# Patient Record
Sex: MALE | Race: WHITE | Employment: OTHER | URBAN - METROPOLITAN AREA
[De-identification: names, ages, dates, MRNs, and addresses within clinical notes are randomized per-mention and may not be internally consistent; named-entity substitution may affect disease eponyms.]

---

## 2017-02-24 ENCOUNTER — HOSPITAL ENCOUNTER (OUTPATIENT)
Dept: WOUND CARE | Age: 62
Discharge: HOME | End: 2017-02-24
Attending: INTERNAL MEDICINE | Admitting: INTERNAL MEDICINE

## 2017-02-24 VITALS
TEMPERATURE: 98.3 F | HEART RATE: 91 BPM | HEIGHT: 70 IN | OXYGEN SATURATION: 93 % | SYSTOLIC BLOOD PRESSURE: 134 MMHG | WEIGHT: 293 LBS | BODY MASS INDEX: 41.95 KG/M2 | DIASTOLIC BLOOD PRESSURE: 78 MMHG | RESPIRATION RATE: 18 BRPM

## 2017-02-24 DIAGNOSIS — L89.301 PRESSURE ULCER OF UNSPECIFIED BUTTOCK, STAGE 1: ICD-10-CM

## 2017-02-24 PROCEDURE — 99213 OFFICE O/P EST LOW 20 MIN: CPT | Performed by: INTERNAL MEDICINE

## 2017-02-24 ASSESSMENT — PAIN DESCRIPTION - PAIN TYPE: TYPE: CHRONIC PAIN

## 2017-02-24 ASSESSMENT — PAIN DESCRIPTION - FREQUENCY: FREQUENCY: CONTINUOUS

## 2017-02-24 ASSESSMENT — PAIN DESCRIPTION - ORIENTATION: ORIENTATION: MID;LOWER

## 2017-02-24 ASSESSMENT — PAIN SCALES - GENERAL: PAINLEVEL_OUTOF10: 8

## 2017-02-24 ASSESSMENT — PAIN DESCRIPTION - ONSET: ONSET: ON-GOING

## 2017-02-24 ASSESSMENT — PAIN DESCRIPTION - DESCRIPTORS: DESCRIPTORS: ACHING;STABBING

## 2017-02-24 ASSESSMENT — PAIN DESCRIPTION - PROGRESSION: CLINICAL_PROGRESSION: NOT CHANGED

## 2017-02-24 ASSESSMENT — PAIN DESCRIPTION - LOCATION: LOCATION: BACK

## 2017-03-01 PROBLEM — L89.301: Status: ACTIVE | Noted: 2017-03-01

## 2017-03-17 PROBLEM — A41.9 SEPSIS (HCC): Status: ACTIVE | Noted: 2017-03-17

## 2017-05-25 ENCOUNTER — HOSPITAL ENCOUNTER (OUTPATIENT)
Dept: WOUND CARE | Age: 62
Discharge: HOME | End: 2017-05-25
Attending: SURGERY | Admitting: SURGERY

## 2017-05-25 VITALS
SYSTOLIC BLOOD PRESSURE: 136 MMHG | BODY MASS INDEX: 39.65 KG/M2 | TEMPERATURE: 98.1 F | HEIGHT: 70 IN | RESPIRATION RATE: 18 BRPM | HEART RATE: 90 BPM | DIASTOLIC BLOOD PRESSURE: 63 MMHG | WEIGHT: 277 LBS

## 2017-05-25 DIAGNOSIS — I83.019 VENOUS ULCER OF RIGHT LEG (HCC): Primary | ICD-10-CM

## 2017-05-25 DIAGNOSIS — L97.919 VENOUS ULCER OF RIGHT LEG (HCC): Primary | ICD-10-CM

## 2017-05-25 RX ORDER — DOXYCYCLINE HYCLATE 100 MG
100 TABLET ORAL 2 TIMES DAILY
Qty: 14 TABLET | Refills: 0 | Status: SHIPPED | OUTPATIENT
Start: 2017-05-25 | End: 2017-07-19 | Stop reason: ALTCHOICE

## 2017-05-25 ASSESSMENT — PAIN SCALES - GENERAL: PAINLEVEL_OUTOF10: 9

## 2017-05-25 ASSESSMENT — PAIN DESCRIPTION - ONSET: ONSET: OTHER (COMMENT)

## 2017-05-25 ASSESSMENT — PAIN DESCRIPTION - DESCRIPTORS: DESCRIPTORS: ACHING;BURNING

## 2017-05-25 ASSESSMENT — PAIN DESCRIPTION - FREQUENCY: FREQUENCY: CONTINUOUS

## 2017-05-25 ASSESSMENT — PAIN DESCRIPTION - ORIENTATION: ORIENTATION: MID;LOWER

## 2017-05-25 ASSESSMENT — PAIN DESCRIPTION - LOCATION: LOCATION: BUTTOCKS;BACK

## 2017-05-25 ASSESSMENT — PAIN DESCRIPTION - PROGRESSION: CLINICAL_PROGRESSION: NOT CHANGED

## 2017-05-25 ASSESSMENT — PAIN DESCRIPTION - PAIN TYPE: TYPE: CHRONIC PAIN

## 2017-06-01 ENCOUNTER — HOSPITAL ENCOUNTER (OUTPATIENT)
Dept: WOUND CARE | Age: 62
Discharge: HOME | End: 2017-06-01
Attending: SURGERY | Admitting: SURGERY

## 2017-06-01 VITALS
BODY MASS INDEX: 39.54 KG/M2 | SYSTOLIC BLOOD PRESSURE: 101 MMHG | DIASTOLIC BLOOD PRESSURE: 62 MMHG | WEIGHT: 276.2 LBS | TEMPERATURE: 98.5 F | HEART RATE: 76 BPM | HEIGHT: 70 IN | RESPIRATION RATE: 18 BRPM

## 2017-06-01 ASSESSMENT — PAIN DESCRIPTION - FREQUENCY: FREQUENCY: CONTINUOUS

## 2017-06-01 ASSESSMENT — PAIN DESCRIPTION - LOCATION: LOCATION: BUTTOCKS

## 2017-06-01 ASSESSMENT — PAIN SCALES - GENERAL: PAINLEVEL_OUTOF10: 7

## 2017-06-01 ASSESSMENT — PAIN DESCRIPTION - DESCRIPTORS: DESCRIPTORS: SORE

## 2017-06-01 ASSESSMENT — PAIN DESCRIPTION - PAIN TYPE: TYPE: CHRONIC PAIN

## 2017-06-01 ASSESSMENT — PAIN DESCRIPTION - ONSET: ONSET: ON-GOING

## 2017-06-01 ASSESSMENT — PAIN DESCRIPTION - ORIENTATION: ORIENTATION: MID

## 2017-06-07 ENCOUNTER — HOSPITAL ENCOUNTER (OUTPATIENT)
Dept: VASCULAR LAB | Age: 62
Discharge: HOME | End: 2017-06-07
Attending: SURGERY | Admitting: SURGERY

## 2017-06-07 DIAGNOSIS — L97.801: ICD-10-CM

## 2017-06-22 ENCOUNTER — HOSPITAL ENCOUNTER (OUTPATIENT)
Dept: WOUND CARE | Age: 62
Discharge: HOME | End: 2017-06-22
Attending: SURGERY | Admitting: SURGERY

## 2017-06-22 VITALS
DIASTOLIC BLOOD PRESSURE: 64 MMHG | HEART RATE: 64 BPM | SYSTOLIC BLOOD PRESSURE: 111 MMHG | TEMPERATURE: 98.3 F | RESPIRATION RATE: 20 BRPM

## 2017-06-22 ASSESSMENT — PAIN DESCRIPTION - DESCRIPTORS: DESCRIPTORS: ACHING

## 2017-06-22 ASSESSMENT — PAIN DESCRIPTION - FREQUENCY: FREQUENCY: CONTINUOUS

## 2017-06-22 ASSESSMENT — PAIN DESCRIPTION - ORIENTATION: ORIENTATION: MID

## 2017-06-22 ASSESSMENT — PAIN DESCRIPTION - ONSET: ONSET: ON-GOING

## 2017-06-22 ASSESSMENT — PAIN DESCRIPTION - LOCATION: LOCATION: LEG;BUTTOCKS

## 2017-06-22 ASSESSMENT — PAIN DESCRIPTION - PROGRESSION: CLINICAL_PROGRESSION: NOT CHANGED

## 2017-06-22 ASSESSMENT — PAIN DESCRIPTION - PAIN TYPE: TYPE: CHRONIC PAIN

## 2017-06-22 ASSESSMENT — PAIN SCALES - GENERAL: PAINLEVEL_OUTOF10: 7

## 2017-06-29 ENCOUNTER — HOSPITAL ENCOUNTER (OUTPATIENT)
Dept: WOUND CARE | Age: 62
Discharge: HOME | End: 2017-06-29
Attending: SURGERY | Admitting: SURGERY

## 2017-06-29 VITALS
WEIGHT: 272 LBS | BODY MASS INDEX: 38.94 KG/M2 | HEIGHT: 70 IN | DIASTOLIC BLOOD PRESSURE: 65 MMHG | TEMPERATURE: 98.7 F | SYSTOLIC BLOOD PRESSURE: 107 MMHG | HEART RATE: 76 BPM | RESPIRATION RATE: 18 BRPM

## 2017-07-12 ENCOUNTER — HOSPITAL ENCOUNTER (OUTPATIENT)
Dept: WOUND CARE | Age: 62
Discharge: HOME | End: 2017-07-12
Attending: INTERNAL MEDICINE | Admitting: INTERNAL MEDICINE

## 2017-07-12 VITALS
TEMPERATURE: 98.2 F | HEART RATE: 73 BPM | DIASTOLIC BLOOD PRESSURE: 59 MMHG | SYSTOLIC BLOOD PRESSURE: 96 MMHG | HEIGHT: 70 IN | RESPIRATION RATE: 20 BRPM

## 2017-07-12 DIAGNOSIS — L97.911 CHRONIC ULCER OF RIGHT LEG, LIMITED TO BREAKDOWN OF SKIN (HCC): ICD-10-CM

## 2017-07-12 DIAGNOSIS — L89.153 PRESSURE ULCER OF COCCYGEAL REGION, STAGE 3 (HCC): ICD-10-CM

## 2017-07-12 PROCEDURE — 97597 DBRDMT OPN WND 1ST 20 CM/<: CPT | Performed by: INTERNAL MEDICINE

## 2017-07-12 ASSESSMENT — PAIN DESCRIPTION - FREQUENCY: FREQUENCY: INTERMITTENT

## 2017-07-12 ASSESSMENT — PAIN DESCRIPTION - PAIN TYPE: TYPE: CHRONIC PAIN

## 2017-07-12 ASSESSMENT — PAIN DESCRIPTION - ORIENTATION: ORIENTATION: MID

## 2017-07-12 ASSESSMENT — PAIN DESCRIPTION - ONSET: ONSET: ON-GOING

## 2017-07-12 ASSESSMENT — PAIN SCALES - GENERAL: PAINLEVEL_OUTOF10: 8

## 2017-07-12 ASSESSMENT — PAIN DESCRIPTION - DESCRIPTORS: DESCRIPTORS: ACHING

## 2017-07-12 ASSESSMENT — PAIN DESCRIPTION - PROGRESSION: CLINICAL_PROGRESSION: NOT CHANGED

## 2017-07-12 ASSESSMENT — PAIN DESCRIPTION - LOCATION: LOCATION: COCCYX

## 2017-07-19 ENCOUNTER — HOSPITAL ENCOUNTER (OUTPATIENT)
Dept: WOUND CARE | Age: 62
Discharge: HOME | End: 2017-07-19
Attending: INTERNAL MEDICINE | Admitting: INTERNAL MEDICINE

## 2017-07-19 VITALS
HEART RATE: 80 BPM | TEMPERATURE: 97.5 F | RESPIRATION RATE: 18 BRPM | DIASTOLIC BLOOD PRESSURE: 64 MMHG | SYSTOLIC BLOOD PRESSURE: 107 MMHG

## 2017-07-19 PROCEDURE — 97597 DBRDMT OPN WND 1ST 20 CM/<: CPT | Performed by: INTERNAL MEDICINE

## 2017-07-20 PROBLEM — L89.301: Status: RESOLVED | Noted: 2017-03-01 | Resolved: 2017-07-20

## 2017-07-20 PROBLEM — L97.911 CHRONIC ULCER OF RIGHT LEG, LIMITED TO BREAKDOWN OF SKIN (HCC): Status: ACTIVE | Noted: 2017-07-20

## 2017-07-20 PROBLEM — A41.9 SEPSIS (HCC): Status: RESOLVED | Noted: 2017-03-17 | Resolved: 2017-07-20

## 2017-07-20 PROBLEM — L89.153 PRESSURE ULCER OF COCCYGEAL REGION, STAGE 3 (HCC): Status: ACTIVE | Noted: 2017-07-20

## 2017-07-26 ENCOUNTER — HOSPITAL ENCOUNTER (OUTPATIENT)
Dept: WOUND CARE | Age: 62
Discharge: HOME | End: 2017-07-26
Attending: INTERNAL MEDICINE | Admitting: INTERNAL MEDICINE

## 2017-07-26 VITALS
HEART RATE: 85 BPM | HEIGHT: 70 IN | RESPIRATION RATE: 20 BRPM | TEMPERATURE: 98.5 F | WEIGHT: 260 LBS | BODY MASS INDEX: 37.22 KG/M2 | SYSTOLIC BLOOD PRESSURE: 102 MMHG | DIASTOLIC BLOOD PRESSURE: 64 MMHG

## 2017-07-26 DIAGNOSIS — L92.9 HYPERGRANULATION: ICD-10-CM

## 2017-07-26 DIAGNOSIS — L97.911 TRAUMATIC ULCER OF RIGHT LOWER LEG, LIMITED TO BREAKDOWN OF SKIN (HCC): ICD-10-CM

## 2017-07-26 PROCEDURE — 11042 DBRDMT SUBQ TIS 1ST 20SQCM/<: CPT | Performed by: INTERNAL MEDICINE

## 2017-07-26 PROCEDURE — 17250 CHEM CAUT OF GRANLTJ TISSUE: CPT | Performed by: INTERNAL MEDICINE

## 2017-07-26 PROCEDURE — 97597 DBRDMT OPN WND 1ST 20 CM/<: CPT | Performed by: INTERNAL MEDICINE

## 2017-07-26 ASSESSMENT — PAIN DESCRIPTION - PAIN TYPE: TYPE: ACUTE PAIN

## 2017-07-26 ASSESSMENT — PAIN DESCRIPTION - LOCATION: LOCATION: COCCYX

## 2017-07-26 ASSESSMENT — PAIN DESCRIPTION - DESCRIPTORS: DESCRIPTORS: BURNING

## 2017-07-26 ASSESSMENT — PAIN DESCRIPTION - ONSET: ONSET: ON-GOING

## 2017-07-26 ASSESSMENT — PAIN SCALES - GENERAL: PAINLEVEL_OUTOF10: 9

## 2017-07-26 ASSESSMENT — PAIN DESCRIPTION - FREQUENCY: FREQUENCY: CONTINUOUS

## 2017-07-26 ASSESSMENT — PAIN DESCRIPTION - ORIENTATION: ORIENTATION: MID

## 2017-08-01 PROBLEM — L92.9 HYPERGRANULATION: Status: ACTIVE | Noted: 2017-08-01

## 2017-08-01 PROBLEM — L97.911 TRAUMATIC ULCER OF RIGHT LOWER LEG, LIMITED TO BREAKDOWN OF SKIN (HCC): Status: ACTIVE | Noted: 2017-08-01

## 2017-08-02 ENCOUNTER — HOSPITAL ENCOUNTER (OUTPATIENT)
Dept: WOUND CARE | Age: 62
Discharge: HOME | End: 2017-08-02
Attending: INTERNAL MEDICINE | Admitting: INTERNAL MEDICINE

## 2017-08-02 VITALS
BODY MASS INDEX: 37.65 KG/M2 | RESPIRATION RATE: 20 BRPM | SYSTOLIC BLOOD PRESSURE: 93 MMHG | TEMPERATURE: 98.5 F | DIASTOLIC BLOOD PRESSURE: 52 MMHG | HEART RATE: 87 BPM | HEIGHT: 70 IN | WEIGHT: 263 LBS

## 2017-08-02 PROCEDURE — 99214 OFFICE O/P EST MOD 30 MIN: CPT | Performed by: INTERNAL MEDICINE

## 2017-08-02 RX ORDER — METRONIDAZOLE 250 MG/1
TABLET ORAL
Qty: 30 TABLET | Refills: 0 | Status: SHIPPED | OUTPATIENT
Start: 2017-08-02 | End: 2017-09-13

## 2017-08-02 RX ORDER — DOXYCYCLINE 100 MG/1
100 TABLET ORAL 2 TIMES DAILY
Qty: 14 TABLET | Refills: 0 | Status: SHIPPED | OUTPATIENT
Start: 2017-08-02 | End: 2017-08-09

## 2017-08-02 ASSESSMENT — PAIN SCALES - GENERAL: PAINLEVEL_OUTOF10: 10

## 2017-08-02 ASSESSMENT — PAIN DESCRIPTION - DESCRIPTORS: DESCRIPTORS: BURNING;SHARP

## 2017-08-02 ASSESSMENT — PAIN DESCRIPTION - PAIN TYPE: TYPE: ACUTE PAIN

## 2017-08-02 ASSESSMENT — PAIN DESCRIPTION - ORIENTATION: ORIENTATION: MID

## 2017-08-02 ASSESSMENT — PAIN DESCRIPTION - LOCATION: LOCATION: BUTTOCKS

## 2017-08-02 ASSESSMENT — PAIN DESCRIPTION - PROGRESSION: CLINICAL_PROGRESSION: GRADUALLY WORSENING

## 2017-08-02 ASSESSMENT — PAIN DESCRIPTION - FREQUENCY: FREQUENCY: CONTINUOUS

## 2017-08-02 ASSESSMENT — PAIN DESCRIPTION - ONSET: ONSET: PROGRESSIVE

## 2017-08-08 PROBLEM — L97.911 TRAUMATIC ULCER OF RIGHT LOWER LEG, LIMITED TO BREAKDOWN OF SKIN (HCC): Status: RESOLVED | Noted: 2017-08-01 | Resolved: 2017-08-08

## 2017-08-08 PROBLEM — L89.45: Status: ACTIVE | Noted: 2017-07-20

## 2017-08-08 PROBLEM — L92.9 HYPERGRANULATION: Status: RESOLVED | Noted: 2017-08-01 | Resolved: 2017-08-08

## 2017-08-09 ENCOUNTER — HOSPITAL ENCOUNTER (OUTPATIENT)
Dept: WOUND CARE | Age: 62
Discharge: HOME | End: 2017-08-09
Attending: INTERNAL MEDICINE | Admitting: INTERNAL MEDICINE

## 2017-08-09 VITALS
DIASTOLIC BLOOD PRESSURE: 88 MMHG | SYSTOLIC BLOOD PRESSURE: 130 MMHG | HEART RATE: 82 BPM | RESPIRATION RATE: 20 BRPM | BODY MASS INDEX: 36.51 KG/M2 | WEIGHT: 255 LBS | TEMPERATURE: 97.9 F | HEIGHT: 70 IN

## 2017-08-09 PROCEDURE — 11042 DBRDMT SUBQ TIS 1ST 20SQCM/<: CPT | Performed by: INTERNAL MEDICINE

## 2017-08-09 ASSESSMENT — PAIN DESCRIPTION - LOCATION: LOCATION: CHEST

## 2017-08-09 ASSESSMENT — PAIN DESCRIPTION - PROGRESSION: CLINICAL_PROGRESSION: NOT CHANGED

## 2017-08-09 ASSESSMENT — PAIN SCALES - GENERAL: PAINLEVEL_OUTOF10: 9

## 2017-08-09 ASSESSMENT — PAIN DESCRIPTION - ORIENTATION: ORIENTATION: MID

## 2017-08-09 ASSESSMENT — PAIN DESCRIPTION - FREQUENCY: FREQUENCY: CONTINUOUS

## 2017-08-09 ASSESSMENT — PAIN DESCRIPTION - PAIN TYPE: TYPE: CHRONIC PAIN

## 2017-08-09 ASSESSMENT — PAIN DESCRIPTION - ONSET: ONSET: ON-GOING

## 2017-08-09 ASSESSMENT — PAIN DESCRIPTION - DESCRIPTORS: DESCRIPTORS: BURNING;SHARP

## 2017-08-16 ENCOUNTER — HOSPITAL ENCOUNTER (OUTPATIENT)
Dept: WOUND CARE | Age: 62
Discharge: HOME | End: 2017-08-16
Attending: INTERNAL MEDICINE | Admitting: INTERNAL MEDICINE

## 2017-08-16 VITALS
HEIGHT: 70 IN | SYSTOLIC BLOOD PRESSURE: 100 MMHG | RESPIRATION RATE: 20 BRPM | DIASTOLIC BLOOD PRESSURE: 68 MMHG | TEMPERATURE: 97.3 F | HEART RATE: 79 BPM

## 2017-08-16 PROCEDURE — 11045 DBRDMT SUBQ TISS EACH ADDL: CPT | Performed by: INTERNAL MEDICINE

## 2017-08-16 PROCEDURE — 11042 DBRDMT SUBQ TIS 1ST 20SQCM/<: CPT | Performed by: INTERNAL MEDICINE

## 2017-08-16 ASSESSMENT — PAIN DESCRIPTION - PROGRESSION: CLINICAL_PROGRESSION: NOT CHANGED

## 2017-08-16 ASSESSMENT — PAIN DESCRIPTION - FREQUENCY: FREQUENCY: CONTINUOUS

## 2017-08-16 ASSESSMENT — PAIN DESCRIPTION - DESCRIPTORS: DESCRIPTORS: ACHING;SORE;TENDER

## 2017-08-16 ASSESSMENT — PAIN DESCRIPTION - PAIN TYPE: TYPE: CHRONIC PAIN

## 2017-08-16 ASSESSMENT — PAIN SCALES - GENERAL: PAINLEVEL_OUTOF10: 7

## 2017-08-16 ASSESSMENT — PAIN DESCRIPTION - ORIENTATION: ORIENTATION: MID

## 2017-08-16 ASSESSMENT — PAIN DESCRIPTION - ONSET: ONSET: ON-GOING

## 2017-08-16 ASSESSMENT — PAIN DESCRIPTION - LOCATION: LOCATION: BUTTOCKS

## 2017-08-21 PROBLEM — L97.911 CHRONIC ULCER OF RIGHT LEG, LIMITED TO BREAKDOWN OF SKIN (HCC): Status: RESOLVED | Noted: 2017-07-20 | Resolved: 2017-08-21

## 2017-08-23 ENCOUNTER — HOSPITAL ENCOUNTER (OUTPATIENT)
Dept: WOUND CARE | Age: 62
Discharge: HOME | End: 2017-08-23
Attending: INTERNAL MEDICINE | Admitting: INTERNAL MEDICINE

## 2017-08-23 VITALS
WEIGHT: 257 LBS | DIASTOLIC BLOOD PRESSURE: 62 MMHG | TEMPERATURE: 97.2 F | BODY MASS INDEX: 36.79 KG/M2 | RESPIRATION RATE: 18 BRPM | HEIGHT: 70 IN | HEART RATE: 69 BPM | SYSTOLIC BLOOD PRESSURE: 101 MMHG

## 2017-08-23 PROCEDURE — 11042 DBRDMT SUBQ TIS 1ST 20SQCM/<: CPT | Performed by: INTERNAL MEDICINE

## 2017-08-23 ASSESSMENT — PAIN SCALES - GENERAL: PAINLEVEL_OUTOF10: 8

## 2017-08-23 ASSESSMENT — PAIN DESCRIPTION - PAIN TYPE: TYPE: CHRONIC PAIN

## 2017-08-23 ASSESSMENT — PAIN DESCRIPTION - FREQUENCY: FREQUENCY: CONTINUOUS

## 2017-08-23 ASSESSMENT — PAIN DESCRIPTION - PROGRESSION: CLINICAL_PROGRESSION: NOT CHANGED

## 2017-08-23 ASSESSMENT — PAIN DESCRIPTION - ONSET: ONSET: ON-GOING

## 2017-08-23 ASSESSMENT — PAIN DESCRIPTION - ORIENTATION: ORIENTATION: MID

## 2017-08-23 ASSESSMENT — PAIN DESCRIPTION - DESCRIPTORS: DESCRIPTORS: BURNING;SHOOTING

## 2017-08-23 ASSESSMENT — PAIN DESCRIPTION - LOCATION: LOCATION: BUTTOCKS

## 2017-08-29 PROBLEM — L89.44 PRESSURE ULCER OF CONTIGUOUS REGION INVOLVING BACK AND BUTTOCK, STAGE 4 (HCC): Status: ACTIVE | Noted: 2017-07-20

## 2017-08-30 ENCOUNTER — HOSPITAL ENCOUNTER (OUTPATIENT)
Dept: WOUND CARE | Age: 62
Discharge: HOME | End: 2017-08-30
Attending: INTERNAL MEDICINE | Admitting: INTERNAL MEDICINE

## 2017-08-30 VITALS
SYSTOLIC BLOOD PRESSURE: 92 MMHG | TEMPERATURE: 97.3 F | HEIGHT: 70 IN | WEIGHT: 261 LBS | RESPIRATION RATE: 14 BRPM | DIASTOLIC BLOOD PRESSURE: 54 MMHG | BODY MASS INDEX: 37.37 KG/M2 | HEART RATE: 68 BPM

## 2017-08-30 LAB
GLUCOSE BLD-MCNC: 175 MG/DL (ref 70–99)
PERFORMED ON: ABNORMAL

## 2017-08-30 PROCEDURE — 11042 DBRDMT SUBQ TIS 1ST 20SQCM/<: CPT | Performed by: INTERNAL MEDICINE

## 2017-08-30 ASSESSMENT — PAIN DESCRIPTION - FREQUENCY: FREQUENCY: CONTINUOUS

## 2017-08-30 ASSESSMENT — PAIN DESCRIPTION - PROGRESSION: CLINICAL_PROGRESSION: NOT CHANGED

## 2017-08-30 ASSESSMENT — PAIN DESCRIPTION - LOCATION: LOCATION: COCCYX

## 2017-08-30 ASSESSMENT — PAIN SCALES - GENERAL: PAINLEVEL_OUTOF10: 7

## 2017-08-30 ASSESSMENT — PAIN DESCRIPTION - DESCRIPTORS: DESCRIPTORS: BURNING

## 2017-08-30 ASSESSMENT — PAIN DESCRIPTION - PAIN TYPE: TYPE: CHRONIC PAIN

## 2017-08-30 ASSESSMENT — PAIN DESCRIPTION - ONSET: ONSET: ON-GOING

## 2017-08-30 ASSESSMENT — PAIN DESCRIPTION - ORIENTATION: ORIENTATION: MID

## 2017-09-06 ENCOUNTER — HOSPITAL ENCOUNTER (OUTPATIENT)
Dept: WOUND CARE | Age: 62
Discharge: HOME | End: 2017-09-06
Attending: INTERNAL MEDICINE | Admitting: INTERNAL MEDICINE

## 2017-09-06 VITALS
RESPIRATION RATE: 17 BRPM | DIASTOLIC BLOOD PRESSURE: 60 MMHG | WEIGHT: 256.2 LBS | SYSTOLIC BLOOD PRESSURE: 90 MMHG | TEMPERATURE: 98.7 F | HEART RATE: 81 BPM | BODY MASS INDEX: 36.68 KG/M2 | HEIGHT: 70 IN

## 2017-09-06 PROCEDURE — 11043 DBRDMT MUSC&/FSCA 1ST 20/<: CPT | Performed by: INTERNAL MEDICINE

## 2017-09-06 RX ORDER — EPLERENONE 25 MG/1
50 TABLET, FILM COATED ORAL DAILY
COMMUNITY
End: 2018-05-18 | Stop reason: ALTCHOICE

## 2017-09-06 ASSESSMENT — PAIN DESCRIPTION - DESCRIPTORS: DESCRIPTORS: BURNING

## 2017-09-06 ASSESSMENT — PAIN DESCRIPTION - ORIENTATION: ORIENTATION: MID

## 2017-09-06 ASSESSMENT — PAIN DESCRIPTION - PROGRESSION: CLINICAL_PROGRESSION: NOT CHANGED

## 2017-09-06 ASSESSMENT — PAIN DESCRIPTION - LOCATION: LOCATION: COCCYX

## 2017-09-06 ASSESSMENT — PAIN DESCRIPTION - ONSET: ONSET: ON-GOING

## 2017-09-06 ASSESSMENT — PAIN SCALES - GENERAL: PAINLEVEL_OUTOF10: 8

## 2017-09-06 ASSESSMENT — PAIN DESCRIPTION - FREQUENCY: FREQUENCY: CONTINUOUS

## 2017-09-06 ASSESSMENT — PAIN DESCRIPTION - PAIN TYPE: TYPE: CHRONIC PAIN

## 2017-09-13 ENCOUNTER — HOSPITAL ENCOUNTER (OUTPATIENT)
Dept: WOUND CARE | Age: 62
Discharge: HOME | End: 2017-09-13
Attending: INTERNAL MEDICINE | Admitting: INTERNAL MEDICINE

## 2017-09-13 VITALS
OXYGEN SATURATION: 93 % | RESPIRATION RATE: 18 BRPM | HEIGHT: 70 IN | SYSTOLIC BLOOD PRESSURE: 106 MMHG | BODY MASS INDEX: 36.08 KG/M2 | TEMPERATURE: 97.6 F | DIASTOLIC BLOOD PRESSURE: 73 MMHG | WEIGHT: 252 LBS | HEART RATE: 80 BPM

## 2017-09-13 PROCEDURE — 11043 DBRDMT MUSC&/FSCA 1ST 20/<: CPT | Performed by: INTERNAL MEDICINE

## 2017-09-13 RX ORDER — PREDNISONE 1 MG/1
4 TABLET ORAL DAILY
COMMUNITY
End: 2017-09-29

## 2017-09-13 RX ORDER — CIPROFLOXACIN 500 MG/1
500 TABLET, FILM COATED ORAL 2 TIMES DAILY
COMMUNITY
End: 2017-10-04 | Stop reason: ALTCHOICE

## 2017-09-13 RX ORDER — METRONIDAZOLE 250 MG/1
TABLET ORAL
Qty: 30 TABLET | Refills: 0 | Status: SHIPPED | OUTPATIENT
Start: 2017-09-13 | End: 2017-12-15

## 2017-09-13 RX ORDER — METOPROLOL SUCCINATE 25 MG/1
25 TABLET, EXTENDED RELEASE ORAL DAILY
COMMUNITY
End: 2018-01-31 | Stop reason: ALTCHOICE

## 2017-09-13 ASSESSMENT — PAIN DESCRIPTION - FREQUENCY: FREQUENCY: CONTINUOUS

## 2017-09-13 ASSESSMENT — PAIN DESCRIPTION - PROGRESSION: CLINICAL_PROGRESSION: NOT CHANGED

## 2017-09-13 ASSESSMENT — PAIN DESCRIPTION - PAIN TYPE: TYPE: CHRONIC PAIN

## 2017-09-13 ASSESSMENT — PAIN DESCRIPTION - ORIENTATION: ORIENTATION: MID

## 2017-09-13 ASSESSMENT — PAIN DESCRIPTION - LOCATION: LOCATION: COCCYX

## 2017-09-13 ASSESSMENT — PAIN DESCRIPTION - ONSET: ONSET: ON-GOING

## 2017-09-13 ASSESSMENT — PAIN SCALES - GENERAL: PAINLEVEL_OUTOF10: 7

## 2017-09-13 ASSESSMENT — PAIN DESCRIPTION - DESCRIPTORS: DESCRIPTORS: BURNING

## 2017-09-19 PROBLEM — L89.154 DECUBITUS ULCER OF SACRAL REGION, STAGE 4 (HCC): Status: ACTIVE | Noted: 2017-07-20

## 2017-09-20 ENCOUNTER — HOSPITAL ENCOUNTER (OUTPATIENT)
Dept: WOUND CARE | Age: 62
Discharge: HOME | End: 2017-09-20
Attending: INTERNAL MEDICINE | Admitting: INTERNAL MEDICINE

## 2017-09-20 VITALS
WEIGHT: 263 LBS | SYSTOLIC BLOOD PRESSURE: 98 MMHG | HEART RATE: 85 BPM | BODY MASS INDEX: 37.65 KG/M2 | RESPIRATION RATE: 17 BRPM | OXYGEN SATURATION: 94 % | TEMPERATURE: 98.9 F | HEIGHT: 70 IN | DIASTOLIC BLOOD PRESSURE: 64 MMHG

## 2017-09-20 PROCEDURE — 11042 DBRDMT SUBQ TIS 1ST 20SQCM/<: CPT | Performed by: INTERNAL MEDICINE

## 2017-09-20 ASSESSMENT — PAIN SCALES - GENERAL: PAINLEVEL_OUTOF10: 7

## 2017-09-20 ASSESSMENT — PAIN DESCRIPTION - PAIN TYPE: TYPE: CHRONIC PAIN

## 2017-09-20 ASSESSMENT — PAIN DESCRIPTION - DESCRIPTORS: DESCRIPTORS: BURNING

## 2017-09-20 ASSESSMENT — PAIN DESCRIPTION - ONSET: ONSET: ON-GOING

## 2017-09-20 ASSESSMENT — PAIN DESCRIPTION - FREQUENCY: FREQUENCY: CONTINUOUS

## 2017-09-20 ASSESSMENT — PAIN DESCRIPTION - ORIENTATION: ORIENTATION: MID

## 2017-09-20 ASSESSMENT — PAIN DESCRIPTION - LOCATION: LOCATION: COCCYX

## 2017-09-20 ASSESSMENT — PAIN DESCRIPTION - PROGRESSION: CLINICAL_PROGRESSION: NOT CHANGED

## 2017-09-27 ENCOUNTER — HOSPITAL ENCOUNTER (OUTPATIENT)
Dept: WOUND CARE | Age: 62
Discharge: HOME | End: 2017-09-27
Attending: INTERNAL MEDICINE | Admitting: INTERNAL MEDICINE

## 2017-09-29 ENCOUNTER — HOSPITAL ENCOUNTER (OUTPATIENT)
Dept: WOUND CARE | Age: 62
Discharge: HOME | End: 2017-09-29
Attending: INTERNAL MEDICINE | Admitting: INTERNAL MEDICINE

## 2017-09-29 VITALS
TEMPERATURE: 98.6 F | HEART RATE: 76 BPM | WEIGHT: 262 LBS | SYSTOLIC BLOOD PRESSURE: 111 MMHG | BODY MASS INDEX: 37.59 KG/M2 | RESPIRATION RATE: 16 BRPM | DIASTOLIC BLOOD PRESSURE: 72 MMHG

## 2017-09-29 PROCEDURE — 11043 DBRDMT MUSC&/FSCA 1ST 20/<: CPT | Performed by: INTERNAL MEDICINE

## 2017-09-29 PROCEDURE — 97605 NEG PRS WND THER DME<=50SQCM: CPT | Performed by: INTERNAL MEDICINE

## 2017-09-29 RX ORDER — POLYETHYLENE GLYCOL 3350 17 G/17G
17 POWDER, FOR SOLUTION ORAL DAILY PRN
COMMUNITY
End: 2018-10-24 | Stop reason: ALTCHOICE

## 2017-09-29 ASSESSMENT — PAIN DESCRIPTION - ONSET: ONSET: ON-GOING

## 2017-09-29 ASSESSMENT — PAIN DESCRIPTION - PROGRESSION: CLINICAL_PROGRESSION: NOT CHANGED

## 2017-09-29 ASSESSMENT — PAIN DESCRIPTION - LOCATION: LOCATION: COCCYX

## 2017-09-29 ASSESSMENT — PAIN DESCRIPTION - ORIENTATION: ORIENTATION: MID

## 2017-09-29 ASSESSMENT — PAIN DESCRIPTION - PAIN TYPE: TYPE: CHRONIC PAIN

## 2017-09-29 ASSESSMENT — PAIN DESCRIPTION - FREQUENCY: FREQUENCY: CONTINUOUS

## 2017-09-29 ASSESSMENT — PAIN DESCRIPTION - DESCRIPTORS: DESCRIPTORS: BURNING

## 2017-09-29 ASSESSMENT — PAIN SCALES - GENERAL: PAINLEVEL_OUTOF10: 7

## 2017-09-29 NOTE — IP AVS SNAPSHOT
pregabalin 100 MG capsule   Commonly known as:  LYRICA       torsemide 20 MG tablet   Commonly known as:  DEMADEX       zafirlukast 20 MG tablet   Commonly known as:  Dorcas Nielsen

## 2017-09-29 NOTE — IP AVS SNAPSHOT
Patient Information     Patient Name KARELY Ann Earing 1955      Important Information for Heart Failure       If your condition worsens or if you have any concerns, call your doctor or seek emergency medical services (dial 9-1-1) as needed. If you have any of the following symptoms/conditions, call your doctor. Call your primary care physician to obtain results of outstanding lab tests, cultures, x-rays, or other tests. If you have a current diagnosis or history of any of the following, please review the information carefully. HEART FAILURE PATIENTS:   DISCHARGE WEIGHT: Weight: 262 lb (118.8 kg)  Record daily weights. Notify your doctor if you have a weight gain 2 pounds in a day, or 3-5 pounds in 1 week. Notify your doctor for increased shortness of breath, or swelling in legs or feet. Follow a low sodium diet. Resume normal activity unless otherwise instructed by your doctor.

## 2017-09-29 NOTE — IP AVS SNAPSHOT
glipiZIDE 10 MG extended release tablet   Commonly known as:  GLUCOTROL XL   Take 10 mg by mouth 2 times daily                                         insulin glargine 100 UNIT/ML injection vial   Commonly known as:  LANTUS   Inject 30 Units into the skin nightly                                         metoprolol succinate 25 MG extended release tablet   Commonly known as:  TOPROL XL   Take 25 mg by mouth daily Indications: 1.5 tabs=37.5 mg                                         metroNIDAZOLE 250 MG tablet   Commonly known as:  FLAGYL   Crush one tablet and sprinkle fine powder into wound bed at each dressing change, PRN malodor (three times weekly). moexipril 15 MG tablet   Commonly known as:  UNIVASC   Take 1 tablet by mouth daily                                         nitroGLYCERIN 0.4 MG SL tablet   Commonly known as:  NITROSTAT   Place 0.4 mg under the tongue every 5 minutes as needed.                                          NONFORMULARY   1 applicator by Other route continuous Pain pump continuous infusion  Hydromorphone 20mg/ml and Bupivacaine 40mg/ml Continuous Infusion of:   Hydromorphone 5.793mg/day    Daily dose Bupivacaine 11.585mg/day                                         NOVOLIN R 100 UNIT/ML injection   Generic drug:  insulin regular   Inject into the skin See Admin Instructions U 20-30 UNITS 30 MINUTES PRIOR TO EACH MEAL OR UTD                                         omeprazole 40 MG delayed release capsule   Commonly known as:  PRILOSEC   Take 40 mg by mouth daily                                         OXYGEN   6 L by Tracheal Tube route continuous Indications: 4 to 6 L 4 to 6 L per Pt                                         polyethylene glycol packet   Commonly known as:  GLYCOLAX   Take 17 g by mouth once                                         potassium chloride 20 MEQ extended release tablet   Commonly known as:  Winston Flood Take 20 mEq by mouth daily                                         pravastatin 40 MG tablet   Commonly known as:  PRAVACHOL   Take 40 mg by mouth daily. predniSONE 1 MG tablet   Commonly known as:  DELTASONE   Take 4 mg by mouth daily Indications: taper dose                                         pregabalin 100 MG capsule   Commonly known as:  LYRICA   Take 100 mg by mouth 3 times daily                                         torsemide 20 MG tablet   Commonly known as:  DEMADEX   Take 2 tablets by mouth daily                                         zafirlukast 20 MG tablet   Commonly known as:  ACCOLATE   Take 20 mg by mouth 2 times daily                                                 Allergies as of 9/29/2017        Reactions    Aspartame And Phenylalanine Anaphylaxis    Erythromycin Anaphylaxis    Arthrotec [Diclofenac-misoprostol]     Betadine [Povidone Iodine]     Cefuroxime Axetil     Claritin [Loratadine]     Clindamycin/lincomycin     Feldene [Piroxicam]     Indocin [Indometacin Sodium]     Iodine     Pcn [Penicillins]     Pletal [Cilostazol]     Robaxin [Methocarbamol]     Tenex [Guanfacine Hcl]     Vasotec     Vioxx       Immunizations as of 9/29/2017     Name Date Dose VIS Date Route    Pneumococcal Polysaccharide ([id number]) 8/31/2016 0.5 mL 4/24/2015 Intramuscular    Pneumococcal Polysaccharide (Sqhzuucyo28) 3/10/2016 0.5 mL 4/24/2015 Intramuscular    Pneumococcal Polysaccharide (Bprykjvxq29) 4/9/2012 0.5 mL 10/6/2009 Intramuscular      Last Vitals          Most Recent Value    Temp  98.6 °F (37 °C)    Pulse  76    Resp  16    BP  111/72         After Visit Summary    This summary was created for you. Thank you for entrusting your care to us.   The following information includes details about your hospital/visit stay along with steps you should take to help with your recovery once you leave the hospital.  In redness, increased warmth, increased pain, increased drainage, odor, or fever) or have questions about your wound care, please contact the [address] [place] [address] at [phone] Monday-Thursday from 8:00 am  4:30 pm, or Friday from 8:00 am - 2:30 pm. If you need help with your wound outside these hours and cannot wait until we are again available, contact your home-care company (if applicable), your PCP, or go to the nearest emergency room. Important information for a smoker       SMOKING: QUIT SMOKING. THIS IS THE MOST IMPORTANT ACTION YOU CAN TAKE TO IMPROVE YOUR CURRENT AND FUTURE HEALTH. Call the [address] [place] [address] at [facility] ([phone])    Smoking harms nonsmokers. When nonsmokers are around people who smoke, they absorb nicotine, carbon monoxide, and other ingredients of tobacco smoke. DO NOT SMOKE AROUND CHILDREN     Children exposed to secondhand smoke are at an increased risk of:  Sudden Infant Death Syndrome (SIDS), acute respiratory infections, inflammation of the middle ear, and severe asthma. Over a longer time, it causes heart disease and lung cancer. There is no safe level of exposure to secondhand smoke. [employer] Signup     [employer] allows you to send messages to your doctor, view your test results, renew your prescriptions, schedule appointments, view visit notes, and more. How Do I Sign Up? 1. In your Internet browser, go to [email or web address]  2. Click on the Sign Up Now link in the Sign In box. You will see the New Member Sign Up page. 3. Enter your [employer] Access Code exactly as it appears below. You will not need to use this code after youve completed the sign-up process. If you do not sign up before the expiration date, you must request a new code. [employer] Access Code: [email or web address]  Expires: 11/28/2017 11:46 AM    4.  Enter your Social Security Number ([id number]) and Date of Birth Patient Signature/Responsible Adult:____________________    Clinician Signature:_____________________    Date:_____________________    Time:_____________________

## 2017-10-04 ENCOUNTER — HOSPITAL ENCOUNTER (OUTPATIENT)
Dept: WOUND CARE | Age: 62
Discharge: HOME | End: 2017-10-04
Attending: INTERNAL MEDICINE | Admitting: INTERNAL MEDICINE

## 2017-10-04 VITALS
DIASTOLIC BLOOD PRESSURE: 58 MMHG | TEMPERATURE: 98 F | SYSTOLIC BLOOD PRESSURE: 119 MMHG | RESPIRATION RATE: 18 BRPM | HEART RATE: 72 BPM

## 2017-10-04 DIAGNOSIS — L89.622 PRESSURE ULCER OF LEFT HEEL, STAGE 2 (HCC): ICD-10-CM

## 2017-10-04 PROCEDURE — 11043 DBRDMT MUSC&/FSCA 1ST 20/<: CPT | Performed by: INTERNAL MEDICINE

## 2017-10-04 PROCEDURE — 97597 DBRDMT OPN WND 1ST 20 CM/<: CPT | Performed by: INTERNAL MEDICINE

## 2017-10-04 ASSESSMENT — PAIN DESCRIPTION - LOCATION: LOCATION: COCCYX

## 2017-10-04 ASSESSMENT — PAIN DESCRIPTION - ORIENTATION: ORIENTATION: MID

## 2017-10-04 ASSESSMENT — PAIN DESCRIPTION - PROGRESSION: CLINICAL_PROGRESSION: NOT CHANGED

## 2017-10-04 ASSESSMENT — PAIN SCALES - GENERAL: PAINLEVEL_OUTOF10: 7

## 2017-10-04 ASSESSMENT — PAIN DESCRIPTION - ONSET: ONSET: ON-GOING

## 2017-10-04 ASSESSMENT — PAIN DESCRIPTION - FREQUENCY: FREQUENCY: CONTINUOUS

## 2017-10-04 ASSESSMENT — PAIN DESCRIPTION - PAIN TYPE: TYPE: CHRONIC PAIN

## 2017-10-04 ASSESSMENT — PAIN DESCRIPTION - DESCRIPTORS: DESCRIPTORS: ACHING;BURNING

## 2017-10-04 NOTE — IP AVS SNAPSHOT
After Visit Summary  (Discharge Instructions)    Medication List for Home    Based on the information you provided to us as well as any changes during this visit, the following is your updated medication list.  Compare this with your prescription bottles at home. If you have any questions or concerns, contact your primary care physician's office. Daily Medication List (This medication list can be shared with any healthcare provider who is helping you manage your medications)      ASK your doctor about these medications if you have questions        Last Dose    Next Dose Due AM NOON PM NIGHT    ADVAIR DISKUS 250-50 MCG/DOSE Aepb   Generic drug:  fluticasone-salmeterol   Inhale 1 puff into the lungs 2 times daily                                         ALBUTEROL IN   Inhale 2 each into the lungs 2 times daily                                         allopurinol 300 MG tablet   Commonly known as:  ZYLOPRIM   Take 300 mg by mouth daily                                         ARIPiprazole 15 MG tablet   Commonly known as:  ABILIFY   Take 30 mg by mouth daily. buPROPion 200 MG extended release tablet   Commonly known as:  WELLBUTRIN SR   Take 1 tablet by mouth 2 times daily                                         docusate sodium 100 MG capsule   Commonly known as:  COLACE   Take 100 mg by mouth 2 times daily                                         eplerenone 25 MG tablet   Commonly known as:  INSPRA   Take 50 mg by mouth daily                                         FOCALIN 10 MG tablet   Generic drug:  dexmethylphenidate   Take 20 mg by mouth 2 times daily  .                                          gabapentin 300 MG capsule   Commonly known as:  NEURONTIN   Take 600 mg by mouth daily                                         glipiZIDE 10 MG extended release tablet   Commonly known as:  GLUCOTROL XL   Take 10 mg by mouth 2 times daily insulin glargine 100 UNIT/ML injection vial   Commonly known as:  LANTUS   Inject 30 Units into the skin nightly                                         metoprolol succinate 25 MG extended release tablet   Commonly known as:  TOPROL XL   Take 25 mg by mouth daily Indications: 1.5 tabs=37.5 mg                                         metroNIDAZOLE 250 MG tablet   Commonly known as:  FLAGYL   Crush one tablet and sprinkle fine powder into wound bed at each dressing change, PRN malodor (three times weekly). moexipril 15 MG tablet   Commonly known as:  UNIVASC   Take 1 tablet by mouth daily                                         nitroGLYCERIN 0.4 MG SL tablet   Commonly known as:  NITROSTAT   Place 0.4 mg under the tongue every 5 minutes as needed. NONFORMULARY   1 applicator by Other route continuous Pain pump continuous infusion  Hydromorphone 20mg/ml and Bupivacaine 40mg/ml Continuous Infusion of:   Hydromorphone 5.793mg/day    Daily dose Bupivacaine 11.585mg/day                                         NOVOLIN R 100 UNIT/ML injection   Generic drug:  insulin regular   Inject into the skin See Admin Instructions U 20-30 UNITS 30 MINUTES PRIOR TO EACH MEAL OR UTD                                         omeprazole 40 MG delayed release capsule   Commonly known as:  PRILOSEC   Take 40 mg by mouth daily                                         OXYGEN   6 L by Tracheal Tube route continuous Indications: 4 to 6 L 4 to 6 L per Pt                                         polyethylene glycol packet   Commonly known as:  GLYCOLAX   Take 17 g by mouth once                                         potassium chloride 20 MEQ extended release tablet   Commonly known as:  KLOR-CON M   Take 20 mEq by mouth daily                                         pravastatin 40 MG tablet   Commonly known as:  PRAVACHOL   Take 40 mg by mouth daily. We hope you will choose us in the future for your healthcare needs. Patient Information     Patient Name KARELY Noonan 1955      Care Provided at:     Name Address Phone       [facility] [address] [phone]            Your Visit    Here you will find information about your visit, including the reason for your visit. Please take this sheet with you when you visit your doctor or other health care provider in the future. It will help determine the best possible medical care for you at that time. If you have any questions once you leave the hospital, please call the department phone number listed below. Why you were here     Your primary diagnosis was:  Not on File      Visit Information     Date & Time Provider Department Dept. Phone    10/4/2017 Cesar Herrera MD [facility] Wound Care [phone]       Follow-up Appointments    Below is a list of your follow-up and future appointments. This may not be a complete list as you may have made appointments directly with providers that we are not aware of or your providers may have made some for you. Please call your providers to confirm appointments. It is important to keep your appointments. Please bring your current insurance card, photo ID, co-pay, and all medication bottles to your appointment. If self-pay, payment is expected at the time of service. Preventive Care        Date Due    Hepatitis C screening is recommended for all adults regardless of risk factors born between [facility] at least once (lifetime) who have never been tested. 1955    Diabetic Foot Exam 1966    Eye Exam By An Eye Doctor 1966    Cholesterol Screening 1966    HIV screening is recommended for all people regardless of risk factors  aged 15-65 years at least once (lifetime) who have never been HIV tested.  1971    Tetanus Combination Vaccine (1 - Tdap) 1975    Colonoscopy 2006 Zoster Vaccine 5/7/2016    Yearly Flu Vaccine (1) 9/1/2017    Hemoglobin A1C (Test For Long-Term Glucose Control) 3/17/2018                 Care Plan Once You Return Home    This section includes instructions you will need to follow once you leave the hospital.  Your care team will discuss these with you, so you and those caring for you know how to best care for your health needs at home. This section may also include educational information about certain health topics that may be of help to you. Discharge [address] [facility] Physician Orders and Discharge 800 [facility]  [address], [address]  [place], [facility]  Telephone: [phone] Fax: [phone]        Please fax these orders to: [address] for dressing changes as ordered below and please have Home health aides to come and help pt bath. Your wound-care supplies will be provided by: [address]     NAME: Debora Levin   YOB: 1955  PRIMARY DIAGNOSIS FOR WOUND CARE CENTER:  Pressure Ulcer-Stage 4 . ANESTHETIC APPLIED IN [facility] TODAY:  Lidocaine 4% Topical .     Cellular or tissue product history, if applicable:      None     Prior testing or prescriptions, if applicable:      DANIELLE 2[birth year] Right 0.95 ; Left 1.08        Wound cleansing:   Do not scrub or use excessive force. Wash hands with soap and water before and after dressing changes. Prior to applying a clean dressing, cleanse wound with normal saline, wound cleanser, or mild soap and water. Ask your physician or nurse before getting the wound(s) wet in the shower. Wound care for home:     Coccyx Wound:  Apply Nexodyn spray to wound bed, let sit for 1 minute, then apply dressing as ordered below.        Crushed Flagyl powder as needed for malodor  No skin prep needed for NPWT dressing  -120 mmHg Continuous NPWT with green foam  HHC to change Friday & Monday Also apply Interdry Ag between scrotum & groin area (Does NOT need to wick to air) PRN    Left Lateral Heel:  Zinc to lexi-wound  Aquacel Ag to wound   Cover with dry dressing  HHC to change on Friday & Monday. Please provide with Prevlon boot today 10/4/17  Wear this at all times when at rest. Do NOT walk in this boot, it will cause you to fall. Substituted dressings applied in the [address] [place] [address] today, if applicable:      Coccyx: Triad, cover with dry dressing today in [address] [place] [address],[address] only     New orders for this week (labs, imaging, medications, etc.):     ROHO cushion use at all times when sitting. Use your pump at home for your [facility] inflation. [facility] AT [facility] Nurse to help patient re-evaluate proper inflation of ROHO cushion during weekly visits. Continue to use wedge pillows to help with repositioning. Reposition from side to side every 2 hours. Do NOT sit for more than 1-2 hours at a time. Hold NPWT today in [address] [place] [address],[address] [place] [address] 10/4/17 & will investigate if unit needs to be changed out with the company. C to apply NPWT on Friday 10/6/17. Additional instructions for specific diagnoses:     General comments for pressure ulcers: * Make sure you stay well-hydrated, and maintain good protein intake. * Reposition at least every two hours, to keep from having pressure in one spot for too long. * If you are not sure whether you have the best offloading surfaces (mattress, mattress overlay, chair cushion, heel-protector boots, etc), please ask. * Moisturize your skin regularly with Vaseline, Aquaphor, Aveeno, CeraVe, Cetaphil, Eucerin, Lubriderm, etc; but keep the skin between your toes dry. * If you smoke, your wound can not heal properly -- please talk with us when you're ready to quit. General comments for venous / lymphedema ulcers: * Elevate your legs to the level of your heart for at least 30 minutes, several times daily. * Walk as much as you can tolerate.  Avoid standing for long periods of time, but if you must stand, regularly do heel-raises and calf-pump exercises. * If you have compression wraps designed to remain in place all week, be sure to keep them from getting wet. * If you have compression garments that can be changed regularly, apply them first thing in the morning, and remove them when you go to bed. Moisturize your skin at bedtime, with Vaseline, Aquaphor, Aveeno, CeraVe, Cetaphil, Eucerin, Lubriderm, etc; but keep the skin between your toes dry. * If you smoke, your wound can not heal properly -- please talk with us when you're ready to quit. * Be sure to adhere to any recommendations from your PCP about diuretics (water pills), diet, exercise, and maintaining a healthy weight. If you have questions, please ask. * If you have a compression pump, aim for at least two hours of use daily. F/U Appointment is with Dr. Kenia Melo in 1 week, on                            At                                         .        Signed by __________________________________________ on 10/4/2017 at _________________. Your nurse  is Brown Kennedy RN. If we applied slip-resistant hospital socks today, be sure to remove them at least once a  day to inspect your toes or feet, even if you're not changing the wraps or dressings  underneath. If you see anything concerning (redness, excess moisture, etc), please call  and let us know right away. Should you experience any significant changes in your wound(s) (including redness, increased warmth, increased pain, increased drainage, odor, or fever) or have questions about your wound care, please contact the [facility] at [phone] Monday-Thursday from 8:00 am  4:30 pm, or Friday from 8:00 am - 2:30 pm. If you need help with your wound outside these hours and cannot wait until we are again available, contact your home-care company (if applicable), your PCP, or go to the nearest emergency room. 8. Enter your e-mail address. You will receive e-mail notification when new information is available in [address] 9. Click Sign Up. You can now view your medical record. Additional Information  If you have questions, please contact the physician practice where you receive care. Remember, MyChart is NOT to be used for urgent needs. For medical emergencies, dial 911. For questions regarding your MyChart account call [phone]. If you have a clinical question, please call your doctor's office. View your information online  ? Review your current list of  medications, immunization, and allergies. ? Review your future test results online . ? Review your discharge instructions provided by your caregivers at discharge    Certain functionality such as prescription refills, scheduling appointments or sending messages to your provider are not activated if your provider does not use [employer] in his/her office    For questions regarding your MyChart account call [phone]. If you have a clinical question, please call your doctor's office. The information on all pages of the After Visit Summary has been reviewed with me, the patient and/or responsible adult, by my health care provider(s). I had the opportunity to ask questions regarding this information. I understand I should dispose of my armband safely at home to protect my health information. A complete copy of the After Visit Summary has been given to me, the patient and/or responsible adult.            Patient Signature/Responsible Adult:____________________    Clinician Signature:_____________________    Date:_____________________    Time:_____________________

## 2017-10-04 NOTE — PLAN OF CARE
Problem: Wound:  Goal: Will show signs of wound healing; wound closure and no evidence of infection  Will show signs of wound healing; wound closure and no evidence of infection   Outcome: Ongoing  Coccyx wound stable, debridement per Dr. Caleb Huerta & pt. Tolerated well. Will hold NPWT today d/t pt. States he doesn't think the NPWT unit is operating properly & not holding charge. [facility] will contact [employer] to have the unit checked & possibly changed. Will increase pressure to -120mmHg & have [address] to apply on Friday. Will use Triad on coccyx today in [facility]. New pressure wound noted on left heel with intact blister. Dr. Caleb Huerta debrided heel wound & pt. Tolerated well. Will use zinc to lexi-wound, AqAg to wound bed, HHC to change on Friday & Monday. Prevlon boot provided for off-loading. Instructed to wear at all times when at rest. Pt. Advised to NOT walk or  boot, that it would cause him to fall. Reinforced importance of cont. To use ROHO & pt. States he did get the new low air loss mattress. Pt. To f/u in [facility] in 1 week as ordered. Discharge instructions reviewed with patient, all questions answered, copy given to patient. Dressings were applied to all wounds per M.D. Instructions at this visit.

## 2017-10-07 NOTE — PROGRESS NOTES
Problem List   Diagnosis Code    Chronic thrombocytopenia D69.6    Chronic respiratory failure with hypoxia on 6 L home O2 tent over trach J96.11    IDDM (insulin dependent diabetes mellitus) ([facility] [address] E11.9, Z79.4    HTN (hypertension) I10    Non morbid obesity due to excess calories E66.09    Anxiety and depression F41.9, F32.9    Tracheostomy dependent ([facility] [address] Z93.0    Chronic pain syndrome on chronic opioids G89.4    Asthma/COPD J44.9    Chronic ischemic heart disease I25.9    Bilateral lower extremity edema R60.0    Hiatal hernia with GERD K21.9, K44.9    Extrinsic asthma J45.909    Low back pain M54.5    TESS G47.33    Calcification of bronchus or trachea J39.8, J98.09    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    Diabetic peripheral neuropathy E11.42    CKD2/3 N18.3    Chronic microcytic Iron-deficiency anemia D50.9    Chronic dCHF (grade 1 LVDD) I50.32    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    Arthritis M19.90    Claustrophobia F40.240    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 Factors contributing to occurrence and/or persistence of the chronic ulcer include diabetes, chronic pressure, decreased mobility, shear force and obesity. Medical necessity of today's visit is shown by the above documentation. Sharp debridement is indicated today, based upon the exam findings in the wound(s) above. Procedure note:     Consent obtained. Time out performed per [facility] policy. Anesthetic  Anesthetic: 4% Topical Xylocaine     Using a curette, scissors and forceps, I sharply debrided the back ulcer(s) down through and including the removal of muscle/fascia.  The type(s) of tissue

## 2017-10-10 PROBLEM — L89.622 PRESSURE ULCER OF LEFT HEEL, STAGE 2 (HCC): Status: ACTIVE | Noted: 2017-10-10

## 2017-10-10 NOTE — PROGRESS NOTES
granular, still some fascial fibronecrosis, overlying fibrin and debris, no bone exposure, no pus. Pre-debridement wound measurements are in the wound documentation flowsheet. Photos also saved in electronic chart. Assessment:     Patient Active Problem List   Diagnosis Code    Chronic thrombocytopenia D69.6    Chronic respiratory failure with hypoxia on 6 L home O2 tent over trach J96.11    IDDM (insulin dependent diabetes mellitus) ([facility] [address] E11.9, Z79.4    HTN (hypertension) I10    Non morbid obesity due to excess calories E66.09    Anxiety and depression F41.9, F32.9    Tracheostomy dependent ([facility] [address] Z93.0    Chronic pain syndrome on chronic opioids G89.4    Asthma/COPD J44.9    Chronic ischemic heart disease I25.9    Bilateral lower extremity edema R60.0    Hiatal hernia with GERD K21.9, K44.9    Extrinsic asthma J45.909    Low back pain M54.5    TESS G47.33    Calcification of bronchus or trachea J39.8, J98.09    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    Diabetic peripheral neuropathy E11.42    CKD2/3 N18.3    Chronic microcytic Iron-deficiency anemia D50.9    Chronic dCHF (grade 1 LVDD) I50.32    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 New left heel pressure ulcer (blister, from a combination of pressure and friction, I think). Foot not well offloaded at times, I think more in his chair than in bed, no signs of infection or ischemia there.  Factors contributing to occurrence and/or persistence of the chronic ulcer include diabetes, chronic pressure, decreased mobility, monitored, and is noted in the wound documentation flowsheet. Discharge plan:     Treatment in the wound care center today: Wound 05/25/17 #8,  Coccyx Pressure Stage 4,  onset 5/1/17, PRESSURE-Dressing/Treatment: Silicone dressing, Triad hydro (flagyl)  Wound 10/04/17 #17 Left lateral heel, pressure, stage II (onset 9/30/17) pressure-Dressing/Treatment: Dry dressing, Protective barrier, Silver dressing. Prevalon boot for offloading. Has a group II mattress and a Roho cushion. If the heel drains significantly, going back to some compression therapy could help. Home treatment: good handwashing before and after any dressing changes. Cleanse wound with saline or soap & water before dressing change. May use Vaseline (petrolatum), Aquaphor, Aveeno, CeraVe, Cetaphil, Eucerin, Lubriderm, etc for dry skin. Dressing type for home: Other: ZnO, silver alginate and gauze to left heel; NPWT (Flagyl powder, foam, bridge to hip, drape, increase to 120 mmHg continuous), change both 3 times weekly. Written discharge instructions given to patient. Follow up in 1 week.     Electronically signed by Cesar Herrera MD on 10/10/2017 at 12:42 PM.

## 2017-10-11 ENCOUNTER — HOSPITAL ENCOUNTER (OUTPATIENT)
Dept: WOUND CARE | Age: 62
Discharge: HOME | End: 2017-10-11
Attending: INTERNAL MEDICINE | Admitting: INTERNAL MEDICINE

## 2017-10-11 VITALS
TEMPERATURE: 98 F | SYSTOLIC BLOOD PRESSURE: 108 MMHG | WEIGHT: 263 LBS | RESPIRATION RATE: 18 BRPM | DIASTOLIC BLOOD PRESSURE: 59 MMHG | BODY MASS INDEX: 37.65 KG/M2 | HEIGHT: 70 IN | HEART RATE: 70 BPM

## 2017-10-11 DIAGNOSIS — L89.322 DECUBITUS ULCER OF LEFT BUTTOCK, STAGE 2 (HCC): ICD-10-CM

## 2017-10-11 PROCEDURE — 11042 DBRDMT SUBQ TIS 1ST 20SQCM/<: CPT | Performed by: INTERNAL MEDICINE

## 2017-10-11 PROCEDURE — 97605 NEG PRS WND THER DME<=50SQCM: CPT | Performed by: INTERNAL MEDICINE

## 2017-10-11 ASSESSMENT — PAIN DESCRIPTION - FREQUENCY: FREQUENCY: CONTINUOUS

## 2017-10-11 ASSESSMENT — PAIN DESCRIPTION - ONSET: ONSET: ON-GOING

## 2017-10-11 ASSESSMENT — PAIN DESCRIPTION - LOCATION: LOCATION: COCCYX

## 2017-10-11 ASSESSMENT — PAIN DESCRIPTION - PAIN TYPE: TYPE: CHRONIC PAIN

## 2017-10-11 ASSESSMENT — PAIN DESCRIPTION - ORIENTATION: ORIENTATION: MID

## 2017-10-11 ASSESSMENT — PAIN DESCRIPTION - DESCRIPTORS: DESCRIPTORS: BURNING;SHARP;ACHING

## 2017-10-11 ASSESSMENT — PAIN DESCRIPTION - PROGRESSION: CLINICAL_PROGRESSION: NOT CHANGED

## 2017-10-11 ASSESSMENT — PAIN SCALES - GENERAL: PAINLEVEL_OUTOF10: 7

## 2017-10-17 PROBLEM — L89.322 DECUBITUS ULCER OF LEFT BUTTOCK, STAGE 2 (HCC): Status: ACTIVE | Noted: 2017-10-17

## 2017-10-17 NOTE — PROGRESS NOTES
[facility] Progress Note    Asher Oreilly     : 1955    DATE OF VISIT:  10/11/2017    Subjective:     Asher Oreilly is a 65 y.o. male who has a pressure ulcer located on the back. Significant symptoms or pertinent wound history since last visit: pain stable this week (still moderately severe), NPWT working a bit more consistently (the unit was switched out). No F/C/D. Trying to offload as best he can. Very much likes his low air loss mattress. Additional ulcer(s) noted? yes. Left heel healed, but a small left buttock stage 2 this week. His current medication list consists of ARIPiprazole, Albuterol, NONFORMULARY, OXYGEN, allopurinol, buPROPion, dexmethylphenidate, docusate sodium, eplerenone, fluticasone-salmeterol, gabapentin, glipiZIDE, insulin glargine, insulin regular, metoprolol succinate, metroNIDAZOLE, moexipril, nitroGLYCERIN, omeprazole, polyethylene glycol, potassium chloride, pravastatin, pregabalin, torsemide, and zafirlukast.    Allergies: Aspartame and phenylalanine; Erythromycin; Arthrotec [diclofenac-misoprostol]; Betadine [povidone iodine]; Cefuroxime axetil; Claritin [loratadine]; Clindamycin/lincomycin; Feldene [piroxicam]; Indocin [indometacin sodium]; Iodine; Pcn [penicillins]; Pletal [cilostazol]; Robaxin [methocarbamol]; Tenex [guanfacine hcl]; Vasotec; and Vioxx    Objective:     Vitals:    10/11/17 1043   BP: (!) 108/59   Pulse: 70   Resp: 18   Temp: 98 °F (36.7 °C)   TempSrc: Oral   Weight: 263 lb (119.3 kg)   Height: 5' 10\" (1.778 m)     AAOx3, overweight, fatigued, NAD  Left heel delicately healed, fragile skin, a bit of hyperkeratosis, moderate edema, no cellulitis  No acute arthritis or bursitis, no incontinence or drainage dermatitis, no Candidiasis, still some allodynia within primary wound  Dominique-ulcer skin: Induration, Warmth, Pink and (at the sacrum) mildly macerated.   Ulcer(s): new buttock stage 2 is mostly pink, a bit of fibrin, no signs of infection; primary stage 4 ulcer is a bit more granular, not smaller yet, still some intermixed fibronecrotic SQ tissue, fibrinous debris, presumed biofilm, a couple of areas very friable. Pre-debridement wound measurements are in the wound documentation flowsheet. Photos also saved in electronic chart.     Assessment:     Patient Active Problem List   Diagnosis Code    Chronic thrombocytopenia D69.6    Chronic respiratory failure with hypoxia on 6 L home O2 tent over trach J96.11    IDDM (insulin dependent diabetes mellitus) ([facility] [address] E11.9, Z79.4    HTN (hypertension) I10    Non morbid obesity due to excess calories E66.09    Anxiety and depression F41.8    Tracheostomy dependent ([facility] [address] Z93.0    Chronic pain syndrome on chronic opioids G89.4    Asthma/COPD J44.9    Chronic ischemic heart disease I25.9    Bilateral lower extremity edema R60.0    Hiatal hernia with GERD K21.9, K44.9    Extrinsic asthma J45.909    Low back pain M54.5    TESS G47.33    Calcification of bronchus or trachea J39.8, J98.09    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    Diabetic peripheral neuropathy E11.42    CKD2/3 N18.3    Chronic microcytic Iron-deficiency anemia D50.9    Chronic dCHF (grade 1 LVDD) I50.32    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    Arthritis M19.90    Claustrophobia F40.240    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    Peripheral venous insufficiency I87.2    Decubitus ulcer of sacral region, stage 4 (HCC) L89.154    Pressure ulcer of left heel, stage 2 F00.717    Decubitus ulcer of left buttock, stage 2 L89.322       Assessment of today's active condition(s): poor mobility from spinal stenosis and chronic pain, recent development of a sacral DTI, progressing to a stage 4 ulcer, soft tissue infection resolved, necrosis less, starting to get healthier with NPWT but still a stubborn area of fibrosis; left heel pressure / friction-related bulla and stage 2 ulcer healed; small new left buttock pressure ulcer, should heal readily, but I'm surprised it happened, with good use of his mattress and Roho; he thought it might have been a skin tear from NPWT drape, but it looks like a pressure ulcer to me. Factors contributing to occurrence and/or persistence of the chronic ulcer include diabetes, chronic pressure, decreased mobility, shear force and obesity. Medical necessity of today's visit is shown by the above documentation. Sharp debridement is indicated today, based upon the exam findings in the wound(s) above. Procedure note:     Consent obtained. Time out performed per [facility] policy. Anesthetic  Anesthetic: 4% Topical Xylocaine     Using a curette, #15 blade scalpel and forceps, I sharply debrided the back ulcer(s) down through and including the removal of subcutaneous tissue. The type(s) of tissue debrided included fibrin, biofilm and necrotic/eschar. Total Surface Area Debrided: 6 sq cm. Post  Debridement Measurements:  Wound 10/04/17 #17 Left lateral heel, pressure, stage II (onset 9/30/17) pressure-Wound Length (cm): 0 cm  Wound 05/25/17 #8,  Coccyx Pressure Stage 4,  onset 5/1/17, PRESSURE-Wound Length (cm): 3 cm  Wound 10/11/17 #18 Left buttock, Pressure Stage II Onset 10/10/17, pressure-Wound Length (cm): 0.9 cm    Wound 10/04/17 #17 Left lateral heel, pressure, stage II (onset 9/30/17) pressure-Wound Width (cm): 0 cm  Wound 05/25/17 #8,  Coccyx Pressure Stage 4,  onset 5/1/17, PRESSURE-Wound Width (cm): 2 cm  Wound 10/11/17 #18 Left buttock, Pressure Stage II Onset 10/10/17, pressure-Wound Width (cm): 0.8 cm    Wound 10/04/17 #17 Left lateral heel, pressure, stage II (onset 9/30/17) pressure-Wound Depth (cm) : 0  Wound 05/25/17 #8,  Coccyx Pressure Stage 4,  onset 5/1/17, PRESSURE-Wound Depth (cm) : 1.5  Wound 10/11/17 #18 Left buttock, Pressure Stage II Onset 10/10/17, pressure-Wound Depth (cm) : 0.1    The ulcers were then irrigated with normal saline solution.  The procedure was completed with a moderate amount of

## 2017-10-18 ENCOUNTER — HOSPITAL ENCOUNTER (OUTPATIENT)
Dept: WOUND CARE | Age: 62
Discharge: HOME | End: 2017-10-18
Attending: INTERNAL MEDICINE | Admitting: INTERNAL MEDICINE

## 2017-10-25 ENCOUNTER — HOSPITAL ENCOUNTER (OUTPATIENT)
Dept: WOUND CARE | Age: 62
Discharge: HOME | End: 2017-10-25
Attending: INTERNAL MEDICINE | Admitting: INTERNAL MEDICINE

## 2017-10-25 VITALS
HEIGHT: 70 IN | TEMPERATURE: 96.8 F | HEART RATE: 73 BPM | WEIGHT: 250 LBS | BODY MASS INDEX: 35.79 KG/M2 | RESPIRATION RATE: 17 BRPM | SYSTOLIC BLOOD PRESSURE: 123 MMHG | DIASTOLIC BLOOD PRESSURE: 95 MMHG

## 2017-10-25 PROCEDURE — 11042 DBRDMT SUBQ TIS 1ST 20SQCM/<: CPT | Performed by: INTERNAL MEDICINE

## 2017-10-25 PROCEDURE — 97605 NEG PRS WND THER DME<=50SQCM: CPT | Performed by: INTERNAL MEDICINE

## 2017-10-25 ASSESSMENT — PAIN DESCRIPTION - ORIENTATION: ORIENTATION: MID

## 2017-10-25 ASSESSMENT — PAIN DESCRIPTION - PAIN TYPE: TYPE: CHRONIC PAIN

## 2017-10-25 ASSESSMENT — PAIN DESCRIPTION - DESCRIPTORS: DESCRIPTORS: BURNING;STABBING

## 2017-10-25 ASSESSMENT — PAIN SCALES - GENERAL: PAINLEVEL_OUTOF10: 7

## 2017-10-25 ASSESSMENT — PAIN DESCRIPTION - ONSET: ONSET: ON-GOING

## 2017-10-25 ASSESSMENT — PAIN DESCRIPTION - PROGRESSION: CLINICAL_PROGRESSION: NOT CHANGED

## 2017-10-25 ASSESSMENT — PAIN DESCRIPTION - FREQUENCY: FREQUENCY: CONTINUOUS

## 2017-10-25 ASSESSMENT — PAIN DESCRIPTION - LOCATION: LOCATION: COCCYX

## 2017-11-01 ENCOUNTER — HOSPITAL ENCOUNTER (OUTPATIENT)
Dept: WOUND CARE | Age: 62
Discharge: HOME | End: 2017-11-01
Attending: INTERNAL MEDICINE | Admitting: INTERNAL MEDICINE

## 2017-11-01 VITALS
RESPIRATION RATE: 16 BRPM | WEIGHT: 256 LBS | HEIGHT: 70 IN | BODY MASS INDEX: 36.65 KG/M2 | SYSTOLIC BLOOD PRESSURE: 123 MMHG | HEART RATE: 82 BPM | TEMPERATURE: 97.2 F | DIASTOLIC BLOOD PRESSURE: 79 MMHG

## 2017-11-01 PROCEDURE — 11042 DBRDMT SUBQ TIS 1ST 20SQCM/<: CPT | Performed by: INTERNAL MEDICINE

## 2017-11-01 ASSESSMENT — PAIN SCALES - GENERAL: PAINLEVEL_OUTOF10: 8

## 2017-11-01 ASSESSMENT — PAIN DESCRIPTION - FREQUENCY: FREQUENCY: CONTINUOUS

## 2017-11-01 ASSESSMENT — PAIN DESCRIPTION - PAIN TYPE: TYPE: CHRONIC PAIN

## 2017-11-01 ASSESSMENT — PAIN DESCRIPTION - LOCATION: LOCATION: COCCYX

## 2017-11-01 ASSESSMENT — PAIN DESCRIPTION - ONSET: ONSET: ON-GOING

## 2017-11-01 ASSESSMENT — PAIN DESCRIPTION - DESCRIPTORS: DESCRIPTORS: SHARP

## 2017-11-01 ASSESSMENT — PAIN DESCRIPTION - ORIENTATION: ORIENTATION: MID

## 2017-11-01 ASSESSMENT — PAIN DESCRIPTION - PROGRESSION: CLINICAL_PROGRESSION: NOT CHANGED

## 2017-11-01 NOTE — PLAN OF CARE
Problem: Wound:  Goal: Will show signs of wound healing; wound closure and no evidence of infection  Will show signs of wound healing; wound closure and no evidence of infection   Outcome: Ongoing  Buttock wound measuring larger, no dressing in place when arrived to [facility]. Reinforced importance of off-loading & frequent repositioning to assist with wound healing, pt. Verbalizes understanding. Also advised dressing needs to be in place as per MD orders. Coccyx wound stable, debridement per Dr. Alejandre Figures & pt. Tolerated well. Will place NPWT on hold until Monday to allow time for lexi-wound skin to recover. Will change to using Triad & Ag Alginate to wound, dry dressing, & HHC to change Thur., Friday, Saturday. F/u in [facility] in 1 week as ordered. Discharge instructions reviewed with patient, all questions answered, copy given to patient. Dressings were applied to all wounds per M.D. Instructions at this visit.

## 2017-11-02 PROBLEM — L89.622 PRESSURE ULCER OF LEFT HEEL, STAGE 2 (HCC): Status: RESOLVED | Noted: 2017-10-10 | Resolved: 2017-11-02

## 2017-11-03 NOTE — PROGRESS NOTES
in the wound(s) above. Procedure note:     Consent obtained. Time out performed per [facility] policy. Anesthetic  Anesthetic: 4% Topical Xylocaine     Using a curette, I sharply debrided the back ulcer(s) down through and including the removal of subcutaneous tissue. The type(s) of tissue debrided included fibrin, biofilm and necrotic/eschar. Total Surface Area Debrided: 3 sq cm. Post  Debridement Measurements:  [REMOVED] Wound 10/04/17 #17 Left lateral heel, pressure, stage II (onset 9/30/17) pressure-Wound Length (cm): 0 cm  Wound 10/11/17 #18 Left buttock, Pressure Stage II Onset 10/10/17, pressure-Wound Length (cm): 1.5 cm  Wound 05/25/17 #8,  Coccyx Pressure Stage 4,  onset 5/1/17, PRESSURE-Wound Length (cm): 2.5 cm    [REMOVED] Wound 10/04/17 #17 Left lateral heel, pressure, stage II (onset 9/30/17) pressure-Wound Width (cm): 0 cm  Wound 10/11/17 #18 Left buttock, Pressure Stage II Onset 10/10/17, pressure-Wound Width (cm): 0.7 cm  Wound 05/25/17 #8,  Coccyx Pressure Stage 4,  onset 5/1/17, PRESSURE-Wound Width (cm): 1.5 cm    [REMOVED] Wound 10/04/17 #17 Left lateral heel, pressure, stage II (onset 9/30/17) pressure-Wound Depth (cm) : 0  Wound 10/11/17 #18 Left buttock, Pressure Stage II Onset 10/10/17, pressure-Wound Depth (cm) : 0.1  Wound 05/25/17 #8,  Coccyx Pressure Stage 4,  onset 5/1/17, PRESSURE-Wound Depth (cm) : 1.5    The ulcers were then irrigated with normal saline solution. The procedure was completed with a small amount of bleeding, and hemostasis was by pressure. The patient tolerated the procedure well, with no significant complications. The patient's level of pain during and after the procedure was monitored, and is noted in the wound documentation flowsheet.     Discharge plan:     Treatment in the wound care center today: Wound 10/11/17 #18 Left buttock, Pressure Stage II Onset 10/10/17, pressure-Dressing/Treatment: Hydrocolloid  Wound 05/25/17 #8,  Coccyx Pressure Stage 4,  onset 5/1/17, PRESSURE-Dressing/Treatment: Triad hydro, Vacuum dressing (flagyl). Home treatment: good handwashing before and after any dressing changes. Cleanse wound with saline or soap & water before dressing change. May use Vaseline (petrolatum), Aquaphor, Aveeno, CeraVe, Cetaphil, Eucerin, Lubriderm, etc for dry skin. Dressing type for home: Other: Kerra-Cool Lite (bordered hydrogel-hydrocolloid) to buttock TIW; NPWT (Triad, Flagyl powder, foam, continuous negative pressure, bridged to hip), also 3 times weekly. Written discharge instructions given to patient. Follow up in 1 week.     Electronically signed by Maribeth Dorsey MD on 11/2/2017 at 8:35 PM.

## 2017-11-08 ENCOUNTER — HOSPITAL ENCOUNTER (OUTPATIENT)
Dept: WOUND CARE | Age: 62
Discharge: HOME | End: 2017-11-08
Attending: INTERNAL MEDICINE | Admitting: INTERNAL MEDICINE

## 2017-11-08 VITALS
TEMPERATURE: 97.6 F | HEIGHT: 70 IN | HEART RATE: 77 BPM | SYSTOLIC BLOOD PRESSURE: 129 MMHG | BODY MASS INDEX: 37.05 KG/M2 | WEIGHT: 258.8 LBS | DIASTOLIC BLOOD PRESSURE: 85 MMHG | RESPIRATION RATE: 16 BRPM

## 2017-11-08 PROCEDURE — 97605 NEG PRS WND THER DME<=50SQCM: CPT | Performed by: INTERNAL MEDICINE

## 2017-11-08 PROCEDURE — 11042 DBRDMT SUBQ TIS 1ST 20SQCM/<: CPT | Performed by: INTERNAL MEDICINE

## 2017-11-08 RX ORDER — CIPROFLOXACIN 500 MG/1
500 TABLET, FILM COATED ORAL 2 TIMES DAILY
COMMUNITY
End: 2017-11-26

## 2017-11-08 ASSESSMENT — PAIN DESCRIPTION - LOCATION: LOCATION: COCCYX

## 2017-11-08 ASSESSMENT — PAIN SCALES - GENERAL: PAINLEVEL_OUTOF10: 8

## 2017-11-08 ASSESSMENT — PAIN DESCRIPTION - ORIENTATION: ORIENTATION: MID

## 2017-11-08 ASSESSMENT — PAIN DESCRIPTION - ONSET: ONSET: ON-GOING

## 2017-11-08 ASSESSMENT — PAIN DESCRIPTION - FREQUENCY: FREQUENCY: CONTINUOUS

## 2017-11-08 ASSESSMENT — PAIN DESCRIPTION - PAIN TYPE: TYPE: CHRONIC PAIN

## 2017-11-08 ASSESSMENT — PAIN DESCRIPTION - DESCRIPTORS: DESCRIPTORS: BURNING

## 2017-11-08 ASSESSMENT — PAIN DESCRIPTION - PROGRESSION: CLINICAL_PROGRESSION: NOT CHANGED

## 2017-11-08 NOTE — PLAN OF CARE
Problem: Wound:  Goal: Will show signs of wound healing; wound closure and no evidence of infection  Will show signs of wound healing; wound closure and no evidence of infection   Outcome: Ongoing  Skin overall showed improvement this week. Debridement per Dr. Eleazar Land, will return to using NPWT this week as per MD orders. Pt. Has done a better job of off-loading this week & it shows. Reinforced to cont. To reposition from side to side & not sit for more than 1-2 hours at a time. F/u in [facility] in 1 week as ordered. Discharge instructions reviewed with patient, all questions answered, copy given to patient. Dressings were applied to all wounds per M.D. Instructions at this visit.

## 2017-11-11 NOTE — PROGRESS NOTES
sacral ulcer mostly red and granular, still some SQ fibronecrosis, rather tender, no pus or bone exposure, less biofilm than previously, stagnant in size. Pre-debridement wound measurements are in the wound documentation flowsheet. Photos also saved in electronic chart. Assessment:     Patient Active Problem List   Diagnosis Code    Chronic thrombocytopenia D69.6    Chronic respiratory failure with hypoxia on 6 L home O2 tent over trach J96.11    IDDM (insulin dependent diabetes mellitus) ([facility] [address] E11.9, Z79.4    HTN (hypertension) I10    Non morbid obesity due to excess calories E66.09    Anxiety and depression F41.8    Tracheostomy dependent ([facility] [address] Z93.0    Chronic pain syndrome on chronic opioids G89.4    Asthma/COPD J44.9    Chronic ischemic heart disease I25.9    Bilateral lower extremity edema R60.0    Hiatal hernia with GERD K21.9, K44.9    Extrinsic asthma J45.909    Low back pain M54.5    TESS G47.33    Calcification of bronchus or trachea J39.8, J98.09    [facility] F20.3    Diabetic peripheral neuropathy E11.42    CKD2/3 N18.3    Chronic microcytic Iron-deficiency anemia D50.9    Chronic dCHF (grade 1 LVDD) I50.32    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 Factors contributing to occurrence and/or persistence of the chronic ulcer include diabetes, chronic pressure, decreased mobility, shear force and obesity.  Medical necessity of today's home-care bed and group 2 mattress doing well. Home treatment: good handwashing before and after any dressing changes. Cleanse wound with saline or soap & water before dressing change. May use Vaseline (petrolatum), Aquaphor, Aveeno, CeraVe, Cetaphil, Eucerin, Lubriderm, etc for dry skin. Dressing type for home: Other: pause NPWT for a week to allow skin to recover; dress as above, 3-4 times weekly. Written discharge instructions given to patient. Follow up in 1 week.     Electronically signed by Frank Alberts MD on 11/11/2017 at 7:38 AM.

## 2017-11-15 ENCOUNTER — HOSPITAL ENCOUNTER (OUTPATIENT)
Dept: WOUND CARE | Age: 62
Discharge: HOME | End: 2017-11-15
Attending: INTERNAL MEDICINE | Admitting: INTERNAL MEDICINE

## 2017-11-15 VITALS
WEIGHT: 262 LBS | HEART RATE: 81 BPM | BODY MASS INDEX: 37.51 KG/M2 | SYSTOLIC BLOOD PRESSURE: 117 MMHG | HEIGHT: 70 IN | RESPIRATION RATE: 17 BRPM | DIASTOLIC BLOOD PRESSURE: 57 MMHG | TEMPERATURE: 97.7 F

## 2017-11-15 PROCEDURE — 15271 SKIN SUB GRAFT TRNK/ARM/LEG: CPT | Performed by: INTERNAL MEDICINE

## 2017-11-15 ASSESSMENT — PAIN DESCRIPTION - DESCRIPTORS: DESCRIPTORS: BURNING

## 2017-11-15 ASSESSMENT — PAIN DESCRIPTION - LOCATION: LOCATION: COCCYX

## 2017-11-15 ASSESSMENT — PAIN DESCRIPTION - FREQUENCY: FREQUENCY: CONTINUOUS

## 2017-11-15 ASSESSMENT — PAIN DESCRIPTION - PROGRESSION: CLINICAL_PROGRESSION: NOT CHANGED

## 2017-11-15 ASSESSMENT — PAIN SCALES - GENERAL: PAINLEVEL_OUTOF10: 7

## 2017-11-15 ASSESSMENT — PAIN DESCRIPTION - ONSET: ONSET: ON-GOING

## 2017-11-15 ASSESSMENT — PAIN DESCRIPTION - PAIN TYPE: TYPE: CHRONIC PAIN

## 2017-11-15 ASSESSMENT — PAIN DESCRIPTION - ORIENTATION: ORIENTATION: MID

## 2017-11-16 NOTE — PROGRESS NOTES
[facility] Progress Note    Xena Christensen     : 1955    DATE OF VISIT:  2017    Subjective:     Xena Christensen is a 65 y.o. male who has a pressure ulcer located on the back and buttocks. Significant symptoms or pertinent wound history since last visit: still mod-severe pain, a bit of malodor, no fever, eating well, trying to offload as best he can. Additional ulcer(s) noted? no.      His current medication list consists of ARIPiprazole, Albuterol, Fexofenadine-Pseudoephedrine, NONFORMULARY, OXYGEN, allopurinol, buPROPion, ciprofloxacin, dexmethylphenidate, docusate sodium, eplerenone, fluticasone-salmeterol, gabapentin, glipiZIDE, insulin glargine, insulin regular, metoprolol succinate, metroNIDAZOLE, moexipril, nitroGLYCERIN, omeprazole, polyethylene glycol, potassium chloride, pravastatin, pregabalin, torsemide, and zafirlukast.    Allergies: Aspartame and phenylalanine; Erythromycin; Arthrotec [diclofenac-misoprostol]; Betadine [povidone iodine]; Cefuroxime axetil; Claritin [loratadine]; Clindamycin/lincomycin; Feldene [piroxicam]; Indocin [indometacin sodium]; Iodine; Pcn [penicillins]; Pletal [cilostazol]; Robaxin [methocarbamol]; Tenex [guanfacine hcl]; Vasotec; and Vioxx    Objective:     Vitals:    17 1020   BP: 129/85   Pulse: 77   Resp: 16   Temp: 97.6 °F (36.4 °C)   TempSrc: Oral   Weight: 258 lb 12.8 oz (117.4 kg)   Height: 5' 10\" (1.778 m)     AAOx3, overweight, fatigued, chronically ill appearing, NAD  No cellulitis, Candidiasis, milder contact dermatitis from NPWT drape, no real allodynia  Dominique-ulcer skin: Induration, Erythema, Warmth and Pink. Ulcer(s): stage 4 sacral slowly more granular and red, less fibrotic, a bit of SQ necrosis, fibrin and biofilm, still rather tender, no palpable bone or clearly exposed fascia any longer; stage 4 buttock a bit smaller, pink, granular, clean.  Pre-debridement wound measurements are in the wound documentation flowsheet. Photos also saved in electronic chart. Assessment:     Patient Active Problem List   Diagnosis Code    Chronic thrombocytopenia D69.6    Chronic respiratory failure with hypoxia on 6 L home O2 tent over trach J96.11    IDDM (insulin dependent diabetes mellitus) ([facility] [address] E11.9, Z79.4    HTN (hypertension) I10    Non morbid obesity due to excess calories E66.09    Anxiety and depression F41.8    [facility] Z[facility]    Chronic pain syndrome on chronic opioids G89.4    Asthma/COPD J44.9    Chronic ischemic heart disease I25.9    Bilateral lower extremity edema R60.0    Hiatal hernia with GERD K21.9, K44.9    Extrinsic asthma J45.909    Low back pain M54.5    TESS G47.33    Calcification of bronchus or trachea J39.8, J98.09    Atypical schizophrenia ([facility] F20.3    Diabetic peripheral neuropathy E11.42    CKD2/3 N18.3    Chronic microcytic Iron-deficiency anemia D50.9    Chronic dCHF (grade 1 LVDD) I50.32    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 Factors contributing to occurrence and/or persistence of the chronic ulcer include diabetes, chronic pressure, decreased mobility, shear force and obesity. Medical necessity of today's visit is shown by the above documentation. Sharp debridement is indicated today, based upon the exam findings in the wound(s) above. Procedure note:     Consent obtained. Time out performed per [facility] policy.     Anesthetic  Anesthetic: 4% Topical Xylocaine     Using a curette, #15 blade scalpel and forceps, I sharply debrided the back ulcer(s) down through and including the removal of subcutaneous tissue. The type(s) of tissue debrided included fibrin, biofilm and necrotic/eschar. Total Surface Area Debrided: 4 sq cm. Post  Debridement Measurements:  Wound 10/11/17 #18 Left buttock, Pressure Stage II Onset 10/10/17, pressure-Wound Length (cm): 0.5 cm  Wound 05/25/17 #8,  Coccyx Pressure Stage 4,  onset 5/1/17, PRESSURE-Wound Length (cm): 2.5 cm    Wound 10/11/17 #18 Left buttock, Pressure Stage II Onset 10/10/17, pressure-Wound Width (cm): 0.3 cm  Wound 05/25/17 #8,  Coccyx Pressure Stage 4,  onset 5/1/17, PRESSURE-Wound Width (cm): 1.5 cm    Wound 10/11/17 #18 Left buttock, Pressure Stage II Onset 10/10/17, pressure-Wound Depth (cm) : 0.1  Wound 05/25/17 #8,  Coccyx Pressure Stage 4,  onset 5/1/17, PRESSURE-Wound Depth (cm) : 1.6    The ulcers were then irrigated with normal saline solution. The procedure was completed with a moderate amount of bleeding, and hemostasis was by pressure and by silver nitrate stick. The patient tolerated the procedure well, with no significant complications. The patient's level of pain during and after the procedure was monitored, and is noted in the wound documentation flowsheet. Discharge plan:     Treatment in the wound care center today: Wound 10/11/17 #18 Left buttock, Pressure Stage II Onset 10/10/17, pressure-Dressing/Treatment:  (Duoderm, Allkare)  Wound 05/25/17 #8,  Coccyx Pressure Stage 4,  onset 5/1/17, PRESSURE-Dressing/Treatment:  (Allkare,Flagyl, green foam, ). If offloading continues to be good and necrosis is at a minimum, might add a cellular-tissue product to further stimulate healing. Home treatment: good handwashing before and after any dressing changes. Cleanse wound with saline or soap & water before dressing change. May use Vaseline (petrolatum), Aquaphor, Aveeno, CeraVe, Cetaphil, Eucerin, Lubriderm, etc for dry skin.      Dressing

## 2017-11-16 NOTE — PLAN OF CARE
Problem: Wound:  Goal: Will show signs of wound healing; wound closure and no evidence of infection  Will show signs of wound healing; wound closure and no evidence of infection   Outcome: Ongoing  Buttock wound stable & almost healed, no debridement, cont. With [employer] border. Coccyx wound stable, debridement per Dr. Sandy iKng & pt. Tolerated well. Primatrix applied per MD with dressing to remain in place until Friday. On Friday [facility] nurse to only remove outer dressing & then apply NPWT over Mepitel Ag as ordered. F/u in [facility] in 1 week as ordered. Reinforced importance of frequent repositioning & not sitting for more than 1-2 hours at a time, pt. Verbalizes understanding & states he has been doing this. Discharge instructions reviewed with patient, all questions answered, copy given to patient. Dressings were applied to all wounds per M.D. Instructions at this visit.

## 2017-11-22 ENCOUNTER — HOSPITAL ENCOUNTER (OUTPATIENT)
Dept: WOUND CARE | Age: 62
Discharge: HOME | End: 2017-11-22
Attending: INTERNAL MEDICINE | Admitting: INTERNAL MEDICINE

## 2017-11-22 VITALS
WEIGHT: 257 LBS | BODY MASS INDEX: 36.79 KG/M2 | HEIGHT: 70 IN | HEART RATE: 97 BPM | DIASTOLIC BLOOD PRESSURE: 79 MMHG | RESPIRATION RATE: 18 BRPM | TEMPERATURE: 98.5 F | SYSTOLIC BLOOD PRESSURE: 130 MMHG

## 2017-11-22 PROCEDURE — 97597 DBRDMT OPN WND 1ST 20 CM/<: CPT | Performed by: INTERNAL MEDICINE

## 2017-11-22 ASSESSMENT — PAIN DESCRIPTION - ONSET: ONSET: ON-GOING

## 2017-11-22 ASSESSMENT — PAIN SCALES - GENERAL: PAINLEVEL_OUTOF10: 6

## 2017-11-22 ASSESSMENT — PAIN DESCRIPTION - ORIENTATION: ORIENTATION: MID

## 2017-11-22 ASSESSMENT — PAIN DESCRIPTION - FREQUENCY: FREQUENCY: CONTINUOUS

## 2017-11-22 ASSESSMENT — PAIN DESCRIPTION - PROGRESSION: CLINICAL_PROGRESSION: NOT CHANGED

## 2017-11-22 ASSESSMENT — PAIN DESCRIPTION - PAIN TYPE: TYPE: CHRONIC PAIN

## 2017-11-22 ASSESSMENT — PAIN DESCRIPTION - LOCATION: LOCATION: COCCYX

## 2017-11-22 ASSESSMENT — PAIN DESCRIPTION - DESCRIPTORS: DESCRIPTORS: BURNING

## 2017-11-22 NOTE — PLAN OF CARE
Problem: Wound:  Goal: Will show signs of wound healing; wound closure and no evidence of infection  Will show signs of wound healing; wound closure and no evidence of infection   Outcome: Ongoing  Buttock wound healed. Wound showing improvement, debridement per Dr. Eleazar Land & pt. Tolerated well. Will cont. With current wound care regime with NPWT starting on Friday. Pt. Asked for time off NPWT for the holiday & restart on Friday. F/u in [address] [place] [address],[address] in 1 week as ordered. Reinforced to keep up with off-loading as much as possible & frequent repositioning. Pt. Cont. To use ZARIA & ROHO cushion when sitting. Discharge instructions reviewed with patient, all questions answered, copy given to patient. Dressings were applied to all wounds per M.D. Instructions at this visit.

## 2017-11-24 NOTE — PROGRESS NOTES
the wound documentation flowsheet. Photos also saved in electronic chart. 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    Chronic pain syndrome on chronic opioids G89.4    Asthma/COPD J44.9    Chronic ischemic heart disease I25.9    Bilateral lower extremity edema R60.0    Hiatal hernia with GERD K21.9, K44.9    Extrinsic asthma J45.909    Low back pain M54.5    TESS G47.33    Calcification of bronchus or trachea J39.8, J98.09    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    Diabetic peripheral neuropathy E11.42    CKD2/3 N18.3    Chronic microcytic Iron-deficiency anemia D50.9    Chronic dCHF (grade 1 LVDD) I50.32    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    Arthritis M19.90    Claustrophobia F40.240    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 Factors contributing to occurrence and/or persistence of the chronic ulcer include chronic pressure, decreased mobility, shear force and obesity. Medical necessity of today's visit is shown by the above documentation. Sharp debridement is indicated today, based upon the exam findings in the wound(s) above.

## 2017-11-26 PROBLEM — L89.322 DECUBITUS ULCER OF LEFT BUTTOCK, STAGE 2 (HCC): Status: RESOLVED | Noted: 2017-10-17 | Resolved: 2017-11-26

## 2017-11-26 NOTE — PROGRESS NOTES
shaped, overlying fibrinous debris +/- biofilm, no deep structures exposed, not as tender to palpation either. Pre-debridement wound measurements are in the wound documentation flowsheet. Photos also saved in electronic chart. 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    Chronic pain syndrome on chronic opioids G89.4    Asthma/COPD J44.9    Chronic ischemic heart disease I25.9    Bilateral lower extremity edema R60.0    Hiatal hernia with GERD K21.9, K44.9    Extrinsic asthma J45.909    Low back pain M54.5    TESS G47.33    Calcification of bronchus or trachea J39.8, J98.09    Atypical schizophrenia ([facility] F20.3    Diabetic peripheral neuropathy E11.42    CKD2/3 N18.3    Chronic microcytic Iron-deficiency anemia D50.9    Chronic dCHF (grade 1 LVDD) I50.32    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    Arthritis M19.90    Claustrophobia F40.240    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 No infection, very little inflammation, less pain, offloading well. Factors contributing to occurrence and/or persistence of the chronic ulcer include diabetes, chronic pressure, decreased mobility, shear force and obesity. Medical necessity of today's visit is shown by the above documentation. Sharp debridement is indicated today, based upon the exam findings in the wound(s) above. Procedure note:     Consent obtained. Time out performed per [facility] policy. Anesthetic  Anesthetic: 4% Topical Xylocaine     Using a curette, I sharply debrided the back ulcer(s) down through and including the removal of dermis. The type(s) of tissue debrided included fibrin, biofilm and exudate. Total Surface Area Debrided: 5 sq cm. Post  Debridement Measurements:  Wound 05/25/17 #8,  Coccyx Pressure Stage 4,  onset 5/1/17, PRESSURE-Wound Length (cm): 2.5 cm  [REMOVED] Wound 10/11/17 #18 Left buttock, Pressure Stage II Onset 10/10/17, pressure-Wound Length (cm): 0 cm    Wound 05/25/17 #8,  Coccyx Pressure Stage 4,  onset 5/1/17, PRESSURE-Wound Width (cm): 2 cm  [REMOVED] Wound 10/11/17 #18 Left buttock, Pressure Stage II Onset 10/10/17, pressure-Wound Width (cm): 0 cm    Wound 05/25/17 #8,  Coccyx Pressure Stage 4,  onset 5/1/17, PRESSURE-Wound Depth (cm) : 1.5  [REMOVED] Wound 10/11/17 #18 Left buttock, Pressure Stage II Onset 10/10/17, pressure-Wound Depth (cm) : 0    The ulcers were then irrigated with normal saline solution. The procedure was completed with a small amount of bleeding, and hemostasis was by pressure. The patient tolerated the procedure well, with no significant complications. The patient's level of pain during and after the procedure was monitored, and is noted in the wound documentation flowsheet. Discharge plan:     Treatment in the wound care center today: Wound 05/25/17 #8,  Coccyx Pressure Stage 4,  onset 5/1/17, PRESSURE-Dressing/Treatment: Other (Comment) (flagyl,aquacel ag,fluffed 4x4,mepilex border ). Might try PriMatrix again next week, if I can be more assured that it will stay in place; since his sensation is intact, I hesitate to suture or staple it. He asked if he could start sitting more, but I cautioned him that this is not the time to get relaxed about this, when he's built up some good momentum towards healing.  He can perhaps very slightly increase his time spent sitting, if his wound does not

## 2017-11-29 ENCOUNTER — HOSPITAL ENCOUNTER (OUTPATIENT)
Dept: WOUND CARE | Age: 62
Discharge: HOME | End: 2017-11-29
Attending: INTERNAL MEDICINE | Admitting: INTERNAL MEDICINE

## 2017-11-29 VITALS
HEART RATE: 70 BPM | DIASTOLIC BLOOD PRESSURE: 76 MMHG | BODY MASS INDEX: 37.22 KG/M2 | WEIGHT: 260 LBS | HEIGHT: 70 IN | TEMPERATURE: 97.8 F | SYSTOLIC BLOOD PRESSURE: 123 MMHG | RESPIRATION RATE: 18 BRPM

## 2017-11-29 PROCEDURE — 15271 SKIN SUB GRAFT TRNK/ARM/LEG: CPT | Performed by: INTERNAL MEDICINE

## 2017-11-29 ASSESSMENT — PAIN DESCRIPTION - ORIENTATION: ORIENTATION: MID;RIGHT

## 2017-11-29 ASSESSMENT — PAIN DESCRIPTION - FREQUENCY: FREQUENCY: CONTINUOUS

## 2017-11-29 ASSESSMENT — PAIN SCALES - GENERAL: PAINLEVEL_OUTOF10: 9

## 2017-11-29 ASSESSMENT — PAIN DESCRIPTION - LOCATION: LOCATION: COCCYX;KNEE

## 2017-11-29 ASSESSMENT — PAIN DESCRIPTION - DESCRIPTORS: DESCRIPTORS: BURNING

## 2017-11-29 ASSESSMENT — PAIN DESCRIPTION - PROGRESSION: CLINICAL_PROGRESSION: GRADUALLY WORSENING

## 2017-11-29 ASSESSMENT — PAIN DESCRIPTION - PAIN TYPE: TYPE: CHRONIC PAIN

## 2017-11-29 ASSESSMENT — PAIN DESCRIPTION - ONSET: ONSET: ON-GOING

## 2017-11-30 NOTE — PLAN OF CARE
Problem: Wound:  Goal: Will show signs of wound healing; wound closure and no evidence of infection  Will show signs of wound healing; wound closure and no evidence of infection   Outcome: Ongoing  Wound stable, debridement per Dr. Jewels Barbosa applied per Dr. Caleb Huerta, will cont. With NPWT. Reinforced importance to cont. With good off-loading & frequent repositioning. F/u in [address] [place] [address],[address] in 1 week as ordered. Discharge instructions reviewed with patient, all questions answered, copy given to patient. Dressings were applied to all wounds per M.D. Instructions at this visit.

## 2017-12-05 NOTE — PROGRESS NOTES
fibrinous necrosis and mild biofilm, minimal residual fibrosis, mild pain, no deep structures exposed. Pre-debridement wound measurements are in the wound documentation flowsheet. Photos also saved in electronic chart. Assessment:     Patient Active Problem List   Diagnosis Code    Chronic thrombocytopenia D69.6    Chronic respiratory failure with hypoxia on 6 L home O2 tent over trach J96.11    IDDM (insulin dependent diabetes mellitus) ([facility] [address] E11.9, Z79.4    HTN (hypertension) I10    Non morbid obesity due to excess calories E66.09    Anxiety and depression F41.8    Tracheostomy dependent ([facility] [address] Z93.0    Chronic pain syndrome on chronic opioids G89.4    Asthma/COPD J44.9    Chronic ischemic heart disease I25.9    Bilateral lower extremity edema R60.0    Hiatal hernia with GERD K21.9, K44.9    Extrinsic asthma J45.909    Low back pain M54.5    TESS G47.33    Calcification of bronchus or trachea J39.8, J98.09    [facility] F20.3    Diabetic peripheral neuropathy E11.42    CKD2/3 N18.3    Chronic microcytic Iron-deficiency anemia D50.9    Chronic dCHF (grade 1 LVDD) I50.32    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    Arthritis M19.90    Claustrophobia F40.240    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 Factors contributing to occurrence and/or persistence of the chronic ulcer include diabetes, chronic pressure, decreased mobility, shear force and obesity.  Medical necessity of today's visit is shown by the above documentation. Sharp debridement is indicated today, based upon the exam findings in the wound(s) above. Procedure note:     Consent obtained. Time out performed per [facility] policy. Anesthetic  Anesthetic: 4% Lidocaine Liquid Topical     Using a curette, I sharply debrided the back ulcer(s) down through and including the removal of dermis. The type(s) of tissue debrided included fibrin and necrotic/eschar. Total Surface Area Debrided: 4 sq cm. Post  Debridement Measurements:  Wound 05/25/17 #8,  Coccyx Pressure Stage 4,  onset 5/1/17, PRESSURE-Wound Length (cm): 3 cm    Wound 05/25/17 #8,  Coccyx Pressure Stage 4,  onset 5/1/17, PRESSURE-Wound Width (cm): 1.5 cm    Wound 05/25/17 #8,  Coccyx Pressure Stage 4,  onset 5/1/17, PRESSURE-Wound Depth (cm) : 1.5    The ulcers were then irrigated with normal saline solution. The procedure was completed with a small amount of bleeding, and hemostasis was by pressure. The patient tolerated the procedure well, with no significant complications. The patient's level of pain during and after the procedure was monitored, and is noted in the wound documentation flowsheet.  _____________    Because this patient's pressure ulcer has failed to respond to standard wound-care measures (including treatment of infection, debridement of necrosis, provision of a moist wound environment, good compliance with offloading and medical treatment of diabetes) for more than 4 weeks, I recommended PriMatrix as adjunctive therapy to help speed healing of this ulcer. There is no evidence of untreated infection today. Mr. Sinan Beckham does not currently smoke.      After cleansing the sacral ulcer with saline and applying Skin-Prep to the elxi-wound skin, a nine sqcm piece of PriMatrix was cut to fit the ulcer (several small pieces, to stack on top of one another), hydrated with saline for 60 seconds, placed into the ulcer bed, affixed to the wound bed and/or

## 2017-12-06 ENCOUNTER — HOSPITAL ENCOUNTER (OUTPATIENT)
Dept: WOUND CARE | Age: 62
Discharge: HOME | End: 2017-12-06
Attending: INTERNAL MEDICINE | Admitting: INTERNAL MEDICINE

## 2017-12-06 VITALS
DIASTOLIC BLOOD PRESSURE: 79 MMHG | TEMPERATURE: 97.1 F | WEIGHT: 258.8 LBS | SYSTOLIC BLOOD PRESSURE: 131 MMHG | HEART RATE: 71 BPM | RESPIRATION RATE: 18 BRPM | BODY MASS INDEX: 37.05 KG/M2 | HEIGHT: 70 IN

## 2017-12-06 PROCEDURE — 97597 DBRDMT OPN WND 1ST 20 CM/<: CPT | Performed by: INTERNAL MEDICINE

## 2017-12-06 ASSESSMENT — PAIN DESCRIPTION - ORIENTATION: ORIENTATION: MID

## 2017-12-06 ASSESSMENT — PAIN DESCRIPTION - FREQUENCY: FREQUENCY: CONTINUOUS

## 2017-12-06 ASSESSMENT — PAIN DESCRIPTION - LOCATION: LOCATION: COCCYX

## 2017-12-06 ASSESSMENT — PAIN SCALES - GENERAL: PAINLEVEL_OUTOF10: 8

## 2017-12-06 ASSESSMENT — PAIN DESCRIPTION - PROGRESSION: CLINICAL_PROGRESSION: NOT CHANGED

## 2017-12-06 ASSESSMENT — PAIN DESCRIPTION - PAIN TYPE: TYPE: CHRONIC PAIN

## 2017-12-06 ASSESSMENT — PAIN DESCRIPTION - ONSET: ONSET: ON-GOING

## 2017-12-06 ASSESSMENT — PAIN DESCRIPTION - DESCRIPTORS: DESCRIPTORS: BURNING

## 2017-12-13 ENCOUNTER — HOSPITAL ENCOUNTER (OUTPATIENT)
Dept: WOUND CARE | Age: 62
Discharge: HOME | End: 2017-12-13
Attending: INTERNAL MEDICINE | Admitting: INTERNAL MEDICINE

## 2017-12-13 VITALS
DIASTOLIC BLOOD PRESSURE: 81 MMHG | HEART RATE: 73 BPM | RESPIRATION RATE: 17 BRPM | SYSTOLIC BLOOD PRESSURE: 139 MMHG | TEMPERATURE: 97.6 F | WEIGHT: 265 LBS | HEIGHT: 70 IN | BODY MASS INDEX: 37.94 KG/M2

## 2017-12-13 PROCEDURE — 11042 DBRDMT SUBQ TIS 1ST 20SQCM/<: CPT | Performed by: INTERNAL MEDICINE

## 2017-12-13 ASSESSMENT — PAIN DESCRIPTION - DESCRIPTORS: DESCRIPTORS: BURNING

## 2017-12-13 ASSESSMENT — PAIN DESCRIPTION - ORIENTATION: ORIENTATION: MID

## 2017-12-13 ASSESSMENT — PAIN DESCRIPTION - PAIN TYPE: TYPE: CHRONIC PAIN

## 2017-12-13 ASSESSMENT — PAIN DESCRIPTION - FREQUENCY: FREQUENCY: CONTINUOUS

## 2017-12-13 ASSESSMENT — PAIN DESCRIPTION - ONSET: ONSET: ON-GOING

## 2017-12-13 ASSESSMENT — PAIN DESCRIPTION - PROGRESSION: CLINICAL_PROGRESSION: NOT CHANGED

## 2017-12-13 ASSESSMENT — PAIN DESCRIPTION - LOCATION: LOCATION: COCCYX

## 2017-12-13 ASSESSMENT — PAIN SCALES - GENERAL: PAINLEVEL_OUTOF10: 8

## 2017-12-15 RX ORDER — METRONIDAZOLE 250 MG/1
TABLET ORAL
Qty: 30 TABLET | Refills: 1 | Status: SHIPPED | OUTPATIENT
Start: 2017-12-15 | End: 2018-03-04

## 2017-12-15 NOTE — PROGRESS NOTES
[facility] Progress Note    Phuong Latham     : 1955    DATE OF VISIT:  2017    Subjective:     Phuong Latham is a 65 y.o. male who has a pressure ulcer located on the back. Significant symptoms or pertinent wound history since last visit: having some troubles with the NPWT (skin irritation, short battery life, inconvenience relative to showering, and also some interference with attempts to do more PT). Trying not to sit too long; eating well; wound pain stable. No fevers. Additional ulcer(s) noted? no.      His current medication list consists of ARIPiprazole, Albuterol, Fexofenadine-Pseudoephedrine, NONFORMULARY, OXYGEN, allopurinol, buPROPion, dexmethylphenidate, docusate sodium, eplerenone, fluticasone-salmeterol, gabapentin, glipiZIDE, insulin glargine, insulin regular, metoprolol succinate, metroNIDAZOLE, moexipril, nitroGLYCERIN, omeprazole, polyethylene glycol, potassium chloride, pravastatin, pregabalin, torsemide, and zafirlukast.    Allergies: Aspartame and phenylalanine; Erythromycin; Arthrotec [diclofenac-misoprostol]; Betadine [povidone iodine]; Cefuroxime axetil; Claritin [loratadine]; Clindamycin/lincomycin; Feldene [piroxicam]; Indocin [indometacin sodium]; Iodine; Pcn [penicillins]; Pletal [cilostazol]; Robaxin [methocarbamol]; Tenex [guanfacine hcl]; Vasotec; and Vioxx    Objective:     Vitals:    17 0959   BP: 131/79   Pulse: 71   Resp: 18   Temp: 97.1 °F (36.2 °C)   TempSrc: Oral   Weight: 258 lb 12.8 oz (117.4 kg)   Height: 5' 10\" (1.778 m)     AAOx3, overweight, fatigued, NAD  No cellulitis, Candidiasis or incontinence dermatitis; some contact dermatitis from NPWT drape however  Less allodynia, no acute arthritis or bursitis  Dominique-ulcer skin: Induration, Warmth, Pink and focally macerated.   Ulcer(s): mostly red and granular, less depth with NPWT Rx, overlying fibrinous debris, one corner of the wound with some residual white fibrotic material. debrided the back ulcer(s) down through and including the removal of dermis. The type(s) of tissue debrided included fibrin and necrotic/eschar. Total Surface Area Debrided: 4 sq cm. Post  Debridement Measurements:  Wound 05/25/17 #8,  Coccyx Pressure Stage 4,  onset 5/1/17, PRESSURE-Wound Length (cm): 2.2 cm    Wound 05/25/17 #8,  Coccyx Pressure Stage 4,  onset 5/1/17, PRESSURE-Wound Width (cm): 1.9 cm    Wound 05/25/17 #8,  Coccyx Pressure Stage 4,  onset 5/1/17, PRESSURE-Wound Depth (cm) : 1.3    The ulcers were then irrigated with normal saline solution. The procedure was completed with a small amount of bleeding, and hemostasis was by pressure. The patient tolerated the procedure well, with no significant complications. The patient's level of pain during and after the procedure was monitored, and is noted in the wound documentation flowsheet. Discharge plan:     Treatment in the wound care center today: Wound 05/25/17 #8,  Coccyx Pressure Stage 4,  onset 5/1/17, PRESSURE-Dressing/Treatment:  (flagyl,triad,mepilex). Will pause NPWT for a week, and both assess the ongoing treatment options -- either continue NPWT (probably with a change from Triad to a collagen), or simple TIW dressing changes with a collagen, or perhaps resuming PriMatrix, with a weekly dressing, since home-care has not been able to prevent losing that material with TIW dressing changes. Keep focused on offloading, protein intake, glucose control, hydration. Home treatment: good handwashing before and after any dressing changes. Cleanse wound with saline or soap & water before dressing change. May use Vaseline (petrolatum), Aquaphor, Aveeno, CeraVe, Cetaphil, Eucerin, Lubriderm, etc for dry skin. Dressing type for home: Other: as above for this week, 3 times weekly. Written discharge instructions given to patient. Follow up in 1 week.     Electronically signed by Christy Villafana MD on 12/14/2017 at 9:03 PM.

## 2017-12-20 ENCOUNTER — HOSPITAL ENCOUNTER (OUTPATIENT)
Dept: WOUND CARE | Age: 62
Discharge: HOME | End: 2017-12-20
Attending: INTERNAL MEDICINE | Admitting: INTERNAL MEDICINE

## 2017-12-20 VITALS
DIASTOLIC BLOOD PRESSURE: 77 MMHG | TEMPERATURE: 97.4 F | HEIGHT: 70 IN | RESPIRATION RATE: 18 BRPM | SYSTOLIC BLOOD PRESSURE: 126 MMHG | BODY MASS INDEX: 37.44 KG/M2 | HEART RATE: 74 BPM | WEIGHT: 261.5 LBS

## 2017-12-20 PROCEDURE — 97597 DBRDMT OPN WND 1ST 20 CM/<: CPT | Performed by: INTERNAL MEDICINE

## 2017-12-20 ASSESSMENT — PAIN SCALES - GENERAL: PAINLEVEL_OUTOF10: 7

## 2017-12-20 ASSESSMENT — PAIN DESCRIPTION - FREQUENCY: FREQUENCY: CONTINUOUS

## 2017-12-20 ASSESSMENT — PAIN DESCRIPTION - PROGRESSION: CLINICAL_PROGRESSION: NOT CHANGED

## 2017-12-20 ASSESSMENT — PAIN DESCRIPTION - LOCATION: LOCATION: COCCYX

## 2017-12-20 ASSESSMENT — PAIN DESCRIPTION - ONSET: ONSET: ON-GOING

## 2017-12-20 ASSESSMENT — PAIN DESCRIPTION - DESCRIPTORS: DESCRIPTORS: BURNING

## 2017-12-20 ASSESSMENT — PAIN DESCRIPTION - PAIN TYPE: TYPE: CHRONIC PAIN

## 2017-12-20 ASSESSMENT — PAIN DESCRIPTION - ORIENTATION: ORIENTATION: MID

## 2017-12-21 NOTE — PROGRESS NOTES
[facility] Progress Note    Daniel Mendoza     : 1955    DATE OF VISIT:  2017    Subjective:     Daniel Mendoza is a 65 y.o. male who has a pressure ulcer located on the back. Significant symptoms or pertinent wound history since last visit: wound and general lower back pain about the same. No F/C/D, skin tender and irritated from NPWT drape, and he's had some other issues with it as well (low battery, air leaks, interference with PT, interference with showering (which tends to flare his seborrheic dermatitis). Additional ulcer(s) noted? no.  Buttock remains healed. His current medication list consists of ARIPiprazole, Albuterol, Fexofenadine-Pseudoephedrine, NONFORMULARY, OXYGEN, allopurinol, buPROPion, dexmethylphenidate, docusate sodium, eplerenone, fluticasone-salmeterol, gabapentin, glipiZIDE, insulin glargine, insulin regular, metoprolol succinate, metroNIDAZOLE, moexipril, nitroGLYCERIN, omeprazole, polyethylene glycol, potassium chloride, pravastatin, pregabalin, torsemide, and zafirlukast.    Allergies: Aspartame and phenylalanine; Erythromycin; Arthrotec [diclofenac-misoprostol]; Betadine [povidone iodine]; Cefuroxime axetil; Claritin [loratadine]; Clindamycin/lincomycin; Feldene [piroxicam]; Indocin [indometacin sodium]; Iodine; Pcn [penicillins]; Pletal [cilostazol]; Robaxin [methocarbamol]; Tenex [guanfacine hcl]; Vasotec; and Vioxx    Objective:     Vitals:    17 1025 17 1027   BP: 139/81    Pulse: 73    Resp: 17    Temp: 97.6 °F (36.4 °C)    TempSrc: Oral    Weight:  265 lb (120.2 kg)   Height: 5' 10\" (1.778 m)      AAOx3, overweight, fatigued, NAD  No cellulitis, angitis, fluctuance, acute arthritis or bursitis  No incontinence dermatitis or Candidiasis; improving contact dermatitis from NPWT drape  Dominique-ulcer skin: Induration, Warmth and Pink.   Ulcer(s): wound IS a bit deeper without NPWT, but base is still mostly red and granular, one small contributing to occurrence and/or persistence of the chronic ulcer include diabetes, chronic pressure, decreased mobility, shear force and obesity. Medical necessity of today's visit is shown by the above documentation. Sharp debridement is indicated today, based upon the exam findings in the wound(s) above. Procedure note:     Consent obtained. Time out performed per Health system policy. Anesthetic  Anesthetic: Other (comment) (4% lidocaine)     Using a curette, I sharply debrided the back ulcer(s) down through and including the removal of subcutaneous tissue. The type(s) of tissue debrided included fibrin, biofilm and necrotic/eschar. Total Surface Area Debrided: 4 sq cm. Post  Debridement Measurements:  Wound 05/25/17 #8,  Coccyx Pressure Stage 4,  onset 5/1/17, PRESSURE-Wound Length (cm): 2.4 cm    Wound 05/25/17 #8,  Coccyx Pressure Stage 4,  onset 5/1/17, PRESSURE-Wound Width (cm): 2 cm    Wound 05/25/17 #8,  Coccyx Pressure Stage 4,  onset 5/1/17, PRESSURE-Wound Depth (cm) : 1.3    The ulcers were then irrigated with normal saline solution. The procedure was completed with a small amount of bleeding, and hemostasis was by pressure. The patient tolerated the procedure well, with no significant complications. The patient's level of pain during and after the procedure was monitored, and is noted in the wound documentation flowsheet. Discharge plan:     Treatment in the wound care center today: Wound 05/25/17 #8,  Coccyx Pressure Stage 4,  onset 5/1/17, PRESSURE-Dressing/Treatment: Dry dressing, Moisture barrier, Silicone dressing, Silver dressing. Home treatment: good handwashing before and after any dressing changes. Cleanse wound with saline or soap & water before dressing change. May use Vaseline (petrolatum), Aquaphor, Aveeno, CeraVe, Cetaphil, Eucerin, Lubriderm, etc for dry skin.      Dressing type for home: Other: periwound ZnO, Nexodyn spray, Flagyl powder PRN malodor, silver collagen, Mepilex

## 2017-12-27 ENCOUNTER — HOSPITAL ENCOUNTER (OUTPATIENT)
Dept: WOUND CARE | Age: 62
Discharge: HOME | End: 2017-12-27
Attending: SURGERY | Admitting: SURGERY

## 2017-12-27 VITALS
BODY MASS INDEX: 37.37 KG/M2 | WEIGHT: 261 LBS | TEMPERATURE: 96.7 F | DIASTOLIC BLOOD PRESSURE: 70 MMHG | HEART RATE: 84 BPM | HEIGHT: 70 IN | SYSTOLIC BLOOD PRESSURE: 113 MMHG | RESPIRATION RATE: 18 BRPM

## 2017-12-27 ASSESSMENT — PAIN SCALES - GENERAL: PAINLEVEL_OUTOF10: 8

## 2017-12-27 ASSESSMENT — PAIN DESCRIPTION - PAIN TYPE: TYPE: CHRONIC PAIN

## 2017-12-27 ASSESSMENT — PAIN DESCRIPTION - DESCRIPTORS: DESCRIPTORS: BURNING

## 2017-12-27 ASSESSMENT — PAIN DESCRIPTION - ORIENTATION: ORIENTATION: MID

## 2017-12-27 ASSESSMENT — PAIN DESCRIPTION - ONSET: ONSET: ON-GOING

## 2017-12-27 ASSESSMENT — PAIN DESCRIPTION - LOCATION: LOCATION: COCCYX

## 2017-12-27 ASSESSMENT — PAIN DESCRIPTION - PROGRESSION: CLINICAL_PROGRESSION: NOT CHANGED

## 2017-12-27 ASSESSMENT — PAIN DESCRIPTION - FREQUENCY: FREQUENCY: CONTINUOUS

## 2018-01-04 NOTE — PROGRESS NOTES
[facility] [place]    Mona Blue     : 1955    DATE OF VISIT:  2017    Subjective:     Mona Blue is a 65 y.o. male who has a pressure ulcer located on the back. Significant symptoms or pertinent wound history since last visit: pain stable, not too much drainage, no fever. Trying to stay well offloaded, and eat well. Additional ulcer(s) noted? no.      His current medication list consists of ARIPiprazole, Albuterol, Fexofenadine-Pseudoephedrine, NONFORMULARY, OXYGEN, allopurinol, buPROPion, dexmethylphenidate, docusate sodium, eplerenone, fluticasone-salmeterol, gabapentin, glipiZIDE, insulin glargine, insulin regular, metoprolol succinate, metroNIDAZOLE, moexipril, nitroGLYCERIN, omeprazole, polyethylene glycol, potassium chloride, pravastatin, pregabalin, torsemide, and zafirlukast.    Allergies: Aspartame and phenylalanine; Erythromycin; Arthrotec [diclofenac-misoprostol]; Betadine [povidone iodine]; Cefuroxime axetil; Claritin [loratadine]; Clindamycin/lincomycin; Feldene [piroxicam]; Indocin [indometacin sodium]; Iodine; Pcn [penicillins]; Pletal [cilostazol]; Robaxin [methocarbamol]; Tenex [guanfacine hcl]; Vasotec; and Vioxx    Objective:     Vitals:    17 1005   BP: 126/77   Pulse: 74   Resp: 18   Temp: 97.4 °F (36.3 °C)   TempSrc: Oral   Weight: 261 lb 8 oz (118.6 kg)   Height: 5' 10\" (1.778 m)     AAOx3, overweight, fatigued, NAD  No cellulitis, Candidiasis, dressing dermatitis, acute arthritis or bursitis  Dominique-ulcer skin: Induration, Warmth and Pink. Ulcer(s): still mostly red and granular, one edge a bit fibrotic, overlying fibrinous debris, definite increase in depth since stopping NPWT (not exposure of deeper tissues, just decreased volume of granulation tissue). Pre-debridement wound measurements are in the wound documentation flowsheet. Photos also saved in electronic chart.     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    Chronic pain syndrome on chronic opioids G89.4    Asthma/COPD J44.9    Chronic ischemic heart disease I25.9    Bilateral lower extremity edema R60.0    Hiatal hernia with GERD K21.9, K44.9    Extrinsic asthma J45.909    Low back pain M54.5    TESS G47.33    Calcification of bronchus or trachea J39.8, J98.09    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    Diabetic peripheral neuropathy E11.42    CKD2/3 N18.3    Chronic microcytic Iron-deficiency anemia D50.9    Chronic dCHF (grade 1 LVDD) I50.32    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    Arthritis M19.90    Claustrophobia F40.240    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 Factors contributing to occurrence and/or persistence of the chronic ulcer include diabetes, chronic pressure, decreased mobility, shear force and obesity. Medical necessity of today's visit is shown by the above documentation. Sharp debridement is indicated today, based upon the exam findings in the wound(s) above. Procedure note:     Consent obtained. Time out performed per [facility] policy. Anesthetic  Anesthetic: Other (comment) (4% lidocaine)     Using a curette, I sharply debrided the back ulcer(s) down through and including the removal of dermis.  The type(s) of tissue debrided included fibrin and necrotic/eschar. Total Surface Area Debrided: 4 sq cm. Post  Debridement Measurements:  Wound 05/25/17 #8,  Coccyx Pressure Stage 4,  onset 5/1/17, PRESSURE-Wound Length (cm): 2.4 cm    Wound 05/25/17 #8,  Coccyx Pressure Stage 4,  onset 5/1/17, PRESSURE-Wound Width (cm): 2 cm    Wound 05/25/17 #8,  Coccyx Pressure Stage 4,  onset 5/1/17, PRESSURE-Wound Depth (cm) : 1.3    The ulcers were then irrigated with normal saline solution. The procedure was completed with a small amount of bleeding, and hemostasis was by pressure. The patient tolerated the procedure well, with no significant complications. The patient's level of pain during and after the procedure was monitored, and is noted in the wound documentation flowsheet. Discharge plan:     Treatment in the wound care center today: Wound 05/25/17 #8,  Coccyx Pressure Stage 4,  onset 5/1/17, PRESSURE-Dressing/Treatment:  (Zinc oxide,Flagyl,AquacelAg,2x2,Mepilex border,NPWT drape). NPWT drape to assist with better adherence of other dressing layers -- somehow that's not irritating his skin much. Plans to resume PriMatrix Rx after the holidays (when he can easier manage a weekly dressing). We need to get a repeat Hgb A1c as well -- it's been 9 months. Home treatment: good handwashing before and after any dressing changes. Cleanse wound with saline or soap & water before dressing change. May use Vaseline (petrolatum), Aquaphor, Aveeno, CeraVe, Cetaphil, Eucerin, Lubriderm, etc for dry skin. Dressing type for home: Other: as above, 3 times weekly. Written discharge instructions given to patient. Follow up in 1 week.     Electronically signed by Latanya Elam MD on 1/3/2018 at 9:56 PM.

## 2018-01-10 ENCOUNTER — HOSPITAL ENCOUNTER (OUTPATIENT)
Dept: WOUND CARE | Age: 63
Discharge: HOME | End: 2018-01-10
Attending: INTERNAL MEDICINE | Admitting: INTERNAL MEDICINE

## 2018-01-10 VITALS
BODY MASS INDEX: 37.51 KG/M2 | HEIGHT: 70 IN | HEART RATE: 80 BPM | WEIGHT: 262 LBS | SYSTOLIC BLOOD PRESSURE: 106 MMHG | TEMPERATURE: 99.5 F | DIASTOLIC BLOOD PRESSURE: 69 MMHG | RESPIRATION RATE: 18 BRPM

## 2018-01-10 PROCEDURE — 15271 SKIN SUB GRAFT TRNK/ARM/LEG: CPT | Performed by: INTERNAL MEDICINE

## 2018-01-10 ASSESSMENT — PAIN DESCRIPTION - FREQUENCY: FREQUENCY: CONTINUOUS

## 2018-01-10 ASSESSMENT — PAIN DESCRIPTION - ORIENTATION: ORIENTATION: LOWER

## 2018-01-10 ASSESSMENT — PAIN DESCRIPTION - ONSET: ONSET: ON-GOING

## 2018-01-10 ASSESSMENT — PAIN DESCRIPTION - DESCRIPTORS: DESCRIPTORS: BURNING

## 2018-01-10 ASSESSMENT — PAIN SCALES - GENERAL: PAINLEVEL_OUTOF10: 8

## 2018-01-10 ASSESSMENT — PAIN DESCRIPTION - PAIN TYPE: TYPE: CHRONIC PAIN

## 2018-01-10 ASSESSMENT — PAIN DESCRIPTION - PROGRESSION: CLINICAL_PROGRESSION: NOT CHANGED

## 2018-01-10 ASSESSMENT — PAIN DESCRIPTION - LOCATION: LOCATION: COCCYX

## 2018-01-15 NOTE — PROGRESS NOTES
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    Chronic pain syndrome on chronic opioids G89.4    Asthma/COPD J44.9    Chronic ischemic heart disease I25.9    Bilateral lower extremity edema R60.0    Hiatal hernia with GERD K21.9, K44.9    Extrinsic asthma J45.909    Low back pain M54.5    TESS G47.33    Calcification of bronchus or trachea J39.8, J98.09    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    Diabetic peripheral neuropathy E11.42    CKD2/3 N18.3    Chronic microcytic Iron-deficiency anemia D50.9    Chronic dCHF (grade 1 LVDD) I50.32    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 Did not have as much progress with (or tolerance of) NPWT as I had hoped. Is willing to try to keep a dressing on for a week, to give PriMatrix a better chance of surviving and benefiting him more than it did the first time - that means not showering, which causes his seborrhea to flare, but he's willing. Factors contributing to occurrence and/or persistence of the chronic ulcer include diabetes, chronic pressure, decreased mobility, shear force and obesity. Medical necessity of today's visit is shown by the above documentation.  Sharp debridement is indicated today, based upon the exam findings in

## 2018-01-17 ENCOUNTER — HOSPITAL ENCOUNTER (OUTPATIENT)
Dept: WOUND CARE | Age: 63
Discharge: HOME | End: 2018-01-17
Attending: INTERNAL MEDICINE | Admitting: INTERNAL MEDICINE

## 2018-01-17 VITALS
HEART RATE: 90 BPM | BODY MASS INDEX: 39.08 KG/M2 | RESPIRATION RATE: 20 BRPM | HEIGHT: 70 IN | WEIGHT: 273 LBS | SYSTOLIC BLOOD PRESSURE: 132 MMHG | TEMPERATURE: 97.1 F | DIASTOLIC BLOOD PRESSURE: 86 MMHG

## 2018-01-17 PROCEDURE — 99213 OFFICE O/P EST LOW 20 MIN: CPT | Performed by: INTERNAL MEDICINE

## 2018-01-17 ASSESSMENT — PAIN SCALES - GENERAL: PAINLEVEL_OUTOF10: 0

## 2018-01-17 NOTE — PLAN OF CARE
Problem: Wound:  Goal: Will show signs of wound healing; wound closure and no evidence of infection  Will show signs of wound healing; wound closure and no evidence of infection   Outcome: Ongoing  Wound stable, the dressing applied last week didn't stay in place for the week as intended, no debridement. Will return to using [address] [place] [address] to change dressing on Friday & Monday. Reinforced importance of not sitting for prolonged periods of time & frequent repositioning from side to side. Pt. States using ROHO when sitting. F/u in [address] [place] [address],[address] in 1 week as ordered. Discharge instructions reviewed with patient, all questions answered, copy given to patient. Dressings were applied to all wounds per M.D. Instructions at this visit.

## 2018-01-25 NOTE — PROGRESS NOTES
88 [facility] Progress Note    Martina Dwyer     : 1955    DATE OF VISIT:  2018    Subjective:     Martina Dwyer is a 65 y.o. male who has a pressure ulcer located on the back. Current complaint of pain in this ulcer? yes. Quality of pain: aching and sharp  Timing: intermittent, stable  Severity: moderate  Associated Signs/Symptoms:  drainage and odor  Other significant symptoms or pertinent ulcer history: Mr. Chip Sparks had a hard time keeping his secondary dressing completely in place this week so some drainage leaked out of the inferior edge of the dressing, and I'm not sure that 100% of the PriMatrix material was retained. Has been sitting more again, trying to get some work done at his computer. No F/C/D, no pruritus. Additional ulcer(s) noted? no.      Mr. Chip Sparks has a past medical history of Abscess or cellulitis of leg; Acute renal failure ([address] Angina pectoris ([address] Cellulitis of buttock; Cellulitis of sacral region; CHF (congestive heart failure) ([address] Claustrophobia; Colitis; Decubitus ulcer; Decubitus ulcer of left buttock, stage 2; Diverticulitis; DVT (deep venous thrombosis) ([address] Gangrene ([address] Gout; HCAP (healthcare-associated pneumonia); Panic attack; Pressure ulcer of coccygeal region, stage 2; Pressure ulcer of left heel, stage 2; Tracheostomy infection ([address] and Unspecified cerebral artery occlusion with cerebral infarction. He has a past surgical history that includes tracheostomy; Tonsillectomy and adenoidectomy; Uvulopalatopharygoplasty; back surgery; knee surgery; Upper gastrointestinal endoscopy (16); lumbar laminectomy; lumbar fusion; Vena Cava Filter Placement (); Total knee arthroplasty; rhinoplasty; other surgical history (Right, 2017); and Endoscopy, colon, diagnostic. His family history includes High Blood Pressure in his father and mother. Mr. Chip Sparks reports that he has been smoking Cigars.   He has never used smokeless tobacco. He reports that he does not drink alcohol or use drugs. His current medication list consists of ARIPiprazole, Albuterol, Fexofenadine-Pseudoephedrine, NONFORMULARY, OXYGEN, allopurinol, buPROPion, dexmethylphenidate, docusate sodium, eplerenone, fluticasone-salmeterol, gabapentin, glipiZIDE, insulin glargine, insulin regular, metoprolol succinate, metroNIDAZOLE, moexipril, nitroGLYCERIN, omeprazole, polyethylene glycol, potassium chloride, pravastatin, pregabalin, torsemide, and zafirlukast.    Allergies: Aspartame and phenylalanine; Erythromycin; Arthrotec [diclofenac-misoprostol]; Betadine [povidone iodine]; Cefuroxime axetil; Claritin [loratadine]; Clindamycin/lincomycin; Feldene [piroxicam]; Indocin [indometacin sodium]; Iodine; Pcn [penicillins]; Pletal [cilostazol]; Robaxin [methocarbamol]; Tenex [guanfacine hcl]; Vasotec; and Vioxx    Pertinent items from the review of systems are discussed in the HPI; the remainder of the ROS was reviewed and is negative. Objective:     Vitals:    01/17/18 1004   BP: 132/86   Pulse: 90   Resp: 20   Temp: 97.1 °F (36.2 °C)   TempSrc: Oral   Weight: 273 lb (123.8 kg)   Height: 5' 10\" (1.778 m)       Constitutional:  well-developed, well-nourished, overweight, chronically ill appearing  Psychiatric:  oriented to person, place and time; mood and affect appropriate for the situation   Musculoskeletal:  no acute arthritis or bursitis  No cellulitis, candidiasis, mild contact dermatitis from drainage  Dominique-ulcer skin:  Induration, Warmth, Pink and mildly red and macerated in some spots. Ulcer(s):  about the same size, red and granular, an overlying translucent layer of material that I believe is fibrin + incorporating PriMatrix; does not look like biofilm; mod-heavy discolored serous exudate with some malodor. Today's ulcer measurements are in the electronic flowsheet. Photos also saved in electronic chart.     Lab Results   Component Value Date    LABALBU 3.4 03/21/2017     Lab Results   Component Value Date    CREATININE 1.1 03/21/2017     Lab Results   Component Value Date    HGB 9.1 (L) 03/21/2017     Lab Results   Component Value Date    LABA1C 8.8 03/17/2017     Assessment:     Patient Active Problem List   Diagnosis Code    Chronic thrombocytopenia D69.6    Chronic respiratory failure with hypoxia on 6 L home O2 tent over trach J96.11    ID[facility] diabetes mellitus) [facility] E11.9, Z79.4    HTN (hypertension) I10    Non morbid obesity due to excess calories E66.09    Anxiety and depression F41.8    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    Chronic pain syndrome on chronic opioids G89.4    Asthma/COPD J44.9    Chronic ischemic heart disease I25.9    Bilateral lower extremity edema R60.0    Hiatal hernia with GERD K21.9, K44.9    Extrinsic asthma J45.909    Low back pain M54.5    TESS G47.33    Calcification of bronchus or trachea J39.8, J98.09    Atypical schizophrenia ([facility] F20.3    Diabetic peripheral neuropathy E11.42    CKD2/3 N18.3    Chronic microcytic Iron-deficiency anemia D50.9    Chronic dCHF (grade 1 LVDD) I50.32    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    Arthritis M19.90    Claustrophobia F40.240    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 No signs of infection. Factors contributing to occurrence and/or persistence of the chronic ulcer include diabetes, poor glucose control, chronic pressure, decreased mobility, shear force, obesity and non-adherence.  Medical

## 2018-01-31 ENCOUNTER — HOSPITAL ENCOUNTER (OUTPATIENT)
Dept: WOUND CARE | Age: 63
Discharge: HOME | End: 2018-01-31
Attending: INTERNAL MEDICINE | Admitting: INTERNAL MEDICINE

## 2018-01-31 VITALS
SYSTOLIC BLOOD PRESSURE: 144 MMHG | HEIGHT: 70 IN | BODY MASS INDEX: 38.37 KG/M2 | WEIGHT: 268 LBS | DIASTOLIC BLOOD PRESSURE: 83 MMHG | HEART RATE: 81 BPM | RESPIRATION RATE: 17 BRPM | TEMPERATURE: 97.9 F

## 2018-01-31 PROCEDURE — 15271 SKIN SUB GRAFT TRNK/ARM/LEG: CPT | Performed by: INTERNAL MEDICINE

## 2018-01-31 RX ORDER — ATENOLOL 25 MG/1
37.5 TABLET ORAL DAILY
COMMUNITY
End: 2018-06-20 | Stop reason: ALTCHOICE

## 2018-01-31 ASSESSMENT — PAIN SCALES - GENERAL: PAINLEVEL_OUTOF10: 0

## 2018-02-07 ENCOUNTER — HOSPITAL ENCOUNTER (OUTPATIENT)
Dept: WOUND CARE | Age: 63
Discharge: HOME | End: 2018-02-07
Attending: INTERNAL MEDICINE | Admitting: INTERNAL MEDICINE

## 2018-02-14 ENCOUNTER — HOSPITAL ENCOUNTER (OUTPATIENT)
Dept: WOUND CARE | Age: 63
Discharge: HOME | End: 2018-02-14
Attending: INTERNAL MEDICINE | Admitting: INTERNAL MEDICINE

## 2018-02-14 VITALS
RESPIRATION RATE: 18 BRPM | SYSTOLIC BLOOD PRESSURE: 148 MMHG | BODY MASS INDEX: 38.65 KG/M2 | HEIGHT: 70 IN | OXYGEN SATURATION: 98 % | HEART RATE: 76 BPM | TEMPERATURE: 98.6 F | DIASTOLIC BLOOD PRESSURE: 86 MMHG | WEIGHT: 270 LBS

## 2018-02-14 PROCEDURE — 97597 DBRDMT OPN WND 1ST 20 CM/<: CPT | Performed by: INTERNAL MEDICINE

## 2018-02-14 ASSESSMENT — PAIN SCALES - GENERAL: PAINLEVEL_OUTOF10: 7

## 2018-02-14 ASSESSMENT — PAIN DESCRIPTION - ONSET: ONSET: ON-GOING

## 2018-02-14 ASSESSMENT — PAIN DESCRIPTION - DESCRIPTORS: DESCRIPTORS: SHARP

## 2018-02-14 ASSESSMENT — PAIN DESCRIPTION - ORIENTATION: ORIENTATION: LOWER

## 2018-02-14 ASSESSMENT — PAIN DESCRIPTION - PROGRESSION: CLINICAL_PROGRESSION: NOT CHANGED

## 2018-02-14 ASSESSMENT — PAIN DESCRIPTION - LOCATION: LOCATION: COCCYX

## 2018-02-14 ASSESSMENT — PAIN DESCRIPTION - PAIN TYPE: TYPE: CHRONIC PAIN

## 2018-02-14 ASSESSMENT — PAIN DESCRIPTION - FREQUENCY: FREQUENCY: CONTINUOUS

## 2018-02-20 NOTE — PROGRESS NOTES
88 [facility] Progress Note    Vy Gibbons     : 1955    DATE OF VISIT:  2018    Subjective:     Vy Gibbons is a 65 y.o. male who has a pressure ulcer located on the back. Significant symptoms or pertinent wound history since last visit: pain stable, drainage stable, mild pruritus, no fever or chills. States that he's sitting for perhaps 2 hours per day in total. Eating well. Was not able to make it in here last week because of the weather, so secondary dressing over PriMatrix had not been removed until today, when it had already loosened and was leaking. Additional ulcer(s) noted? no.      His current medication list consists of ARIPiprazole, Albuterol, Fexofenadine-Pseudoephedrine, NONFORMULARY, OXYGEN, allopurinol, atenolol, buPROPion, dexmethylphenidate, docusate sodium, eplerenone, fluticasone-salmeterol, gabapentin, glipiZIDE, insulin glargine, insulin regular, metroNIDAZOLE, moexipril, nitroGLYCERIN, omeprazole, polyethylene glycol, potassium chloride, pravastatin, pregabalin, torsemide, and zafirlukast.    Allergies: Aspartame and phenylalanine; Erythromycin; Arthrotec [diclofenac-misoprostol]; Betadine [povidone iodine]; Cefuroxime axetil; Claritin [loratadine]; Clindamycin/lincomycin; Feldene [piroxicam]; Indocin [indometacin sodium]; Iodine; Pcn [penicillins]; Pletal [cilostazol]; Robaxin [methocarbamol]; Tenex [guanfacine hcl]; Vasotec; and Vioxx    Objective:     Vitals:    18 1007 18 1015   BP: (!) 148/86    Pulse: 76    Resp: 18    Temp: 98.6 °F (37 °C)    TempSrc: Oral    SpO2:  98%   Weight: 270 lb (122.5 kg)    Height: 5' 10\" (1.778 m)      AAOx3, overweight, chronically ill appearing, NAD  No cellulitis, fluctuance, Candidiasis; mild contact dermatitis from dressing  Dominique-ulcer skin: Induration, Warmth and Pink.   Ulcer(s): a bit of new epidermal tissue, most of wound bed red and granular, overlying fibrin, stable depth, no deep tissues

## 2018-02-21 ENCOUNTER — HOSPITAL ENCOUNTER (OUTPATIENT)
Dept: WOUND CARE | Age: 63
Discharge: HOME | End: 2018-02-21
Attending: INTERNAL MEDICINE | Admitting: INTERNAL MEDICINE

## 2018-02-21 VITALS
DIASTOLIC BLOOD PRESSURE: 70 MMHG | BODY MASS INDEX: 39.8 KG/M2 | SYSTOLIC BLOOD PRESSURE: 132 MMHG | WEIGHT: 278 LBS | TEMPERATURE: 98.3 F | RESPIRATION RATE: 17 BRPM | HEIGHT: 70 IN | HEART RATE: 78 BPM

## 2018-02-21 DIAGNOSIS — L89.892 PRESSURE ULCER OF TOE OF RIGHT FOOT, STAGE 2 (HCC): ICD-10-CM

## 2018-02-21 PROCEDURE — 97597 DBRDMT OPN WND 1ST 20 CM/<: CPT | Performed by: INTERNAL MEDICINE

## 2018-02-21 ASSESSMENT — PAIN SCALES - GENERAL: PAINLEVEL_OUTOF10: 5

## 2018-02-21 ASSESSMENT — PAIN DESCRIPTION - ORIENTATION: ORIENTATION: LOWER

## 2018-02-21 ASSESSMENT — PAIN DESCRIPTION - ONSET: ONSET: ON-GOING

## 2018-02-21 ASSESSMENT — PAIN DESCRIPTION - PROGRESSION: CLINICAL_PROGRESSION: NOT CHANGED

## 2018-02-21 ASSESSMENT — PAIN DESCRIPTION - LOCATION: LOCATION: COCCYX

## 2018-02-21 ASSESSMENT — PAIN DESCRIPTION - PAIN TYPE: TYPE: CHRONIC PAIN

## 2018-02-21 ASSESSMENT — PAIN DESCRIPTION - DESCRIPTORS: DESCRIPTORS: BURNING

## 2018-02-21 ASSESSMENT — PAIN DESCRIPTION - FREQUENCY: FREQUENCY: INTERMITTENT

## 2018-02-21 NOTE — PLAN OF CARE
Problem: Wound:  Goal: Will show signs of wound healing; wound closure and no evidence of infection  Will show signs of wound healing; wound closure and no evidence of infection   Outcome: Ongoing  Blood blister noted to right great toe last week & left alone d/t being stable. Pt. States he has had some discmfort this week in the toe, debridement per Dr Parrish Hernandez. Ag Alginate & hydrocolloid applied per MD. Coccyx wound stable, debridement per Dr. Parrish Hernandez & pt. Tolerated well. Will cont. With current wound care regime. F/u in [facility] in 1 week as ordered. Again reinforced importance of off-loading & frequent repositioning. Pt. States he has been off of his wound more this week & uses ROHO when sitting. Pt. Does state that he is unable to use in w/c d/t his feet don't touch the ground & this is how he gets around. Dr. Parrish Hernandez will see if pt. Qualifies for a new w/c. Discharge instructions reviewed with patient, all questions answered, copy given to patient. Dressings were applied to all wounds per M.D. Instructions at this visit.

## 2018-02-27 PROBLEM — L89.892 PRESSURE ULCER OF TOE OF RIGHT FOOT, STAGE 2 (HCC): Status: ACTIVE | Noted: 2018-02-27

## 2018-02-28 ENCOUNTER — HOSPITAL ENCOUNTER (OUTPATIENT)
Dept: WOUND CARE | Age: 63
Discharge: HOME | End: 2018-02-28
Attending: INTERNAL MEDICINE | Admitting: INTERNAL MEDICINE

## 2018-02-28 VITALS
TEMPERATURE: 97.5 F | HEIGHT: 70 IN | BODY MASS INDEX: 39.51 KG/M2 | WEIGHT: 276 LBS | HEART RATE: 75 BPM | DIASTOLIC BLOOD PRESSURE: 72 MMHG | RESPIRATION RATE: 17 BRPM | SYSTOLIC BLOOD PRESSURE: 122 MMHG

## 2018-02-28 PROCEDURE — 97597 DBRDMT OPN WND 1ST 20 CM/<: CPT | Performed by: INTERNAL MEDICINE

## 2018-02-28 RX ORDER — M-VIT,TX,IRON,MINS/CALC/FOLIC 27MG-0.4MG
1 TABLET ORAL DAILY
COMMUNITY

## 2018-02-28 RX ORDER — ZINC GLUCONATE 50 MG
100 TABLET ORAL 2 TIMES DAILY
COMMUNITY

## 2018-02-28 ASSESSMENT — PAIN DESCRIPTION - DESCRIPTORS: DESCRIPTORS: BURNING

## 2018-02-28 ASSESSMENT — PAIN DESCRIPTION - ORIENTATION: ORIENTATION: LOWER

## 2018-02-28 ASSESSMENT — PAIN SCALES - GENERAL: PAINLEVEL_OUTOF10: 7

## 2018-02-28 ASSESSMENT — PAIN DESCRIPTION - LOCATION: LOCATION: COCCYX

## 2018-02-28 ASSESSMENT — PAIN DESCRIPTION - PROGRESSION: CLINICAL_PROGRESSION: NOT CHANGED

## 2018-02-28 ASSESSMENT — PAIN DESCRIPTION - ONSET: ONSET: ON-GOING

## 2018-02-28 ASSESSMENT — PAIN DESCRIPTION - FREQUENCY: FREQUENCY: INTERMITTENT

## 2018-02-28 ASSESSMENT — PAIN DESCRIPTION - PAIN TYPE: TYPE: CHRONIC PAIN

## 2018-02-28 NOTE — PROGRESS NOTES
tolerated the procedure well, with no significant complications. The patient's level of pain during and after the procedure was monitored, and is noted in the wound documentation flowsheet. Discharge plan:     Treatment in the wound care center today: Wound 02/21/18 #19 Right great toe, Pressure, stage 2 (onset 2/8/18) trauma-Dressing/Treatment: Hydrocolloid, Silver dressing (skin prep, ZnO, AqAg, hydrocolloid, Keaton applied per MD)  Wound 05/25/17 #8,  Coccyx Pressure Stage 4,  onset 5/1/17, PRESSURE-Dressing/Treatment:  (Nexodyn, Zinc oxide,Flagyl,Celia,Mepilex border). Keep focused on pressure relief, minimal time seated, glucose control, protein intake. He asked whether a new wheelchair might be an option for him, and I'll have a local DME provider check into his coverage and the date of purchase of his current chair (he thinks maybe 4-5 years). Home treatment: good handwashing before and after any dressing changes. Cleanse wound with saline or soap & water before dressing change. May use Vaseline (petrolatum), Aquaphor, Aveeno, CeraVe, Cetaphil, Eucerin, Lubriderm, etc for dry skin. Dressing type for home: Other: as above, 3 times weekly. Written discharge instructions given to patient. Follow up in 1 week.     Electronically signed by Niranjan Antonio MD on 2/27/2018 at 10:06 PM.

## 2018-03-01 NOTE — PLAN OF CARE
Problem: Wound:  Goal: Will show signs of wound healing; wound closure and no evidence of infection  Will show signs of wound healing; wound closure and no evidence of infection   Outcome: Ongoing  Right great toe wound stable, debridement per Dr Altagracia Patel, will cont. with current wound care regime. Coccyx wound stable, debridement per Dr Altagracia Patel, will cont. With current wound care regime. Pt. Did state that he had more discomfort in his wound this. Dr Altagracia Patel obtained culture, no visible signs of infection noted. Will hold on antibiotics until culture results obtained. Reinforced importance of off-loading & frequent repositioning from side to side. Pt. States that he is sitting for 30 minutes per day. F/u in [address] [place] [address],[address] in 1 week as ordered. Discharge instructions reviewed with patient, all questions answered, copy given to patient. Dressings were applied to all wounds per M.D. Instructions at this visit.

## 2018-03-02 LAB
GRAM STAIN RESULT: NORMAL
WOUND/ABSCESS: NORMAL

## 2018-03-04 PROBLEM — R53.81 DEBILITY: Status: ACTIVE | Noted: 2018-03-04

## 2018-03-09 PROBLEM — M17.11 ARTHRITIS OF RIGHT KNEE: Status: ACTIVE | Noted: 2018-03-09

## 2018-03-23 PROBLEM — R53.1 GENERAL WEAKNESS: Status: ACTIVE | Noted: 2018-03-23

## 2018-05-18 ENCOUNTER — HOSPITAL ENCOUNTER (OUTPATIENT)
Dept: WOUND CARE | Age: 63
Discharge: HOME | End: 2018-05-18
Attending: INTERNAL MEDICINE | Admitting: INTERNAL MEDICINE

## 2018-05-18 VITALS
DIASTOLIC BLOOD PRESSURE: 77 MMHG | OXYGEN SATURATION: 97 % | BODY MASS INDEX: 39.67 KG/M2 | HEIGHT: 70 IN | RESPIRATION RATE: 20 BRPM | TEMPERATURE: 97.3 F | HEART RATE: 81 BPM | SYSTOLIC BLOOD PRESSURE: 129 MMHG | WEIGHT: 277.13 LBS

## 2018-05-18 PROCEDURE — 97597 DBRDMT OPN WND 1ST 20 CM/<: CPT | Performed by: INTERNAL MEDICINE

## 2018-05-18 RX ORDER — LIDOCAINE 40 MG/G
CREAM TOPICAL ONCE
Status: DISCONTINUED | OUTPATIENT
Start: 2018-05-18 | End: 2018-05-19 | Stop reason: HOSPADM

## 2018-05-18 ASSESSMENT — PAIN DESCRIPTION - ONSET: ONSET: ON-GOING

## 2018-05-18 ASSESSMENT — PAIN DESCRIPTION - PAIN TYPE: TYPE: CHRONIC PAIN

## 2018-05-18 ASSESSMENT — PAIN SCALES - GENERAL: PAINLEVEL_OUTOF10: 8

## 2018-05-18 ASSESSMENT — PAIN DESCRIPTION - FREQUENCY: FREQUENCY: CONTINUOUS

## 2018-05-18 ASSESSMENT — PAIN DESCRIPTION - DESCRIPTORS: DESCRIPTORS: SHARP;STABBING

## 2018-05-18 ASSESSMENT — PAIN DESCRIPTION - PROGRESSION: CLINICAL_PROGRESSION: NOT CHANGED

## 2018-05-18 ASSESSMENT — PAIN DESCRIPTION - ORIENTATION: ORIENTATION: LOWER

## 2018-05-18 ASSESSMENT — PAIN DESCRIPTION - LOCATION: LOCATION: COCCYX

## 2018-05-30 ENCOUNTER — HOSPITAL ENCOUNTER (OUTPATIENT)
Dept: WOUND CARE | Age: 63
Discharge: HOME | End: 2018-05-30
Attending: INTERNAL MEDICINE | Admitting: INTERNAL MEDICINE

## 2018-05-30 VITALS
WEIGHT: 275 LBS | DIASTOLIC BLOOD PRESSURE: 67 MMHG | TEMPERATURE: 97.6 F | RESPIRATION RATE: 16 BRPM | BODY MASS INDEX: 39.37 KG/M2 | HEIGHT: 70 IN | HEART RATE: 80 BPM | SYSTOLIC BLOOD PRESSURE: 113 MMHG

## 2018-05-30 DIAGNOSIS — L97.511 NEUROPATHIC ULCER OF RIGHT FOOT, LIMITED TO BREAKDOWN OF SKIN (HCC): ICD-10-CM

## 2018-05-30 PROCEDURE — 97597 DBRDMT OPN WND 1ST 20 CM/<: CPT | Performed by: INTERNAL MEDICINE

## 2018-05-30 RX ORDER — LIDOCAINE 40 MG/G
CREAM TOPICAL ONCE
Status: DISCONTINUED | OUTPATIENT
Start: 2018-05-30 | End: 2018-05-31 | Stop reason: HOSPADM

## 2018-05-30 RX ORDER — FERROUS SULFATE 325(65) MG
325 TABLET ORAL
COMMUNITY

## 2018-05-30 ASSESSMENT — PAIN DESCRIPTION - ONSET: ONSET: ON-GOING

## 2018-05-30 ASSESSMENT — PAIN DESCRIPTION - ORIENTATION: ORIENTATION: LOWER

## 2018-05-30 ASSESSMENT — PAIN DESCRIPTION - FREQUENCY: FREQUENCY: CONTINUOUS

## 2018-05-30 ASSESSMENT — PAIN SCALES - GENERAL: PAINLEVEL_OUTOF10: 8

## 2018-05-30 ASSESSMENT — PAIN DESCRIPTION - PAIN TYPE: TYPE: CHRONIC PAIN

## 2018-05-30 ASSESSMENT — PAIN DESCRIPTION - PROGRESSION: CLINICAL_PROGRESSION: NOT CHANGED

## 2018-05-30 ASSESSMENT — PAIN DESCRIPTION - LOCATION: LOCATION: COCCYX

## 2018-05-30 ASSESSMENT — PAIN DESCRIPTION - DESCRIPTORS: DESCRIPTORS: SHARP;STABBING

## 2018-06-01 PROBLEM — L89.892 PRESSURE ULCER OF TOE OF RIGHT FOOT, STAGE 2 (HCC): Status: RESOLVED | Noted: 2018-02-27 | Resolved: 2018-06-01

## 2018-06-07 PROBLEM — L97.511 NEUROPATHIC ULCER OF RIGHT FOOT, LIMITED TO BREAKDOWN OF SKIN (HCC): Status: ACTIVE | Noted: 2018-06-07

## 2018-06-08 ENCOUNTER — HOSPITAL ENCOUNTER (OUTPATIENT)
Dept: WOUND CARE | Age: 63
Discharge: HOME | End: 2018-06-08
Attending: INTERNAL MEDICINE | Admitting: INTERNAL MEDICINE

## 2018-06-08 VITALS
TEMPERATURE: 98.2 F | RESPIRATION RATE: 17 BRPM | DIASTOLIC BLOOD PRESSURE: 78 MMHG | HEART RATE: 85 BPM | HEIGHT: 70 IN | SYSTOLIC BLOOD PRESSURE: 127 MMHG

## 2018-06-08 PROCEDURE — 97597 DBRDMT OPN WND 1ST 20 CM/<: CPT | Performed by: INTERNAL MEDICINE

## 2018-06-08 RX ORDER — LIDOCAINE 40 MG/G
CREAM TOPICAL PRN
Status: DISCONTINUED | OUTPATIENT
Start: 2018-06-08 | End: 2018-06-09 | Stop reason: HOSPADM

## 2018-06-08 ASSESSMENT — PAIN SCALES - GENERAL: PAINLEVEL_OUTOF10: 8

## 2018-06-08 ASSESSMENT — PAIN DESCRIPTION - ORIENTATION: ORIENTATION: MID

## 2018-06-08 ASSESSMENT — PAIN DESCRIPTION - ONSET: ONSET: ON-GOING

## 2018-06-08 ASSESSMENT — PAIN DESCRIPTION - PROGRESSION: CLINICAL_PROGRESSION: NOT CHANGED

## 2018-06-08 ASSESSMENT — PAIN DESCRIPTION - LOCATION: LOCATION: COCCYX

## 2018-06-08 ASSESSMENT — PAIN DESCRIPTION - PAIN TYPE: TYPE: CHRONIC PAIN

## 2018-06-08 ASSESSMENT — PAIN DESCRIPTION - FREQUENCY: FREQUENCY: CONTINUOUS

## 2018-06-08 ASSESSMENT — PAIN DESCRIPTION - DESCRIPTORS: DESCRIPTORS: SHARP;SHOOTING

## 2018-06-13 ENCOUNTER — HOSPITAL ENCOUNTER (OUTPATIENT)
Dept: WOUND CARE | Age: 63
Discharge: HOME | End: 2018-06-13
Attending: INTERNAL MEDICINE | Admitting: INTERNAL MEDICINE

## 2018-06-13 VITALS
BODY MASS INDEX: 39.51 KG/M2 | WEIGHT: 276 LBS | TEMPERATURE: 99 F | HEIGHT: 70 IN | DIASTOLIC BLOOD PRESSURE: 77 MMHG | SYSTOLIC BLOOD PRESSURE: 128 MMHG | HEART RATE: 78 BPM | RESPIRATION RATE: 16 BRPM

## 2018-06-13 PROCEDURE — 97597 DBRDMT OPN WND 1ST 20 CM/<: CPT | Performed by: INTERNAL MEDICINE

## 2018-06-13 RX ORDER — LIDOCAINE 40 MG/G
CREAM TOPICAL PRN
Status: DISCONTINUED | OUTPATIENT
Start: 2018-06-13 | End: 2018-06-14 | Stop reason: HOSPADM

## 2018-06-13 ASSESSMENT — PAIN DESCRIPTION - DESCRIPTORS: DESCRIPTORS: SHARP;SHOOTING;BURNING

## 2018-06-13 ASSESSMENT — PAIN DESCRIPTION - ORIENTATION: ORIENTATION: MID

## 2018-06-13 ASSESSMENT — PAIN DESCRIPTION - FREQUENCY: FREQUENCY: CONTINUOUS

## 2018-06-13 ASSESSMENT — PAIN DESCRIPTION - ONSET: ONSET: ON-GOING

## 2018-06-13 ASSESSMENT — PAIN SCALES - GENERAL: PAINLEVEL_OUTOF10: 8

## 2018-06-13 ASSESSMENT — PAIN DESCRIPTION - PROGRESSION: CLINICAL_PROGRESSION: NOT CHANGED

## 2018-06-13 ASSESSMENT — PAIN DESCRIPTION - LOCATION: LOCATION: COCCYX

## 2018-06-13 ASSESSMENT — PAIN DESCRIPTION - PAIN TYPE: TYPE: CHRONIC PAIN

## 2018-06-20 ENCOUNTER — HOSPITAL ENCOUNTER (OUTPATIENT)
Dept: WOUND CARE | Age: 63
Discharge: HOME | End: 2018-06-20
Attending: INTERNAL MEDICINE | Admitting: INTERNAL MEDICINE

## 2018-06-20 VITALS
OXYGEN SATURATION: 93 % | TEMPERATURE: 97.3 F | HEIGHT: 70 IN | RESPIRATION RATE: 18 BRPM | HEART RATE: 81 BPM | BODY MASS INDEX: 40.89 KG/M2 | SYSTOLIC BLOOD PRESSURE: 123 MMHG | WEIGHT: 285.6 LBS | DIASTOLIC BLOOD PRESSURE: 67 MMHG

## 2018-06-20 PROCEDURE — 99213 OFFICE O/P EST LOW 20 MIN: CPT | Performed by: INTERNAL MEDICINE

## 2018-06-20 RX ORDER — INSULIN GLARGINE 100 [IU]/ML
30 INJECTION, SOLUTION SUBCUTANEOUS NIGHTLY
Status: ON HOLD | COMMUNITY
End: 2019-03-19 | Stop reason: HOSPADM

## 2018-06-20 RX ORDER — ARIPIPRAZOLE 30 MG/1
30 TABLET ORAL DAILY
COMMUNITY

## 2018-06-20 RX ORDER — LIDOCAINE 40 MG/G
CREAM TOPICAL ONCE
Status: DISCONTINUED | OUTPATIENT
Start: 2018-06-20 | End: 2018-06-21 | Stop reason: HOSPADM

## 2018-06-20 ASSESSMENT — PAIN DESCRIPTION - FREQUENCY: FREQUENCY: CONTINUOUS

## 2018-06-20 ASSESSMENT — PAIN DESCRIPTION - ONSET
ONSET: ON-GOING
ONSET_2: ON-GOING

## 2018-06-20 ASSESSMENT — PAIN DESCRIPTION - DURATION: DURATION_2: CONTINUOUS

## 2018-06-20 ASSESSMENT — PAIN DESCRIPTION - PROGRESSION
CLINICAL_PROGRESSION: NOT CHANGED
CLINICAL_PROGRESSION_2: NOT CHANGED

## 2018-06-20 ASSESSMENT — PAIN DESCRIPTION - ORIENTATION
ORIENTATION: MID
ORIENTATION_2: RIGHT

## 2018-06-20 ASSESSMENT — PAIN DESCRIPTION - LOCATION
LOCATION: COCCYX
LOCATION_2: KNEE

## 2018-06-20 ASSESSMENT — PAIN DESCRIPTION - PAIN TYPE
TYPE_2: CHRONIC PAIN
TYPE: CHRONIC PAIN

## 2018-06-20 ASSESSMENT — PAIN SCALES - GENERAL: PAINLEVEL_OUTOF10: 8

## 2018-06-20 ASSESSMENT — PAIN DESCRIPTION - DESCRIPTORS
DESCRIPTORS_2: ACHING
DESCRIPTORS: BURNING

## 2018-06-20 ASSESSMENT — PAIN DESCRIPTION - INTENSITY: RATING_2: 3

## 2018-06-27 ENCOUNTER — OFFICE VISIT (OUTPATIENT)
Dept: SURGERY | Age: 63
End: 2018-06-27

## 2018-06-27 VITALS
WEIGHT: 280 LBS | BODY MASS INDEX: 40.09 KG/M2 | HEART RATE: 98 BPM | HEIGHT: 70 IN | DIASTOLIC BLOOD PRESSURE: 70 MMHG | SYSTOLIC BLOOD PRESSURE: 112 MMHG | TEMPERATURE: 98.7 F | RESPIRATION RATE: 16 BRPM | OXYGEN SATURATION: 95 %

## 2018-06-27 DIAGNOSIS — E11.42 TYPE 2 DIABETES MELLITUS WITH DIABETIC POLYNEUROPATHY, WITH LONG-TERM CURRENT USE OF INSULIN (HCC): Primary | ICD-10-CM

## 2018-06-27 DIAGNOSIS — Z79.4 TYPE 2 DIABETES MELLITUS WITH DIABETIC POLYNEUROPATHY, WITH LONG-TERM CURRENT USE OF INSULIN (HCC): Primary | ICD-10-CM

## 2018-06-27 DIAGNOSIS — L89.154 DECUBITUS ULCER OF SACRAL REGION, STAGE 4 (HCC): ICD-10-CM

## 2018-06-27 PROBLEM — L97.511 NEUROPATHIC ULCER OF RIGHT FOOT, LIMITED TO BREAKDOWN OF SKIN (HCC): Status: RESOLVED | Noted: 2018-06-07 | Resolved: 2018-06-27

## 2018-06-27 PROCEDURE — 99214 OFFICE O/P EST MOD 30 MIN: CPT | Performed by: SURGERY

## 2018-06-27 PROCEDURE — G8598 ASA/ANTIPLAT THER USED: HCPCS | Performed by: SURGERY

## 2018-06-27 PROCEDURE — 3045F PR MOST RECENT HEMOGLOBIN A1C LEVEL 7.0-9.0%: CPT | Performed by: SURGERY

## 2018-06-27 PROCEDURE — 1036F TOBACCO NON-USER: CPT | Performed by: SURGERY

## 2018-06-27 PROCEDURE — G8419 CALC BMI OUT NRM PARAM NOF/U: HCPCS | Performed by: SURGERY

## 2018-06-27 PROCEDURE — 2022F DILAT RTA XM EVC RTNOPTHY: CPT | Performed by: SURGERY

## 2018-06-27 PROCEDURE — G8427 DOCREV CUR MEDS BY ELIG CLIN: HCPCS | Performed by: SURGERY

## 2018-06-27 PROCEDURE — 3017F COLORECTAL CA SCREEN DOC REV: CPT | Performed by: SURGERY

## 2018-06-27 ASSESSMENT — ENCOUNTER SYMPTOMS
BACK PAIN: 1
GASTROINTESTINAL NEGATIVE: 1
EYES NEGATIVE: 1
SHORTNESS OF BREATH: 1

## 2018-07-03 ENCOUNTER — TELEPHONE (OUTPATIENT)
Dept: SURGERY | Age: 63
End: 2018-07-03

## 2018-07-11 ENCOUNTER — HOSPITAL ENCOUNTER (OUTPATIENT)
Dept: WOUND CARE | Age: 63
Discharge: HOME | End: 2018-07-11
Attending: INTERNAL MEDICINE | Admitting: INTERNAL MEDICINE

## 2018-07-11 VITALS
BODY MASS INDEX: 39.41 KG/M2 | WEIGHT: 275.3 LBS | HEIGHT: 70 IN | SYSTOLIC BLOOD PRESSURE: 119 MMHG | RESPIRATION RATE: 17 BRPM | HEART RATE: 72 BPM | DIASTOLIC BLOOD PRESSURE: 77 MMHG | TEMPERATURE: 98 F

## 2018-07-11 PROCEDURE — 97597 DBRDMT OPN WND 1ST 20 CM/<: CPT | Performed by: INTERNAL MEDICINE

## 2018-07-11 RX ORDER — LIDOCAINE 40 MG/G
CREAM TOPICAL PRN
Status: DISCONTINUED | OUTPATIENT
Start: 2018-07-11 | End: 2018-07-12 | Stop reason: HOSPADM

## 2018-07-11 ASSESSMENT — PAIN DESCRIPTION - ORIENTATION: ORIENTATION: MID

## 2018-07-11 ASSESSMENT — PAIN DESCRIPTION - ONSET: ONSET: ON-GOING

## 2018-07-11 ASSESSMENT — PAIN DESCRIPTION - LOCATION: LOCATION: COCCYX

## 2018-07-11 ASSESSMENT — PAIN DESCRIPTION - PROGRESSION: CLINICAL_PROGRESSION: NOT CHANGED

## 2018-07-11 ASSESSMENT — PAIN SCALES - GENERAL: PAINLEVEL_OUTOF10: 9

## 2018-07-11 ASSESSMENT — PAIN DESCRIPTION - PAIN TYPE: TYPE: CHRONIC PAIN

## 2018-07-11 ASSESSMENT — PAIN DESCRIPTION - DESCRIPTORS: DESCRIPTORS: BURNING

## 2018-07-11 ASSESSMENT — PAIN DESCRIPTION - FREQUENCY: FREQUENCY: CONTINUOUS

## 2018-07-18 ENCOUNTER — HOSPITAL ENCOUNTER (OUTPATIENT)
Dept: WOUND CARE | Age: 63
Discharge: HOME | End: 2018-07-18
Payer: MEDICARE

## 2018-07-18 VITALS
SYSTOLIC BLOOD PRESSURE: 124 MMHG | BODY MASS INDEX: 39.55 KG/M2 | HEIGHT: 70 IN | TEMPERATURE: 97.6 F | RESPIRATION RATE: 16 BRPM | WEIGHT: 276.25 LBS | DIASTOLIC BLOOD PRESSURE: 82 MMHG | HEART RATE: 79 BPM

## 2018-07-18 PROCEDURE — 97597 DBRDMT OPN WND 1ST 20 CM/<: CPT

## 2018-07-18 PROCEDURE — 97597 DBRDMT OPN WND 1ST 20 CM/<: CPT | Performed by: INTERNAL MEDICINE

## 2018-07-18 RX ORDER — LIDOCAINE 40 MG/G
CREAM TOPICAL ONCE
Status: DISCONTINUED | OUTPATIENT
Start: 2018-07-18 | End: 2018-07-19 | Stop reason: HOSPADM

## 2018-07-18 ASSESSMENT — PAIN DESCRIPTION - LOCATION: LOCATION: BUTTOCKS

## 2018-07-18 ASSESSMENT — PAIN DESCRIPTION - PAIN TYPE: TYPE: CHRONIC PAIN

## 2018-07-18 ASSESSMENT — PAIN DESCRIPTION - ORIENTATION: ORIENTATION: MID

## 2018-07-18 ASSESSMENT — PAIN DESCRIPTION - ONSET: ONSET: ON-GOING

## 2018-07-18 ASSESSMENT — PAIN DESCRIPTION - DESCRIPTORS: DESCRIPTORS: BURNING

## 2018-07-18 ASSESSMENT — PAIN DESCRIPTION - PROGRESSION: CLINICAL_PROGRESSION: NOT CHANGED

## 2018-07-18 ASSESSMENT — PAIN DESCRIPTION - FREQUENCY: FREQUENCY: CONTINUOUS

## 2018-07-18 ASSESSMENT — PAIN SCALES - GENERAL: PAINLEVEL_OUTOF10: 7

## 2018-07-19 NOTE — PROGRESS NOTES
88 [facility] Progress Note    Rani Mckeon     : 1955    DATE OF VISIT:  2018    Subjective:     Rani Mckeon is a 59 y.o. male who has a diabetic and neuropathic ulcer located on the right foot. Significant symptoms or pertinent wound history since last visit: got his new right knee brace last week, starting to take some tentative steps in it. Also went to , had impressions taken, should have shoes and inserts in a couple of weeks. No fever, stable pain, eating well. Up in chair more than usual this past week, taking care of personal affairs. Glucoses improving since discharge from rehab. Additional ulcer(s) noted? no.      His current medication list consists of ARIPiprazole, Albuterol, Atenolol, BuPROPion HCl, Dexmethylphenidate HCl, Fexofenadine HCl, NONFORMULARY, OXYGEN, Torsemide, allopurinol, docusate sodium, ferrous sulfate, fluticasone-salmeterol, gabapentin, glipiZIDE, insulin NPH, insulin glargine, lidocaine, moexipril, nitroGLYCERIN, omeprazole, polyethylene glycol, potassium chloride, pravastatin, pregabalin, therapeutic multivitamin-minerals, zafirlukast, and zinc gluconate. Allergies: Aspartame and phenylalanine; Erythromycin; Arthrotec [diclofenac-misoprostol]; Betadine [povidone iodine]; Cefuroxime axetil; Claritin [loratadine]; Clindamycin/lincomycin; Feldene [piroxicam]; Indocin [indometacin sodium]; Iodine; Pcn [penicillins]; Pletal [cilostazol]; Robaxin [methocarbamol]; Tenex [guanfacine hcl];  Vasotec; and Vioxx    Objective:     Vitals:    18 0954   BP: 124/82   Pulse: 79   Resp: 16   Temp: 97.6 °F (36.4 °C)   TempSrc: Oral   Weight: 276 lb 4 oz (125.3 kg)   Height: 5' 10\" (1.778 m)     AAOx3, overweight, fatigued, NAD  Intact distal pulses, foot warm, good cap refill  Mild-mod LE edema, no cellulitis or angitis  No Candidiasis, some pressure erythema at medial buttocks and presacral area  Dominique-ulcer skin: less callus at foot; pink and

## 2018-07-25 ENCOUNTER — HOSPITAL ENCOUNTER (OUTPATIENT)
Dept: WOUND CARE | Age: 63
Discharge: HOME | End: 2018-07-25
Payer: MEDICARE

## 2018-07-25 VITALS
DIASTOLIC BLOOD PRESSURE: 55 MMHG | RESPIRATION RATE: 18 BRPM | SYSTOLIC BLOOD PRESSURE: 93 MMHG | HEART RATE: 72 BPM | HEIGHT: 70 IN | TEMPERATURE: 97 F

## 2018-07-25 PROCEDURE — 97597 DBRDMT OPN WND 1ST 20 CM/<: CPT | Performed by: INTERNAL MEDICINE

## 2018-07-25 PROCEDURE — 97597 DBRDMT OPN WND 1ST 20 CM/<: CPT

## 2018-07-25 RX ORDER — METRONIDAZOLE 250 MG/1
TABLET ORAL
Qty: 30 TABLET | Refills: 0 | Status: SHIPPED | OUTPATIENT
Start: 2018-07-25 | End: 2018-09-28 | Stop reason: ALTCHOICE

## 2018-07-25 RX ORDER — LIDOCAINE 40 MG/G
CREAM TOPICAL PRN
Status: DISCONTINUED | OUTPATIENT
Start: 2018-07-25 | End: 2018-07-26 | Stop reason: HOSPADM

## 2018-07-25 RX ORDER — LEVOFLOXACIN 250 MG/1
250 TABLET ORAL DAILY
COMMUNITY
End: 2018-08-30

## 2018-07-25 ASSESSMENT — PAIN DESCRIPTION - PROGRESSION: CLINICAL_PROGRESSION: NOT CHANGED

## 2018-07-25 ASSESSMENT — PAIN DESCRIPTION - PAIN TYPE: TYPE: CHRONIC PAIN

## 2018-07-25 ASSESSMENT — PAIN DESCRIPTION - ORIENTATION: ORIENTATION: MID

## 2018-07-25 ASSESSMENT — PAIN SCALES - GENERAL: PAINLEVEL_OUTOF10: 7

## 2018-07-25 ASSESSMENT — PAIN DESCRIPTION - ONSET: ONSET: ON-GOING

## 2018-07-25 ASSESSMENT — PAIN DESCRIPTION - FREQUENCY: FREQUENCY: CONTINUOUS

## 2018-07-25 ASSESSMENT — PAIN DESCRIPTION - LOCATION: LOCATION: BUTTOCKS

## 2018-07-25 ASSESSMENT — PAIN DESCRIPTION - DESCRIPTORS: DESCRIPTORS: BURNING

## 2018-07-26 NOTE — PROGRESS NOTES
and after the procedure was monitored, and is noted in the wound documentation flowsheet. Discharge plan:     Treatment in the wound care center today: Wound 05/30/18 #21  Right great toe, DFU, Ferguson 1 (onset 5/25/18) Friction/shear-Dressing/Treatment: Other (Comment) (calmoseptine, medihoney,2x2, tape)  Wound 05/18/18 #20, Coccyx, Pressure Ulcer, Stage 4, onset 2015, Pressure-Dressing/Treatment: Other (Comment) (calmoseptine,medihoney, mepilex,drape). Called in Flagyl tabs to crush into powder, to use in sacral ulcer PRN malodor. Important that he work more on offloading; the glucose control will come back quickly, I think. Home treatment: good handwashing before and after any dressing changes. Cleanse wound with saline or soap & water before dressing change. May use Vaseline (petrolatum), Aquaphor, Aveeno, CeraVe, Cetaphil, Eucerin, Lubriderm, etc for dry skin. Dressing type for home: Other: as above, 3 times weekly. Written discharge instructions given to patient. Follow up in 1 week.     Electronically signed by Brittney Sotelo MD on 7/25/2018 at 9:00 PM.

## 2018-08-01 ENCOUNTER — APPOINTMENT (OUTPATIENT)
Dept: WOUND CARE | Age: 63
End: 2018-08-01
Payer: MEDICARE

## 2018-08-08 ENCOUNTER — HOSPITAL ENCOUNTER (OUTPATIENT)
Dept: WOUND CARE | Age: 63
Discharge: HOME | End: 2018-08-08
Payer: MEDICARE

## 2018-08-13 ENCOUNTER — OFFICE VISIT (OUTPATIENT)
Dept: SURGERY | Age: 63
End: 2018-08-13

## 2018-08-13 VITALS
OXYGEN SATURATION: 95 % | SYSTOLIC BLOOD PRESSURE: 129 MMHG | TEMPERATURE: 98.2 F | DIASTOLIC BLOOD PRESSURE: 76 MMHG | RESPIRATION RATE: 18 BRPM | HEART RATE: 95 BPM

## 2018-08-13 DIAGNOSIS — E11.42 TYPE 2 DIABETES MELLITUS WITH DIABETIC POLYNEUROPATHY, WITH LONG-TERM CURRENT USE OF INSULIN (HCC): ICD-10-CM

## 2018-08-13 DIAGNOSIS — L89.154 DECUBITUS ULCER OF SACRAL REGION, STAGE 4 (HCC): Primary | ICD-10-CM

## 2018-08-13 DIAGNOSIS — Z79.4 TYPE 2 DIABETES MELLITUS WITH DIABETIC POLYNEUROPATHY, WITH LONG-TERM CURRENT USE OF INSULIN (HCC): ICD-10-CM

## 2018-08-13 PROCEDURE — 1036F TOBACCO NON-USER: CPT | Performed by: SURGERY

## 2018-08-13 PROCEDURE — G8598 ASA/ANTIPLAT THER USED: HCPCS | Performed by: SURGERY

## 2018-08-13 PROCEDURE — G8417 CALC BMI ABV UP PARAM F/U: HCPCS | Performed by: SURGERY

## 2018-08-13 PROCEDURE — 2022F DILAT RTA XM EVC RTNOPTHY: CPT | Performed by: SURGERY

## 2018-08-13 PROCEDURE — 3045F PR MOST RECENT HEMOGLOBIN A1C LEVEL 7.0-9.0%: CPT | Performed by: SURGERY

## 2018-08-13 PROCEDURE — 99213 OFFICE O/P EST LOW 20 MIN: CPT | Performed by: SURGERY

## 2018-08-13 PROCEDURE — G8428 CUR MEDS NOT DOCUMENT: HCPCS | Performed by: SURGERY

## 2018-08-13 PROCEDURE — 3017F COLORECTAL CA SCREEN DOC REV: CPT | Performed by: SURGERY

## 2018-08-13 NOTE — PROGRESS NOTES
a satisfactory manner according to the patient.  Specifically, the risks including, but not limited to: bleeding possibly requiring transfusion or reoperation, infection, seroma, nonhealing, recurrence necessitating reoperation, poor cosmetic outcome, scarring,  partial or total flap loss, VTE (DVT/PE), and death were discussed.   Yovany Lo MD  [address] [place] [address] [facility]  [phone]  08/13/18

## 2018-08-15 ENCOUNTER — HOSPITAL ENCOUNTER (OUTPATIENT)
Dept: WOUND CARE | Age: 63
Discharge: HOME | End: 2018-08-15
Payer: MEDICARE

## 2018-08-15 VITALS
RESPIRATION RATE: 18 BRPM | HEART RATE: 88 BPM | WEIGHT: 283 LBS | DIASTOLIC BLOOD PRESSURE: 82 MMHG | BODY MASS INDEX: 40.52 KG/M2 | SYSTOLIC BLOOD PRESSURE: 134 MMHG | TEMPERATURE: 97.2 F | HEIGHT: 70 IN

## 2018-08-15 PROCEDURE — 97597 DBRDMT OPN WND 1ST 20 CM/<: CPT

## 2018-08-15 PROCEDURE — 97597 DBRDMT OPN WND 1ST 20 CM/<: CPT | Performed by: INTERNAL MEDICINE

## 2018-08-15 RX ORDER — LIDOCAINE 40 MG/G
CREAM TOPICAL PRN
Status: DISCONTINUED | OUTPATIENT
Start: 2018-08-15 | End: 2018-08-16 | Stop reason: HOSPADM

## 2018-08-21 NOTE — PROGRESS NOTES
[facility] Progress Note    Jose Rausch     : 1955    DATE OF VISIT:  8/15/2018    Subjective:     Jose Rausch is a 59 y.o. male who has a diabetic and neuropathic ulcer located on the right foot. Significant symptoms or pertinent wound history since last visit: stable pain in foot and sacral area. Finding that he has to postpone consideration of plastic surgery right now, as he's been forced to look for a new place to live, with very little notice. Hgb A1c still elevated this month, unfortunately (8%). Additional ulcer(s) noted? yes. Sacral stage 4 pressure ulcer. His current medication list consists of ARIPiprazole, Albuterol, Atenolol, BuPROPion HCl, Dexmethylphenidate HCl, Fexofenadine HCl, OXYGEN, Torsemide, allopurinol, docusate sodium, ferrous sulfate, fluticasone-salmeterol, gabapentin, glipiZIDE, insulin NPH, insulin glargine, levofloxacin, lidocaine, metroNIDAZOLE, moexipril, nitroGLYCERIN, omeprazole, polyethylene glycol, potassium chloride, pravastatin, pregabalin, sodium chloride 0.9 % SOLN 40 mL with SUFentanil 50 MCG/ML SOLN 5 mcg/mL, therapeutic multivitamin-minerals, zafirlukast, and zinc gluconate. Allergies: Aspartame and phenylalanine; Erythromycin; Arthrotec [diclofenac-misoprostol]; Betadine [povidone iodine]; Cefuroxime axetil; Claritin [loratadine]; Clindamycin/lincomycin; Feldene [piroxicam]; Indocin [indometacin sodium]; Iodine; Pcn [penicillins]; Pletal [cilostazol]; Robaxin [methocarbamol]; Tenex [guanfacine hcl];  Vasotec; and Vioxx    Objective:     Vitals:    08/15/18 0944   BP: 134/82   Pulse: 88   Resp: 18   Temp: 97.2 °F (36.2 °C)   TempSrc: Oral   Weight: 283 lb (128.4 kg)   Height: 5' 10\" (1.778 m)     AAOx3, overweight, fatigued, NAD  Intact distal pulses, foot warm, good cap refill, mild LE edema  No cellulitis, angitis, Candidiasis, contact dermatitis  Dominique-ulcer skin: mildly macerated callus at hallux; pink-red and indurated at sacrum. Ulcer(s): sacrum usual red granulation, a bit inflamed, fibrin, serous exudate; hallux ulcer small, granular, fibrin, mild skin-edge necrosis and steep edges. Pre-debridement wound measurements are in the wound documentation flowsheet. Photos also saved in electronic chart.     Assessment:     Patient Active Problem List   Diagnosis Code    Chronic thrombocytopenia D69.6    Chronic respiratory failure with hypoxia on 6 L home O2 tent over trach J96.11    Type 2 diabetes mellitus with diabetic polyneuropathy, with long-term current use of insulin ([facility]) E11.42, Z79.4    HTN (hypertension) I10    Non morbid obesity due to excess calories E66.09    Anxiety and depression F41.9, F32.9    Tracheostomy dependent ([facility]) Z93.0    Chronic pain syndrome on chronic opioids G89.4    Asthma/COPD J44.9    Chronic ischemic heart disease I25.9    Bilateral lower extremity edema R60.0    Hiatal hernia with GERD K21.9, K44.9    Extrinsic asthma J45.909    Low back pain M54.5    TESS G47.33    Calcification of bronchus or trachea J39.8, J98.09    Atypical schizophrenia ([facility]) F20.3    Diabetic peripheral neuropathy E11.42    CKD2/3 N18.3    Chronic microcytic Iron-deficiency anemia D50.9    Chronic dCHF (grade 1 LVDD) I50.32    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    Arthritis M19.90    Claustrophobia F40.240    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    Peripheral venous insufficiency I87.2    Decubitus ulcer of sacral region, stage 4 ([facility]) L89.154    Debility R53.81    Arthritis of right knee M17.11    General weakness R53.1    Neuropathic ulcer of toe of right foot, limited to breakdown of skin ([facility]) L97.511       Assessment of today's active condition(s): uncontrolled DM2, neuropathy, recurrent Ferguson 1 neuropathic ulcer of right hallux, now in new inserts and shoes; also chronic nonhealing stage 4 sacral ulcer, I think ultimately benefiting most from plastic surgical closure, but he needs to work more on offloading

## 2018-08-29 ENCOUNTER — HOSPITAL ENCOUNTER (OUTPATIENT)
Dept: WOUND CARE | Age: 63
Discharge: HOME | End: 2018-08-29
Payer: MEDICARE

## 2018-08-29 VITALS
SYSTOLIC BLOOD PRESSURE: 134 MMHG | DIASTOLIC BLOOD PRESSURE: 74 MMHG | OXYGEN SATURATION: 93 % | TEMPERATURE: 98.9 F | HEIGHT: 70 IN | BODY MASS INDEX: 39.71 KG/M2 | HEART RATE: 121 BPM | WEIGHT: 277.4 LBS

## 2018-08-29 PROCEDURE — 6370000000 HC RX 637 (ALT 250 FOR IP): Performed by: INTERNAL MEDICINE

## 2018-08-29 PROCEDURE — 97597 DBRDMT OPN WND 1ST 20 CM/<: CPT | Performed by: INTERNAL MEDICINE

## 2018-08-29 PROCEDURE — 97597 DBRDMT OPN WND 1ST 20 CM/<: CPT

## 2018-08-29 RX ORDER — LIDOCAINE 40 MG/G
CREAM TOPICAL ONCE
Status: DISCONTINUED | OUTPATIENT
Start: 2018-08-29 | End: 2018-08-30 | Stop reason: HOSPADM

## 2018-08-29 RX ORDER — ATENOLOL 25 MG/1
37.5 TABLET ORAL ONCE
Status: COMPLETED | OUTPATIENT
Start: 2018-08-29 | End: 2018-08-29

## 2018-08-29 RX ADMIN — ATENOLOL 37.5 MG: 25 TABLET ORAL at 11:07

## 2018-08-29 ASSESSMENT — PAIN DESCRIPTION - FREQUENCY: FREQUENCY: CONTINUOUS

## 2018-08-29 ASSESSMENT — PAIN DESCRIPTION - PAIN TYPE: TYPE: CHRONIC PAIN

## 2018-08-29 ASSESSMENT — PAIN DESCRIPTION - PROGRESSION: CLINICAL_PROGRESSION: NOT CHANGED

## 2018-08-29 ASSESSMENT — PAIN DESCRIPTION - ONSET: ONSET: ON-GOING

## 2018-08-29 ASSESSMENT — PAIN SCALES - GENERAL: PAINLEVEL_OUTOF10: 7

## 2018-08-29 ASSESSMENT — PAIN DESCRIPTION - LOCATION: LOCATION: BUTTOCKS

## 2018-08-29 ASSESSMENT — PAIN DESCRIPTION - DESCRIPTORS: DESCRIPTORS: BURNING

## 2018-08-30 NOTE — PROGRESS NOTES
DFU, Ferguson 1 (onset 5/25/18) Friction/shear-Wound Depth (cm) : 0.2  Wound 05/18/18 #20, Coccyx, Pressure Ulcer, Stage 4, onset 2015, Pressure-Wound Depth (cm) : 1.5    The ulcers were then irrigated with normal saline solution. The procedure was completed with a small amount of bleeding, and hemostasis was by pressure. The patient tolerated the procedure well, with no significant complications. The patient's level of pain during and after the procedure was monitored, and is noted in the wound documentation flowsheet. Discharge plan:     Treatment in the wound care center today: Wound 05/30/18 #21  Right great toe, DFU, Ferguson 1 (onset 5/25/18) Friction/shear-Dressing/Treatment:  (calmoseptine medihoney dry dressing)  Wound 05/18/18 #20, Coccyx, Pressure Ulcer, Stage 4, onset 2015, Pressure-Dressing/Treatment:  (calmoseptine medihoney mepilex border). I recommended that he go to the ER today, before going home, so that he could get an EKG, some routine labs, monitoring for a short time, make a diagnosis. He refused, wanting to get home so that he can finish packing for his move. I was able to get him a dose of atenolol, 37.5 mg, from the pharmacy for him to take while he's here, so at least he'll have that back in his system a little quicker. Encouraged him to call his PCP or 911 at any point, if he feels more short of breath, lightheaded, has chest pain, etc.     Home treatment: good handwashing before and after any dressing changes. Cleanse wound with saline or soap & water before dressing change. May use Vaseline (petrolatum), Aquaphor, Aveeno, CeraVe, Cetaphil, Eucerin, Lubriderm, etc for dry skin. Dressing type for home: Other: as above, 3 times weekly. Topical Flagyl powder to sacrum PRN malodor. Written discharge instructions given to patient. Follow up in 2 weeks.  I would have him come in more frequently, but it's not really warranted until he can make some more progress on his own, with glucose

## 2018-09-12 ENCOUNTER — HOSPITAL ENCOUNTER (OUTPATIENT)
Dept: WOUND CARE | Age: 63
Discharge: HOME | End: 2018-09-12
Payer: MEDICARE

## 2018-09-19 ENCOUNTER — HOSPITAL ENCOUNTER (OUTPATIENT)
Dept: WOUND CARE | Age: 63
Discharge: HOME | End: 2018-09-19
Payer: MEDICARE

## 2018-09-19 VITALS
BODY MASS INDEX: 39.6 KG/M2 | DIASTOLIC BLOOD PRESSURE: 69 MMHG | HEART RATE: 81 BPM | WEIGHT: 276.6 LBS | TEMPERATURE: 97.2 F | SYSTOLIC BLOOD PRESSURE: 123 MMHG | HEIGHT: 70 IN

## 2018-09-19 PROCEDURE — 97597 DBRDMT OPN WND 1ST 20 CM/<: CPT

## 2018-09-19 PROCEDURE — 97597 DBRDMT OPN WND 1ST 20 CM/<: CPT | Performed by: INTERNAL MEDICINE

## 2018-09-19 RX ORDER — LIDOCAINE 40 MG/G
CREAM TOPICAL ONCE
Status: DISCONTINUED | OUTPATIENT
Start: 2018-09-19 | End: 2018-09-20 | Stop reason: HOSPADM

## 2018-09-19 RX ORDER — NITROFURANTOIN 25; 75 MG/1; MG/1
100 CAPSULE ORAL 2 TIMES DAILY
COMMUNITY
End: 2018-09-28 | Stop reason: ALTCHOICE

## 2018-09-19 ASSESSMENT — PAIN DESCRIPTION - ORIENTATION: ORIENTATION: MID

## 2018-09-19 ASSESSMENT — PAIN DESCRIPTION - FREQUENCY: FREQUENCY: CONTINUOUS

## 2018-09-19 ASSESSMENT — PAIN DESCRIPTION - PROGRESSION: CLINICAL_PROGRESSION: NOT CHANGED

## 2018-09-19 ASSESSMENT — PAIN DESCRIPTION - ONSET: ONSET: ON-GOING

## 2018-09-19 ASSESSMENT — PAIN DESCRIPTION - LOCATION: LOCATION: COCCYX

## 2018-09-19 ASSESSMENT — PAIN DESCRIPTION - DESCRIPTORS: DESCRIPTORS: ACHING;STABBING

## 2018-09-19 ASSESSMENT — PAIN DESCRIPTION - PAIN TYPE: TYPE: CHRONIC PAIN

## 2018-09-19 NOTE — PLAN OF CARE
Problem: Wound:  Goal: Will show signs of wound healing; wound closure and no evidence of infection  Will show signs of wound healing; wound closure and no evidence of infection   Outcome: Ongoing  Toe wound stable, although not much change, debridement per MD & pt. Tolerated well. Will change to using football dressing for off-loading. Reinforced importance for off-loading to assist with wound healing. Coccyx wound stable, no debridement. Will cont. With current wound care regime. Reinforced importance of off-loading, using ROHO, ZARIA, & frequent repositioning to assist with wound healing. Pt. Noted to not have ROHO cushion in chair today, during visit. Pt. States his ROHO doesn't fit his current w/c, & that he uses the [employer] when in his recliner at home. Will look into getting new ROHO cushion for his current w/c, Pt. Agreeable to potential purchase out of pocket if needed & insurance won't cover. Patient states he is unsure if he wants to go through with plastic surgery. F/u in [address] [place] [address] in 1 week as ordered. Discharge instructions reviewed with patient, all questions answered, copy given to patient. Dressings were applied to all wounds per M.D. Instructions at this visit.

## 2018-09-25 NOTE — PROGRESS NOTES
Decubitus ulcer of sacral region, stage 4 ([facility]) L89.154    Debility R53.81    Arthritis of right knee M17.11    General weakness R53.1    Neuropathic ulcer of toe of right foot, limited to breakdown of skin ([facility] [address] L97.511       Assessment of today's active condition(s): uncontrolled DM, neuropathy, recurrent right hallux DFU, not responding to offloading with his shoe and insert, no infection or ischemia; also spinal stenosis, poor mobility, nonhealing stage 4 sacral ulcer, I think eventually in need of flap closure, but he's not ready for that yet (in terms of DM control, offloading, and needing to be able to commit to stay in a facility for 6-8 weeks or so after surgery). Factors contributing to occurrence and/or persistence of the chronic ulcer include diabetes, poor glucose control, chronic pressure, decreased mobility, shear force and obesity. Medical necessity of today's visit is shown by the above documentation. Sharp debridement is indicated today, based upon the exam findings in the wound(s) above. Procedure note:     Consent obtained. Time out performed per [facility] policy. Anesthetic  Anesthetic: 4% Lidocaine Liquid Topical     Using a #15 blade scalpel, I sharply debrided the right foot ulcer(s) down through and including the removal of dermis. The type(s) of tissue debrided included fibrin, necrotic/eschar and callus. Total Surface Area Debrided: 1 sq cm.     Post  Debridement Measurements from today:  Wound 05/30/18 #21  Right great toe, DFU, Ferguson 1 (onset 5/25/18) Friction/shear-Wound Length (cm): 0.5 cm  Wound 05/18/18 #20, Coccyx, Pressure Ulcer, Stage 4, onset 2015, Pressure-Wound Length (cm): 1 cm    Wound 05/30/18 #21  Right great toe, DFU, Ferguson 1 (onset 5/25/18) Friction/shear-Wound Width (cm): 0.5 cm  Wound 05/18/18 #20, Coccyx, Pressure Ulcer, Stage 4, onset 2015, Pressure-Wound Width (cm): 1.1 cm    Wound 05/30/18 #21  Right great toe, DFU, Ferguson 1 (onset 5/25/18) Friction/shear-Wound Depth (cm) : 0.2  Wound 05/18/18 #20, Coccyx, Pressure Ulcer, Stage 4, onset 2015, Pressure-Wound Depth (cm) : 1.2    The ulcers were then irrigated with normal saline solution. The procedure was completed with a small amount of bleeding, and hemostasis was by pressure. The patient tolerated the procedure well, with no significant complications. The patient's level of pain during and after the procedure was monitored, and is noted in the wound documentation flowsheet. Discharge plan:     Treatment in the wound care center today: Wound 05/30/18 #21  Right great toe, DFU, Ferguson 1 (onset 5/25/18) Friction/shear-Dressing/Treatment:  (Multidex powder,Safgel,foam,football dressing)  Wound 05/18/18 #20, Coccyx, Pressure Ulcer, Stage 4, onset 2015, Pressure-Dressing/Treatment:  (Calmoseptine,Multidex pdr.Safgel,mepilex border). Keep primary and football dressing in place on the right foot for the week. Asked him to check his glucoses 3-4 times daily, at different times, for a week or so. He's potentially interested in purchasing a ROHO cushion out-of-pocket for this smaller wheelchair, but doesn't want to get anything too high, because then it becomes hard for him to use his feet to help guide the chair along; he'll check on the height of this chair versus the one at home with the [facility] already, and then we can decide on a standard or low-profile cushion, and I can check on prices. Home treatment: good handwashing before and after any dressing changes. Cleanse wound with saline or soap & water before dressing change. May use Vaseline (petrolatum), Aquaphor, Aveeno, CeraVe, Cetaphil, Eucerin, Lubriderm, etc for dry skin. Dressing type for home: Other: as above for the sacrum, 3 times weekly. Written discharge instructions given to patient. Follow up in 1 week.     Electronically signed by Dano Schultz MD on 9/25/2018 at 2:20 PM.

## 2018-09-26 ENCOUNTER — HOSPITAL ENCOUNTER (OUTPATIENT)
Dept: WOUND CARE | Age: 63
Discharge: HOME | End: 2018-09-26
Payer: MEDICARE

## 2018-09-26 VITALS
RESPIRATION RATE: 18 BRPM | SYSTOLIC BLOOD PRESSURE: 123 MMHG | TEMPERATURE: 97.1 F | HEART RATE: 76 BPM | DIASTOLIC BLOOD PRESSURE: 74 MMHG

## 2018-09-26 PROCEDURE — 97597 DBRDMT OPN WND 1ST 20 CM/<: CPT | Performed by: INTERNAL MEDICINE

## 2018-09-26 PROCEDURE — 97597 DBRDMT OPN WND 1ST 20 CM/<: CPT

## 2018-09-26 RX ORDER — LIDOCAINE 40 MG/G
CREAM TOPICAL ONCE
Status: DISCONTINUED | OUTPATIENT
Start: 2018-09-26 | End: 2018-09-27 | Stop reason: HOSPADM

## 2018-09-26 ASSESSMENT — PAIN DESCRIPTION - PROGRESSION: CLINICAL_PROGRESSION: NOT CHANGED

## 2018-09-26 ASSESSMENT — PAIN SCALES - GENERAL: PAINLEVEL_OUTOF10: 7

## 2018-09-26 ASSESSMENT — PAIN DESCRIPTION - DESCRIPTORS: DESCRIPTORS: ACHING;BURNING

## 2018-09-26 ASSESSMENT — PAIN DESCRIPTION - FREQUENCY: FREQUENCY: CONTINUOUS

## 2018-09-26 ASSESSMENT — PAIN DESCRIPTION - LOCATION: LOCATION: COCCYX

## 2018-09-26 ASSESSMENT — PAIN DESCRIPTION - PAIN TYPE: TYPE: CHRONIC PAIN

## 2018-09-26 ASSESSMENT — PAIN DESCRIPTION - ORIENTATION: ORIENTATION: MID

## 2018-09-26 ASSESSMENT — PAIN DESCRIPTION - ONSET: ONSET: ON-GOING

## 2018-09-28 ENCOUNTER — APPOINTMENT (OUTPATIENT)
Dept: CT IMAGING | Age: 63
DRG: 202 | End: 2018-09-28
Payer: MEDICARE

## 2018-09-28 ENCOUNTER — APPOINTMENT (OUTPATIENT)
Dept: GENERAL RADIOLOGY | Age: 63
DRG: 202 | End: 2018-09-28
Payer: MEDICARE

## 2018-09-28 ENCOUNTER — HOSPITAL ENCOUNTER (INPATIENT)
Age: 63
LOS: 6 days | Discharge: HOME HEALTH | DRG: 202 | End: 2018-10-04
Attending: EMERGENCY MEDICINE
Payer: MEDICARE

## 2018-09-28 DIAGNOSIS — J95.02 TRACHEOSTOMY INFECTION (HCC): ICD-10-CM

## 2018-09-28 DIAGNOSIS — R06.89 DYSPNEA AND RESPIRATORY ABNORMALITIES: Primary | ICD-10-CM

## 2018-09-28 DIAGNOSIS — T17.908A ASPIRATION INTO AIRWAY, INITIAL ENCOUNTER: ICD-10-CM

## 2018-09-28 DIAGNOSIS — E87.20 LACTIC ACIDOSIS: ICD-10-CM

## 2018-09-28 DIAGNOSIS — N17.9 AKI (ACUTE KIDNEY INJURY) (HCC): ICD-10-CM

## 2018-09-28 DIAGNOSIS — R06.00 DYSPNEA AND RESPIRATORY ABNORMALITIES: Primary | ICD-10-CM

## 2018-09-28 LAB
A/G RATIO: 1.4 (ref 1.1–2.2)
ALBUMIN SERPL-MCNC: 3.7 G/DL (ref 3.4–5)
ALP BLD-CCNC: 119 U/L (ref 40–129)
ALT SERPL-CCNC: 15 U/L (ref 10–40)
ANION GAP SERPL CALCULATED.3IONS-SCNC: 12 MMOL/L (ref 3–16)
AST SERPL-CCNC: 12 U/L (ref 15–37)
BILIRUB SERPL-MCNC: 0.4 MG/DL (ref 0–1)
BUN BLDV-MCNC: 30 MG/DL (ref 7–20)
CALCIUM SERPL-MCNC: 9.2 MG/DL (ref 8.3–10.6)
CHLORIDE BLD-SCNC: 97 MMOL/L (ref 99–110)
CO2: 30 MMOL/L (ref 21–32)
CREAT SERPL-MCNC: 2.1 MG/DL (ref 0.8–1.3)
GFR AFRICAN AMERICAN: 39
GFR NON-AFRICAN AMERICAN: 32
GLOBULIN: 2.7 G/DL
GLUCOSE BLD-MCNC: 158 MG/DL (ref 70–99)
GLUCOSE BLD-MCNC: 214 MG/DL (ref 70–99)
GLUCOSE BLD-MCNC: 282 MG/DL (ref 70–99)
GLUCOSE BLD-MCNC: 346 MG/DL (ref 70–99)
HCT VFR BLD CALC: 37.4 % (ref 40.5–52.5)
HEMOGLOBIN: 12.4 G/DL (ref 13.5–17.5)
LACTIC ACID, SEPSIS: 1.7 MMOL/L (ref 0.4–1.9)
LACTIC ACID: 2.2 MMOL/L (ref 0.4–2)
MCH RBC QN AUTO: 27.4 PG (ref 26–34)
MCHC RBC AUTO-ENTMCNC: 33 G/DL (ref 31–36)
MCV RBC AUTO: 83 FL (ref 80–100)
PDW BLD-RTO: 15.8 % (ref 12.4–15.4)
PERFORMED ON: ABNORMAL
PLATELET # BLD: 125 K/UL (ref 135–450)
PMV BLD AUTO: 8.8 FL (ref 5–10.5)
POTASSIUM SERPL-SCNC: 4.9 MMOL/L (ref 3.5–5.1)
RBC # BLD: 4.51 M/UL (ref 4.2–5.9)
SODIUM BLD-SCNC: 139 MMOL/L (ref 136–145)
TOTAL PROTEIN: 6.4 G/DL (ref 6.4–8.2)
WBC # BLD: 8.1 K/UL (ref 4–11)

## 2018-09-28 PROCEDURE — 94761 N-INVAS EAR/PLS OXIMETRY MLT: CPT

## 2018-09-28 PROCEDURE — 6370000000 HC RX 637 (ALT 250 FOR IP): Performed by: INTERNAL MEDICINE

## 2018-09-28 PROCEDURE — 85027 COMPLETE CBC AUTOMATED: CPT

## 2018-09-28 PROCEDURE — 1200000000 HC SEMI PRIVATE

## 2018-09-28 PROCEDURE — 87070 CULTURE OTHR SPECIMN AEROBIC: CPT

## 2018-09-28 PROCEDURE — 36415 COLL VENOUS BLD VENIPUNCTURE: CPT

## 2018-09-28 PROCEDURE — 83605 ASSAY OF LACTIC ACID: CPT

## 2018-09-28 PROCEDURE — 87205 SMEAR GRAM STAIN: CPT

## 2018-09-28 PROCEDURE — 6370000000 HC RX 637 (ALT 250 FOR IP): Performed by: EMERGENCY MEDICINE

## 2018-09-28 PROCEDURE — 94664 DEMO&/EVAL PT USE INHALER: CPT

## 2018-09-28 PROCEDURE — 71250 CT THORAX DX C-: CPT

## 2018-09-28 PROCEDURE — 2700000000 HC OXYGEN THERAPY PER DAY

## 2018-09-28 PROCEDURE — 2580000003 HC RX 258: Performed by: EMERGENCY MEDICINE

## 2018-09-28 PROCEDURE — 6360000002 HC RX W HCPCS: Performed by: INTERNAL MEDICINE

## 2018-09-28 PROCEDURE — 94640 AIRWAY INHALATION TREATMENT: CPT

## 2018-09-28 PROCEDURE — 70490 CT SOFT TISSUE NECK W/O DYE: CPT

## 2018-09-28 PROCEDURE — 93010 ELECTROCARDIOGRAM REPORT: CPT | Performed by: INTERNAL MEDICINE

## 2018-09-28 PROCEDURE — 87040 BLOOD CULTURE FOR BACTERIA: CPT

## 2018-09-28 PROCEDURE — 6360000002 HC RX W HCPCS: Performed by: EMERGENCY MEDICINE

## 2018-09-28 PROCEDURE — 99285 EMERGENCY DEPT VISIT HI MDM: CPT

## 2018-09-28 PROCEDURE — 93005 ELECTROCARDIOGRAM TRACING: CPT | Performed by: EMERGENCY MEDICINE

## 2018-09-28 PROCEDURE — 99223 1ST HOSP IP/OBS HIGH 75: CPT | Performed by: INTERNAL MEDICINE

## 2018-09-28 PROCEDURE — 71045 X-RAY EXAM CHEST 1 VIEW: CPT

## 2018-09-28 PROCEDURE — 2580000003 HC RX 258: Performed by: INTERNAL MEDICINE

## 2018-09-28 PROCEDURE — 80053 COMPREHEN METABOLIC PANEL: CPT

## 2018-09-28 RX ORDER — BUPROPION HYDROCHLORIDE 150 MG/1
400 TABLET ORAL DAILY
Status: DISCONTINUED | OUTPATIENT
Start: 2018-09-28 | End: 2018-09-28

## 2018-09-28 RX ORDER — SODIUM CHLORIDE 0.9 % (FLUSH) 0.9 %
10 SYRINGE (ML) INJECTION EVERY 12 HOURS SCHEDULED
Status: DISCONTINUED | OUTPATIENT
Start: 2018-09-28 | End: 2018-10-04 | Stop reason: HOSPADM

## 2018-09-28 RX ORDER — DOCUSATE SODIUM 100 MG/1
100 CAPSULE, LIQUID FILLED ORAL 2 TIMES DAILY
Status: DISCONTINUED | OUTPATIENT
Start: 2018-09-28 | End: 2018-10-04 | Stop reason: HOSPADM

## 2018-09-28 RX ORDER — DEXTROSE MONOHYDRATE 25 G/50ML
12.5 INJECTION, SOLUTION INTRAVENOUS PRN
Status: DISCONTINUED | OUTPATIENT
Start: 2018-09-28 | End: 2018-10-04 | Stop reason: HOSPADM

## 2018-09-28 RX ORDER — PANTOPRAZOLE SODIUM 40 MG/1
40 TABLET, DELAYED RELEASE ORAL
Status: DISCONTINUED | OUTPATIENT
Start: 2018-09-29 | End: 2018-10-04 | Stop reason: HOSPADM

## 2018-09-28 RX ORDER — IPRATROPIUM BROMIDE AND ALBUTEROL SULFATE 2.5; .5 MG/3ML; MG/3ML
1 SOLUTION RESPIRATORY (INHALATION) ONCE
Status: COMPLETED | OUTPATIENT
Start: 2018-09-28 | End: 2018-09-28

## 2018-09-28 RX ORDER — INSULIN GLARGINE 100 [IU]/ML
20 INJECTION, SOLUTION SUBCUTANEOUS NIGHTLY
Status: DISCONTINUED | OUTPATIENT
Start: 2018-09-28 | End: 2018-09-30

## 2018-09-28 RX ORDER — PRAVASTATIN SODIUM 40 MG
40 TABLET ORAL DAILY
Status: DISCONTINUED | OUTPATIENT
Start: 2018-09-28 | End: 2018-10-04 | Stop reason: HOSPADM

## 2018-09-28 RX ORDER — ARIPIPRAZOLE 10 MG/1
10 TABLET ORAL DAILY
Status: DISCONTINUED | OUTPATIENT
Start: 2018-09-28 | End: 2018-09-30

## 2018-09-28 RX ORDER — DEXTROSE MONOHYDRATE 50 MG/ML
100 INJECTION, SOLUTION INTRAVENOUS PRN
Status: DISCONTINUED | OUTPATIENT
Start: 2018-09-28 | End: 2018-10-04 | Stop reason: HOSPADM

## 2018-09-28 RX ORDER — BUPROPION HYDROCHLORIDE 100 MG/1
100 TABLET ORAL 2 TIMES DAILY
Status: DISCONTINUED | OUTPATIENT
Start: 2018-09-28 | End: 2018-09-30

## 2018-09-28 RX ORDER — FERROUS SULFATE 325(65) MG
325 TABLET ORAL
Status: DISCONTINUED | OUTPATIENT
Start: 2018-09-29 | End: 2018-10-04 | Stop reason: HOSPADM

## 2018-09-28 RX ORDER — ZAFIRLUKAST 20 MG/1
20 TABLET, FILM COATED ORAL 2 TIMES DAILY
Status: DISCONTINUED | OUTPATIENT
Start: 2018-09-28 | End: 2018-10-04 | Stop reason: HOSPADM

## 2018-09-28 RX ORDER — SODIUM CHLORIDE 0.9 % (FLUSH) 0.9 %
10 SYRINGE (ML) INJECTION PRN
Status: DISCONTINUED | OUTPATIENT
Start: 2018-09-28 | End: 2018-10-04 | Stop reason: HOSPADM

## 2018-09-28 RX ORDER — ALLOPURINOL 100 MG/1
100 TABLET ORAL DAILY
Status: DISCONTINUED | OUTPATIENT
Start: 2018-09-28 | End: 2018-09-30

## 2018-09-28 RX ORDER — NICOTINE POLACRILEX 4 MG
15 LOZENGE BUCCAL PRN
Status: DISCONTINUED | OUTPATIENT
Start: 2018-09-28 | End: 2018-09-30 | Stop reason: SDUPTHER

## 2018-09-28 RX ORDER — SODIUM CHLORIDE 9 MG/ML
INJECTION, SOLUTION INTRAVENOUS CONTINUOUS
Status: DISCONTINUED | OUTPATIENT
Start: 2018-09-28 | End: 2018-09-30

## 2018-09-28 RX ORDER — CIPROFLOXACIN 250 MG/1
500 TABLET, FILM COATED ORAL 2 TIMES DAILY
Status: ON HOLD | COMMUNITY
End: 2018-10-04 | Stop reason: HOSPADM

## 2018-09-28 RX ORDER — SULFAMETHOXAZOLE AND TRIMETHOPRIM 400; 80 MG/1; MG/1
1 TABLET ORAL 2 TIMES DAILY
COMMUNITY
End: 2018-10-16

## 2018-09-28 RX ORDER — LINEZOLID 2 MG/ML
600 INJECTION, SOLUTION INTRAVENOUS EVERY 12 HOURS
Status: DISCONTINUED | OUTPATIENT
Start: 2018-09-28 | End: 2018-09-28

## 2018-09-28 RX ORDER — ONDANSETRON 2 MG/ML
4 INJECTION INTRAMUSCULAR; INTRAVENOUS EVERY 6 HOURS PRN
Status: DISCONTINUED | OUTPATIENT
Start: 2018-09-28 | End: 2018-10-04 | Stop reason: HOSPADM

## 2018-09-28 RX ADMIN — INSULIN LISPRO 2 UNITS: 100 INJECTION, SOLUTION INTRAVENOUS; SUBCUTANEOUS at 17:57

## 2018-09-28 RX ADMIN — ENOXAPARIN SODIUM 30 MG: 30 INJECTION SUBCUTANEOUS at 17:52

## 2018-09-28 RX ADMIN — INSULIN LISPRO 1 UNITS: 100 INJECTION, SOLUTION INTRAVENOUS; SUBCUTANEOUS at 22:11

## 2018-09-28 RX ADMIN — VANCOMYCIN HYDROCHLORIDE 1000 MG: 1 INJECTION, POWDER, LYOPHILIZED, FOR SOLUTION INTRAVENOUS at 14:32

## 2018-09-28 RX ADMIN — IPRATROPIUM BROMIDE AND ALBUTEROL SULFATE 1 AMPULE: .5; 3 SOLUTION RESPIRATORY (INHALATION) at 13:01

## 2018-09-28 RX ADMIN — SODIUM CHLORIDE: 9 INJECTION, SOLUTION INTRAVENOUS at 17:52

## 2018-09-28 RX ADMIN — INSULIN GLARGINE 20 UNITS: 100 INJECTION, SOLUTION SUBCUTANEOUS at 22:11

## 2018-09-28 RX ADMIN — SODIUM CHLORIDE, PRESERVATIVE FREE 10 ML: 5 INJECTION INTRAVENOUS at 17:52

## 2018-09-28 RX ADMIN — DOCUSATE SODIUM 100 MG: 100 CAPSULE, LIQUID FILLED ORAL at 22:43

## 2018-09-28 RX ADMIN — PRAVASTATIN SODIUM 40 MG: 40 TABLET ORAL at 17:52

## 2018-09-28 RX ADMIN — Medication 2 PUFF: at 20:05

## 2018-09-28 ASSESSMENT — ENCOUNTER SYMPTOMS
EYE PAIN: 0
SORE THROAT: 0
ABDOMINAL PAIN: 0
RHINORRHEA: 0
PHOTOPHOBIA: 0
BACK PAIN: 0
SPUTUM PRODUCTION: 1
EYE REDNESS: 0
BLURRED VISION: 0
WHEEZING: 0
BLOOD IN STOOL: 0
CONSTIPATION: 0
VOICE CHANGE: 0
ORTHOPNEA: 0
STRIDOR: 0
ANAL BLEEDING: 0
DOUBLE VISION: 0
EYE ITCHING: 0
FACIAL SWELLING: 0
DIARRHEA: 0
NAUSEA: 0
TROUBLE SWALLOWING: 0
SINUS PRESSURE: 0
SHORTNESS OF BREATH: 1
COUGH: 1
VOMITING: 0
EYE DISCHARGE: 0
HEARTBURN: 0
ABDOMINAL DISTENTION: 0
CHEST TIGHTNESS: 0

## 2018-09-28 ASSESSMENT — PAIN DESCRIPTION - LOCATION
LOCATION: EAR;THROAT
LOCATION: COCCYX;THROAT
LOCATION: OTHER (COMMENT)

## 2018-09-28 ASSESSMENT — PAIN SCALES - GENERAL
PAINLEVEL_OUTOF10: 8

## 2018-09-28 ASSESSMENT — PAIN SCALES - WONG BAKER
WONGBAKER_NUMERICALRESPONSE: 8
WONGBAKER_NUMERICALRESPONSE: 8

## 2018-09-28 ASSESSMENT — PAIN DESCRIPTION - PAIN TYPE
TYPE: ACUTE PAIN
TYPE: ACUTE PAIN

## 2018-09-28 ASSESSMENT — PAIN DESCRIPTION - FREQUENCY: FREQUENCY: CONTINUOUS

## 2018-09-28 ASSESSMENT — PAIN DESCRIPTION - DESCRIPTORS: DESCRIPTORS: SHARP

## 2018-09-28 NOTE — CONSULTS
Laterality Date    BACK SURGERY      ENDOSCOPY, COLON, DIAGNOSTIC      KNEE SURGERY      X 4    LUMBAR FUSION      LUMBAR LAMINECTOMY      OTHER SURGICAL HISTORY Right 05/2017    Pain Pump insertion    RHINOPLASTY      x2    TONSILLECTOMY AND ADENOIDECTOMY      TOTAL KNEE ARTHROPLASTY      TRACHEOSTOMY      over 30 trach operations due to MRSA infections in the trach    UPPER GASTROINTESTINAL ENDOSCOPY  1/8/16    removal of foreign body    UVULOPALATOPHARYGOPLASTY      VENA CAVA FILTER PLACEMENT  2002       family history includes High Blood Pressure in his father and mother.     Social History   Substance Use Topics    Smoking status: Former Smoker     Types: Cigars    Smokeless tobacco: Never Used    Alcohol use No        Scheduled Meds:   mometasone-formoterol  2 puff Inhalation BID    allopurinol  100 mg Oral Daily    ARIPiprazole  10 mg Oral Daily    docusate sodium  100 mg Oral BID    [START ON 9/29/2018] ferrous sulfate  325 mg Oral Daily with breakfast    insulin glargine  20 Units Subcutaneous Nightly    [START ON 9/29/2018] pantoprazole  40 mg Oral QAM AC    pravastatin  40 mg Oral Daily    zafirlukast  20 mg Oral BID    sodium chloride flush  10 mL Intravenous 2 times per day    enoxaparin  30 mg Subcutaneous Daily    insulin lispro  0-6 Units Subcutaneous TID WC    insulin lispro  0-3 Units Subcutaneous Nightly    buPROPion  100 mg Oral BID       Continuous Infusions:   sodium chloride 75 mL/hr at 09/28/18 1752    dextrose         PRN Meds:  sodium chloride flush, magnesium hydroxide, ondansetron, glucose, dextrose, glucagon (rDNA), dextrose  Inpatient consult to Pulmonology  Consult performed by: Parth Coe  Consult ordered by: Cleone Cabot:  Patient is allergic to aspartame and phenylalanine; cefuroxime axetil; erythromycin; feldene [piroxicam]; iodine; pcn [penicillins]; pletal [cilostazol]; robaxin [methocarbamol]; arthrotec [diclofenac-misoprostol]; betadine [povidone iodine]; claritin [loratadine]; clindamycin/lincomycin; indocin [indometacin sodium]; tenex [guanfacine hcl]; vasotec; and vioxx. REVIEW OF SYSTEMS:  Review of Systems   Constitutional: Negative for chills, fever and weight loss. HENT: Negative for hearing loss and nosebleeds. Eyes: Negative for blurred vision, double vision and photophobia. Respiratory: Positive for cough, sputum production and shortness of breath. Cardiovascular: Negative for chest pain, palpitations and orthopnea. Gastrointestinal: Negative for abdominal pain, heartburn, nausea and vomiting. Genitourinary: Negative for dysuria, frequency and urgency. Musculoskeletal: Negative for back pain, myalgias and neck pain. Neurological: Negative for dizziness, focal weakness, loss of consciousness and headaches. Endo/Heme/Allergies: Negative for environmental allergies and polydipsia. Does not bruise/bleed easily. Objective:   PHYSICAL EXAM:  BP (!) 117/58   Pulse 78   Temp 98.3 °F (36.8 °C) (Oral)   Resp 18   Ht 5' 10\" (1.778 m)   Wt 282 lb 13.6 oz (128.3 kg)   SpO2 98%   BMI 40.58 kg/m²    Physical Exam   Constitutional: He appears well-developed and well-nourished. No distress. HENT:   Head: Normocephalic and atraumatic. Mouth/Throat: Oropharynx is clear and moist. No oropharyngeal exudate. Eyes: Pupils are equal, round, and reactive to light. EOM are normal.   Neck: Neck supple. No JVD present. Tracheal stoma with erythema but no drainage   Cardiovascular: Normal heart sounds. Exam reveals no gallop and no friction rub. No murmur heard. Pulmonary/Chest: Effort normal. He has no wheezes. He has no rales. Equal chest rise and expansion bilaterally   Abdominal: Soft. Bowel sounds are normal. He exhibits no distension. There is no tenderness. Musculoskeletal: Normal range of motion. He exhibits no edema. Lymphadenopathy:     He has no cervical adenopathy. Neurological: He is alert. No cranial nerve deficit. CN 2-12 grossly intact   Skin: Skin is warm and dry. No rash noted. He is not diaphoretic. Data Reviewed:   LABS:  CBC:   Recent Labs      09/28/18   1255   WBC  8.1   HGB  12.4*   HCT  37.4*   MCV  83.0   PLT  125*     BMP:   Recent Labs      09/28/18   1255   NA  139   K  4.9   CL  97*   CO2  30   BUN  30*   CREATININE  2.1*     LIVER PROFILE:   Recent Labs      09/28/18   1255   AST  12*   ALT  15   BILITOT  0.4   ALKPHOS  119     PT/INR: No results for input(s): PROTIME, INR in the last 72 hours. APTT: No results for input(s): APTT in the last 72 hours. UA:No results for input(s): NITRITE, COLORU, PHUR, LABCAST, WBCUA, RBCUA, MUCUS, TRICHOMONAS, YEAST, BACTERIA, CLARITYU, SPECGRAV, LEUKOCYTESUR, UROBILINOGEN, BILIRUBINUR, BLOODU, GLUCOSEU, AMORPHOUS in the last 72 hours. Invalid input(s): KETONESU  No results for input(s): PHART, TZH7YKV, PO2ART in the last 72 hours. Vent Information  Equipment ID: . .. FiO2 : 40 %     CXR personally reviewed, no acute process          Assessment:     1. Acute on chronic resp failure, hypoxic  2. Acute tracheitis   -suspected MRSA based on prior history  3. Chronic tracheal stenosis with a T tube  4. Underlying COPD without acute exac  5. TRICIA     Plan:      -Escalate regimen with Meropenem given his frequent healthcare exposures, continue vancomycin. Monitor renal function and change the latter to linezolid if worse  -Will obtain CT neck and chest to evaluate tracheal site.  Continue conservative treatment with antibiotics if no acute findings  -Bronchoscopy tomorrow to evaluate airway, obtain directed specimen for culture, and assist with secretions.   -Titrate FIO2 via trach collar as tolerated    Will follow, thank you for this consultation    Marian Jackman MD

## 2018-09-28 NOTE — ED NOTES
Bedside report given Ruben Webster RN from C-5. [facility] confirmed tele box 6.        Devorah Machuca RN  09/28/18 2652

## 2018-09-28 NOTE — ED PROVIDER NOTES
by mouth 2 times daily    Yes Historical Provider, MD   allopurinol (ZYLOPRIM) 300 MG tablet Take 300 mg by mouth daily   Yes Historical Provider, MD   nitroGLYCERIN (NITROSTAT) 0.4 MG SL tablet Place 0.4 mg under the tongue every 5 minutes as needed. Yes Historical Provider, MD   OXYGEN 6 L by Tracheal Tube route continuous Indications: 4 to 6 L 4 to 6 L per Pt     Yes Historical Provider, MD   pregabalin (LYRICA) 100 MG capsule Take 100 mg by mouth 2 times daily    Yes Historical Provider, MD   pravastatin (PRAVACHOL) 40 MG tablet Take 40 mg by mouth daily.      Yes Historical Provider, MD       Allergies as of 09/28/2018 - Review Complete 09/28/2018   Allergen Reaction Noted    Aspartame and phenylalanine Anaphylaxis 08/29/2016    Cefuroxime axetil Anaphylaxis 09/06/2006    Erythromycin Anaphylaxis and Rash 09/06/2006    Feldene [piroxicam] Anaphylaxis 09/06/2006    Iodine Anaphylaxis 09/06/2006    Pcn [penicillins] Anaphylaxis 04/08/2012    Pletal [cilostazol] Anaphylaxis 09/06/2006    Robaxin [methocarbamol] Anaphylaxis and Shortness Of Breath 03/14/2007    Arthrotec [diclofenac-misoprostol]  04/08/2012    Betadine [povidone iodine]  04/08/2012    Claritin [loratadine]  04/08/2012    Clindamycin/lincomycin  04/08/2012    Indocin [indometacin sodium]  04/08/2012    Tenex [guanfacine hcl]  04/08/2012    Vasotec  04/08/2012    Vioxx  04/08/2012       Past Medical History:   Diagnosis Date    Abscess or cellulitis of leg 4/8/2012    Acute renal failure (HCC) 4/10/2012    Angina pectoris (HCC)     Cellulitis of buttock     Cellulitis of sacral region     CHF (congestive heart failure) (HCC)     Claustrophobia     Colitis     Decubitus ulcer     Decubitus ulcer of left buttock, stage 2 10/17/2017    Diverticulitis     [facility]deep venous thrombosis) ([facility]     Gangrene (HCC)     on lower back to upper thigh    Gout     HCAP (healthcare-associated pneumonia)     Low iron     Panic Interpretation. The Ekg interpreted by me in the absence of a cardiologist shows. normal sinus rhythm with a rate of 85 with first degree AV block and incomplete RBBB present  Axis is   Normal  QTc is  within an acceptable range  Intervals and Durations are unremarkable. No specific ST-T wave changes appreciated. No evidence of acute ischemia.    No significant change from prior EKG dated 7 march 2018         Laney Chase MD  09/28/18 0504

## 2018-09-28 NOTE — H&P
Allergies:  Aspartame and phenylalanine; Cefuroxime axetil; Erythromycin; Feldene [piroxicam]; Iodine; Pcn [penicillins]; Pletal [cilostazol]; Robaxin [methocarbamol]; Arthrotec [diclofenac-misoprostol]; Betadine [povidone iodine]; Claritin [loratadine]; Clindamycin/lincomycin; Indocin [indometacin sodium]; Tenex [guanfacine hcl]; Vasotec; and Vioxx    Social History:      The patient currently lives at home       TOBACCO:   reports that he has quit smoking. His smoking use included Cigars. He has never used smokeless tobacco.  ETOH:   reports that he does not drink alcohol. Family History:      Reviewed in detail and d/w patient. Family History   Problem Relation Age of Onset    High Blood Pressure Mother     High Blood Pressure Father        REVIEW OF SYSTEMS:   Pertinent positives as noted in the HPI. All other systems reviewed and negative. PHYSICAL EXAM PERFORMED BY ME:    BP (!) 123/107   Pulse 88   Temp 98.8 °F (37.1 °C) (Oral)   Resp 17   Wt 276 lb (125.2 kg)   SpO2 97%   BMI 39.60 kg/m²     General appearance: comfortable, distress- none   HEENT: Atraumatic, Pupils equal, round, and reactive to light. Extra ocular muscles intact. Neck: Supple, with full range of motion. No jugular venous distention. Respiratory:  Clear to  auscultation , accessory muscle use no, Rales/Ronchi no  Cardiovascular: Regular rate and rhythm with normal S1/S2 ,  Abdomen: Soft, non-tender, non-distended with normal bowel sounds. Musculoskelatal: No clubbing, no  edema bilaterally. Dorsalis pedal pulses +   And capillary refills time is  <  than 3 secs.   Skin: Skin color, texture, turgor normal.     Neurologic:   Neurovascularly intact grossly non-focal.      CXR:  I have reviewed the CXR with the following interpretation: clear   EKG:  I have reviewed the EKG with the following interpretation: nsr     Labs:     Recent Labs      09/28/18   1255   WBC  8.1   HGB  12.4*   HCT  37.4*   PLT  125* Recent Labs      09/28/18   1255   NA  139   K  4.9   CL  97*   CO2  30   BUN  30*   CREATININE  2.1*   CALCIUM  9.2     Recent Labs      09/28/18   1255   AST  12*   ALT  15   BILITOT  0.4   ALKPHOS  119     No results for input(s): INR in the last 72 hours. No results for input(s): Meghan Spicer in the last 72 hours. No flowsheet data found. Urinalysis:      Lab Results   Component Value Date    NITRU Negative 03/23/2018    WBCUA 0-2 08/29/2016    BACTERIA 1+ 03/05/2016    RBCUA None seen 08/29/2016    BLOODU Negative 03/23/2018    SPECGRAV 1.020 03/23/2018    GLUCOSEU Negative 03/23/2018    GLUCOSEU 500 04/08/2012              Diagnostic Assesment and Plan :   1. Chr hypo resp failure Secondary to copd, s/p trach  continue home oxygen at 6 L/m. 2.  Complicated tracheal wound infection, requiring multiple surgeries in past, recent MRSA infection, failed outpatient antibiotics with Bactrim, he mentions he had positive mrsa culture at [facility] which  I could not identify from   care every where, we will try Zyvox  Here, He failed to p.o. Bactrim Levaquin and Flagyl. We will get pulmonary evaluation to look at his trach site  3. Acute renal failure on chronic kidney disease stage III secondary to multiple antibiotics including Bactrim, discontinue all p.o. home antibiotics, gentle IV fluids, monitor BMP and creatinine. 4.  Diabetes mellitus type 2 on insulin appears uncontrolled, continue Lantus and a sliding scale for now monitor blood sugars. 5.  Obesity advised weight loss. 6.  Chronic COPD, stable continue home oxygen. 7.  Chronic stable diastolic CHF, ejection fraction 55-60%, in 2015, continue gentle IV fluids, watch for signs of CHF    Body mass index is 39.6 kg/m². DVT Prophylaxis: lvx low dose   Diet:    Code Status: Prior    PT/OT Eval Status: na    : na     Dispo - will monitor in floor   Needs to stay in hospital for iv abx .    I have discussed the above stated

## 2018-09-28 NOTE — CONSULTS
Pharmacy to Dose Vancomycin    Dx: Infection of Trachea/ Possible aspiration  Goal trough = 15-20 mcg/mL  Pt wt 128.3 kg, will use adjusted dosing wt = 95.1kg  Estimated Creatinine Clearance: 49 mL/min (A) (based on SCr of 2.1 mg/dL (H)). Vancomycin 1000mg IVPB x 1 in ED at 1430 today. Start Vancomycin 1000mg IVPB q12h tomorrow at. 0230  Vancomycin trough prior to 3rd dose at 1330  Trough ordered before 3rd dose because of todays SrCR = 2.1  Peggy Rice, [facility] 9/28/2018 6:26 PM    9/30  Vanc trough = 11.9 mcg/mL at 0131. Will increase vancomycin to 1250 mg q12. Recheck vanc trough prior to the 3rd dose (10/1 at 0400). Rizwana Etienne, PharmD  9/30/2018 3:55 AM    10/1  Vanc trough = 14.7 mcg/mL at 0345. Continue current dose and frequency of vancomycin. Will monitor and adjust dose accordingly.   Rizwana Etienne, PharmD  10/1/2018 5:54 AM

## 2018-09-28 NOTE — PROGRESS NOTES
09/28/18 1301   Oxygen Therapy/Pulse Ox   O2 Therapy Oxygen humidified   $Oxygen $Daily Charge   O2 Device Trach mask   O2 Flow Rate (L/min) 10 L/min   FiO2  40 %   Equipment ID . Oracio Go Resp 16   SpO2 97 %   $Pulse Oximeter $Spot check (multiple)   Treatment   Treatment Type HHN   $Treatment Type $Medication Delivery   Medications Albuterol/Ipratropium   Is patient on O2?  Y   Pre-Tx Pulse 83   Pre-Tx Resps 20   Breath Sounds Pre-Tx Left Diminished   Breath Sounds Pre-Tx Right Diminished   Breath Sounds Post-Tx Left Diminished   Breath Sounds Post-Tx Right Diminished   Post-Tx Pulse 83   Post-Tx Resps 20   Delivery Source Oxygen   Position Semi-Schilling's   Tx Tolerance Well

## 2018-09-29 PROBLEM — J04.10 TRACHEITIS: Status: ACTIVE | Noted: 2018-09-29

## 2018-09-29 LAB
ANION GAP SERPL CALCULATED.3IONS-SCNC: 8 MMOL/L (ref 3–16)
APPEARANCE BAL (LAVAGE): ABNORMAL
BASOPHILS ABSOLUTE: 0 K/UL (ref 0–0.2)
BASOPHILS RELATIVE PERCENT: 0.8 %
BUN BLDV-MCNC: 29 MG/DL (ref 7–20)
CALCIUM SERPL-MCNC: 8.7 MG/DL (ref 8.3–10.6)
CHLORIDE BLD-SCNC: 102 MMOL/L (ref 99–110)
CLOT EVALUATION BAL: ABNORMAL
CO2: 31 MMOL/L (ref 21–32)
COLOR LAVAGE: COLORLESS
CREAT SERPL-MCNC: 1.7 MG/DL (ref 0.8–1.3)
EOSINOPHILS ABSOLUTE: 0.1 K/UL (ref 0–0.6)
EOSINOPHILS RELATIVE PERCENT: 2.6 %
EPITHELIAL CELLS FLUID: 44 %
GFR AFRICAN AMERICAN: 50
GFR NON-AFRICAN AMERICAN: 41
GLUCOSE BLD-MCNC: 137 MG/DL (ref 70–99)
GLUCOSE BLD-MCNC: 143 MG/DL (ref 70–99)
GLUCOSE BLD-MCNC: 216 MG/DL (ref 70–99)
GLUCOSE BLD-MCNC: 231 MG/DL (ref 70–99)
GLUCOSE BLD-MCNC: 242 MG/DL (ref 70–99)
HCT VFR BLD CALC: 33.2 % (ref 40.5–52.5)
HEMOGLOBIN: 10.9 G/DL (ref 13.5–17.5)
LYMPHOCYTES ABSOLUTE: 0.9 K/UL (ref 1–5.1)
LYMPHOCYTES RELATIVE PERCENT: 16.3 %
LYMPHOCYTES, BAL: 8 % (ref 5–10)
MCH RBC QN AUTO: 27.1 PG (ref 26–34)
MCHC RBC AUTO-ENTMCNC: 32.7 G/DL (ref 31–36)
MCV RBC AUTO: 82.8 FL (ref 80–100)
MONOCYTES ABSOLUTE: 0.5 K/UL (ref 0–1.3)
MONOCYTES RELATIVE PERCENT: 9.3 %
MONOCYTES, BAL: 1 %
NEUTROPHILS ABSOLUTE: 4 K/UL (ref 1.7–7.7)
NEUTROPHILS RELATIVE PERCENT: 71 %
NUMBER OF CELLS COUNTED BAL (LAVAGE): 200
PDW BLD-RTO: 15.5 % (ref 12.4–15.4)
PERFORMED ON: ABNORMAL
PLATELET # BLD: 93 K/UL (ref 135–450)
PLATELET SLIDE REVIEW: ABNORMAL
PMV BLD AUTO: 8.5 FL (ref 5–10.5)
POTASSIUM REFLEX MAGNESIUM: 3.9 MMOL/L (ref 3.5–5.1)
RBC # BLD: 4.01 M/UL (ref 4.2–5.9)
RBC, BAL: 15 /CUMM
SEGMENTED NEUTROPHILS, BAL: 47 % (ref 5–10)
SLIDE REVIEW: ABNORMAL
SODIUM BLD-SCNC: 141 MMOL/L (ref 136–145)
WBC # BLD: 5.7 K/UL (ref 4–11)
WBC/EPI CELLS BAL: 20 /CUMM

## 2018-09-29 PROCEDURE — 7100000011 HC PHASE II RECOVERY - ADDTL 15 MIN: Performed by: INTERNAL MEDICINE

## 2018-09-29 PROCEDURE — 85025 COMPLETE CBC W/AUTO DIFF WBC: CPT

## 2018-09-29 PROCEDURE — 6360000002 HC RX W HCPCS: Performed by: INTERNAL MEDICINE

## 2018-09-29 PROCEDURE — 87116 MYCOBACTERIA CULTURE: CPT

## 2018-09-29 PROCEDURE — 6370000000 HC RX 637 (ALT 250 FOR IP): Performed by: INTERNAL MEDICINE

## 2018-09-29 PROCEDURE — 2580000003 HC RX 258: Performed by: INTERNAL MEDICINE

## 2018-09-29 PROCEDURE — 87077 CULTURE AEROBIC IDENTIFY: CPT

## 2018-09-29 PROCEDURE — 87102 FUNGUS ISOLATION CULTURE: CPT

## 2018-09-29 PROCEDURE — 87206 SMEAR FLUORESCENT/ACID STAI: CPT

## 2018-09-29 PROCEDURE — 31624 DX BRONCHOSCOPE/LAVAGE: CPT | Performed by: INTERNAL MEDICINE

## 2018-09-29 PROCEDURE — 3609010800 HC BRONCHOSCOPY ALVEOLAR LAVAGE: Performed by: INTERNAL MEDICINE

## 2018-09-29 PROCEDURE — 7100000010 HC PHASE II RECOVERY - FIRST 15 MIN: Performed by: INTERNAL MEDICINE

## 2018-09-29 PROCEDURE — 99152 MOD SED SAME PHYS/QHP 5/>YRS: CPT | Performed by: INTERNAL MEDICINE

## 2018-09-29 PROCEDURE — 94640 AIRWAY INHALATION TREATMENT: CPT

## 2018-09-29 PROCEDURE — 87205 SMEAR GRAM STAIN: CPT

## 2018-09-29 PROCEDURE — 87186 SC STD MICRODIL/AGAR DIL: CPT

## 2018-09-29 PROCEDURE — 1200000000 HC SEMI PRIVATE

## 2018-09-29 PROCEDURE — 87070 CULTURE OTHR SPECIMN AEROBIC: CPT

## 2018-09-29 PROCEDURE — 89051 BODY FLUID CELL COUNT: CPT

## 2018-09-29 PROCEDURE — 2700000000 HC OXYGEN THERAPY PER DAY

## 2018-09-29 PROCEDURE — 31645 BRNCHSC W/THER ASPIR 1ST: CPT | Performed by: INTERNAL MEDICINE

## 2018-09-29 PROCEDURE — 99233 SBSQ HOSP IP/OBS HIGH 50: CPT | Performed by: INTERNAL MEDICINE

## 2018-09-29 PROCEDURE — 2500000003 HC RX 250 WO HCPCS: Performed by: INTERNAL MEDICINE

## 2018-09-29 PROCEDURE — 2709999900 HC NON-CHARGEABLE SUPPLY: Performed by: INTERNAL MEDICINE

## 2018-09-29 PROCEDURE — 87015 SPECIMEN INFECT AGNT CONCNTJ: CPT

## 2018-09-29 PROCEDURE — 36415 COLL VENOUS BLD VENIPUNCTURE: CPT

## 2018-09-29 PROCEDURE — 0B9D8ZX DRAINAGE OF RIGHT MIDDLE LUNG LOBE, VIA NATURAL OR ARTIFICIAL OPENING ENDOSCOPIC, DIAGNOSTIC: ICD-10-PCS | Performed by: INTERNAL MEDICINE

## 2018-09-29 PROCEDURE — 80048 BASIC METABOLIC PNL TOTAL CA: CPT

## 2018-09-29 PROCEDURE — 94761 N-INVAS EAR/PLS OXIMETRY MLT: CPT

## 2018-09-29 RX ORDER — FENTANYL CITRATE 50 UG/ML
INJECTION, SOLUTION INTRAMUSCULAR; INTRAVENOUS PRN
Status: DISCONTINUED | OUTPATIENT
Start: 2018-09-29 | End: 2018-09-29 | Stop reason: HOSPADM

## 2018-09-29 RX ORDER — LIDOCAINE HYDROCHLORIDE 20 MG/ML
INJECTION, SOLUTION EPIDURAL; INFILTRATION; INTRACAUDAL; PERINEURAL PRN
Status: DISCONTINUED | OUTPATIENT
Start: 2018-09-29 | End: 2018-09-29 | Stop reason: HOSPADM

## 2018-09-29 RX ORDER — ACETAMINOPHEN 500 MG
500 TABLET ORAL EVERY 6 HOURS PRN
COMMUNITY
End: 2018-10-24 | Stop reason: ALTCHOICE

## 2018-09-29 RX ORDER — HEPARIN SODIUM 5000 [USP'U]/ML
5000 INJECTION, SOLUTION INTRAVENOUS; SUBCUTANEOUS EVERY 8 HOURS SCHEDULED
Status: DISCONTINUED | OUTPATIENT
Start: 2018-09-30 | End: 2018-10-04 | Stop reason: HOSPADM

## 2018-09-29 RX ORDER — METRONIDAZOLE 250 MG/1
250 TABLET ORAL
COMMUNITY
End: 2018-10-16 | Stop reason: CLARIF

## 2018-09-29 RX ORDER — HEPARIN SODIUM 5000 [USP'U]/ML
5000 INJECTION, SOLUTION INTRAVENOUS; SUBCUTANEOUS EVERY 8 HOURS SCHEDULED
Status: DISCONTINUED | OUTPATIENT
Start: 2018-09-29 | End: 2018-09-29

## 2018-09-29 RX ORDER — ZAFIRLUKAST 20 MG/1
20 TABLET, FILM COATED ORAL 2 TIMES DAILY
COMMUNITY

## 2018-09-29 RX ORDER — ONDANSETRON HYDROCHLORIDE 8 MG/1
8 TABLET, FILM COATED ORAL EVERY 8 HOURS PRN
COMMUNITY
End: 2019-05-28

## 2018-09-29 RX ORDER — SILDENAFIL 100 MG/1
100 TABLET, FILM COATED ORAL PRN
Status: ON HOLD | COMMUNITY
End: 2019-03-16

## 2018-09-29 RX ORDER — MIDAZOLAM HYDROCHLORIDE 5 MG/ML
INJECTION INTRAMUSCULAR; INTRAVENOUS PRN
Status: DISCONTINUED | OUTPATIENT
Start: 2018-09-29 | End: 2018-09-29 | Stop reason: HOSPADM

## 2018-09-29 RX ORDER — NITROFURANTOIN MACROCRYSTALS 100 MG/1
100 CAPSULE ORAL 2 TIMES DAILY
Status: ON HOLD | COMMUNITY
End: 2018-10-04 | Stop reason: HOSPADM

## 2018-09-29 RX ORDER — 0.9 % SODIUM CHLORIDE 0.9 %
INTRAVENOUS SOLUTION INTRAVENOUS CONTINUOUS PRN
Status: COMPLETED | OUTPATIENT
Start: 2018-09-29 | End: 2018-09-29

## 2018-09-29 RX ORDER — ALLOPURINOL 300 MG/1
300 TABLET ORAL DAILY
Status: ON HOLD | COMMUNITY
End: 2019-03-16

## 2018-09-29 RX ORDER — HYDROMORPHONE HYDROCHLORIDE 4 MG/1
4 TABLET ORAL EVERY 12 HOURS
COMMUNITY
End: 2019-01-30 | Stop reason: ALTCHOICE

## 2018-09-29 RX ORDER — LEVOFLOXACIN 500 MG/1
500 TABLET, FILM COATED ORAL DAILY
Status: ON HOLD | COMMUNITY
End: 2018-10-04 | Stop reason: HOSPADM

## 2018-09-29 RX ORDER — EPLERENONE 25 MG/1
25 TABLET, FILM COATED ORAL DAILY
Status: ON HOLD | COMMUNITY
End: 2019-03-19 | Stop reason: HOSPADM

## 2018-09-29 RX ORDER — FLUTICASONE PROPIONATE 50 MCG
1 SPRAY, SUSPENSION (ML) NASAL DAILY
Status: ON HOLD | COMMUNITY
End: 2019-03-16

## 2018-09-29 RX ADMIN — FERROUS SULFATE TAB 325 MG (65 MG ELEMENTAL FE) 325 MG: 325 (65 FE) TAB at 14:32

## 2018-09-29 RX ADMIN — PRAVASTATIN SODIUM 40 MG: 40 TABLET ORAL at 14:32

## 2018-09-29 RX ADMIN — VANCOMYCIN HYDROCHLORIDE 1000 MG: 1 INJECTION, POWDER, LYOPHILIZED, FOR SOLUTION INTRAVENOUS at 03:12

## 2018-09-29 RX ADMIN — ALLOPURINOL 100 MG: 100 TABLET ORAL at 14:33

## 2018-09-29 RX ADMIN — ARIPIPRAZOLE 10 MG: 10 TABLET ORAL at 14:33

## 2018-09-29 RX ADMIN — INSULIN LISPRO 4 UNITS: 100 INJECTION, SOLUTION INTRAVENOUS; SUBCUTANEOUS at 18:11

## 2018-09-29 RX ADMIN — Medication 2 PUFF: at 19:40

## 2018-09-29 RX ADMIN — INSULIN LISPRO 2 UNITS: 100 INJECTION, SOLUTION INTRAVENOUS; SUBCUTANEOUS at 21:10

## 2018-09-29 RX ADMIN — BUPROPION HYDROCHLORIDE 100 MG: 100 TABLET, FILM COATED ORAL at 14:41

## 2018-09-29 RX ADMIN — PANTOPRAZOLE SODIUM 40 MG: 40 TABLET, DELAYED RELEASE ORAL at 14:32

## 2018-09-29 RX ADMIN — VANCOMYCIN HYDROCHLORIDE 1000 MG: 1 INJECTION, POWDER, LYOPHILIZED, FOR SOLUTION INTRAVENOUS at 14:33

## 2018-09-29 RX ADMIN — INSULIN GLARGINE 20 UNITS: 100 INJECTION, SOLUTION SUBCUTANEOUS at 21:10

## 2018-09-29 RX ADMIN — DOCUSATE SODIUM 100 MG: 100 CAPSULE, LIQUID FILLED ORAL at 14:32

## 2018-09-29 RX ADMIN — Medication 2 PUFF: at 07:53

## 2018-09-29 RX ADMIN — SODIUM CHLORIDE: 9 INJECTION, SOLUTION INTRAVENOUS at 08:52

## 2018-09-29 RX ADMIN — DOCUSATE SODIUM 100 MG: 100 CAPSULE, LIQUID FILLED ORAL at 21:07

## 2018-09-29 ASSESSMENT — PAIN SCALES - WONG BAKER: WONGBAKER_NUMERICALRESPONSE: 0

## 2018-09-29 ASSESSMENT — PAIN SCALES - GENERAL: PAINLEVEL_OUTOF10: 0

## 2018-09-29 NOTE — PROGRESS NOTES
Pulmonary & Critical Care Inpatient Progress Note   Kerrie Salazar MD     REASON FOR TODAY'S VISIT:  Acute on chronic resp failure    SUBJECTIVE:   Tolerating tach collar oxygen   No new complaints    Scheduled Meds:   mometasone-formoterol  2 puff Inhalation BID    allopurinol  100 mg Oral Daily    ARIPiprazole  10 mg Oral Daily    docusate sodium  100 mg Oral BID    ferrous sulfate  325 mg Oral Daily with breakfast    insulin glargine  20 Units Subcutaneous Nightly    pantoprazole  40 mg Oral QAM AC    pravastatin  40 mg Oral Daily    zafirlukast  20 mg Oral BID    sodium chloride flush  10 mL Intravenous 2 times per day    enoxaparin  30 mg Subcutaneous Daily    insulin lispro  0-6 Units Subcutaneous TID WC    insulin lispro  0-3 Units Subcutaneous Nightly    buPROPion  100 mg Oral BID    vancomycin  1,000 mg Intravenous Q12H       Continuous Infusions:   sodium chloride 75 mL/hr at 09/29/18 0852    dextrose         PRN Meds:  sodium chloride flush, magnesium hydroxide, ondansetron, glucose, dextrose, glucagon (rDNA), dextrose    ALLERGIES:  Patient is allergic to aspartame and phenylalanine; cefuroxime axetil; erythromycin; feldene [piroxicam]; iodine; pcn [penicillins]; pletal [cilostazol]; robaxin [methocarbamol]; arthrotec [diclofenac-misoprostol]; betadine [povidone iodine]; claritin [loratadine]; clindamycin/lincomycin; indocin [indometacin sodium]; tenex [guanfacine hcl]; vasotec; and vioxx. Objective:   PHYSICAL EXAM:  /76   Pulse 67   Temp 97.9 °F (36.6 °C) (Oral)   Resp 18   Ht 5' 10\" (1.778 m)   Wt 282 lb 13.6 oz (128.3 kg)   SpO2 100%   BMI 40.58 kg/m²    Physical Exam   Constitutional: He appears well-developed and well-nourished. No distress. HENT:   Head: Normocephalic and atraumatic. Mouth/Throat: Oropharynx is clear and moist. No oropharyngeal exudate. Eyes: Pupils are equal, round, and reactive to light. EOM are normal.   Neck: Neck supple. No JVD present.

## 2018-09-29 NOTE — PROGRESS NOTES
Nutrition Assessment    Type and Reason for Visit: Initial, Positive Nutrition Screen (non  healing wound)    Nutrition Recommendations:   1. General may need carb control restriction if blood sugars continue greater than 200 mg/dl and pending po intake  2. Add Ensure high protein with meals to enhance protein  3. Monitor education needs  4. Will monitor nutritional adequacy, nutrition-related labs, weights, BMs, and clinical progress     Malnutrition Assessment:  · Malnutrition Status: No malnutrition    Nutrition Diagnosis:   · Problem: Increased nutrient needs  · Etiology: related to Increased demand for energy/nutrients due to     Signs and symptoms:  as evidenced by Presence of wounds    Nutrition Assessment:  · Subjective Assessment: Patient is a 59 y.o male admitted for acute renal failure. Normal saline at 75 ml/hr. HX of chronic respiratory failure on 6 liters of O2 via trach. Bronch today, revealed acute on chronic respiratory failure and acute tracheitis. Currently on a general diet eating % meals. Blood sugars trending up greater than 200 mg/dl. Lantus and low dose correction ordered. · Nutrition-Focused Physical Findings: BM x 1 on 9/28/18  · Wound Type: Stage IV, Stage II, Multiple  · Current Nutrition Therapies:  · Oral Diet Orders: General   · Oral Diet intake: %  · Oral Nutrition Supplement (ONS) Orders: None  · Anthropometric Measures:  · Ht: 5' 10\" (177.8 cm)   · Current Body Wt: 282 lb 13 oz (128.3 kg)  · Ideal Body Wt: 166 lb (75.3 kg), % Ideal Body    · BMI Classification: BMI > or equal to 40.0 Obese Class III        Estimated Intake vs Estimated Needs: Insufficient Data    Nutrition Risk Level: Moderate    Nutrition Interventions:   Continue current diet, Start ONS  Continued Inpatient Monitoring    Nutrition Evaluation:   · Evaluation: Goals set   · Goals: Patient will eat 50% or greater of meals and supplements.       · Monitoring: Meal Intake, Pertinent Labs, Comparative Standards    See Adult Nutrition Doc Flowsheet for more detail.      Electronically signed by Leanne Gregg, [address] [place] [address],  on 9/29/18 at 4:36 PM    Contact Number: [phone]

## 2018-09-29 NOTE — PROGRESS NOTES
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 [address] [Z93.0] 10/14/2015    Type 2 diabetes mellitus with diabetic polyneuropathy, with long-term current use of insulin ([facility] [address] [E11.42, Z79.4] 10/14/2015    Chronic respiratory failure with hypoxia on 6 L home O2 tent over trach [J96.11] 04/09/2012    Atypical schizophrenia ([facility] [address] [F20.3] 01/01/2000       60 yo male with hx of complicated trach/ stoma admitted with suspected infection     Tracheitis POA: uncontrolled- suspect poor medical compliance at home   - Trach care/ Resp and pulm consult   -  Reported Hx pf MRSA   -  Cont Vanc- pharm to dose, Merrem , cultures pending   -  Will need ENT management ( No ENT at [facility] may need transfer)   - Bronch planned 9/29/18   - SLP eval     Acute on chronic respiratory failure with hypoxia in the setting of mechanical breathing issues  - supportive care  - wean as tolerated, trach collar baseline 6 Liter   - Pulm following   - cont pulse ox     DM type 2 with neuropathy - unknown control  - A1C pending  - Basal bolus with SSI   - Cont gabapentin for neuropathy     Acute on CKD- baseline seem to be ~ 1.5 on arrive 2.2   - likely secondary to acute illness   - now back to 1.7   - cont to trend     PAD- follows with Dr. Gypsy Meigs lower extremities and coccyx  - follows with wound care  - consult wound care     Anxiety/ depression and schizophrenia - no acute issues overall maybe contributing to his non compliance                    DVT Prophylaxis: Heparin  Diet: DIET GENERAL;  Code Status: Full Code    PT/OT Eval Status: eval and

## 2018-09-30 LAB
ALBUMIN SERPL-MCNC: 3.4 G/DL (ref 3.4–5)
ANION GAP SERPL CALCULATED.3IONS-SCNC: 9 MMOL/L (ref 3–16)
BASOPHILS ABSOLUTE: 0 K/UL (ref 0–0.2)
BASOPHILS RELATIVE PERCENT: 0.6 %
BUN BLDV-MCNC: 21 MG/DL (ref 7–20)
CALCIUM SERPL-MCNC: 8.9 MG/DL (ref 8.3–10.6)
CHLORIDE BLD-SCNC: 102 MMOL/L (ref 99–110)
CO2: 29 MMOL/L (ref 21–32)
CREAT SERPL-MCNC: 1.2 MG/DL (ref 0.8–1.3)
EOSINOPHILS ABSOLUTE: 0.1 K/UL (ref 0–0.6)
EOSINOPHILS RELATIVE PERCENT: 2.1 %
GFR AFRICAN AMERICAN: >60
GFR NON-AFRICAN AMERICAN: >60
GLUCOSE BLD-MCNC: 221 MG/DL (ref 70–99)
GLUCOSE BLD-MCNC: 221 MG/DL (ref 70–99)
GLUCOSE BLD-MCNC: 224 MG/DL (ref 70–99)
GLUCOSE BLD-MCNC: 236 MG/DL (ref 70–99)
GLUCOSE BLD-MCNC: 237 MG/DL (ref 70–99)
GLUCOSE BLD-MCNC: 308 MG/DL (ref 70–99)
HCT VFR BLD CALC: 34.9 % (ref 40.5–52.5)
HEMOGLOBIN: 11.4 G/DL (ref 13.5–17.5)
LYMPHOCYTES ABSOLUTE: 0.7 K/UL (ref 1–5.1)
LYMPHOCYTES RELATIVE PERCENT: 12.9 %
MCH RBC QN AUTO: 27.4 PG (ref 26–34)
MCHC RBC AUTO-ENTMCNC: 32.7 G/DL (ref 31–36)
MCV RBC AUTO: 83.9 FL (ref 80–100)
MONOCYTES ABSOLUTE: 0.5 K/UL (ref 0–1.3)
MONOCYTES RELATIVE PERCENT: 9.6 %
NEUTROPHILS ABSOLUTE: 4.1 K/UL (ref 1.7–7.7)
NEUTROPHILS RELATIVE PERCENT: 74.8 %
PDW BLD-RTO: 15.4 % (ref 12.4–15.4)
PERFORMED ON: ABNORMAL
PHOSPHORUS: 3 MG/DL (ref 2.5–4.9)
PLATELET # BLD: 93 K/UL (ref 135–450)
PMV BLD AUTO: 8.9 FL (ref 5–10.5)
POTASSIUM REFLEX MAGNESIUM: 4.4 MMOL/L (ref 3.5–5.1)
POTASSIUM SERPL-SCNC: 4.3 MMOL/L (ref 3.5–5.1)
RBC # BLD: 4.16 M/UL (ref 4.2–5.9)
SODIUM BLD-SCNC: 140 MMOL/L (ref 136–145)
VANCOMYCIN TROUGH: 11.9 UG/ML (ref 10–20)
WBC # BLD: 5.5 K/UL (ref 4–11)

## 2018-09-30 PROCEDURE — 80069 RENAL FUNCTION PANEL: CPT

## 2018-09-30 PROCEDURE — 94640 AIRWAY INHALATION TREATMENT: CPT

## 2018-09-30 PROCEDURE — 6370000000 HC RX 637 (ALT 250 FOR IP): Performed by: INTERNAL MEDICINE

## 2018-09-30 PROCEDURE — G8979 MOBILITY GOAL STATUS: HCPCS

## 2018-09-30 PROCEDURE — 97530 THERAPEUTIC ACTIVITIES: CPT

## 2018-09-30 PROCEDURE — 94761 N-INVAS EAR/PLS OXIMETRY MLT: CPT

## 2018-09-30 PROCEDURE — G8988 SELF CARE GOAL STATUS: HCPCS

## 2018-09-30 PROCEDURE — 2700000000 HC OXYGEN THERAPY PER DAY

## 2018-09-30 PROCEDURE — 85025 COMPLETE CBC W/AUTO DIFF WBC: CPT

## 2018-09-30 PROCEDURE — 1200000000 HC SEMI PRIVATE

## 2018-09-30 PROCEDURE — 6360000002 HC RX W HCPCS: Performed by: INTERNAL MEDICINE

## 2018-09-30 PROCEDURE — 2580000003 HC RX 258: Performed by: NURSE PRACTITIONER

## 2018-09-30 PROCEDURE — G8980 MOBILITY D/C STATUS: HCPCS

## 2018-09-30 PROCEDURE — 6370000000 HC RX 637 (ALT 250 FOR IP): Performed by: NURSE PRACTITIONER

## 2018-09-30 PROCEDURE — 6360000002 HC RX W HCPCS: Performed by: NURSE PRACTITIONER

## 2018-09-30 PROCEDURE — 97163 PT EVAL HIGH COMPLEX 45 MIN: CPT

## 2018-09-30 PROCEDURE — G8987 SELF CARE CURRENT STATUS: HCPCS

## 2018-09-30 PROCEDURE — 99233 SBSQ HOSP IP/OBS HIGH 50: CPT | Performed by: INTERNAL MEDICINE

## 2018-09-30 PROCEDURE — 97167 OT EVAL HIGH COMPLEX 60 MIN: CPT

## 2018-09-30 PROCEDURE — G8989 SELF CARE D/C STATUS: HCPCS

## 2018-09-30 PROCEDURE — 97535 SELF CARE MNGMENT TRAINING: CPT

## 2018-09-30 PROCEDURE — 94664 DEMO&/EVAL PT USE INHALER: CPT

## 2018-09-30 PROCEDURE — 88112 CYTOPATH CELL ENHANCE TECH: CPT

## 2018-09-30 PROCEDURE — G8978 MOBILITY CURRENT STATUS: HCPCS

## 2018-09-30 PROCEDURE — 36415 COLL VENOUS BLD VENIPUNCTURE: CPT

## 2018-09-30 PROCEDURE — 88305 TISSUE EXAM BY PATHOLOGIST: CPT

## 2018-09-30 PROCEDURE — 2580000003 HC RX 258: Performed by: INTERNAL MEDICINE

## 2018-09-30 RX ORDER — GABAPENTIN 300 MG/1
300 CAPSULE ORAL 3 TIMES DAILY
Status: DISCONTINUED | OUTPATIENT
Start: 2018-09-30 | End: 2018-10-04 | Stop reason: HOSPADM

## 2018-09-30 RX ORDER — SPIRONOLACTONE 25 MG/1
25 TABLET ORAL DAILY
Status: DISCONTINUED | OUTPATIENT
Start: 2018-09-30 | End: 2018-10-04 | Stop reason: HOSPADM

## 2018-09-30 RX ORDER — POTASSIUM CHLORIDE 20 MEQ/1
20 TABLET, EXTENDED RELEASE ORAL DAILY
Status: DISCONTINUED | OUTPATIENT
Start: 2018-10-01 | End: 2018-10-04 | Stop reason: HOSPADM

## 2018-09-30 RX ORDER — ARIPIPRAZOLE 10 MG/1
20 TABLET ORAL DAILY
Status: DISCONTINUED | OUTPATIENT
Start: 2018-10-01 | End: 2018-10-04 | Stop reason: HOSPADM

## 2018-09-30 RX ORDER — FLUTICASONE PROPIONATE 50 MCG
1 SPRAY, SUSPENSION (ML) NASAL DAILY
Status: DISCONTINUED | OUTPATIENT
Start: 2018-09-30 | End: 2018-10-04 | Stop reason: HOSPADM

## 2018-09-30 RX ORDER — HYDROMORPHONE HYDROCHLORIDE 2 MG/1
4 TABLET ORAL EVERY 6 HOURS PRN
Status: DISCONTINUED | OUTPATIENT
Start: 2018-09-30 | End: 2018-10-04 | Stop reason: HOSPADM

## 2018-09-30 RX ORDER — INSULIN GLARGINE 100 [IU]/ML
30 INJECTION, SOLUTION SUBCUTANEOUS NIGHTLY
Status: DISCONTINUED | OUTPATIENT
Start: 2018-09-30 | End: 2018-10-03

## 2018-09-30 RX ORDER — DEXTROSE MONOHYDRATE 50 MG/ML
100 INJECTION, SOLUTION INTRAVENOUS PRN
Status: DISCONTINUED | OUTPATIENT
Start: 2018-09-30 | End: 2018-10-02 | Stop reason: SDUPTHER

## 2018-09-30 RX ORDER — DEXTROSE MONOHYDRATE 25 G/50ML
12.5 INJECTION, SOLUTION INTRAVENOUS PRN
Status: DISCONTINUED | OUTPATIENT
Start: 2018-09-30 | End: 2018-10-02 | Stop reason: SDUPTHER

## 2018-09-30 RX ORDER — BUPROPION HYDROCHLORIDE 100 MG/1
300 TABLET ORAL 2 TIMES DAILY
Status: DISCONTINUED | OUTPATIENT
Start: 2018-09-30 | End: 2018-10-04 | Stop reason: HOSPADM

## 2018-09-30 RX ORDER — METHYLPREDNISOLONE SODIUM SUCCINATE 40 MG/ML
40 INJECTION, POWDER, LYOPHILIZED, FOR SOLUTION INTRAMUSCULAR; INTRAVENOUS EVERY 12 HOURS
Status: DISCONTINUED | OUTPATIENT
Start: 2018-09-30 | End: 2018-10-01

## 2018-09-30 RX ORDER — ATENOLOL 25 MG/1
37 TABLET ORAL DAILY
Status: DISCONTINUED | OUTPATIENT
Start: 2018-09-30 | End: 2018-10-04 | Stop reason: HOSPADM

## 2018-09-30 RX ORDER — TORSEMIDE 20 MG/1
40 TABLET ORAL DAILY
Status: DISCONTINUED | OUTPATIENT
Start: 2018-09-30 | End: 2018-10-04 | Stop reason: HOSPADM

## 2018-09-30 RX ORDER — ALLOPURINOL 300 MG/1
300 TABLET ORAL DAILY
Status: DISCONTINUED | OUTPATIENT
Start: 2018-10-01 | End: 2018-10-04 | Stop reason: HOSPADM

## 2018-09-30 RX ORDER — NICOTINE POLACRILEX 4 MG
15 LOZENGE BUCCAL PRN
Status: DISCONTINUED | OUTPATIENT
Start: 2018-09-30 | End: 2018-10-04 | Stop reason: HOSPADM

## 2018-09-30 RX ADMIN — INSULIN LISPRO 14 UNITS: 100 INJECTION, SOLUTION INTRAVENOUS; SUBCUTANEOUS at 15:40

## 2018-09-30 RX ADMIN — PANTOPRAZOLE SODIUM 40 MG: 40 TABLET, DELAYED RELEASE ORAL at 05:40

## 2018-09-30 RX ADMIN — PRAVASTATIN SODIUM 40 MG: 40 TABLET ORAL at 15:19

## 2018-09-30 RX ADMIN — SODIUM CHLORIDE: 9 INJECTION, SOLUTION INTRAVENOUS at 04:52

## 2018-09-30 RX ADMIN — GABAPENTIN 300 MG: 300 CAPSULE ORAL at 15:23

## 2018-09-30 RX ADMIN — FERROUS SULFATE TAB 325 MG (65 MG ELEMENTAL FE) 325 MG: 325 (65 FE) TAB at 15:23

## 2018-09-30 RX ADMIN — HEPARIN SODIUM 5000 UNITS: 5000 INJECTION INTRAVENOUS; SUBCUTANEOUS at 15:24

## 2018-09-30 RX ADMIN — Medication 2 PUFF: at 08:21

## 2018-09-30 RX ADMIN — Medication 2 PUFF: at 20:35

## 2018-09-30 RX ADMIN — HEPARIN SODIUM 5000 UNITS: 5000 INJECTION INTRAVENOUS; SUBCUTANEOUS at 21:19

## 2018-09-30 RX ADMIN — ATENOLOL 37.5 MG: 25 TABLET ORAL at 15:19

## 2018-09-30 RX ADMIN — VANCOMYCIN HYDROCHLORIDE 1000 MG: 1 INJECTION, POWDER, LYOPHILIZED, FOR SOLUTION INTRAVENOUS at 02:51

## 2018-09-30 RX ADMIN — DOCUSATE SODIUM 100 MG: 100 CAPSULE, LIQUID FILLED ORAL at 21:11

## 2018-09-30 RX ADMIN — SPIRONOLACTONE 25 MG: 25 TABLET ORAL at 15:21

## 2018-09-30 RX ADMIN — METHYLPREDNISOLONE SODIUM SUCCINATE 40 MG: 40 INJECTION, POWDER, FOR SOLUTION INTRAMUSCULAR; INTRAVENOUS at 15:25

## 2018-09-30 RX ADMIN — HEPARIN SODIUM 5000 UNITS: 5000 INJECTION INTRAVENOUS; SUBCUTANEOUS at 05:40

## 2018-09-30 RX ADMIN — INSULIN GLARGINE 30 UNITS: 100 INJECTION, SOLUTION SUBCUTANEOUS at 21:13

## 2018-09-30 RX ADMIN — DOCUSATE SODIUM 100 MG: 100 CAPSULE, LIQUID FILLED ORAL at 15:50

## 2018-09-30 RX ADMIN — GABAPENTIN 300 MG: 300 CAPSULE ORAL at 21:11

## 2018-09-30 RX ADMIN — INSULIN LISPRO 4 UNITS: 100 INJECTION, SOLUTION INTRAVENOUS; SUBCUTANEOUS at 21:14

## 2018-09-30 RX ADMIN — VANCOMYCIN HYDROCHLORIDE 1250 MG: 10 INJECTION, POWDER, LYOPHILIZED, FOR SOLUTION INTRAVENOUS at 20:30

## 2018-09-30 RX ADMIN — Medication 10 ML: at 15:50

## 2018-09-30 ASSESSMENT — PAIN SCALES - GENERAL
PAINLEVEL_OUTOF10: 8
PAINLEVEL_OUTOF10: 8
PAINLEVEL_OUTOF10: 0
PAINLEVEL_OUTOF10: 0
PAINLEVEL_OUTOF10: 8

## 2018-09-30 ASSESSMENT — PAIN DESCRIPTION - DESCRIPTORS
DESCRIPTORS: SHARP
DESCRIPTORS: ACHING;SHARP

## 2018-09-30 ASSESSMENT — PAIN DESCRIPTION - PAIN TYPE
TYPE: ACUTE PAIN;CHRONIC PAIN
TYPE: ACUTE PAIN
TYPE: ACUTE PAIN;CHRONIC PAIN

## 2018-09-30 ASSESSMENT — PAIN DESCRIPTION - LOCATION
LOCATION: BUTTOCKS
LOCATION: BUTTOCKS

## 2018-09-30 ASSESSMENT — PAIN DESCRIPTION - FREQUENCY
FREQUENCY: CONTINUOUS
FREQUENCY: CONTINUOUS

## 2018-09-30 ASSESSMENT — PAIN DESCRIPTION - ORIENTATION: ORIENTATION: OTHER (COMMENT)

## 2018-09-30 ASSESSMENT — PAIN DESCRIPTION - ONSET: ONSET: ON-GOING

## 2018-09-30 NOTE — PROGRESS NOTES
Yes  Response To Previous Treatment: Not applicable  Family / Caregiver Present: No  Referring Practitioner: Aida Oliva CNP  Referral Date : 09/29/18  Diagnosis: ARF  Follows Commands: Within Functional Limits  General Comment  Comments: Pt resting in bed upon entry, RN cleared pt for therapy  Subjective  Subjective: Pt agreeable to PT  Pain Screening  Patient Currently in Pain: Yes  Pain Assessment  Pain Assessment: 0-10  Pain Level: 8  Pain Type: Acute pain;Chronic pain  Pain Location: Buttocks (also trach pain from infection)  Pain Intervention(s): Ambulation/Increased activity;Repositioned; Therapeutic presence;RN notified  Vital Signs  Patient Currently in Pain: Yes  Pre Treatment Pain Screening  Intervention List: Patient able to continue with treatment    Orientation  Orientation  Overall Orientation Status: Within Normal Limits    Social/Functional History  Social/Functional History  Lives With: Alone  Type of Home: Apartment  Home Layout: One level  Home Access: Level entry  Bathroom Shower/Tub: Walk-in shower, Shower chair with back (will be installing grab bars)  Bathroom Toilet: Standard  Bathroom Equipment: Shower chair, Hand-held shower  Home Equipment: Wheelchair-manual, Cane  ADL Assistance:  (has aid to assist with bathing and dressing, 3 days per week)  Homemaking Assistance:  (pt has assist with cooking, cleaning, laundry, pt can heat up meals in microwave)  Homemaking Responsibilities: No  Ambulation Assistance:  (non ambulatory for 1 yr)  Transfer Assistance: Independent  Active : No  Patient's  Info:  Takes senior services bus  Occupation: On disability  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

## 2018-09-30 NOTE — PLAN OF CARE
Problem: Falls - Risk of:  Goal: Will remain free from falls  Will remain free from falls   Outcome: Ongoing  Bed in lowest position, wheels locked, 2/4 side rails up, nonskid footwear on. Bed/ chair check alarm in place, call light within reach. Pt instructed to call out when needing assistance. Pt stated understanding. Nurse will continue to monitor.

## 2018-09-30 NOTE — PROGRESS NOTES
Perfect serve Jessi Danielle: No AM labs. med rec needing reviewed, many discrepancies between home meds and what is ordered.   Thank you :)

## 2018-09-30 NOTE — PROGRESS NOTES
Caregiver Present: No  Referring Practitioner: NEGRITA Messina CNP  Diagnosis: ARF  Subjective  Subjective: Pt supine in bed upon arrival and agreeable to OT session  General Comment  Comments: RN approval prior to session  Pain Assessment  Patient Currently in Pain: Yes  Pain Assessment: 0-10  Dawn-Baker Pain Rating: No hurt  Pain Level: 8  Pain Type: Acute pain, Chronic pain  Pain Location: Buttocks  Pain Descriptors: Sharp  Pain Frequency: Continuous  Patient's Stated Pain Goal: 7  Pain Intervention(s): Repositioned, Ambulation/Increased activity, RN notified, Emotional support  Response to Pain Intervention: Patient Satisfied  Multiple Pain Sites: No     Social/Functional History  Social/Functional History  Lives With: Alone  Type of Home: Apartment  Home Layout: One level  Home Access: Level entry  Bathroom Shower/Tub: Walk-in shower, Shower chair with back (will be installing grab bars)  Bathroom Toilet: Standard  Bathroom Equipment: Shower chair, Hand-held shower  Home Equipment: Wheelchair-manual, Cane  ADL Assistance:  (has aid to assist with bathing and dressing, 3 days per week)  Homemaking Assistance:  (pt has assist with cooking, cleaning, laundry, pt can heat up meals in microwave)  Homemaking Responsibilities: No  Ambulation Assistance:  (non ambulatory for 1 yr)  Transfer Assistance: Independent  Active : No  Patient's  Info:  Takes senior services bus  Occupation: On disability  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       Objective        Orientation  Overall Orientation Status: Within Functional Limits  Observation/Palpation  Posture: Good  Balance  Sitting Balance: Independent  Standing Balance: Unable to assess(comment)  ADL  LE Dressing: Setup (socks)  Toileting: Independent (urinal )  Tone RUE  RUE Tone: Normotonic  Tone LUE  LUE Tone: Normotonic  Coordination  Movements Are Fluid And Coordinated: No  Coordination and Movement description: Fine recommendations   No Skilled OT: At baseline function; Safe to return home  REQUIRES OT FOLLOW UP: No  Activity Tolerance  Activity Tolerance: Patient Tolerated treatment well  Safety Devices  Safety Devices in place: Yes  Type of devices: All fall risk precautions in place;Call light within reach; Chair alarm in place;Gait belt;Patient at risk for falls; Left in chair;Nurse notified         Plan   Plan  Times per week: OT eval only, pt at baseline 9/30    G-Code  OT G-codes  Functional Assessment Tool Used: [facility]  Score: 17  Functional Limitation: Self care  Self Care Current Status (): At least 40 percent but less than 60 percent impaired, limited or restricted  Self Care Goal Status (): At least 40 percent but less than 60 percent impaired, limited or restricted  Self Care Discharge Status ():  At least 40 percent but less than 60 percent impaired, limited or restricted    AM-PAC Score  AM-PAC Inpatient Daily Activity Raw Score: 17  AM-PAC Inpatient ADL T-Scale Score : 37.26  ADL Inpatient CMS 0-100% Score: 50.11  ADL Inpatient CMS G-Code Modifier : CK    Goals  Patient Goals   Patient goals : OT eval only, pt at baseline 9/30       Therapy Time   Individual Concurrent Group Co-treatment   Time In 0840         Time Out 0913         Minutes 33         Timed Code Treatment Minutes: 23 Minutes (10 min eval)       OLEG Joshi/ENRIQUETA

## 2018-09-30 NOTE — PROGRESS NOTES
-260 ml       Physical Exam Performed:    BP (!) 151/85   Pulse 84   Temp 98 °F (36.7 °C) (Oral)   Resp 16   Ht 5' 10\" (1.778 m)   Wt 282 lb 13.6 oz (128.3 kg)   SpO2 100%   BMI 40.58 kg/m²     General appearance: No apparent distress, appears stated age and cooperative. HEENT: Pupils equal, round, and reactive to light. Conjunctivae/corneas clear. Trach site with secretions   Neck: Supple, with full range of motion. No jugular venous distention. Trachea midline. Respiratory:  Normal respirations, stoma site red area with drainage on trach collar   Cardiovascular: Regular rate and rhythm with normal S1/S2 without murmurs, rubs or gallops. Abdomen: Soft, non-tender, non-distended with normal bowel sounds. Musculoskeletal: No clubbing, cyanosis or edema bilaterally. Full range of motion without deformity. Skin: pale and dry with multiple wounds   Neurologic:  Neurovascularly intact without any focal sensory/motor deficits. Cranial nerves: II-XII intact, grossly non-focal.  Psychiatric: Alert and oriented, thought content appropriate, poor insight   Peripheral Pulses: +2 palpable, equal bilaterally       Labs:   Recent Labs      09/28/18   1255  09/29/18   0624  09/30/18   0904   WBC  8.1  5.7  5.5   HGB  12.4*  10.9*  11.4*   HCT  37.4*  33.2*  34.9*   PLT  125*  93*  93*     Recent Labs      09/28/18   1255  09/29/18   0624  09/30/18   0904   NA  139  141  140   K  4.9  3.9  4.3  4.4   CL  97*  102  102   CO2  30  31  29   BUN  30*  29*  21*   CREATININE  2.1*  1.7*  1.2   CALCIUM  9.2  8.7  8.9   PHOS   --    --   3.0     Recent Labs      09/28/18   1255   AST  12*   ALT  15   BILITOT  0.4   ALKPHOS  119     No results for input(s): INR in the last 72 hours. No results for input(s): [place] in the last 72 hours.     Urinalysis:      Lab Results   Component Value Date    NITRU Negative 03/23/2018    WBCUA 0-2 08/29/2016    BACTERIA 1+ 03/05/2016    RBCUA None seen 08/29/2016    BLOODU or smoking or known   risk factors. Radiology 2017 http://pubs. rsna.org/doi/full/10.1148/radiol. [id number]         CT CHEST WO CONTRAST   Final Result   No acute abnormality of the soft tissue structures of the neck identified. Incidentally noted stone within the left submandibular gland and scattered   dental caries. Presence of tracheostomy tube limits evaluation for tracheal stenosis. However, the trachea just below the tip of the ET tube appears to be mildly   but focally narrowed. Incidentally noted right lower lobe pulmonary nodule. RECOMMENDATIONS:   Fleischner Society guidelines for follow-up and management of incidentally   detected pulmonary nodules:      Single Solid Nodule:      Nodule size less than 6 mm   In a low-risk patient, no routine follow-up. In a high-risk patient, optional CT at 12 months. Nodule size equals 6-8 mm   In a low-risk patient, CT at 6-12 months, then consider CT at 18-24 months. In a high-risk patient, CT at 6-12 months, then CT at 18-24 months. Nodule size greater than 8 mm         In a low-risk patient, consider CT at 3 months, PET/CT, or tissue sampling. In a high-risk patient, consider CT at 3 months, PET/CT, or tissue sampling. Multiple Solid Nodules:      Nodule size less than 6 mm   In a low-risk patient, no routine follow-up. In a high-risk patient, optional CT at 12 months. Nodule size equals 6-8 mm   In a low-risk patient, CT at 3-6 months, then consider CT at 18-24 months. In a high-risk patient, CT at 3-6 months, then CT at 18-24 months. Nodule size greater than 8 mm   In a low-risk patient, CT at 3-6 months, then consider CT at 18-24 months. In a high-risk patient, CT at 3-6 months, then CT at 18-24 months. - Low risk patients include individuals with minimal or absent history of   smoking and other known risk factors.       - High risk patients include individuals with a history or smoking or 1.5 on arrive 2.2   - likely secondary to acute illness   - now back to 1.2  - cont to trend     PAD- follows with Dr. Keena Vizcarra lower extremities and coccyx  - follows with wound care  - consult wound care     Anxiety/ depression and schizophrenia - no acute issues overall maybe contributing to his non compliance    Social needs: currently lives alone with EC bound transfer per self, has Home care nurse that visits 2 times per week and aid few times a week and meals on wheels.       DVT Prophylaxis: Heparin  Diet: DIET GENERAL;  Dietary Nutrition Supplements: Low Calorie High Protein Supplement DM diet   Code Status: Full Code    PT/OT Eval Status: eval and treat     Dispo - Uncertain     Marcela West, APRN - CNP

## 2018-10-01 PROBLEM — R91.1 LUNG NODULE: Status: ACTIVE | Noted: 2018-10-01

## 2018-10-01 PROBLEM — K11.5 SUBMANDIBULAR SIALOLITHIASIS: Status: ACTIVE | Noted: 2018-10-01

## 2018-10-01 PROBLEM — J15.6 GRAM-NEGATIVE PNEUMONIA (HCC): Status: ACTIVE | Noted: 2018-10-01

## 2018-10-01 LAB
ANION GAP SERPL CALCULATED.3IONS-SCNC: 11 MMOL/L (ref 3–16)
BUN BLDV-MCNC: 21 MG/DL (ref 7–20)
CALCIUM SERPL-MCNC: 9.4 MG/DL (ref 8.3–10.6)
CHLORIDE BLD-SCNC: 99 MMOL/L (ref 99–110)
CO2: 27 MMOL/L (ref 21–32)
CREAT SERPL-MCNC: 1.2 MG/DL (ref 0.8–1.3)
GFR AFRICAN AMERICAN: >60
GFR NON-AFRICAN AMERICAN: >60
GLUCOSE BLD-MCNC: 237 MG/DL (ref 70–99)
GLUCOSE BLD-MCNC: 280 MG/DL (ref 70–99)
GLUCOSE BLD-MCNC: 283 MG/DL (ref 70–99)
GLUCOSE BLD-MCNC: 321 MG/DL (ref 70–99)
GLUCOSE BLD-MCNC: 361 MG/DL (ref 70–99)
GLUCOSE BLD-MCNC: 361 MG/DL (ref 70–99)
GRAM STAIN RESULT: NORMAL
HCT VFR BLD CALC: 37 % (ref 40.5–52.5)
HEMOGLOBIN: 12.2 G/DL (ref 13.5–17.5)
MCH RBC QN AUTO: 27.2 PG (ref 26–34)
MCHC RBC AUTO-ENTMCNC: 33 G/DL (ref 31–36)
MCV RBC AUTO: 82.5 FL (ref 80–100)
PDW BLD-RTO: 15.2 % (ref 12.4–15.4)
PERFORMED ON: ABNORMAL
PLATELET # BLD: 109 K/UL (ref 135–450)
PMV BLD AUTO: 9.1 FL (ref 5–10.5)
POTASSIUM REFLEX MAGNESIUM: 4.9 MMOL/L (ref 3.5–5.1)
RBC # BLD: 4.48 M/UL (ref 4.2–5.9)
SODIUM BLD-SCNC: 137 MMOL/L (ref 136–145)
VANCOMYCIN TROUGH: 14.7 UG/ML (ref 10–20)
WBC # BLD: 6 K/UL (ref 4–11)
WOUND/ABSCESS: NORMAL

## 2018-10-01 PROCEDURE — 94640 AIRWAY INHALATION TREATMENT: CPT

## 2018-10-01 PROCEDURE — 80048 BASIC METABOLIC PNL TOTAL CA: CPT

## 2018-10-01 PROCEDURE — 6370000000 HC RX 637 (ALT 250 FOR IP): Performed by: INTERNAL MEDICINE

## 2018-10-01 PROCEDURE — 2580000003 HC RX 258: Performed by: INTERNAL MEDICINE

## 2018-10-01 PROCEDURE — 2500000003 HC RX 250 WO HCPCS: Performed by: INTERNAL MEDICINE

## 2018-10-01 PROCEDURE — 2580000003 HC RX 258: Performed by: NURSE PRACTITIONER

## 2018-10-01 PROCEDURE — 6360000002 HC RX W HCPCS: Performed by: INTERNAL MEDICINE

## 2018-10-01 PROCEDURE — 94761 N-INVAS EAR/PLS OXIMETRY MLT: CPT

## 2018-10-01 PROCEDURE — 99233 SBSQ HOSP IP/OBS HIGH 50: CPT | Performed by: INTERNAL MEDICINE

## 2018-10-01 PROCEDURE — 6360000002 HC RX W HCPCS: Performed by: NURSE PRACTITIONER

## 2018-10-01 PROCEDURE — 36415 COLL VENOUS BLD VENIPUNCTURE: CPT

## 2018-10-01 PROCEDURE — 6370000000 HC RX 637 (ALT 250 FOR IP): Performed by: NURSE PRACTITIONER

## 2018-10-01 PROCEDURE — 1200000000 HC SEMI PRIVATE

## 2018-10-01 PROCEDURE — 85027 COMPLETE CBC AUTOMATED: CPT

## 2018-10-01 PROCEDURE — 2700000000 HC OXYGEN THERAPY PER DAY

## 2018-10-01 PROCEDURE — 80202 ASSAY OF VANCOMYCIN: CPT

## 2018-10-01 RX ORDER — PREDNISONE 20 MG/1
20 TABLET ORAL DAILY
Status: DISCONTINUED | OUTPATIENT
Start: 2018-10-02 | End: 2018-10-04 | Stop reason: HOSPADM

## 2018-10-01 RX ORDER — SODIUM CHLORIDE 9 MG/ML
INJECTION, SOLUTION INTRAVENOUS
Status: DISPENSED
Start: 2018-10-01 | End: 2018-10-01

## 2018-10-01 RX ADMIN — INSULIN LISPRO 4 UNITS: 100 INJECTION, SOLUTION INTRAVENOUS; SUBCUTANEOUS at 17:40

## 2018-10-01 RX ADMIN — FERROUS SULFATE TAB 325 MG (65 MG ELEMENTAL FE) 325 MG: 325 (65 FE) TAB at 08:10

## 2018-10-01 RX ADMIN — Medication 2 PUFF: at 20:00

## 2018-10-01 RX ADMIN — VANCOMYCIN HYDROCHLORIDE 1250 MG: 10 INJECTION, POWDER, LYOPHILIZED, FOR SOLUTION INTRAVENOUS at 08:12

## 2018-10-01 RX ADMIN — HEPARIN SODIUM 5000 UNITS: 5000 INJECTION INTRAVENOUS; SUBCUTANEOUS at 16:06

## 2018-10-01 RX ADMIN — PANTOPRAZOLE SODIUM 40 MG: 40 TABLET, DELAYED RELEASE ORAL at 06:07

## 2018-10-01 RX ADMIN — SPIRONOLACTONE 25 MG: 25 TABLET ORAL at 08:09

## 2018-10-01 RX ADMIN — INSULIN LISPRO 10 UNITS: 100 INJECTION, SOLUTION INTRAVENOUS; SUBCUTANEOUS at 12:15

## 2018-10-01 RX ADMIN — HEPARIN SODIUM 5000 UNITS: 5000 INJECTION INTRAVENOUS; SUBCUTANEOUS at 20:59

## 2018-10-01 RX ADMIN — TORSEMIDE 40 MG: 20 TABLET ORAL at 08:09

## 2018-10-01 RX ADMIN — ATENOLOL 37.5 MG: 25 TABLET ORAL at 08:11

## 2018-10-01 RX ADMIN — INSULIN LISPRO 10 UNITS: 100 INJECTION, SOLUTION INTRAVENOUS; SUBCUTANEOUS at 08:14

## 2018-10-01 RX ADMIN — GABAPENTIN 300 MG: 300 CAPSULE ORAL at 20:58

## 2018-10-01 RX ADMIN — PRAVASTATIN SODIUM 40 MG: 40 TABLET ORAL at 08:11

## 2018-10-01 RX ADMIN — Medication 10 ML: at 20:59

## 2018-10-01 RX ADMIN — Medication 10 ML: at 08:15

## 2018-10-01 RX ADMIN — Medication 2 PUFF: at 07:42

## 2018-10-01 RX ADMIN — POTASSIUM CHLORIDE 20 MEQ: 20 TABLET, EXTENDED RELEASE ORAL at 08:10

## 2018-10-01 RX ADMIN — ARIPIPRAZOLE 20 MG: 10 TABLET ORAL at 08:10

## 2018-10-01 RX ADMIN — DOCUSATE SODIUM 100 MG: 100 CAPSULE, LIQUID FILLED ORAL at 20:58

## 2018-10-01 RX ADMIN — ALLOPURINOL 300 MG: 300 TABLET ORAL at 08:10

## 2018-10-01 RX ADMIN — METHYLPREDNISOLONE SODIUM SUCCINATE 40 MG: 40 INJECTION, POWDER, FOR SOLUTION INTRAMUSCULAR; INTRAVENOUS at 02:48

## 2018-10-01 RX ADMIN — INSULIN LISPRO 6 UNITS: 100 INJECTION, SOLUTION INTRAVENOUS; SUBCUTANEOUS at 08:13

## 2018-10-01 RX ADMIN — INSULIN GLARGINE 30 UNITS: 100 INJECTION, SOLUTION SUBCUTANEOUS at 21:03

## 2018-10-01 RX ADMIN — INSULIN LISPRO 10 UNITS: 100 INJECTION, SOLUTION INTRAVENOUS; SUBCUTANEOUS at 17:41

## 2018-10-01 RX ADMIN — AZTREONAM 1 G: 1 INJECTION, POWDER, LYOPHILIZED, FOR SOLUTION INTRAMUSCULAR; INTRAVENOUS at 16:40

## 2018-10-01 RX ADMIN — DOCUSATE SODIUM 100 MG: 100 CAPSULE, LIQUID FILLED ORAL at 08:12

## 2018-10-01 RX ADMIN — INSULIN LISPRO 4 UNITS: 100 INJECTION, SOLUTION INTRAVENOUS; SUBCUTANEOUS at 21:03

## 2018-10-01 RX ADMIN — BUPROPION HYDROCHLORIDE 300 MG: 100 TABLET, FILM COATED ORAL at 08:10

## 2018-10-01 ASSESSMENT — PAIN SCALES - GENERAL
PAINLEVEL_OUTOF10: 8
PAINLEVEL_OUTOF10: 8

## 2018-10-01 ASSESSMENT — PAIN DESCRIPTION - PAIN TYPE: TYPE: ACUTE PAIN;CHRONIC PAIN

## 2018-10-01 NOTE — PROGRESS NOTES
90-94% only  · Pulmonary toilet  · Tracheostomy care  · Patient is growing gram-negative rods in the bronchoscopy fluid evaluation-as active and started on the basis of the preliminary data and the allergies-to be reassessed as per final cultures  · IV vancomycin discontinued for now  · Bronchodilators  · IV Solu-Medrol changed to by mouth prednisone  · Lantus insulin as per blood glucose monitoring-titration as per IM  · BGM with SSI  · Salt and fluid restrictions  · Diuretics as per IM  · Monitor input and output and BMP  · PUD and DVT prophylaxis as per IM            Electronically signed by:  Hieu Garcia MD    10/1/2018    3:46 PM.

## 2018-10-02 PROBLEM — J15.1 PNEUMONIA OF BOTH LOWER LOBES DUE TO PSEUDOMONAS SPECIES (HCC): Status: ACTIVE | Noted: 2018-10-01

## 2018-10-02 LAB
ANION GAP SERPL CALCULATED.3IONS-SCNC: 8 MMOL/L (ref 3–16)
BUN BLDV-MCNC: 27 MG/DL (ref 7–20)
CALCIUM SERPL-MCNC: 9 MG/DL (ref 8.3–10.6)
CHLORIDE BLD-SCNC: 102 MMOL/L (ref 99–110)
CO2: 31 MMOL/L (ref 21–32)
CREAT SERPL-MCNC: 1.2 MG/DL (ref 0.8–1.3)
CULTURE, RESPIRATORY: ABNORMAL
CULTURE, RESPIRATORY: ABNORMAL
GFR AFRICAN AMERICAN: >60
GFR NON-AFRICAN AMERICAN: >60
GLUCOSE BLD-MCNC: 172 MG/DL (ref 70–99)
GLUCOSE BLD-MCNC: 189 MG/DL (ref 70–99)
GLUCOSE BLD-MCNC: 212 MG/DL (ref 70–99)
GLUCOSE BLD-MCNC: 246 MG/DL (ref 70–99)
GLUCOSE BLD-MCNC: 315 MG/DL (ref 70–99)
GLUCOSE BLD-MCNC: 332 MG/DL (ref 70–99)
GRAM STAIN RESULT: ABNORMAL
HCT VFR BLD CALC: 33.9 % (ref 40.5–52.5)
HEMOGLOBIN: 11.2 G/DL (ref 13.5–17.5)
MCH RBC QN AUTO: 26.9 PG (ref 26–34)
MCHC RBC AUTO-ENTMCNC: 32.9 G/DL (ref 31–36)
MCV RBC AUTO: 81.6 FL (ref 80–100)
ORGANISM: ABNORMAL
PDW BLD-RTO: 15.3 % (ref 12.4–15.4)
PERFORMED ON: ABNORMAL
PLATELET # BLD: 94 K/UL (ref 135–450)
PMV BLD AUTO: 8.7 FL (ref 5–10.5)
POTASSIUM REFLEX MAGNESIUM: 3.8 MMOL/L (ref 3.5–5.1)
RBC # BLD: 4.16 M/UL (ref 4.2–5.9)
SODIUM BLD-SCNC: 141 MMOL/L (ref 136–145)
TOBRAMYCIN RANDOM DOSE AMOUNT: NORMAL
TOBRAMYCIN RANDOM: >10 UG/ML
WBC # BLD: 4.9 K/UL (ref 4–11)

## 2018-10-02 PROCEDURE — 80200 ASSAY OF TOBRAMYCIN: CPT

## 2018-10-02 PROCEDURE — 99222 1ST HOSP IP/OBS MODERATE 55: CPT | Performed by: INTERNAL MEDICINE

## 2018-10-02 PROCEDURE — 2580000003 HC RX 258: Performed by: INTERNAL MEDICINE

## 2018-10-02 PROCEDURE — 6370000000 HC RX 637 (ALT 250 FOR IP): Performed by: INTERNAL MEDICINE

## 2018-10-02 PROCEDURE — 1200000000 HC SEMI PRIVATE

## 2018-10-02 PROCEDURE — 99232 SBSQ HOSP IP/OBS MODERATE 35: CPT | Performed by: INTERNAL MEDICINE

## 2018-10-02 PROCEDURE — 6360000002 HC RX W HCPCS: Performed by: NURSE PRACTITIONER

## 2018-10-02 PROCEDURE — 2500000003 HC RX 250 WO HCPCS: Performed by: INTERNAL MEDICINE

## 2018-10-02 PROCEDURE — 80048 BASIC METABOLIC PNL TOTAL CA: CPT

## 2018-10-02 PROCEDURE — 85027 COMPLETE CBC AUTOMATED: CPT

## 2018-10-02 PROCEDURE — 2580000003 HC RX 258

## 2018-10-02 PROCEDURE — 6370000000 HC RX 637 (ALT 250 FOR IP): Performed by: NURSE PRACTITIONER

## 2018-10-02 PROCEDURE — 94761 N-INVAS EAR/PLS OXIMETRY MLT: CPT

## 2018-10-02 PROCEDURE — 2700000000 HC OXYGEN THERAPY PER DAY

## 2018-10-02 PROCEDURE — 36415 COLL VENOUS BLD VENIPUNCTURE: CPT

## 2018-10-02 PROCEDURE — 94640 AIRWAY INHALATION TREATMENT: CPT

## 2018-10-02 PROCEDURE — 2580000003 HC RX 258: Performed by: NURSE PRACTITIONER

## 2018-10-02 RX ORDER — SODIUM CHLORIDE 9 MG/ML
INJECTION, SOLUTION INTRAVENOUS
Status: COMPLETED
Start: 2018-10-02 | End: 2018-10-02

## 2018-10-02 RX ADMIN — INSULIN LISPRO 10 UNITS: 100 INJECTION, SOLUTION INTRAVENOUS; SUBCUTANEOUS at 12:29

## 2018-10-02 RX ADMIN — INSULIN LISPRO 10 UNITS: 100 INJECTION, SOLUTION INTRAVENOUS; SUBCUTANEOUS at 09:25

## 2018-10-02 RX ADMIN — TOBRAMYCIN SULFATE 665.6 MG: 40 INJECTION, SOLUTION INTRAMUSCULAR; INTRAVENOUS at 12:29

## 2018-10-02 RX ADMIN — ALLOPURINOL 300 MG: 300 TABLET ORAL at 09:23

## 2018-10-02 RX ADMIN — BUPROPION HYDROCHLORIDE 300 MG: 100 TABLET, FILM COATED ORAL at 09:23

## 2018-10-02 RX ADMIN — ATENOLOL 37.5 MG: 25 TABLET ORAL at 09:23

## 2018-10-02 RX ADMIN — HEPARIN SODIUM 5000 UNITS: 5000 INJECTION INTRAVENOUS; SUBCUTANEOUS at 15:30

## 2018-10-02 RX ADMIN — HEPARIN SODIUM 5000 UNITS: 5000 INJECTION INTRAVENOUS; SUBCUTANEOUS at 05:38

## 2018-10-02 RX ADMIN — POTASSIUM CHLORIDE 20 MEQ: 20 TABLET, EXTENDED RELEASE ORAL at 09:24

## 2018-10-02 RX ADMIN — Medication 10 ML: at 22:14

## 2018-10-02 RX ADMIN — TORSEMIDE 40 MG: 20 TABLET ORAL at 09:22

## 2018-10-02 RX ADMIN — AZTREONAM 1 G: 1 INJECTION, POWDER, LYOPHILIZED, FOR SOLUTION INTRAMUSCULAR; INTRAVENOUS at 09:24

## 2018-10-02 RX ADMIN — INSULIN LISPRO 10 UNITS: 100 INJECTION, SOLUTION INTRAVENOUS; SUBCUTANEOUS at 17:14

## 2018-10-02 RX ADMIN — GABAPENTIN 300 MG: 300 CAPSULE ORAL at 22:02

## 2018-10-02 RX ADMIN — Medication 10 ML: at 09:24

## 2018-10-02 RX ADMIN — INSULIN LISPRO 2 UNITS: 100 INJECTION, SOLUTION INTRAVENOUS; SUBCUTANEOUS at 09:29

## 2018-10-02 RX ADMIN — PRAVASTATIN SODIUM 40 MG: 40 TABLET ORAL at 09:22

## 2018-10-02 RX ADMIN — SPIRONOLACTONE 25 MG: 25 TABLET ORAL at 09:22

## 2018-10-02 RX ADMIN — INSULIN GLARGINE 30 UNITS: 100 INJECTION, SOLUTION SUBCUTANEOUS at 22:04

## 2018-10-02 RX ADMIN — INSULIN LISPRO 4 UNITS: 100 INJECTION, SOLUTION INTRAVENOUS; SUBCUTANEOUS at 12:38

## 2018-10-02 RX ADMIN — Medication 2 PUFF: at 08:53

## 2018-10-02 RX ADMIN — AZTREONAM 1 G: 1 INJECTION, POWDER, LYOPHILIZED, FOR SOLUTION INTRAMUSCULAR; INTRAVENOUS at 00:35

## 2018-10-02 RX ADMIN — INSULIN LISPRO 2 UNITS: 100 INJECTION, SOLUTION INTRAVENOUS; SUBCUTANEOUS at 17:14

## 2018-10-02 RX ADMIN — DOCUSATE SODIUM 100 MG: 100 CAPSULE, LIQUID FILLED ORAL at 22:03

## 2018-10-02 RX ADMIN — INSULIN LISPRO 4 UNITS: 100 INJECTION, SOLUTION INTRAVENOUS; SUBCUTANEOUS at 22:04

## 2018-10-02 RX ADMIN — DOCUSATE SODIUM 100 MG: 100 CAPSULE, LIQUID FILLED ORAL at 09:22

## 2018-10-02 RX ADMIN — FERROUS SULFATE TAB 325 MG (65 MG ELEMENTAL FE) 325 MG: 325 (65 FE) TAB at 09:22

## 2018-10-02 RX ADMIN — PANTOPRAZOLE SODIUM 40 MG: 40 TABLET, DELAYED RELEASE ORAL at 05:38

## 2018-10-02 RX ADMIN — ARIPIPRAZOLE 20 MG: 10 TABLET ORAL at 09:22

## 2018-10-02 RX ADMIN — SODIUM CHLORIDE: 900 INJECTION, SOLUTION INTRAVENOUS at 12:15

## 2018-10-02 RX ADMIN — PREDNISONE 20 MG: 20 TABLET ORAL at 09:24

## 2018-10-02 RX ADMIN — HEPARIN SODIUM 5000 UNITS: 5000 INJECTION INTRAVENOUS; SUBCUTANEOUS at 22:03

## 2018-10-02 ASSESSMENT — PAIN DESCRIPTION - PAIN TYPE
TYPE: CHRONIC PAIN
TYPE: CHRONIC PAIN

## 2018-10-02 ASSESSMENT — PAIN DESCRIPTION - LOCATION
LOCATION: COCCYX
LOCATION: COCCYX
LOCATION_2: THROAT
LOCATION_2: THROAT

## 2018-10-02 ASSESSMENT — PAIN SCALES - GENERAL
PAINLEVEL_OUTOF10: 8
PAINLEVEL_OUTOF10: 8

## 2018-10-02 ASSESSMENT — PAIN DESCRIPTION - INTENSITY
RATING_2: 7
RATING_2: 7

## 2018-10-02 NOTE — PROGRESS NOTES
67   Temp 97.9 °F (36.6 °C) (Oral)   Resp 16   Ht 5' 10\" (1.778 m)   Wt 282 lb 13.6 oz (128.3 kg)   SpO2 100%   BMI 40.58 kg/m²     General appearance: No apparent distress, appears stated age and cooperative. HEENT: Pupils equal, round, and reactive to light. Conjunctivae/corneas clear. Trach site with secretions   Neck: Supple, with full range of motion. No jugular venous distention. Trachea midline. Respiratory:  Normal respirations, stoma site red area with drainage on trach collar   Cardiovascular: Regular rate and rhythm with normal S1/S2 without murmurs, rubs or gallops. Abdomen: Soft, non-tender, non-distended with normal bowel sounds. Musculoskeletal: No clubbing, cyanosis or edema bilaterally. Full range of motion without deformity. Skin: pale and dry with multiple wounds   Neurologic:  Neurovascularly intact without any focal sensory/motor deficits. Cranial nerves: II-XII intact, grossly non-focal.  Psychiatric: Alert and oriented, thought content appropriate, poor insight   Peripheral Pulses: +2 palpable, equal bilaterally       Labs:   Recent Labs      09/30/18   0904  10/01/18   0959  10/02/18   0528   WBC  5.5  6.0  4.9   HGB  11.4*  12.2*  11.2*   HCT  34.9*  37.0*  33.9*   PLT  93*  109*  94*     Recent Labs      09/30/18   0904  10/01/18   0959  10/02/18   0528   NA  140  137  141   K  4.3  4.4  4.9  3.8   CL  102  99  102   CO2  29  27  31   BUN  21*  21*  27*   CREATININE  1.2  1.2  1.2   CALCIUM  8.9  9.4  9.0   PHOS  3.0   --    --      No results for input(s): AST, ALT, BILIDIR, BILITOT, ALKPHOS in the last 72 hours. No results for input(s): INR in the last 72 hours. No results for input(s): Frann Goes in the last 72 hours.     Urinalysis:      Lab Results   Component Value Date    NITRU Negative 03/23/2018    WBCUA 0-2 08/29/2016    BACTERIA 1+ 03/05/2016    RBCUA None seen 08/29/2016    BLOODU Negative 03/23/2018    SPECGRAV 1.020 03/23/2018    GLUCOSEU Negative needs   - supportive care  - wean as tolerated, trach collar baseline 6 Liter   - Pulm following   - cont pulse ox     DM type 2 with neuropathy - unknown control  - A1C pending  - Basal bolus with SSI and prandial covrage  - Cont gabapentin for neuropathy     Acute on CKD- baseline seem to be ~ 1.5 on arrive 2.2   - likely secondary to acute illness   - now back to 1.2  - cont to trend     PAD- follows with Dr. Eric Donald lower extremities and coccyx  - follows with wound care  - consult wound care     Anxiety/ depression and schizophrenia - no acute issues overall maybe contributing to his non compliance    Social needs: currently lives alone with EC bound transfer per self, has Home care nurse that visits 2 times per week and aid few times a week and meals on wheels.       DVT Prophylaxis: Heparin  Diet: DIET GENERAL;  Dietary Nutrition Supplements: Low Calorie High Protein Supplement DM diet   Code Status: Full Code    PT/OT Eval Status: eval and treat   Rec home with home health   [address] 1937, APRN - CNP

## 2018-10-02 NOTE — PROGRESS NOTES
and BMP  · PUD and DVT prophylaxis as per IM    Case d/w Dr Elizabeth Santillan       Electronically signed by:  Estefanía Blanc MD    10/2/2018    4:58 PM.

## 2018-10-02 NOTE — PROGRESS NOTES
Ambu bag/mask and spare trachs at bedside. Pt states that he does not want an trach mask/aerosol. Pt states that he does not want airway suctioned. Pt resting comfortably on O2 via trach mask.

## 2018-10-02 NOTE — CONSULTS
distress, and appears stated age  Obese. Pale   HEENT: NCAT, PERRL, EOMI. Sclera white, conjunctiva full. OP with moist mucosal membranes, no thrush, tongue protrudes midline  NECK:  Supple, symmetrical, trachea midline, no adenopathy  Scant exudate around the tracheostomy, no erythema  LUNGS:   Breathing non-labored. CTA felipe   CARDIOVASCULAR:  RRR  ABDOMEN:  Protuberant, soft, NT.  +BS   PSYCHIATRIC: Oriented to person place and time. No obvious depression or anxiety. MUSCULOSKELETAL: No obvious misalignment or effusion of the joints. No clubbing, cyanosis of the digits. SKIN:  normal skin color, texture, turgor and no redness, warmth, or swelling. No palpable nodules or stigmata of embolic phenomenon  NEUROLOGIC: nonfocal exam  ACCESS:  IV site ok       DATA:    Old records have been reviewed    CBC:  Recent Labs      09/30/18   0904  10/01/18   0959  10/02/18   0528   WBC  5.5  6.0  4.9   RBC  4.16*  4.48  4.16*   HGB  11.4*  12.2*  11.2*   HCT  34.9*  37.0*  33.9*   PLT  93*  109*  94*   MCV  83.9  82.5  81.6   MCH  27.4  27.2  26.9   MCHC  32.7  33.0  32.9   RDW  15.4  15.2  15.3      BMP:  Recent Labs      09/30/18   0904  10/01/18   0959  10/02/18   0528   NA  140  137  141   K  4.3  4.4  4.9  3.8   CL  102  99  102   CO2  29  27  31   BUN  21*  21*  27*   CREATININE  1.2  1.2  1.2   CALCIUM  8.9  9.4  9.0   GLUCOSE  224*  361*  246*        Cultures:   9/28 BC x2 neg   Wound cx trach site neg; GPR on GS   9/29 BAL RML P aeruginosa >100k cfu; 1+ GPC on GS   PSEUDOMONAS AERUGINOSA   Antibiotic Interpretation JORGE Unit   cefepime Sensitive 8 mcg/mL   ciprofloxacin Resistant >2 mcg/mL   gentamicin Intermediate 8 mcg/mL   meropenem Resistant >8 mcg/mL   piperacillin-tazobactam Sensitive <=16 mcg/mL   tobramycin Sensitive <=4 mcg/mL       Radiology Review:  All pertinent images / reports were reviewed as a part of this visit.    CT neck and chest 9/28:  Impression   No acute abnormality of the soft tissue structures of the neck identified.       Incidentally noted stone within the left submandibular gland and scattered   dental caries.       Presence of tracheostomy tube limits evaluation for tracheal stenosis. However, the trachea just below the tip of the ET tube appears to be mildly   but focally narrowed.       Incidentally noted right lower lobe pulmonary nodule.        Assessment:     Patient Active Problem List   Diagnosis    Chronic thrombocytopenia    Chronic respiratory failure with hypoxia on 6 L home O2 tent over trach    Type 2 diabetes mellitus with diabetic polyneuropathy, with long-term current use of insulin ([facility])    HTN (hypertension)    Non morbid obesity due to excess calories    Anxiety and depression    Tracheostomy dependent (HCC)    Chronic pain syndrome on chronic opioids    Asthma/COPD    Chronic ischemic heart disease    Bilateral lower extremity edema    Hiatal hernia with GERD    Extrinsic asthma    Low back pain    TESS    Calcification of bronchus or trachea    Atypical schizophrenia ([address]    Diabetic peripheral neuropathy    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    Chronic microcytic Iron-deficiency anemia    Chronic dCHF (grade 1 LVDD)    Agoraphobia    Arthritis    Claustrophobia    Congenital stenosis of trachea    PAD (peripheral artery disease) ([facility])    Decubitus ulcer of sacral region, stage 4 ([facility])    Debility    Arthritis of right knee    General weakness    Neuropathic ulcer of toe of right foot, limited to breakdown of skin ([address]    ARF (acute renal failure) ([facility])    Tracheitis    Acute tracheitis with airway obstruction    Gram-negative pneumonia ([facility])    Submandibular sialolithiasis    Lung nodule    Dyspnea and respiratory abnormalities    Aspiration into airway       Julia Donaldsonpool is a 58yoM who is evaluated for the following:    Tracheal stenosis with tracheostomy 1999     Admitted 9/28 with increased tracheal secretions, SOB, purulent drainage around the tracheostomy  He has not had signs of systemic illness, including fever, leukocytosis   S/p bronchoscopy 9/29  MDR Pseudomonas aeruginosa isolated in BAL culture. Chest CT no pulmonary infiltrates     CKD     Listed allergy to cefuxoxime, erythromycin, pcn, clindamycin (has received carbepenems in the past)      Recs:  Clinically improved, hard to know if from therapeutic bronch and/or steroids or abx. The Pseudomonas exhibits only intermediate sensitivity to aztreonam started yesterday, therefore, doesn't seem all that likely that his clinical response was to antibacterials. With his CKD, he will be at increased risk of toxicity from tobramycin and would require PICC    Therefore, would favor stopping abx, continue on prednisone and monitor for continued clinical improvement  If he fails to improve with that strategy, would need PICC for a course of aminoglycoside     Continue strict isolation     Verify immunizations are up to date (flu, prevnar, pneumovax, Shingrix)    Discussed with patient, all questions answered  He has family coming from out of town and hopes to be home on/before 10/5  D/w LAURIE Johnson M.D. Thank you for the opportunity to participate in the care of your patient.     Please do not hesitate to contact me:   [phone] office  [phone] mobile

## 2018-10-03 LAB
ANION GAP SERPL CALCULATED.3IONS-SCNC: 11 MMOL/L (ref 3–16)
BLOOD CULTURE, ROUTINE: NORMAL
BLOOD CULTURE, ROUTINE: NORMAL
BUN BLDV-MCNC: 30 MG/DL (ref 7–20)
CALCIUM SERPL-MCNC: 9.1 MG/DL (ref 8.3–10.6)
CHLORIDE BLD-SCNC: 98 MMOL/L (ref 99–110)
CO2: 33 MMOL/L (ref 21–32)
CREAT SERPL-MCNC: 1.3 MG/DL (ref 0.8–1.3)
GFR AFRICAN AMERICAN: >60
GFR NON-AFRICAN AMERICAN: 56
GLUCOSE BLD-MCNC: 226 MG/DL (ref 70–99)
GLUCOSE BLD-MCNC: 251 MG/DL (ref 70–99)
GLUCOSE BLD-MCNC: 277 MG/DL (ref 70–99)
GLUCOSE BLD-MCNC: 304 MG/DL (ref 70–99)
GLUCOSE BLD-MCNC: 361 MG/DL (ref 70–99)
HCT VFR BLD CALC: 35.9 % (ref 40.5–52.5)
HEMOGLOBIN: 12.1 G/DL (ref 13.5–17.5)
MCH RBC QN AUTO: 27.3 PG (ref 26–34)
MCHC RBC AUTO-ENTMCNC: 33.7 G/DL (ref 31–36)
MCV RBC AUTO: 80.9 FL (ref 80–100)
PDW BLD-RTO: 15.3 % (ref 12.4–15.4)
PERFORMED ON: ABNORMAL
PLATELET # BLD: 112 K/UL (ref 135–450)
PMV BLD AUTO: 8.9 FL (ref 5–10.5)
POTASSIUM REFLEX MAGNESIUM: 3.7 MMOL/L (ref 3.5–5.1)
RBC # BLD: 4.43 M/UL (ref 4.2–5.9)
SODIUM BLD-SCNC: 142 MMOL/L (ref 136–145)
TOBRAMYCIN RANDOM DOSE AMOUNT: NORMAL
TOBRAMYCIN RANDOM: 6.4 UG/ML
WBC # BLD: 5.9 K/UL (ref 4–11)

## 2018-10-03 PROCEDURE — 99231 SBSQ HOSP IP/OBS SF/LOW 25: CPT | Performed by: INTERNAL MEDICINE

## 2018-10-03 PROCEDURE — 85027 COMPLETE CBC AUTOMATED: CPT

## 2018-10-03 PROCEDURE — 80048 BASIC METABOLIC PNL TOTAL CA: CPT

## 2018-10-03 PROCEDURE — 6370000000 HC RX 637 (ALT 250 FOR IP): Performed by: INTERNAL MEDICINE

## 2018-10-03 PROCEDURE — 94640 AIRWAY INHALATION TREATMENT: CPT

## 2018-10-03 PROCEDURE — 1200000000 HC SEMI PRIVATE

## 2018-10-03 PROCEDURE — 2580000003 HC RX 258: Performed by: INTERNAL MEDICINE

## 2018-10-03 PROCEDURE — 6370000000 HC RX 637 (ALT 250 FOR IP): Performed by: NURSE PRACTITIONER

## 2018-10-03 PROCEDURE — 6360000002 HC RX W HCPCS: Performed by: NURSE PRACTITIONER

## 2018-10-03 PROCEDURE — 99232 SBSQ HOSP IP/OBS MODERATE 35: CPT | Performed by: INTERNAL MEDICINE

## 2018-10-03 PROCEDURE — 36415 COLL VENOUS BLD VENIPUNCTURE: CPT

## 2018-10-03 RX ORDER — INSULIN GLARGINE 100 [IU]/ML
40 INJECTION, SOLUTION SUBCUTANEOUS NIGHTLY
Status: DISCONTINUED | OUTPATIENT
Start: 2018-10-03 | End: 2018-10-04 | Stop reason: HOSPADM

## 2018-10-03 RX ADMIN — DOCUSATE SODIUM 100 MG: 100 CAPSULE, LIQUID FILLED ORAL at 09:16

## 2018-10-03 RX ADMIN — Medication 10 ML: at 09:17

## 2018-10-03 RX ADMIN — GABAPENTIN 300 MG: 300 CAPSULE ORAL at 22:10

## 2018-10-03 RX ADMIN — PREDNISONE 20 MG: 20 TABLET ORAL at 09:16

## 2018-10-03 RX ADMIN — INSULIN LISPRO 5 UNITS: 100 INJECTION, SOLUTION INTRAVENOUS; SUBCUTANEOUS at 22:11

## 2018-10-03 RX ADMIN — PRAVASTATIN SODIUM 40 MG: 40 TABLET ORAL at 09:16

## 2018-10-03 RX ADMIN — INSULIN LISPRO 4 UNITS: 100 INJECTION, SOLUTION INTRAVENOUS; SUBCUTANEOUS at 09:26

## 2018-10-03 RX ADMIN — Medication 2 PUFF: at 07:44

## 2018-10-03 RX ADMIN — Medication 2 PUFF: at 20:02

## 2018-10-03 RX ADMIN — SPIRONOLACTONE 25 MG: 25 TABLET ORAL at 09:28

## 2018-10-03 RX ADMIN — FERROUS SULFATE TAB 325 MG (65 MG ELEMENTAL FE) 325 MG: 325 (65 FE) TAB at 09:16

## 2018-10-03 RX ADMIN — ALLOPURINOL 300 MG: 300 TABLET ORAL at 09:16

## 2018-10-03 RX ADMIN — INSULIN LISPRO 6 UNITS: 100 INJECTION, SOLUTION INTRAVENOUS; SUBCUTANEOUS at 12:28

## 2018-10-03 RX ADMIN — POTASSIUM CHLORIDE 20 MEQ: 20 TABLET, EXTENDED RELEASE ORAL at 09:16

## 2018-10-03 RX ADMIN — FLUTICASONE PROPIONATE 1 SPRAY: 50 SPRAY, METERED NASAL at 09:19

## 2018-10-03 RX ADMIN — INSULIN LISPRO 10 UNITS: 100 INJECTION, SOLUTION INTRAVENOUS; SUBCUTANEOUS at 09:26

## 2018-10-03 RX ADMIN — INSULIN LISPRO 8 UNITS: 100 INJECTION, SOLUTION INTRAVENOUS; SUBCUTANEOUS at 17:15

## 2018-10-03 RX ADMIN — HEPARIN SODIUM 5000 UNITS: 5000 INJECTION INTRAVENOUS; SUBCUTANEOUS at 14:13

## 2018-10-03 RX ADMIN — DOCUSATE SODIUM 100 MG: 100 CAPSULE, LIQUID FILLED ORAL at 22:10

## 2018-10-03 RX ADMIN — GABAPENTIN 300 MG: 300 CAPSULE ORAL at 09:16

## 2018-10-03 RX ADMIN — TORSEMIDE 40 MG: 20 TABLET ORAL at 09:16

## 2018-10-03 RX ADMIN — INSULIN GLARGINE 40 UNITS: 100 INJECTION, SOLUTION SUBCUTANEOUS at 22:11

## 2018-10-03 RX ADMIN — HEPARIN SODIUM 5000 UNITS: 5000 INJECTION INTRAVENOUS; SUBCUTANEOUS at 22:10

## 2018-10-03 RX ADMIN — PANTOPRAZOLE SODIUM 40 MG: 40 TABLET, DELAYED RELEASE ORAL at 07:51

## 2018-10-03 RX ADMIN — BUPROPION HYDROCHLORIDE 300 MG: 100 TABLET, FILM COATED ORAL at 09:16

## 2018-10-03 RX ADMIN — INSULIN LISPRO 10 UNITS: 100 INJECTION, SOLUTION INTRAVENOUS; SUBCUTANEOUS at 17:16

## 2018-10-03 RX ADMIN — INSULIN LISPRO 10 UNITS: 100 INJECTION, SOLUTION INTRAVENOUS; SUBCUTANEOUS at 12:28

## 2018-10-03 RX ADMIN — ATENOLOL 37.5 MG: 25 TABLET ORAL at 09:16

## 2018-10-03 RX ADMIN — Medication 10 ML: at 22:11

## 2018-10-03 RX ADMIN — ARIPIPRAZOLE 20 MG: 10 TABLET ORAL at 09:28

## 2018-10-03 RX ADMIN — HEPARIN SODIUM 5000 UNITS: 5000 INJECTION INTRAVENOUS; SUBCUTANEOUS at 07:51

## 2018-10-03 NOTE — PROGRESS NOTES
Hospitalist Progress Note      PCP: Mayo Caceres    Date of Admission: 9/28/2018    Chief Complaint: Nausea and drainage from trachsite    Hospital Course: 58 yo male with hx of complicated tracheal wound infection requiring multiple surgeries in past was admitted with C/O nausea and likely infection of trach/ stoma site. Subjective: EMR and notes reviewed pt seen and examined. No fever, chills, nausea vomiting. No chest pain or SOB.         Medications:  Reviewed    Infusion Medications    dextrose       Scheduled Medications    predniSONE  20 mg Oral Daily    allopurinol  300 mg Oral Daily    ARIPiprazole  20 mg Oral Daily    buPROPion  300 mg Oral BID    insulin glargine  30 Units Subcutaneous Nightly    insulin lispro  10 Units Subcutaneous TID WC    atenolol  37.5 mg Oral Daily    spironolactone  25 mg Oral Daily    fluticasone  1 spray Each Nare Daily    gabapentin  300 mg Oral TID    torsemide  40 mg Oral Daily    potassium chloride  20 mEq Oral Daily    insulin lispro  0-12 Units Subcutaneous TID WC    insulin lispro  0-6 Units Subcutaneous Nightly    heparin (porcine)  5,000 Units Subcutaneous 3 times per day    mometasone-formoterol  2 puff Inhalation BID    docusate sodium  100 mg Oral BID    ferrous sulfate  325 mg Oral Daily with breakfast    pantoprazole  40 mg Oral QAM AC    pravastatin  40 mg Oral Daily    zafirlukast  20 mg Oral BID    sodium chloride flush  10 mL Intravenous 2 times per day     PRN Meds: glucose, glucagon (rDNA), HYDROmorphone, benzocaine, sodium chloride flush, magnesium hydroxide, ondansetron, dextrose, dextrose      Intake/Output Summary (Last 24 hours) at 10/03/18 1101  Last data filed at 10/02/18 1852   Gross per 24 hour   Intake              240 ml   Output              625 ml   Net             -385 ml       Physical Exam Performed:    BP (!) 167/92   Pulse 92   Temp 97.6 °F (36.4 °C) (Oral)   Resp 14   Ht 5' 10\" (1.778 m)   Wt 282 lb

## 2018-10-03 NOTE — PROGRESS NOTES
INPATIENT PULMONARY CRITICAL CARE PROGRESS NOTE      Reason for visit    acute on chronic resp failure, tracheal stenosis    SUBJECTIVE:  Patient when seen this morning was feeling better with no increased cough/shortness of breath;patent is expectorating better ;no wheezing/chest pain  , patient when seen was alert and communicative; patient has had adequate urine output with cumulative fluid balance of -6.6L, patient's blood sugars are still not controlled  , no other pertinent review of system of concern         Physical Exam:  Blood pressure (!) 145/77, pulse 74, temperature 97.6 °F (36.4 °C), temperature source Oral, resp. rate 14, height 5' 10\" (1.778 m), weight 282 lb 13.6 oz (128.3 kg), SpO2 98 %.'   Constitutional:  No acute distress. HENT:  Oropharynx is clear and moist. No thyromegaly. Eyes:  Conjunctivae are normal. Pupils equal, round, and reactive to light. No scleral icterus. Neck: . No tracheal deviation present. tracheostomy in place, neck diameter is slightly increased  Cardiovascular: Normal rate, regular rhythm, normal heart sounds. No right ventricular heave. 1-2+ lower extremity edema. Pulmonary/Chest: No wheezes. No  rales. Chest wall is not dull to percussion. No accessory muscle usage or stridor. Decreased breath sound intensity  Abdominal: Soft. Bowel sounds present. No distension or hernia. No tenderness. Obese   Musculoskeletal: No cyanosis. No clubbing. No obvious joint deformity. Lymphadenopathy: No cervical or supraclavicular adenopathy. Skin: Skin is warm and dry. Stasis dermatitis in the lower extremities  Psychiatric: Normal mood and affect. Behavior is normal.  No anxiety. Neurologic: Alert, awake and oriented. PERRL.   Speech appropriate with a Passy-Cohagen valve        Results:  CBC:   Recent Labs      10/01/18   0959  10/02/18   0528  10/03/18   0542   WBC  6.0  4.9  5.9   HGB  12.2*  11.2*  12.1*   HCT  37.0*  33.9*  35.9*   MCV  82.5  81.6  80.9   PLT  109* 94*  112*     BMP:   Recent Labs      10/01/18   0959  10/02/18   0528  10/03/18   0542   NA  137  141  142   K  4.9  3.8  3.7   CL  99  102  98*   CO2  27  31  33*   BUN  21*  27*  30*   CREATININE  1.2  1.2  1.3       Imaging:  I have reviewed radiology images personally. CT SOFT TISSUE NECK WO CONTRAST   Final Result   No acute abnormality of the soft tissue structures of the neck identified. Incidentally noted stone within the left submandibular gland and scattered   dental caries. Presence of tracheostomy tube limits evaluation for tracheal stenosis. However, the trachea just below the tip of the ET tube appears to be mildly   but focally narrowed. Incidentally noted right lower lobe pulmonary nodule. RECOMMENDATIONS:   Fleischner Society guidelines for follow-up and management of incidentally   detected pulmonary nodules:      Single Solid Nodule:      Nodule size less than 6 mm   In a low-risk patient, no routine follow-up. In a high-risk patient, optional CT at 12 months. Nodule size equals 6-8 mm   In a low-risk patient, CT at 6-12 months, then consider CT at 18-24 months. In a high-risk patient, CT at 6-12 months, then CT at 18-24 months. Nodule size greater than 8 mm         In a low-risk patient, consider CT at 3 months, PET/CT, or tissue sampling. In a high-risk patient, consider CT at 3 months, PET/CT, or tissue sampling. Multiple Solid Nodules:      Nodule size less than 6 mm   In a low-risk patient, no routine follow-up. In a high-risk patient, optional CT at 12 months. Nodule size equals 6-8 mm   In a low-risk patient, CT at 3-6 months, then consider CT at 18-24 months. In a high-risk patient, CT at 3-6 months, then CT at 18-24 months. Nodule size greater than 8 mm   In a low-risk patient, CT at 3-6 months, then consider CT at 18-24 months. In a high-risk patient, CT at 3-6 months, then CT at 18-24 months.       - Low risk patients MD    10/3/2018    1:12 PM.

## 2018-10-03 NOTE — PROGRESS NOTES
Received call from [facility] about panic lab value: Tobramycin level 10.1. Contacted both on call hospitalist and on call Infection Disease MD via [phone] messages.

## 2018-10-03 NOTE — PROGRESS NOTES
Clindamycin/lincomycin; Indocin [indometacin sodium]; Tenex [guanfacine hcl]; Vasotec; and Vioxx    Objective:     Vitals:    09/26/18 0939   BP: 123/74   Pulse: 76   Resp: 18   Temp: 97.1 °F (36.2 °C)   TempSrc: Oral     AAOx3, overweight, chronically ill appearing, fatigued, NAD, and didn't cough much at all during his visit  Intact distal pulses, foot warm, good cap refill, mild RLE edema  No cellulitis, angitis, acute arthritis, some allodynia at wounds; no Candidiasis  Dominique-ulcer skin: less callus at foot; usual pink, induration, very mild erythema at sacrum. Ulcer(s): foot ulcer smaller, pink, granular, fibrin, mild skin-edge necrosis; sacral ulcer red, granular, somewhat inflamed, serous exudate, quite tender, mild epibole and undermining. Pre-debridement wound measurements are in the wound documentation flowsheet. Photos also saved in electronic chart.     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    Chronic pain syndrome on chronic opioids G89.4    Asthma/COPD J44.9    Chronic ischemic heart disease I25.9    Bilateral lower extremity edema R60.0    Hiatal hernia with GERD K21.9, K44.9    Extrinsic asthma J45.909    Low back pain M54.5    TESS G47.33    Calcification of bronchus or trachea J39.8, J98.09    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    Diabetic peripheral neuropathy E11.42    CKD2/3 N18.3    Chronic microcytic Iron-deficiency anemia D50.9    Chronic dCHF (grade 1 LVDD) I50.32    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    Arthritis M19.90    Claustrophobia F40.240    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    PAD (peripheral artery disease) ([facility]) I73.9    Decubitus ulcer of sacral region, stage 4 ([facility]) L89.154    Debility R53.81    Arthritis of right knee M17.11    General weakness R53.1    Neuropathic ulcer of toe of right foot, limited to breakdown of skin ([facility]) L97.511       Assessment of today's active condition(s): spinal stenosis, poor mobility, recurrent stage 4 sacral pressure ulcer, treating in a palliative manner for now until he can get his glucoses under control, offload better, and consider flap closure. Recurrent Ferguson 1 neuropathic ulcer of right hallux, definitely doing better with football dressing, no acute infection. Factors contributing to occurrence and/or persistence of the chronic ulcer include diabetes, poor glucose control, chronic pressure, decreased mobility, shear force and obesity. Medical necessity of today's visit is shown by the above documentation. Sharp debridement is indicated today, based upon the exam findings in the wound(s) above. Procedure note:     Consent obtained. Time out performed per [facility] policy. Anesthetic  Anesthetic: 4% Lidocaine Liquid Topical     Using a #15 blade scalpel, I sharply debrided the right foot ulcer(s) down through and including the removal of dermis. The type(s) of tissue debrided included fibrin, necrotic/eschar and callus. Total Surface Area Debrided: 1 sq cm.     Post  Debridement Measurements from today:  Wound 05/30/18 #21  Right great toe, DFU, Ferguson 1 (onset 5/25/18) Friction/shear-Wound Length (cm): 0.5 cm  Wound 05/18/18 #20, Coccyx, Pressure Ulcer, Stage 4, onset 2015, Pressure-Wound Length (cm): 1.4 cm    Wound 05/30/18 #21  Right great toe, DFU, Ferguson 1 (onset 5/25/18) Friction/shear-Wound Width (cm): 0.4 cm  Wound 05/18/18 #20, Coccyx, Pressure Ulcer, Stage 4, onset 2015, Pressure-Wound Width (cm): 1.9 cm    Wound 05/30/18 #21  Right great toe, DFU, Ferguson 1 (onset 5/25/18) Friction/shear-Wound Depth (cm) : 0.2  Wound 05/18/18 #20, Coccyx, Pressure Ulcer, Stage 4, onset 2015, Pressure-Wound Depth (cm) : 1.8    The ulcers were then irrigated with normal saline solution. The procedure was completed with a small amount of bleeding, and hemostasis was by pressure. The patient tolerated the procedure well, with no significant complications. The patient's level of pain during and after the procedure was monitored, and is noted in the wound documentation flowsheet. Discharge plan:     Treatment in the wound care center today: Wound 05/30/18 #21  Right great toe, DFU, Ferguson 1 (onset 5/25/18) Friction/shear-Dressing/Treatment:  (calmoseptine multidex foam football dressing)  Wound 05/18/18 #20, Coccyx, Pressure Ulcer, Stage 4, onset 2015, Pressure-Dressing/Treatment:  (calmoseptine medihoney  2x2 mepilex border ). Leave right foot dressing in place for the week. Work on getting TID-QID glucose checks for the week. Also look at height of old vs new wheelchair, to let me know what height of a ROHO cushion he thinks he can manage (higher would offload better, but he's concerned that his feet might not touch the floor if it's too high). Home treatment: good handwashing before and after any dressing changes. Cleanse wound with saline or soap & water before dressing change. May use Vaseline (petrolatum), Aquaphor, Aveeno, CeraVe, Cetaphil, Eucerin, Lubriderm, etc for dry skin. Dressing type for home: Other: as above for the sacrum, 3 times weekly. Written discharge instructions given to patient. Follow up in 1 week.     Electronically signed by Raiford Essex, MD on 10/3/2018 at 11:25 AM.

## 2018-10-04 VITALS
OXYGEN SATURATION: 99 % | WEIGHT: 282.85 LBS | BODY MASS INDEX: 40.49 KG/M2 | HEIGHT: 70 IN | DIASTOLIC BLOOD PRESSURE: 79 MMHG | HEART RATE: 78 BPM | RESPIRATION RATE: 16 BRPM | TEMPERATURE: 98.2 F | SYSTOLIC BLOOD PRESSURE: 126 MMHG

## 2018-10-04 LAB
ANION GAP SERPL CALCULATED.3IONS-SCNC: 10 MMOL/L (ref 3–16)
BUN BLDV-MCNC: 31 MG/DL (ref 7–20)
CALCIUM SERPL-MCNC: 9 MG/DL (ref 8.3–10.6)
CHLORIDE BLD-SCNC: 98 MMOL/L (ref 99–110)
CO2: 34 MMOL/L (ref 21–32)
CREAT SERPL-MCNC: 1.2 MG/DL (ref 0.8–1.3)
GFR AFRICAN AMERICAN: >60
GFR NON-AFRICAN AMERICAN: >60
GLUCOSE BLD-MCNC: 200 MG/DL (ref 70–99)
GLUCOSE BLD-MCNC: 211 MG/DL (ref 70–99)
GLUCOSE BLD-MCNC: 241 MG/DL (ref 70–99)
HCT VFR BLD CALC: 36.3 % (ref 40.5–52.5)
HEMOGLOBIN: 12.2 G/DL (ref 13.5–17.5)
MCH RBC QN AUTO: 27.2 PG (ref 26–34)
MCHC RBC AUTO-ENTMCNC: 33.6 G/DL (ref 31–36)
MCV RBC AUTO: 81 FL (ref 80–100)
PDW BLD-RTO: 15.4 % (ref 12.4–15.4)
PERFORMED ON: ABNORMAL
PERFORMED ON: ABNORMAL
PLATELET # BLD: 112 K/UL (ref 135–450)
PMV BLD AUTO: 8.6 FL (ref 5–10.5)
POTASSIUM REFLEX MAGNESIUM: 3.6 MMOL/L (ref 3.5–5.1)
RBC # BLD: 4.48 M/UL (ref 4.2–5.9)
SODIUM BLD-SCNC: 142 MMOL/L (ref 136–145)
WBC # BLD: 6.8 K/UL (ref 4–11)

## 2018-10-04 PROCEDURE — 2700000000 HC OXYGEN THERAPY PER DAY

## 2018-10-04 PROCEDURE — 80048 BASIC METABOLIC PNL TOTAL CA: CPT

## 2018-10-04 PROCEDURE — 6370000000 HC RX 637 (ALT 250 FOR IP): Performed by: INTERNAL MEDICINE

## 2018-10-04 PROCEDURE — 85027 COMPLETE CBC AUTOMATED: CPT

## 2018-10-04 PROCEDURE — 2580000003 HC RX 258: Performed by: INTERNAL MEDICINE

## 2018-10-04 PROCEDURE — 6360000002 HC RX W HCPCS: Performed by: NURSE PRACTITIONER

## 2018-10-04 PROCEDURE — 99232 SBSQ HOSP IP/OBS MODERATE 35: CPT | Performed by: INTERNAL MEDICINE

## 2018-10-04 PROCEDURE — 6370000000 HC RX 637 (ALT 250 FOR IP): Performed by: NURSE PRACTITIONER

## 2018-10-04 PROCEDURE — 94640 AIRWAY INHALATION TREATMENT: CPT

## 2018-10-04 PROCEDURE — 94761 N-INVAS EAR/PLS OXIMETRY MLT: CPT

## 2018-10-04 PROCEDURE — 36415 COLL VENOUS BLD VENIPUNCTURE: CPT

## 2018-10-04 RX ORDER — PREDNISONE 10 MG/1
TABLET ORAL
Qty: 7 TABLET | Refills: 0 | Status: SHIPPED | OUTPATIENT
Start: 2018-10-04 | End: 2018-10-16

## 2018-10-04 RX ADMIN — DOCUSATE SODIUM 100 MG: 100 CAPSULE, LIQUID FILLED ORAL at 09:20

## 2018-10-04 RX ADMIN — ARIPIPRAZOLE 20 MG: 10 TABLET ORAL at 09:20

## 2018-10-04 RX ADMIN — Medication 10 ML: at 09:24

## 2018-10-04 RX ADMIN — PREDNISONE 20 MG: 20 TABLET ORAL at 09:24

## 2018-10-04 RX ADMIN — INSULIN LISPRO 10 UNITS: 100 INJECTION, SOLUTION INTRAVENOUS; SUBCUTANEOUS at 12:16

## 2018-10-04 RX ADMIN — PRAVASTATIN SODIUM 40 MG: 40 TABLET ORAL at 09:24

## 2018-10-04 RX ADMIN — INSULIN LISPRO 4 UNITS: 100 INJECTION, SOLUTION INTRAVENOUS; SUBCUTANEOUS at 12:16

## 2018-10-04 RX ADMIN — Medication 2 PUFF: at 08:12

## 2018-10-04 RX ADMIN — BUPROPION HYDROCHLORIDE 300 MG: 100 TABLET, FILM COATED ORAL at 09:20

## 2018-10-04 RX ADMIN — PANTOPRAZOLE SODIUM 40 MG: 40 TABLET, DELAYED RELEASE ORAL at 06:34

## 2018-10-04 RX ADMIN — POTASSIUM CHLORIDE 20 MEQ: 20 TABLET, EXTENDED RELEASE ORAL at 09:24

## 2018-10-04 RX ADMIN — ALLOPURINOL 300 MG: 300 TABLET ORAL at 09:24

## 2018-10-04 RX ADMIN — INSULIN LISPRO 4 UNITS: 100 INJECTION, SOLUTION INTRAVENOUS; SUBCUTANEOUS at 09:25

## 2018-10-04 RX ADMIN — ATENOLOL 37.5 MG: 25 TABLET ORAL at 09:20

## 2018-10-04 RX ADMIN — TORSEMIDE 40 MG: 20 TABLET ORAL at 09:20

## 2018-10-04 RX ADMIN — FERROUS SULFATE TAB 325 MG (65 MG ELEMENTAL FE) 325 MG: 325 (65 FE) TAB at 09:20

## 2018-10-04 RX ADMIN — HEPARIN SODIUM 5000 UNITS: 5000 INJECTION INTRAVENOUS; SUBCUTANEOUS at 06:33

## 2018-10-04 RX ADMIN — SPIRONOLACTONE 25 MG: 25 TABLET ORAL at 09:24

## 2018-10-04 RX ADMIN — INSULIN LISPRO 10 UNITS: 100 INJECTION, SOLUTION INTRAVENOUS; SUBCUTANEOUS at 09:26

## 2018-10-04 RX ADMIN — GABAPENTIN 300 MG: 300 CAPSULE ORAL at 09:24

## 2018-10-04 ASSESSMENT — PAIN SCALES - GENERAL: PAINLEVEL_OUTOF10: 8

## 2018-10-04 ASSESSMENT — PAIN DESCRIPTION - LOCATION: LOCATION: COCCYX

## 2018-10-04 ASSESSMENT — PAIN DESCRIPTION - PAIN TYPE: TYPE: CHRONIC PAIN

## 2018-10-04 NOTE — PROGRESS NOTES
Nutrition Assessment    Type and Reason for Visit: Reassess    Nutrition Recommendations:   1. Continue Carb control diet  2.  RD provided tips on protein supplements at home  3.  RD provided Carb counting and CHF education materials at time of visit-  3. Fluid intake at home (approximately 1 gallon per day per patient consumed due to other health issues poor urine output/UTI), encouraged patient to discuss this with MD prior to discharge    Malnutrition Assessment:  · Malnutrition Status: At risk for malnutrition  · Context:    · Findings of the 6 clinical characteristics of malnutrition (Minimum of 2 out of 6 clinical characteristics is required to make the diagnosis of moderate or severe Protein Calorie Malnutrition based on AND/ASPEN Guidelines):  1. Energy Intake-51% to 75%,      2. Weight Loss-Unable to assess,    3. Fat Loss-No significant subcutaneous fat loss,    4. Muscle Loss-No significant muscle mass loss,    5. Fluid Accumulation-Mild fluid accumulation, Extremities  6.  Strength-Not measured    Nutrition Diagnosis:   · Problem: Increased nutrient needs  · Etiology: related to Increased demand for energy/nutrients due to     Signs and symptoms:  as evidenced by Presence of wounds    Nutrition Assessment:  · Subjective Assessment: Follow up:  Patient reported feeling much better this am.  CNP discussed patient going home just prior to visit. RD provided and explained carb counting and CHF education; patient agreed to information. Patient reported monitoring carbohydrates at home. Home insulin vs hospital insulin was discussed, patient stated going back on home regimen. Regarding heart failure, patient was not as familiar with diet plan for CHF. Patient reported being allowed to drink a gallon of fluid per day, does have history of UTI as well. RD encouraged patient to discussed this level of fluid intake with MD prior to discharge. Appetite has improved.   Patient does not like Ensure

## 2018-10-04 NOTE — DISCHARGE SUMMARY
-  Vanc- pharm to dose, Merrem changed to [place] as Bronch with Pseudomonas. Placed Consult to ID for abx treatment course and needs- appreciate seeing recs reviewed. Now off abx for 24 + hours and stable. No plans to treat the Pseudomonas (would require PICC or tunneled CVL with IV abx therapy) unless the clinical situation changes  -  Will need ENT management he can f/u as OP with ENT at [address] [facility]  9/29/18  - SLP eval No issues   -trach care      Acute on chronic respiratory failure with hypoxia in the setting of mechanical breathing issues-  Back to baseline needs   - supportive care  - wean as tolerated, trach collar baseline 6 Liter   - Pulm following   - cont pulse ox      DM type 2 with neuropathy - unknown control  - A1C pending  - Basal bolus with SSI and prandial covrage  - Cont gabapentin for neuropathy      Acute on CKD- baseline seem to be ~ 1.5 on arrive 2.2   - likely secondary to acute illness - improved at baseline      PAD- follows with Dr. Alon Chacon lower extremities and coccyx  - follows with wound care  - consult wound care      Anxiety/ depression and schizophrenia - no acute issues overall maybe contributing to his non compliance     Social needs: currently lives alone with EC bound transfer per self, has Home care nurse that visits 2 times per week and aid few times a week and meals on wheels.            Physical Exam Performed:     /68   Pulse 63   Temp 97.8 °F (36.6 °C) (Oral)   Resp 16   Ht 5' 10\" (1.778 m)   Wt 282 lb 13.6 oz (128.3 kg)   SpO2 100%   BMI 40.58 kg/m²   General appearance: No apparent distress, appears stated age and cooperative. HEENT: Pupils equal, round, and reactive to light. Conjunctivae/corneas clear. Trach site with secretions redness resolved   Neck: Supple, with full range of motion. No jugular venous distention. Trachea midline.   Respiratory:  Normal respirations, stoma site red area with drainage on trach collar Cardiovascular: Regular rate and rhythm with normal S1/S2 without murmurs, rubs or gallops. Abdomen: Soft, non-tender, non-distended with normal bowel sounds. Musculoskeletal: No clubbing, cyanosis or edema bilaterally. Full range of motion without deformity. Skin: pale and dry with multiple wounds   Neurologic:  Neurovascularly intact without any focal sensory/motor deficits. Cranial nerves: II-XII intact, grossly non-focal.  Psychiatric: Alert and oriented, thought content appropriate, poor insight   Peripheral Pulses: +2 palpable, equal bilaterally         Labs: For convenience and continuity at follow-up the following most recent labs are provided:      CBC:    Lab Results   Component Value Date    WBC 6.8 10/04/2018    HGB 12.2 10/04/2018    HCT 36.3 10/04/2018     10/04/2018       Renal:    Lab Results   Component Value Date     10/04/2018    K 3.6 10/04/2018    CL 98 10/04/2018    CO2 34 10/04/2018    BUN 31 10/04/2018    CREATININE 1.2 10/04/2018    CALCIUM 9.0 10/04/2018    PHOS 3.0 09/30/2018         Significant Diagnostic Studies    Radiology:   CT SOFT TISSUE NECK WO CONTRAST   Final Result   No acute abnormality of the soft tissue structures of the neck identified. Incidentally noted stone within the left submandibular gland and scattered   dental caries. Presence of tracheostomy tube limits evaluation for tracheal stenosis. However, the trachea just below the tip of the ET tube appears to be mildly   but focally narrowed. Incidentally noted right lower lobe pulmonary nodule. RECOMMENDATIONS:   Fleischner Society guidelines for follow-up and management of incidentally   detected pulmonary nodules:      Single Solid Nodule:      Nodule size less than 6 mm   In a low-risk patient, no routine follow-up. In a high-risk patient, optional CT at 12 months. Nodule size equals 6-8 mm   In a low-risk patient, CT at 6-12 months, then consider CT at 18-24 months.    In take additional 20mg at lunch daily)      ferrous sulfate 325 (65 Fe) MG tablet Take 325 mg by mouth daily (with breakfast)      Fexofenadine HCl (ALLEGRA PO) Take 180 mg by mouth daily       lidocaine (XYLOCAINE) 2 % jelly Apply topically as needed for Trach change  Qty: 1 Tube, Refills: 0      moexipril (UNIVASC) 15 MG tablet Take 0.5 tablets by mouth daily  Qty: 30 tablet, Refills: 0      Multiple Vitamins-Minerals (THERAPEUTIC MULTIVITAMIN-MINERALS) tablet Take 1 tablet by mouth daily      zinc gluconate 50 MG tablet Take 50 mg by mouth 2 times daily       polyethylene glycol (GLYCOLAX) packet Take 17 g by mouth daily as needed       ADVAIR DISKUS 250-50 MCG/DOSE AEPB Inhale 1 puff into the lungs 2 times daily      glipiZIDE (GLUCOTROL XL) 10 MG extended release tablet Take 10 mg by mouth 2 times daily      omeprazole (PRILOSEC) 40 MG delayed release capsule Take 40 mg by mouth daily      ALBUTEROL IN Inhale 2 each into the lungs 2 times daily       potassium chloride SA (K-DUR;KLOR-CON M) 20 MEQ tablet Take 20 mEq by mouth daily      docusate sodium (COLACE) 100 MG capsule Take 100 mg by mouth 2 times daily       nitroGLYCERIN (NITROSTAT) 0.4 MG SL tablet Place 0.4 mg under the tongue every 5 minutes as needed. OXYGEN 6 L by Tracheal Tube route continuous Indications: 4 to 6 L 4 to 6 L per Pt        pregabalin (LYRICA) 100 MG capsule Take 100 mg by mouth 2 times daily       pravastatin (PRAVACHOL) 40 MG tablet Take 40 mg by mouth daily. Time Spent on discharge is more than 30 minutes in the examination, evaluation, counseling and review of medications and discharge plan. Signed:    NEGRITA Kelsey - CNP   10/4/2018      Thank you Jody Zepeda for the opportunity to be involved in this patient's care. If you have any questions or concerns please feel free to contact me at [phone].

## 2018-10-04 NOTE — PROGRESS NOTES
INPATIENT PULMONARY CRITICAL CARE PROGRESS NOTE      Reason for visit    acute on chronic resp failure, tracheal stenosis    SUBJECTIVE:  Patient when seen this morning was feeling better with no increased cough/shortness of breath;patent is expectorating better ;no wheezing/chest pain  , patient when seen was alert and communicative; patient has had adequate urine output with cumulative fluid balance of -7.4L, patient's blood sugars are still not controlled  , no other pertinent review of system of concern         Physical Exam:  Blood pressure 121/76, pulse 75, temperature 97.8 °F (36.6 °C), temperature source Oral, resp. rate 16, height 5' 10\" (1.778 m), weight 282 lb 13.6 oz (128.3 kg), SpO2 99 %.'   Constitutional:  No acute distress. HENT:  Oropharynx is clear and moist. No thyromegaly. Eyes:  Conjunctivae are normal. Pupils equal, round, and reactive to light. No scleral icterus. Neck: . No tracheal deviation present. tracheostomy in place, neck diameter is slightly increased  Cardiovascular: Normal rate, regular rhythm, normal heart sounds. No right ventricular heave. 1-2+ lower extremity edema. Pulmonary/Chest: No wheezes. No  rales. Chest wall is not dull to percussion. No accessory muscle usage or stridor. Decreased breath sound intensity  Abdominal: Soft. Bowel sounds present. No distension or hernia. No tenderness. Obese   Musculoskeletal: No cyanosis. No clubbing. No obvious joint deformity. Lymphadenopathy: No cervical or supraclavicular adenopathy. Skin: Skin is warm and dry. Stasis dermatitis in the lower extremities  Psychiatric: Normal mood and affect. Behavior is normal.  No anxiety. Neurologic: Alert, awake and oriented. PERRL.   Speech appropriate with a Passy-Deepthi valve        Results:  CBC:   Recent Labs      10/02/18   0528  10/03/18   0542  10/04/18   0554   WBC  4.9  5.9  6.8   HGB  11.2*  12.1*  12.2*   HCT  33.9*  35.9*  36.3*   MCV  81.6  80.9  81.0   PLT  94*  112* 112*     BMP:   Recent Labs      10/02/18   0528  10/03/18   0542  10/04/18   0553   NA  141  142  142   K  3.8  3.7  3.6   CL  102  98*  98*   CO2  31  33*  34*   BUN  27*  30*  31*   CREATININE  1.2  1.3  1.2       Imaging:  I have reviewed radiology images personally. CT SOFT TISSUE NECK WO CONTRAST   Final Result   No acute abnormality of the soft tissue structures of the neck identified. Incidentally noted stone within the left submandibular gland and scattered   dental caries. Presence of tracheostomy tube limits evaluation for tracheal stenosis. However, the trachea just below the tip of the ET tube appears to be mildly   but focally narrowed. Incidentally noted right lower lobe pulmonary nodule. RECOMMENDATIONS:   Fleischner Society guidelines for follow-up and management of incidentally   detected pulmonary nodules:      Single Solid Nodule:      Nodule size less than 6 mm   In a low-risk patient, no routine follow-up. In a high-risk patient, optional CT at 12 months. Nodule size equals 6-8 mm   In a low-risk patient, CT at 6-12 months, then consider CT at 18-24 months. In a high-risk patient, CT at 6-12 months, then CT at 18-24 months. Nodule size greater than 8 mm         In a low-risk patient, consider CT at 3 months, PET/CT, or tissue sampling. In a high-risk patient, consider CT at 3 months, PET/CT, or tissue sampling. Multiple Solid Nodules:      Nodule size less than 6 mm   In a low-risk patient, no routine follow-up. In a high-risk patient, optional CT at 12 months. Nodule size equals 6-8 mm   In a low-risk patient, CT at 3-6 months, then consider CT at 18-24 months. In a high-risk patient, CT at 3-6 months, then CT at 18-24 months. Nodule size greater than 8 mm   In a low-risk patient, CT at 3-6 months, then consider CT at 18-24 months. In a high-risk patient, CT at 3-6 months, then CT at 18-24 months.       - Low risk patients include individuals with minimal or absent history of   smoking and other known risk factors. - High risk patients include individuals with a history or smoking or known   risk factors. Radiology 2017 http://pubs. rsna.org/doi/full/10.1148/radiol. 0497322475         CT CHEST WO CONTRAST   Final Result   No acute abnormality of the soft tissue structures of the neck identified. Incidentally noted stone within the left submandibular gland and scattered   dental caries. Presence of tracheostomy tube limits evaluation for tracheal stenosis. However, the trachea just below the tip of the ET tube appears to be mildly   but focally narrowed. Incidentally noted right lower lobe pulmonary nodule. RECOMMENDATIONS:   Fleischner Society guidelines for follow-up and management of incidentally   detected pulmonary nodules:      Single Solid Nodule:      Nodule size less than 6 mm   In a low-risk patient, no routine follow-up. In a high-risk patient, optional CT at 12 months. Nodule size equals 6-8 mm   In a low-risk patient, CT at 6-12 months, then consider CT at 18-24 months. In a high-risk patient, CT at 6-12 months, then CT at 18-24 months. Nodule size greater than 8 mm         In a low-risk patient, consider CT at 3 months, PET/CT, or tissue sampling. In a high-risk patient, consider CT at 3 months, PET/CT, or tissue sampling. Multiple Solid Nodules:      Nodule size less than 6 mm   In a low-risk patient, no routine follow-up. In a high-risk patient, optional CT at 12 months. Nodule size equals 6-8 mm   In a low-risk patient, CT at 3-6 months, then consider CT at 18-24 months. In a high-risk patient, CT at 3-6 months, then CT at 18-24 months. Nodule size greater than 8 mm   In a low-risk patient, CT at 3-6 months, then consider CT at 18-24 months. In a high-risk patient, CT at 3-6 months, then CT at 18-24 months.       - Low risk patients include individuals with

## 2018-10-04 NOTE — PROGRESS NOTES
Patient has refused bed alarm all shift, despite education about fall prevention and safety. Will continue to monitor.

## 2018-10-04 NOTE — PLAN OF CARE
Problem: Falls - Risk of:  Goal: Will remain free from falls  Will remain free from falls   Outcome: Ongoing  Pt. Has non-skid socks on, call light in reach, side rails up x-2, bed alarm on, patient instructed to use call light with concerns and questions. Goal: Absence of physical injury  Absence of physical injury   Outcome: Ongoing      Problem: Pain:  Goal: Pain level will decrease  Pain level will decrease   Outcome: Ongoing    Goal: Control of acute pain  Control of acute pain   Outcome: Ongoing    Goal: Control of chronic pain  Control of chronic pain   Outcome: Ongoing      Problem: Risk for Impaired Skin Integrity  Goal: Tissue integrity - skin and mucous membranes  Structural intactness and normal physiological function of skin and  mucous membranes.    Outcome: Ongoing      Problem: Nutrition  Goal: Optimal nutrition therapy  Outcome: Ongoing      Problem: OXYGENATION/RESPIRATORY FUNCTION  Goal: Patient will maintain patent airway  Outcome: Ongoing    Goal: Patient will achieve/maintain normal respiratory rate/effort  Respiratory rate and effort will be within normal limits for the patient   Outcome: Ongoing      Problem: HEMODYNAMIC STATUS  Goal: Patient has stable vital signs and fluid balance  Outcome: Ongoing      Problem: FLUID AND ELECTROLYTE IMBALANCE  Goal: Fluid and electrolyte balance are achieved/maintained  Outcome: Ongoing      Problem: ACTIVITY INTOLERANCE/IMPAIRED MOBILITY  Goal: Mobility/activity is maintained at optimum level for patient  Outcome: Ongoing

## 2018-10-05 LAB
EKG ATRIAL RATE: 85 BPM
EKG DIAGNOSIS: NORMAL
EKG P AXIS: 29 DEGREES
EKG P-R INTERVAL: 206 MS
EKG Q-T INTERVAL: 382 MS
EKG QRS DURATION: 118 MS
EKG QTC CALCULATION (BAZETT): 454 MS
EKG R AXIS: -4 DEGREES
EKG T AXIS: 29 DEGREES
EKG VENTRICULAR RATE: 85 BPM

## 2018-10-10 ENCOUNTER — HOSPITAL ENCOUNTER (OUTPATIENT)
Dept: WOUND CARE | Age: 63
Discharge: HOME | End: 2018-10-10
Payer: MEDICARE

## 2018-10-10 VITALS
TEMPERATURE: 97.8 F | SYSTOLIC BLOOD PRESSURE: 147 MMHG | BODY MASS INDEX: 40.09 KG/M2 | HEIGHT: 70 IN | RESPIRATION RATE: 16 BRPM | DIASTOLIC BLOOD PRESSURE: 78 MMHG | HEART RATE: 80 BPM | WEIGHT: 280 LBS

## 2018-10-10 PROCEDURE — 99214 OFFICE O/P EST MOD 30 MIN: CPT | Performed by: INTERNAL MEDICINE

## 2018-10-10 PROCEDURE — 99213 OFFICE O/P EST LOW 20 MIN: CPT

## 2018-10-10 ASSESSMENT — PAIN DESCRIPTION - PROGRESSION: CLINICAL_PROGRESSION: NOT CHANGED

## 2018-10-10 ASSESSMENT — PAIN DESCRIPTION - DESCRIPTORS: DESCRIPTORS: ACHING;CONSTANT

## 2018-10-10 ASSESSMENT — PAIN SCALES - GENERAL: PAINLEVEL_OUTOF10: 8

## 2018-10-10 ASSESSMENT — PAIN DESCRIPTION - FREQUENCY: FREQUENCY: CONTINUOUS

## 2018-10-10 ASSESSMENT — PAIN DESCRIPTION - ORIENTATION: ORIENTATION: MID

## 2018-10-10 ASSESSMENT — PAIN DESCRIPTION - ONSET: ONSET: ON-GOING

## 2018-10-10 ASSESSMENT — PAIN DESCRIPTION - LOCATION: LOCATION: COCCYX

## 2018-10-10 ASSESSMENT — PAIN DESCRIPTION - PAIN TYPE: TYPE: CHRONIC PAIN

## 2018-10-16 PROBLEM — L97.511 NEUROPATHIC ULCER OF TOE OF RIGHT FOOT, LIMITED TO BREAKDOWN OF SKIN (HCC): Status: RESOLVED | Noted: 2018-06-07 | Resolved: 2018-10-16

## 2018-10-16 PROBLEM — J04.10 TRACHEITIS: Status: RESOLVED | Noted: 2018-09-29 | Resolved: 2018-10-16

## 2018-10-16 PROBLEM — N17.9 ARF (ACUTE RENAL FAILURE) (HCC): Status: RESOLVED | Noted: 2018-09-28 | Resolved: 2018-10-16

## 2018-10-16 RX ORDER — METRONIDAZOLE 250 MG/1
250 TABLET ORAL
Status: ON HOLD | COMMUNITY
End: 2019-03-16

## 2018-10-17 NOTE — PROGRESS NOTES
Lab Results   Component Value Date    LABA1C 8.1 03/23/2018     Assessment:     Patient Active Problem List   Diagnosis Code    Chronic thrombocytopenia D69.6    Chronic respiratory failure with hypoxia on 6 L home O2 tent over trach J96.11    Uncontrolled type 2 diabetes mellitus with diabetic polyneuropathy, with long-term current use of insulin ([facility]) E11.42, Z79.4, E11.65    HTN (hypertension) I10    Non morbid obesity due to excess calories E66.09    Anxiety and depression F41.9, F32.9    Tracheostomy dependent ([facility] Z93.0    Chronic pain syndrome on chronic opioids G89.4    Asthma/COPD J44.9    Chronic ischemic heart disease I25.9    Bilateral lower extremity edema R60.0    Hiatal hernia with GERD K21.9, K44.9    Low back pain M54.5    TESS G47.33    Calcification of bronchus or trachea J39.8, J98.09    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    CKD2/3 N18.3    Chronic microcytic Iron-deficiency anemia D50.9    Chronic dCHF (grade 1 LVDD) I50.32    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  Main options at this point are to be aggressive and move toward wound healing with more attention to offloading and glucose control, then OR excision and flapping, necessitating at least weeks in a long term care facility, OR palliative care for the wound, focusing on managing pain and exudate, and preventing infection, without the intent to heal. I don't have anything else to offer him here, in terms of simpler plans intended to heal him

## 2018-10-24 ENCOUNTER — HOSPITAL ENCOUNTER (OUTPATIENT)
Dept: WOUND CARE | Age: 63
Discharge: HOME | End: 2018-10-24
Payer: MEDICARE

## 2018-10-24 VITALS
WEIGHT: 280.2 LBS | HEART RATE: 70 BPM | RESPIRATION RATE: 18 BRPM | DIASTOLIC BLOOD PRESSURE: 83 MMHG | HEIGHT: 70 IN | SYSTOLIC BLOOD PRESSURE: 138 MMHG | BODY MASS INDEX: 40.11 KG/M2 | TEMPERATURE: 98.6 F

## 2018-10-24 DIAGNOSIS — L97.911 TRAUMATIC ULCER OF RIGHT LOWER EXTREMITY, LIMITED TO BREAKDOWN OF SKIN (HCC): ICD-10-CM

## 2018-10-24 PROCEDURE — 99213 OFFICE O/P EST LOW 20 MIN: CPT | Performed by: INTERNAL MEDICINE

## 2018-10-24 PROCEDURE — 99213 OFFICE O/P EST LOW 20 MIN: CPT

## 2018-10-24 RX ORDER — LIDOCAINE 40 MG/G
CREAM TOPICAL ONCE
Status: DISCONTINUED | OUTPATIENT
Start: 2018-10-24 | End: 2018-10-25 | Stop reason: HOSPADM

## 2018-10-24 ASSESSMENT — PAIN DESCRIPTION - ONSET: ONSET: ON-GOING

## 2018-10-24 ASSESSMENT — PAIN DESCRIPTION - PAIN TYPE: TYPE: CHRONIC PAIN

## 2018-10-24 ASSESSMENT — PAIN SCALES - GENERAL: PAINLEVEL_OUTOF10: 8

## 2018-10-24 ASSESSMENT — PAIN DESCRIPTION - PROGRESSION: CLINICAL_PROGRESSION: NOT CHANGED

## 2018-10-24 ASSESSMENT — PAIN DESCRIPTION - FREQUENCY: FREQUENCY: INTERMITTENT

## 2018-10-24 ASSESSMENT — PAIN DESCRIPTION - DESCRIPTORS: DESCRIPTORS: BURNING

## 2018-10-24 ASSESSMENT — PAIN DESCRIPTION - LOCATION: LOCATION: COCCYX

## 2018-10-24 NOTE — PLAN OF CARE
Problem: Wound:  Goal: Will show signs of wound healing; wound closure and no evidence of infection  Will show signs of wound healing; wound closure and no evidence of infection   Outcome: Ongoing  New wound noted to lateral ankle, no debridement, weekly dressing placed per MD orders. Coccyx wound stable, no debridement, cont. With current wound care regime. Pt. States that at this time he is not interested in plastic surgery to heal coccyx wound. Pt. Also states he is unable to use ROHO in his w/c d/t not being able to reach the floor to get around. Reinforced importance of off-loading to assist with wound healing. F/u in [address] [place] [address],[address] in 3 weeks as ordered. Discharge instructions reviewed with patient, all questions answered, copy given to patient. Dressings were applied to all wounds per M.D. Instructions at this visit.

## 2018-10-29 LAB
FUNGUS (MYCOLOGY) CULTURE: NORMAL
FUNGUS STAIN: NORMAL

## 2018-10-30 PROBLEM — L97.911 TRAUMATIC ULCER OF RIGHT LOWER EXTREMITY, LIMITED TO BREAKDOWN OF SKIN (HCC): Status: ACTIVE | Noted: 2018-10-30

## 2018-10-30 NOTE — PROGRESS NOTES
2; Pressure ulcer of left heel, stage 2; Tracheostomy infection ([address] Tracheostomy infection ([address] Unspecified cerebral artery occlusion with cerebral infarction; and UTI (urinary tract infection). He has a past surgical history that includes tracheostomy; Tonsillectomy and adenoidectomy; Uvulopalatopharygoplasty; back surgery; knee surgery; Upper gastrointestinal endoscopy (1/8/16); lumbar laminectomy; lumbar fusion; Vena Cava Filter Placement (2002); Total knee arthroplasty; rhinoplasty; other surgical history (Right, 05/2017); [facility] ([facility]/A, 9/29/2018). His family history includes High Blood Pressure in his father and mother. Mr. Lanette Sevilla reports that he has quit smoking. His smoking use included Cigars. He has never used smokeless tobacco. He reports that he does not drink alcohol or use drugs. His current medication list consists of ARIPiprazole, Albuterol, Atenolol, BuPROPion HCl, Dexmethylphenidate HCl, Fexofenadine HCl, Gabapentin, HYDROmorphone, Torsemide, allopurinol, docusate sodium, eplerenone, ferrous sulfate, fluticasone, fluticasone-salmeterol, glipiZIDE, insulin glargine, insulin regular, lidocaine, metroNIDAZOLE, moexipril, nitroGLYCERIN, omeprazole, ondansetron, potassium chloride, pravastatin, pregabalin, sildenafil, sodium chloride 0.9 % SOLN 40 mL with SUFentanil 50 MCG/ML SOLN 5 mcg/mL, therapeutic multivitamin-minerals, zafirlukast, and zinc gluconate. Allergies: Aspartame and phenylalanine; Cefuroxime axetil; Erythromycin; Feldene [piroxicam]; Iodine; Pcn [penicillins]; Pletal [cilostazol]; Robaxin [methocarbamol]; Arthrotec [diclofenac-misoprostol]; Betadine [povidone iodine]; Claritin [loratadine]; Clindamycin/lincomycin; Indocin [indometacin sodium]; Tenex [guanfacine hcl];  Vasotec; and Vioxx    Pertinent items from the review of systems are discussed in the HPI; the remainder of the ROS was reviewed and is negative. Objective:     Vitals:    10/24/18 1009   BP: 138/83   Pulse: 70   Resp: 18   Temp: 98.6 °F (37 °C)   TempSrc: Oral   Weight: 280 lb 3.2 oz (127.1 kg)   Height: 5' 10\" (1.778 m)       Constitutional:  well-developed, well-nourished, overweight, fatigued, NAD  Psychiatric:  oriented to person, place and time; mood and affect appropriate for the situation   Eyes:  pupils equal, round and reactive to light; sclerae anicteric, conjunctivae not pale  Cardiovascular:  bilateral pedal pulses palpable; mild lower extremity edema  Lymphatic:  no inguinal or popliteal adenopathy, no angitis or cellulitis  Musculoskeletal:  no clubbing, cyanosis or petechiae; RLE and LLE with no gross effusions or acute arthritis, mild hallux valgus  Dominique-ulcer skin:  indurated and pink at ankle, indurated and deeply red / radha at sacrum, from pressure I believe. Ulcer(s):  ankle ulcer small, partial thickness, pink-red, clean; sacral ulcer perhaps a bit smaller in area, stable depth, mostly red and granular, but inflamed, a bit of fibrin, serous exudate, steep edges however. Photos also saved in electronic chart.     Today's Wound Measurements:  Wound 10/24/18 #22,Right lateral maleolus, traumatic, partial thickness, onset 10/21/18, trauma-Wound Length (cm): 1.6 cm  Wound 05/18/18 #20, Coccyx, Pressure Ulcer, Stage 4, onset 2015, Pressure-Wound Length (cm): 1.2 cm    Wound 10/24/18 #22,Right lateral maleolus, traumatic, partial thickness, onset 10/21/18, trauma-Wound Width (cm): 1.3 cm  Wound 05/18/18 #20, Coccyx, Pressure Ulcer, Stage 4, onset 2015, Pressure-Wound Width (cm): 1.3 cm    Wound 10/24/18 #22,Right lateral maleolus, traumatic, partial thickness, onset 10/21/18, trauma-Wound Depth (cm) : 0.1  Wound 05/18/18 #20, Coccyx, Pressure Ulcer, Stage 4, onset 2015, Pressure-Wound Depth (cm) : 1  ______________________________    Lab Results   Component Value Date    BEAN 3.4 09/30/2018     Lab Results   Component Value

## 2018-11-13 LAB
AFB CULTURE (MYCOBACTERIA): NORMAL
AFB SMEAR: NORMAL

## 2018-11-14 ENCOUNTER — HOSPITAL ENCOUNTER (OUTPATIENT)
Dept: WOUND CARE | Age: 63
Discharge: HOME | End: 2018-11-14
Payer: MEDICARE

## 2018-11-14 VITALS
DIASTOLIC BLOOD PRESSURE: 73 MMHG | TEMPERATURE: 97.3 F | HEIGHT: 70 IN | BODY MASS INDEX: 39.03 KG/M2 | SYSTOLIC BLOOD PRESSURE: 132 MMHG | WEIGHT: 272.6 LBS | RESPIRATION RATE: 20 BRPM | HEART RATE: 70 BPM

## 2018-11-14 PROCEDURE — 99213 OFFICE O/P EST LOW 20 MIN: CPT

## 2018-11-14 PROCEDURE — 99213 OFFICE O/P EST LOW 20 MIN: CPT | Performed by: INTERNAL MEDICINE

## 2018-11-14 RX ORDER — LIDOCAINE 40 MG/G
CREAM TOPICAL ONCE
Status: DISCONTINUED | OUTPATIENT
Start: 2018-11-14 | End: 2018-11-15 | Stop reason: HOSPADM

## 2018-11-14 ASSESSMENT — PAIN DESCRIPTION - ONSET: ONSET: ON-GOING

## 2018-11-14 ASSESSMENT — PAIN DESCRIPTION - PROGRESSION: CLINICAL_PROGRESSION: NOT CHANGED

## 2018-11-14 ASSESSMENT — PAIN DESCRIPTION - DESCRIPTORS: DESCRIPTORS: BURNING;CONSTANT

## 2018-11-14 ASSESSMENT — PAIN DESCRIPTION - FREQUENCY: FREQUENCY: CONTINUOUS

## 2018-11-14 ASSESSMENT — PAIN SCALES - GENERAL: PAINLEVEL_OUTOF10: 9

## 2018-11-14 ASSESSMENT — PAIN DESCRIPTION - PAIN TYPE: TYPE: CHRONIC PAIN

## 2018-11-14 ASSESSMENT — PAIN DESCRIPTION - LOCATION: LOCATION: COCCYX

## 2018-11-25 PROBLEM — L97.911 TRAUMATIC ULCER OF RIGHT LOWER EXTREMITY, LIMITED TO BREAKDOWN OF SKIN (HCC): Status: RESOLVED | Noted: 2018-10-30 | Resolved: 2018-11-25

## 2019-01-30 ENCOUNTER — HOSPITAL ENCOUNTER (OUTPATIENT)
Dept: WOUND CARE | Age: 64
Discharge: HOME | End: 2019-01-30
Payer: MEDICARE

## 2019-01-30 VITALS
WEIGHT: 280.6 LBS | DIASTOLIC BLOOD PRESSURE: 68 MMHG | RESPIRATION RATE: 20 BRPM | SYSTOLIC BLOOD PRESSURE: 140 MMHG | TEMPERATURE: 98.4 F | HEART RATE: 76 BPM | BODY MASS INDEX: 40.17 KG/M2 | HEIGHT: 70 IN

## 2019-01-30 DIAGNOSIS — L89.152 PRESSURE ULCER OF SACRAL REGION, STAGE 2 (HCC): ICD-10-CM

## 2019-01-30 PROCEDURE — 99214 OFFICE O/P EST MOD 30 MIN: CPT | Performed by: INTERNAL MEDICINE

## 2019-01-30 PROCEDURE — 99214 OFFICE O/P EST MOD 30 MIN: CPT

## 2019-01-30 RX ORDER — LIDOCAINE 40 MG/G
CREAM TOPICAL ONCE
Status: DISCONTINUED | OUTPATIENT
Start: 2019-01-30 | End: 2019-01-31 | Stop reason: HOSPADM

## 2019-01-30 ASSESSMENT — PAIN DESCRIPTION - PAIN TYPE: TYPE: CHRONIC PAIN

## 2019-01-30 ASSESSMENT — PAIN - FUNCTIONAL ASSESSMENT: PAIN_FUNCTIONAL_ASSESSMENT: PREVENTS OR INTERFERES SOME ACTIVE ACTIVITIES AND ADLS

## 2019-01-30 ASSESSMENT — PAIN SCALES - GENERAL: PAINLEVEL_OUTOF10: 9

## 2019-01-30 ASSESSMENT — PAIN DESCRIPTION - LOCATION: LOCATION: BUTTOCKS;COCCYX

## 2019-01-30 ASSESSMENT — PAIN DESCRIPTION - PROGRESSION: CLINICAL_PROGRESSION: NOT CHANGED

## 2019-01-30 ASSESSMENT — PAIN DESCRIPTION - ONSET: ONSET: ON-GOING

## 2019-01-30 ASSESSMENT — PAIN DESCRIPTION - DESCRIPTORS: DESCRIPTORS: BURNING;SHOOTING

## 2019-01-30 ASSESSMENT — PAIN DESCRIPTION - FREQUENCY: FREQUENCY: CONTINUOUS

## 2019-02-07 PROBLEM — L89.152 PRESSURE ULCER OF SACRAL REGION, STAGE 2 (HCC): Status: ACTIVE | Noted: 2019-02-07

## 2019-02-13 ENCOUNTER — HOSPITAL ENCOUNTER (OUTPATIENT)
Dept: WOUND CARE | Age: 64
Discharge: HOME | End: 2019-02-13
Payer: MEDICARE

## 2019-02-13 VITALS
RESPIRATION RATE: 20 BRPM | HEART RATE: 82 BPM | BODY MASS INDEX: 39.31 KG/M2 | DIASTOLIC BLOOD PRESSURE: 71 MMHG | SYSTOLIC BLOOD PRESSURE: 143 MMHG | TEMPERATURE: 98.7 F | WEIGHT: 274.6 LBS | HEIGHT: 70 IN

## 2019-02-13 PROCEDURE — 99213 OFFICE O/P EST LOW 20 MIN: CPT | Performed by: INTERNAL MEDICINE

## 2019-02-13 PROCEDURE — 99213 OFFICE O/P EST LOW 20 MIN: CPT

## 2019-02-13 RX ORDER — LIDOCAINE 40 MG/G
CREAM TOPICAL ONCE
Status: DISCONTINUED | OUTPATIENT
Start: 2019-02-13 | End: 2019-02-14 | Stop reason: HOSPADM

## 2019-02-13 ASSESSMENT — PAIN SCALES - GENERAL: PAINLEVEL_OUTOF10: 9

## 2019-02-13 ASSESSMENT — PAIN DESCRIPTION - ONSET: ONSET: ON-GOING

## 2019-02-13 ASSESSMENT — PAIN DESCRIPTION - PAIN TYPE: TYPE: CHRONIC PAIN

## 2019-02-13 ASSESSMENT — PAIN DESCRIPTION - PROGRESSION: CLINICAL_PROGRESSION: NOT CHANGED

## 2019-02-13 ASSESSMENT — PAIN - FUNCTIONAL ASSESSMENT: PAIN_FUNCTIONAL_ASSESSMENT: PREVENTS OR INTERFERES SOME ACTIVE ACTIVITIES AND ADLS

## 2019-02-13 ASSESSMENT — PAIN DESCRIPTION - FREQUENCY: FREQUENCY: CONTINUOUS

## 2019-02-13 ASSESSMENT — PAIN DESCRIPTION - DESCRIPTORS: DESCRIPTORS: STABBING

## 2019-02-13 ASSESSMENT — PAIN DESCRIPTION - LOCATION: LOCATION: COCCYX

## 2019-02-20 ENCOUNTER — HOSPITAL ENCOUNTER (OUTPATIENT)
Dept: WOUND CARE | Age: 64
Discharge: HOME | End: 2019-02-20
Payer: MEDICARE

## 2019-02-20 VITALS
DIASTOLIC BLOOD PRESSURE: 75 MMHG | WEIGHT: 266 LBS | BODY MASS INDEX: 38.08 KG/M2 | RESPIRATION RATE: 18 BRPM | HEART RATE: 96 BPM | HEIGHT: 70 IN | TEMPERATURE: 98.2 F | SYSTOLIC BLOOD PRESSURE: 115 MMHG

## 2019-02-20 PROCEDURE — 97597 DBRDMT OPN WND 1ST 20 CM/<: CPT | Performed by: INTERNAL MEDICINE

## 2019-02-20 PROCEDURE — 97597 DBRDMT OPN WND 1ST 20 CM/<: CPT

## 2019-02-20 RX ORDER — LIDOCAINE 40 MG/G
CREAM TOPICAL ONCE
Status: DISCONTINUED | OUTPATIENT
Start: 2019-02-20 | End: 2019-02-21 | Stop reason: HOSPADM

## 2019-02-20 ASSESSMENT — PAIN DESCRIPTION - ONSET: ONSET: ON-GOING

## 2019-02-20 ASSESSMENT — PAIN SCALES - GENERAL: PAINLEVEL_OUTOF10: 9

## 2019-02-20 ASSESSMENT — PAIN DESCRIPTION - LOCATION: LOCATION: COCCYX;SACRUM

## 2019-02-20 ASSESSMENT — PAIN DESCRIPTION - PAIN TYPE: TYPE: CHRONIC PAIN

## 2019-02-20 ASSESSMENT — PAIN - FUNCTIONAL ASSESSMENT: PAIN_FUNCTIONAL_ASSESSMENT: PREVENTS OR INTERFERES SOME ACTIVE ACTIVITIES AND ADLS

## 2019-02-20 ASSESSMENT — PAIN DESCRIPTION - FREQUENCY: FREQUENCY: CONTINUOUS

## 2019-02-20 ASSESSMENT — PAIN DESCRIPTION - DESCRIPTORS: DESCRIPTORS: STABBING;BURNING

## 2019-02-20 ASSESSMENT — PAIN DESCRIPTION - PROGRESSION: CLINICAL_PROGRESSION: NOT CHANGED

## 2019-03-01 ENCOUNTER — HOSPITAL ENCOUNTER (OUTPATIENT)
Dept: WOUND CARE | Age: 64
Discharge: HOME | End: 2019-03-01
Payer: MEDICARE

## 2019-03-01 VITALS
TEMPERATURE: 98.6 F | WEIGHT: 267.6 LBS | BODY MASS INDEX: 38.31 KG/M2 | DIASTOLIC BLOOD PRESSURE: 61 MMHG | SYSTOLIC BLOOD PRESSURE: 109 MMHG | HEART RATE: 89 BPM | HEIGHT: 70 IN | RESPIRATION RATE: 18 BRPM

## 2019-03-01 PROCEDURE — 99213 OFFICE O/P EST LOW 20 MIN: CPT

## 2019-03-01 PROCEDURE — 99213 OFFICE O/P EST LOW 20 MIN: CPT | Performed by: INTERNAL MEDICINE

## 2019-03-01 RX ORDER — LIDOCAINE 40 MG/G
CREAM TOPICAL ONCE
Status: DISCONTINUED | OUTPATIENT
Start: 2019-03-01 | End: 2019-03-02 | Stop reason: HOSPADM

## 2019-03-01 RX ORDER — HYDROMORPHONE HYDROCHLORIDE 4 MG/1
4 TABLET ORAL EVERY 8 HOURS
Status: ON HOLD | COMMUNITY
End: 2019-03-19 | Stop reason: SDUPTHER

## 2019-03-01 ASSESSMENT — PAIN SCALES - GENERAL: PAINLEVEL_OUTOF10: 9

## 2019-03-01 ASSESSMENT — PAIN DESCRIPTION - LOCATION: LOCATION: COCCYX;SACRUM

## 2019-03-01 ASSESSMENT — PAIN DESCRIPTION - PAIN TYPE: TYPE: CHRONIC PAIN

## 2019-03-01 ASSESSMENT — PAIN DESCRIPTION - FREQUENCY: FREQUENCY: CONTINUOUS

## 2019-03-01 ASSESSMENT — PAIN DESCRIPTION - PROGRESSION: CLINICAL_PROGRESSION: GRADUALLY WORSENING

## 2019-03-01 ASSESSMENT — PAIN - FUNCTIONAL ASSESSMENT: PAIN_FUNCTIONAL_ASSESSMENT: PREVENTS OR INTERFERES SOME ACTIVE ACTIVITIES AND ADLS

## 2019-03-01 ASSESSMENT — PAIN DESCRIPTION - DESCRIPTORS: DESCRIPTORS: STABBING;BURNING

## 2019-03-01 ASSESSMENT — PAIN DESCRIPTION - ORIENTATION: ORIENTATION: MID

## 2019-03-01 ASSESSMENT — PAIN DESCRIPTION - ONSET: ONSET: ON-GOING

## 2019-03-06 ENCOUNTER — HOSPITAL ENCOUNTER (OUTPATIENT)
Dept: WOUND CARE | Age: 64
Discharge: HOME | End: 2019-03-06
Payer: MEDICARE

## 2019-03-06 VITALS
BODY MASS INDEX: 38.22 KG/M2 | DIASTOLIC BLOOD PRESSURE: 70 MMHG | WEIGHT: 267 LBS | HEIGHT: 70 IN | SYSTOLIC BLOOD PRESSURE: 105 MMHG | TEMPERATURE: 97.8 F | RESPIRATION RATE: 20 BRPM | HEART RATE: 93 BPM

## 2019-03-06 DIAGNOSIS — L89.302 PRESSURE INJURY OF BUTTOCK, STAGE 2, UNSPECIFIED LATERALITY (HCC): ICD-10-CM

## 2019-03-06 DIAGNOSIS — R26.2 AMBULATORY DYSFUNCTION: ICD-10-CM

## 2019-03-06 PROCEDURE — 99213 OFFICE O/P EST LOW 20 MIN: CPT

## 2019-03-06 PROCEDURE — 99213 OFFICE O/P EST LOW 20 MIN: CPT | Performed by: INTERNAL MEDICINE

## 2019-03-06 RX ORDER — LIDOCAINE 40 MG/G
CREAM TOPICAL ONCE
Status: DISCONTINUED | OUTPATIENT
Start: 2019-03-06 | End: 2019-03-07 | Stop reason: HOSPADM

## 2019-03-06 ASSESSMENT — PAIN DESCRIPTION - PAIN TYPE: TYPE: CHRONIC PAIN

## 2019-03-06 ASSESSMENT — PAIN - FUNCTIONAL ASSESSMENT: PAIN_FUNCTIONAL_ASSESSMENT: PREVENTS OR INTERFERES SOME ACTIVE ACTIVITIES AND ADLS

## 2019-03-06 ASSESSMENT — PAIN DESCRIPTION - DESCRIPTORS: DESCRIPTORS: BURNING;CONSTANT;STABBING

## 2019-03-06 ASSESSMENT — PAIN SCALES - GENERAL: PAINLEVEL_OUTOF10: 9

## 2019-03-06 ASSESSMENT — PAIN DESCRIPTION - ORIENTATION: ORIENTATION: RIGHT;LEFT;MID

## 2019-03-06 ASSESSMENT — PAIN DESCRIPTION - LOCATION: LOCATION: BUTTOCKS;COCCYX;SACRUM

## 2019-03-06 ASSESSMENT — PAIN DESCRIPTION - PROGRESSION: CLINICAL_PROGRESSION: GRADUALLY WORSENING

## 2019-03-06 ASSESSMENT — PAIN DESCRIPTION - ONSET: ONSET: ON-GOING

## 2019-03-06 ASSESSMENT — PAIN DESCRIPTION - FREQUENCY: FREQUENCY: CONTINUOUS

## 2019-03-07 PROBLEM — R53.1 GENERAL WEAKNESS: Status: RESOLVED | Noted: 2018-03-23 | Resolved: 2019-03-07

## 2019-03-07 PROBLEM — L89.302 PRESSURE INJURY OF BUTTOCK, STAGE 2 (HCC): Status: ACTIVE | Noted: 2019-03-07

## 2019-03-07 PROBLEM — M47.816 DEGENERATIVE ARTHRITIS OF LUMBAR SPINE: Status: ACTIVE | Noted: 2019-03-07

## 2019-03-07 PROBLEM — L89.153 PRESSURE ULCER OF SACRAL REGION, STAGE 3 (HCC): Status: ACTIVE | Noted: 2019-02-07

## 2019-03-07 PROBLEM — R26.2 AMBULATORY DYSFUNCTION: Status: ACTIVE | Noted: 2019-03-07

## 2019-03-15 ENCOUNTER — HOSPITAL ENCOUNTER (OUTPATIENT)
Dept: WOUND CARE | Age: 64
Discharge: HOME | DRG: 602 | End: 2019-03-15
Payer: MEDICARE

## 2019-03-15 ENCOUNTER — HOSPITAL ENCOUNTER (INPATIENT)
Age: 64
LOS: 6 days | Discharge: NURSING, REHAB OR LONG-TERM CARE | DRG: 602 | End: 2019-03-21
Attending: INTERNAL MEDICINE | Admitting: INTERNAL MEDICINE
Payer: MEDICARE

## 2019-03-15 VITALS
HEART RATE: 76 BPM | RESPIRATION RATE: 18 BRPM | WEIGHT: 260.1 LBS | TEMPERATURE: 96.9 F | SYSTOLIC BLOOD PRESSURE: 101 MMHG | BODY MASS INDEX: 37.24 KG/M2 | DIASTOLIC BLOOD PRESSURE: 70 MMHG | HEIGHT: 70 IN

## 2019-03-15 DIAGNOSIS — R11.14 BILIOUS VOMITING WITHOUT NAUSEA: ICD-10-CM

## 2019-03-15 DIAGNOSIS — G89.4 PAIN SYNDROME, CHRONIC: Primary | Chronic | ICD-10-CM

## 2019-03-15 PROBLEM — L03.317 CELLULITIS OF BUTTOCK: Status: ACTIVE | Noted: 2019-03-15

## 2019-03-15 PROBLEM — N17.9 ACUTE KIDNEY INJURY (HCC): Status: ACTIVE | Noted: 2019-03-15

## 2019-03-15 LAB
A/G RATIO: 1 (ref 1.1–2.2)
ALBUMIN SERPL-MCNC: 3.6 G/DL (ref 3.4–5)
ALP BLD-CCNC: 145 U/L (ref 40–129)
ALT SERPL-CCNC: 18 U/L (ref 10–40)
ANION GAP SERPL CALCULATED.3IONS-SCNC: 12 MMOL/L (ref 3–16)
AST SERPL-CCNC: 14 U/L (ref 15–37)
BASOPHILS ABSOLUTE: 0.1 K/UL (ref 0–0.2)
BASOPHILS RELATIVE PERCENT: 1.1 %
BILIRUB SERPL-MCNC: 0.3 MG/DL (ref 0–1)
BUN BLDV-MCNC: 43 MG/DL (ref 7–20)
CALCIUM SERPL-MCNC: 9.4 MG/DL (ref 8.3–10.6)
CHLORIDE BLD-SCNC: 97 MMOL/L (ref 99–110)
CO2: 30 MMOL/L (ref 21–32)
CREAT SERPL-MCNC: 2.2 MG/DL (ref 0.8–1.3)
EOSINOPHILS ABSOLUTE: 0.2 K/UL (ref 0–0.6)
EOSINOPHILS RELATIVE PERCENT: 2.3 %
GFR AFRICAN AMERICAN: 37
GFR NON-AFRICAN AMERICAN: 30
GLOBULIN: 3.5 G/DL
GLUCOSE BLD-MCNC: 132 MG/DL (ref 70–99)
GLUCOSE BLD-MCNC: 145 MG/DL (ref 70–99)
GLUCOSE BLD-MCNC: 183 MG/DL (ref 70–99)
GLUCOSE BLD-MCNC: 205 MG/DL (ref 70–99)
HCT VFR BLD CALC: 40.1 % (ref 40.5–52.5)
HEMOGLOBIN: 12.9 G/DL (ref 13.5–17.5)
LYMPHOCYTES ABSOLUTE: 1 K/UL (ref 1–5.1)
LYMPHOCYTES RELATIVE PERCENT: 11.5 %
MCH RBC QN AUTO: 25.8 PG (ref 26–34)
MCHC RBC AUTO-ENTMCNC: 32.2 G/DL (ref 31–36)
MCV RBC AUTO: 80.2 FL (ref 80–100)
MONOCYTES ABSOLUTE: 0.8 K/UL (ref 0–1.3)
MONOCYTES RELATIVE PERCENT: 8.8 %
NEUTROPHILS ABSOLUTE: 6.7 K/UL (ref 1.7–7.7)
NEUTROPHILS RELATIVE PERCENT: 76.3 %
PDW BLD-RTO: 14.8 % (ref 12.4–15.4)
PERFORMED ON: ABNORMAL
PLATELET # BLD: 191 K/UL (ref 135–450)
PMV BLD AUTO: 8.8 FL (ref 5–10.5)
POTASSIUM SERPL-SCNC: 4.7 MMOL/L (ref 3.5–5.1)
RBC # BLD: 5.01 M/UL (ref 4.2–5.9)
SODIUM BLD-SCNC: 139 MMOL/L (ref 136–145)
TOTAL PROTEIN: 7.1 G/DL (ref 6.4–8.2)
WBC # BLD: 8.8 K/UL (ref 4–11)

## 2019-03-15 PROCEDURE — 2500000003 HC RX 250 WO HCPCS: Performed by: PHYSICIAN ASSISTANT

## 2019-03-15 PROCEDURE — 2580000003 HC RX 258: Performed by: PHYSICIAN ASSISTANT

## 2019-03-15 PROCEDURE — 6370000000 HC RX 637 (ALT 250 FOR IP): Performed by: PHYSICIAN ASSISTANT

## 2019-03-15 PROCEDURE — 85025 COMPLETE CBC W/AUTO DIFF WBC: CPT

## 2019-03-15 PROCEDURE — 87077 CULTURE AEROBIC IDENTIFY: CPT

## 2019-03-15 PROCEDURE — 87070 CULTURE OTHR SPECIMN AEROBIC: CPT

## 2019-03-15 PROCEDURE — 87205 SMEAR GRAM STAIN: CPT

## 2019-03-15 PROCEDURE — 1200000000 HC SEMI PRIVATE

## 2019-03-15 PROCEDURE — 99214 OFFICE O/P EST MOD 30 MIN: CPT | Performed by: INTERNAL MEDICINE

## 2019-03-15 PROCEDURE — 83036 HEMOGLOBIN GLYCOSYLATED A1C: CPT

## 2019-03-15 PROCEDURE — 36415 COLL VENOUS BLD VENIPUNCTURE: CPT

## 2019-03-15 PROCEDURE — 94761 N-INVAS EAR/PLS OXIMETRY MLT: CPT

## 2019-03-15 PROCEDURE — 99223 1ST HOSP IP/OBS HIGH 75: CPT | Performed by: PHYSICIAN ASSISTANT

## 2019-03-15 PROCEDURE — 80053 COMPREHEN METABOLIC PANEL: CPT

## 2019-03-15 PROCEDURE — 6360000002 HC RX W HCPCS: Performed by: PHYSICIAN ASSISTANT

## 2019-03-15 PROCEDURE — 94640 AIRWAY INHALATION TREATMENT: CPT

## 2019-03-15 PROCEDURE — 99213 OFFICE O/P EST LOW 20 MIN: CPT

## 2019-03-15 PROCEDURE — 87186 SC STD MICRODIL/AGAR DIL: CPT

## 2019-03-15 RX ORDER — HYDROMORPHONE HYDROCHLORIDE 2 MG/1
4 TABLET ORAL EVERY 8 HOURS
Status: DISCONTINUED | OUTPATIENT
Start: 2019-03-15 | End: 2019-03-21 | Stop reason: HOSPADM

## 2019-03-15 RX ORDER — ONDANSETRON 2 MG/ML
4 INJECTION INTRAMUSCULAR; INTRAVENOUS EVERY 6 HOURS PRN
Status: DISCONTINUED | OUTPATIENT
Start: 2019-03-15 | End: 2019-03-21 | Stop reason: HOSPADM

## 2019-03-15 RX ORDER — DEXTROSE MONOHYDRATE 50 MG/ML
100 INJECTION, SOLUTION INTRAVENOUS PRN
Status: DISCONTINUED | OUTPATIENT
Start: 2019-03-15 | End: 2019-03-21 | Stop reason: HOSPADM

## 2019-03-15 RX ORDER — MONTELUKAST SODIUM 10 MG/1
10 TABLET ORAL NIGHTLY
Status: DISCONTINUED | OUTPATIENT
Start: 2019-03-15 | End: 2019-03-21 | Stop reason: HOSPADM

## 2019-03-15 RX ORDER — PRAVASTATIN SODIUM 40 MG
40 TABLET ORAL NIGHTLY
Status: DISCONTINUED | OUTPATIENT
Start: 2019-03-15 | End: 2019-03-21 | Stop reason: HOSPADM

## 2019-03-15 RX ORDER — ALBUTEROL SULFATE 90 UG/1
2 AEROSOL, METERED RESPIRATORY (INHALATION) EVERY 4 HOURS PRN
Status: DISCONTINUED | OUTPATIENT
Start: 2019-03-15 | End: 2019-03-21 | Stop reason: HOSPADM

## 2019-03-15 RX ORDER — SODIUM CHLORIDE 0.9 % (FLUSH) 0.9 %
10 SYRINGE (ML) INJECTION PRN
Status: DISCONTINUED | OUTPATIENT
Start: 2019-03-15 | End: 2019-03-21 | Stop reason: HOSPADM

## 2019-03-15 RX ORDER — LIDOCAINE 40 MG/G
CREAM TOPICAL ONCE
Status: DISCONTINUED | OUTPATIENT
Start: 2019-03-15 | End: 2019-03-16 | Stop reason: HOSPADM

## 2019-03-15 RX ORDER — POTASSIUM CHLORIDE 7.45 MG/ML
10 INJECTION INTRAVENOUS PRN
Status: DISCONTINUED | OUTPATIENT
Start: 2019-03-15 | End: 2019-03-21 | Stop reason: HOSPADM

## 2019-03-15 RX ORDER — SODIUM CHLORIDE 0.9 % (FLUSH) 0.9 %
10 SYRINGE (ML) INJECTION EVERY 12 HOURS SCHEDULED
Status: DISCONTINUED | OUTPATIENT
Start: 2019-03-15 | End: 2019-03-21 | Stop reason: HOSPADM

## 2019-03-15 RX ORDER — BUPROPION HYDROCHLORIDE 300 MG/1
400 TABLET ORAL DAILY
Status: DISCONTINUED | OUTPATIENT
Start: 2019-03-16 | End: 2019-03-15

## 2019-03-15 RX ORDER — DOCUSATE SODIUM 100 MG/1
100 CAPSULE, LIQUID FILLED ORAL 2 TIMES DAILY
Status: DISCONTINUED | OUTPATIENT
Start: 2019-03-15 | End: 2019-03-21 | Stop reason: HOSPADM

## 2019-03-15 RX ORDER — OXYCODONE HYDROCHLORIDE 5 MG/1
5 TABLET ORAL EVERY 4 HOURS PRN
Status: DISCONTINUED | OUTPATIENT
Start: 2019-03-15 | End: 2019-03-21 | Stop reason: HOSPADM

## 2019-03-15 RX ORDER — BUPROPION HYDROCHLORIDE 200 MG/1
400 TABLET, EXTENDED RELEASE ORAL DAILY
COMMUNITY

## 2019-03-15 RX ORDER — MAGNESIUM SULFATE 1 G/100ML
1 INJECTION INTRAVENOUS PRN
Status: DISCONTINUED | OUTPATIENT
Start: 2019-03-15 | End: 2019-03-21 | Stop reason: HOSPADM

## 2019-03-15 RX ORDER — INSULIN GLARGINE 100 [IU]/ML
30 INJECTION, SOLUTION SUBCUTANEOUS NIGHTLY
Status: DISCONTINUED | OUTPATIENT
Start: 2019-03-15 | End: 2019-03-21 | Stop reason: HOSPADM

## 2019-03-15 RX ORDER — SODIUM CHLORIDE 9 MG/ML
INJECTION, SOLUTION INTRAVENOUS CONTINUOUS
Status: DISCONTINUED | OUTPATIENT
Start: 2019-03-15 | End: 2019-03-17

## 2019-03-15 RX ORDER — ZINC SULFATE 50(220)MG
220 CAPSULE ORAL DAILY
Status: DISCONTINUED | OUTPATIENT
Start: 2019-03-15 | End: 2019-03-21 | Stop reason: HOSPADM

## 2019-03-15 RX ORDER — ZAFIRLUKAST 20 MG/1
20 TABLET, FILM COATED ORAL 2 TIMES DAILY
Status: DISCONTINUED | OUTPATIENT
Start: 2019-03-15 | End: 2019-03-15 | Stop reason: CLARIF

## 2019-03-15 RX ORDER — ACETAMINOPHEN 325 MG/1
650 TABLET ORAL EVERY 4 HOURS PRN
Status: DISCONTINUED | OUTPATIENT
Start: 2019-03-15 | End: 2019-03-21 | Stop reason: HOSPADM

## 2019-03-15 RX ORDER — ALLOPURINOL 300 MG/1
300 TABLET ORAL DAILY
Status: DISCONTINUED | OUTPATIENT
Start: 2019-03-16 | End: 2019-03-21 | Stop reason: HOSPADM

## 2019-03-15 RX ORDER — FERROUS SULFATE 325(65) MG
325 TABLET ORAL
Status: DISCONTINUED | OUTPATIENT
Start: 2019-03-16 | End: 2019-03-21 | Stop reason: HOSPADM

## 2019-03-15 RX ORDER — TAMSULOSIN HYDROCHLORIDE 0.4 MG/1
0.4 CAPSULE ORAL DAILY
Status: DISCONTINUED | OUTPATIENT
Start: 2019-03-16 | End: 2019-03-21 | Stop reason: HOSPADM

## 2019-03-15 RX ORDER — GABAPENTIN 300 MG/1
600 CAPSULE ORAL NIGHTLY
Status: DISCONTINUED | OUTPATIENT
Start: 2019-03-15 | End: 2019-03-21 | Stop reason: HOSPADM

## 2019-03-15 RX ORDER — DEXTROSE MONOHYDRATE 25 G/50ML
12.5 INJECTION, SOLUTION INTRAVENOUS PRN
Status: DISCONTINUED | OUTPATIENT
Start: 2019-03-15 | End: 2019-03-21 | Stop reason: HOSPADM

## 2019-03-15 RX ORDER — NICOTINE POLACRILEX 4 MG
15 LOZENGE BUCCAL PRN
Status: DISCONTINUED | OUTPATIENT
Start: 2019-03-15 | End: 2019-03-21 | Stop reason: HOSPADM

## 2019-03-15 RX ORDER — BUPROPION HYDROCHLORIDE 100 MG/1
400 TABLET, EXTENDED RELEASE ORAL DAILY
Status: DISCONTINUED | OUTPATIENT
Start: 2019-03-15 | End: 2019-03-21 | Stop reason: HOSPADM

## 2019-03-15 RX ORDER — FLUTICASONE PROPIONATE 50 MCG
1 SPRAY, SUSPENSION (ML) NASAL DAILY
Status: DISCONTINUED | OUTPATIENT
Start: 2019-03-15 | End: 2019-03-21 | Stop reason: HOSPADM

## 2019-03-15 RX ORDER — ARIPIPRAZOLE 10 MG/1
30 TABLET ORAL DAILY
Status: DISCONTINUED | OUTPATIENT
Start: 2019-03-16 | End: 2019-03-21 | Stop reason: HOSPADM

## 2019-03-15 RX ORDER — POTASSIUM CHLORIDE 20 MEQ/1
40 TABLET, EXTENDED RELEASE ORAL PRN
Status: DISCONTINUED | OUTPATIENT
Start: 2019-03-15 | End: 2019-03-21 | Stop reason: HOSPADM

## 2019-03-15 RX ADMIN — INSULIN GLARGINE 30 UNITS: 100 INJECTION, SOLUTION SUBCUTANEOUS at 22:37

## 2019-03-15 RX ADMIN — DOCUSATE SODIUM 100 MG: 100 CAPSULE, LIQUID FILLED ORAL at 22:09

## 2019-03-15 RX ADMIN — MONTELUKAST SODIUM 10 MG: 10 TABLET, FILM COATED ORAL at 22:11

## 2019-03-15 RX ADMIN — SODIUM CHLORIDE: 9 INJECTION, SOLUTION INTRAVENOUS at 16:05

## 2019-03-15 RX ADMIN — HYDROMORPHONE HYDROCHLORIDE 4 MG: 2 TABLET ORAL at 16:34

## 2019-03-15 RX ADMIN — Medication 2 PUFF: at 19:41

## 2019-03-15 RX ADMIN — AZTREONAM 1 G: 2 INJECTION, POWDER, LYOPHILIZED, FOR SOLUTION INTRAMUSCULAR; INTRAVENOUS at 16:16

## 2019-03-15 RX ADMIN — Medication 1.5 G: at 17:10

## 2019-03-15 RX ADMIN — INSULIN LISPRO 1 UNITS: 100 INJECTION, SOLUTION INTRAVENOUS; SUBCUTANEOUS at 22:35

## 2019-03-15 RX ADMIN — GABAPENTIN 600 MG: 300 CAPSULE ORAL at 22:10

## 2019-03-15 RX ADMIN — ENOXAPARIN SODIUM 40 MG: 100 INJECTION SUBCUTANEOUS at 16:16

## 2019-03-15 RX ADMIN — ZINC SULFATE 220 MG (50 MG) CAPSULE 220 MG: CAPSULE at 16:16

## 2019-03-15 RX ADMIN — Medication 10 ML: at 16:03

## 2019-03-15 RX ADMIN — AZTREONAM 1 G: 2 INJECTION, POWDER, LYOPHILIZED, FOR SOLUTION INTRAMUSCULAR; INTRAVENOUS at 22:14

## 2019-03-15 RX ADMIN — PRAVASTATIN SODIUM 40 MG: 40 TABLET ORAL at 22:10

## 2019-03-15 RX ADMIN — SODIUM CHLORIDE: 9 INJECTION, SOLUTION INTRAVENOUS at 22:13

## 2019-03-15 ASSESSMENT — PAIN - FUNCTIONAL ASSESSMENT: PAIN_FUNCTIONAL_ASSESSMENT: PREVENTS OR INTERFERES SOME ACTIVE ACTIVITIES AND ADLS

## 2019-03-15 ASSESSMENT — PAIN DESCRIPTION - FREQUENCY: FREQUENCY: CONTINUOUS

## 2019-03-15 ASSESSMENT — PAIN DESCRIPTION - PAIN TYPE: TYPE: CHRONIC PAIN

## 2019-03-15 ASSESSMENT — PAIN DESCRIPTION - LOCATION: LOCATION: BUTTOCKS

## 2019-03-15 ASSESSMENT — PAIN SCALES - GENERAL
PAINLEVEL_OUTOF10: 9
PAINLEVEL_OUTOF10: 9

## 2019-03-15 ASSESSMENT — PAIN DESCRIPTION - PROGRESSION: CLINICAL_PROGRESSION: GRADUALLY WORSENING

## 2019-03-15 ASSESSMENT — PAIN DESCRIPTION - ORIENTATION: ORIENTATION: MID

## 2019-03-15 ASSESSMENT — PAIN DESCRIPTION - DESCRIPTORS: DESCRIPTORS: BURNING;STABBING

## 2019-03-15 ASSESSMENT — PAIN DESCRIPTION - ONSET: ONSET: ON-GOING

## 2019-03-15 NOTE — PROGRESS NOTES
Nutrition Assessment    Type and Reason for Visit: Initial, Consult, Positive Nutrition Screen(consult for nutritional assessment for Ramirez Score; + screen for multiple stage 2+ wounds/pressure ulcers and poor po intake)    Nutrition Recommendations:   1. Continue CCC-4 diet order - patient has allergies to aspartame and phenylalanine. 2. Added Ensure high-protein with breakfast and dinner meals d/t increased protein needs r/t multiple non-healing stage 2+ wounds/pressure ulcers. 3. Monitor appetite and po intake. 4. Monitor for in-patient weight changes. 5. Monitor nutrition-related labs and bowel function/regimen. Nutrition Assessment: patient is nutritionally compromised AEB multiple non-healing stage 2+ wounds/pressure ulcers and report of poor po intake PTA and he is at risk for further compromise d/t increased protein needs r/t multiple non-healing wounds/pressure ulcers + patient developed cellulitis on his buttocks and acute kidney injury; will continue CCC-4 diet order and add Ensure high-protein ONS with breakfast and dinner meals for increased protein needs    Malnutrition Assessment:  · Malnutrition Status: At risk for malnutrition  · Context: Acute illness or injury  · Findings of the 6 clinical characteristics of malnutrition (Minimum of 2 out of 6 clinical characteristics is required to make the diagnosis of moderate or severe Protein Calorie Malnutrition based on AND/ASPEN Guidelines):  1. Energy Intake-Less than or equal to 75% of estimated energy requirement, Unable to assess    2. Weight Loss-5% loss or greater(- 17# or 6% weight loss), in 1 year  3. Fat Loss-No significant subcutaneous fat loss,    4. Muscle Loss-No significant muscle mass loss,    5. Fluid Accumulation-Unable to assess,    6.   Strength-Not measured    Nutrition Risk Level: High    Nutrient Needs:  · Estimated Daily Total Kcal: 1770 - 2124 kcals based on 15-18 kcals/kg/CBW  · Estimated Daily Protein (g): 113 - 150 g protein based on 1.5-2.0 g/kg/IBW  · Estimated Daily Total Fluid (ml/day): 1700 - 2100 ml     Nutrition Diagnosis:   · Problem: Increased nutrient needs(protein for multiple non-healing stage 2+ wounds/pressure ulcers)  · Etiology: related to Increased demand for energy/nutrients, Insufficient energy/nutrient consumption     Signs and symptoms:  as evidenced by Patient report of, Diet history of poor intake, Presence of wounds    Objective Information:  · Nutrition-Focused Physical Findings: patient has multiple non-healing stage 2+ wounds/pressure ulcers on his body; patient developed cellulitis on buttocks and was admitted to hospital from [facility] today; + trach patient; + generalized weakness noted; unknown when last BM occurred  · Wound Type: Multiple, Pressure Ulcer, Stage II, Stage III, Stage IV(stage 3 on sacrum; stage 2 on L buttocks; stage 2 on R buttocks; stage 4 on coccyx)  · Current Nutrition Therapies:  · Oral Diet Orders: Carb Control 4 Carbs/Meal   · Oral Diet intake: Unable to assess(no po intake recorded at this time; his first meal will be dinner)  · Oral Nutrition Supplement (ONS) Orders: None  · ONS intake: (none ordered)  · Anthropometric Measures:  · Ht: 5' 10\" (177.8 cm)   · Current Body Wt: 260 lb 2 oz (118 kg)  · Admission Body Wt: 262 lb (118.8 kg)  · Usual Body Wt: (270's to 280's, per past weights in EMR; patient weighed 277# 2 oz on 5/18/18)  · Weight Change:  - 17# or 6% weight loss x 1 year  · Ideal Body Wt: 166 lb (75.3 kg), % Ideal Body 157%  · BMI Classification: BMI 35.0 - 39.9 Obese Class II    Nutrition Interventions:   Continue current diet, Start ONS  Continued Inpatient Monitoring, Coordination of Care, Coordination of Community Care    Nutrition Evaluation:   · Evaluation: Goals set   · Goals: patient will consume 50% or greater of meals on CCC-4 diet order x 3 meals per day; weight will remain stable during remainder of admission (no significant weight loss or weight gain); patient will accept and consume Ensure high-protein with breakfast and dinner meals     · Monitoring: Meal Intake, Supplement Intake, Diet Tolerance, Skin Integrity, Wound Healing, Weight, Pertinent Labs, Monitor Bowel Function      Electronically signed by Mai Liu RD, MARY on 3/15/19 at 4:42 PM    Contact Number: [phone]

## 2019-03-15 NOTE — PROGRESS NOTES
attempt to quit: 2000     Years since quittin.2    Smokeless tobacco: Never Used   Substance Use Topics    Alcohol use: No    Drug use: No       Recent Surgical History: None = 0  Past Surgical History  Past Surgical History:   Procedure Laterality Date    BACK SURGERY      ENDOSCOPY, COLON, DIAGNOSTIC      KNEE SURGERY      X 4    LUMBAR FUSION      LUMBAR LAMINECTOMY      OTHER SURGICAL HISTORY Right 2017    Pain Pump insertion    [place] [address] W/BRNCL ALVEOLAR LAVAGE N/A 2018    BRONCHOSCOPY ALVEOLAR LAVAGE performed by Robin Lewis MD at [address] [address]      x2    TONSILLECTOMY AND ADENOIDECTOMY      TOTAL KNEE ARTHROPLASTY      TRACHEOSTOMY      over 27 trach operations due to MRSA infections in the trach    UPPER GASTROINTESTINAL ENDOSCOPY  16    removal of foreign body    UVULOPALATOPHARYGOPLASTY      VENA CAVA FILTER PLACEMENT         Level of Consciousness: Alert, Oriented, and Cooperative = 0    Level of Activity: Mostly sedentary, minimal walking = 2    Respiratory Pattern: Regular Pattern; RR 8-20 = 0    Breath Sounds: Diminshed bilaterally and/or crackles = 2    Sputum   ,  ,    Cough: Strong, spontaneous, non-productive = 0    Vital Signs   /68   Pulse 78   Temp 97.3 °F (36.3 °C) (Oral)   Resp 17   Ht 5' 10\" (1.778 m)   Wt 260 lb 2.3 oz (118 kg)   SpO2 96%   BMI 37.33 kg/m²   SPO2 (COPD values may differ): Greater than or equal to 92% on room air = 0    Peak Flow (asthma only): not applicable = 0    RSI: 5-6 = Q4hr PRN (every four hours as needed) for dyspnea        Plan       Goals: medication delivery, mobilize retained secretions, volume expansion and improve oxygenation    Patient/caregiver was educated on the proper method of use for Respiratory Care Devices:  Yes      Level of patient/caregiver understanding able to:   ? Verbalize understanding   ? Demonstrate understanding       ? Teach back        ?  Needs reinforcement ?  No available caregiver               ? Other:     Response to education:  Excellent     Is patient being placed on Home Treatment Regimen? Yes     Does the patient have everything they need prior to discharge? NA     Comments: chart reviewed and patient assessed    Plan of Care: as ordered, home regimen, patient with Alicja Newell, family will bring in extra    Electronically signed by Kamaljit Hatch RCP on 3/15/2019 at 4:33 PM    Respiratory Protocol Guidelines     1. Assessment and treatment by Respiratory Therapy will be initiated for medication and therapeutic interventions upon initiation of aerosolized medication. 2. Physician will be contacted for respiratory rate (RR) greater than 35 breaths per minute. Therapy will be held for heart rate (HR) greater than 140 beats per minute, pending direction from physician. 3. Bronchodilators will be administered via Metered Dose Inhaler (MDI) with spacer when the following criteria are met:  a. Alert and cooperative     b. HR < 140 bpm  c. RR < 30 bpm                d. Can demonstrate a 2-3 second inspiratory hold  4. Bronchodilators will be administered via Hand Held Nebulizer [facility]ISHE[facility]) to patients when ANY of the following criteria are met  a. Incognizant or uncooperative          b. Patients treated with HHN at Home        c. Unable to demonstrate proper use of MDI with spacer     d. RR > 30 bpm   5. Bronchodilators will be delivered via Metered Dose Inhaler (MDI), HHN, Aerogen to intubated patients on mechanical ventilation. 6. Inhalation medication orders will be delivered and/or substituted as outlined below. Aerosolized Medications Ordering and Administration Guidelines:    1. All Medications will be ordered by a physician, and their frequency and/or modality will be adjusted as defined by the patients Respiratory Severity Index (RSI) score.   2. If the patient does not have documented COPD, consider discontinuing anticholinergics when RSI is less than 9.  3. If the bronchospasm worsens (increased RSI), then the bronchodilator frequency can be increased to a maximum of every 4 hours. If greater than every 4 hours is required, the physician will be contacted. 4. If the bronchospasm improves, the frequency of the bronchodilator can be decreased, based on the patient's RSI, but not less than home treatment regimen frequency. 5. Bronchodilator(s) will be discontinued if patient has a RSI less than 9 and has received no scheduled or as needed treatment for 72  Hrs. Patients Ordered on a Mucolytic Agent:    1. Must always be administered with a bronchodilator. 2. Discontinue if patient experiences worsened bronchospasm, or secretions have lessened to the point that the patient is able to clear them with a cough. Anti-inflammatory and Combination Medications:    1. If the patient lacks prior history of lung disease, is not using inhaled anti-inflammatory medication at home, and lacks wheezing by examination or by history for at least 24 hours, contact physician for possible discontinuation.

## 2019-03-15 NOTE — PROGRESS NOTES
Pharmacy note:  Vancomycin Day #  1  Patient is a 58 yo male admitted from the wound care clinic with  cellulitis of the buttock. Patient has allergies to PCN, Cefuroxime, and Clindamycin. Pt was started on Aztreonam to cover gram negative organisms, including Pseudomonas. Pt was also started on Vancomycin to cover gram positive organisms, including MRSA. WBC           BUN/SCr            Calc. CrCl                Ht.             Wt.   8.8               43/2.2               35.9 ml/min             5'10\"          118 kg. Adjusted dosing weight = 91 kg. Based on patient's age, weight, and renal function will start Vancomycin 1500 mg IVPB q24h and check trough at steady state. Will adjust as necessary. Vee Saldivar, Pharm. D. 3/15/2019 2:12 PM

## 2019-03-15 NOTE — PLAN OF CARE
Nutrition Problem: Increased nutrient needs(protein for multiple non-healing stage 2+ wounds/pressure ulcers)  Intervention: Food and/or Nutrient Delivery: Continue current diet, Start ONS  Nutritional Goals: patient will consume 50% or greater of meals on CCC-4 diet order x 3 meals per day; weight will remain stable during remainder of admission (no significant weight loss or weight gain); patient will accept and consume Ensure high-protein with breakfast and dinner meals

## 2019-03-15 NOTE — H&P
Hospital Medicine History & Physical      PCP: Harry Forde    Date of Admission: 3/15/2019    Date of Service: Pt seen/examined on 3/15/2019    Chief Complaint:  No chief complaint on file. History Of Present Illness: The patient is a 59 y.o. male with a PMH of DM type II, Gout, COPD, Hx of Tracheostomy who presented as a direct admit from Dr. Franky Thorne office for cellulitis to buttocks 2/2 pressure ulcer forming on sacrum and coccyx. Pt states he has had a chronic wound to his buttocks 2/2 pressure ulcers. He is wheelchair bound. He denies any fevers, nausea, vomiting, or diarrhea. His intake has been poor but pt states he has been drinking plenty of fluids. 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 He does also have chronic pain syndrome and pt states he has a pain pump as well as PO dilaudid. Past Medical History:        Diagnosis Date    Abscess or cellulitis of leg 4/8/2012    Acute renal failure ([address] 4/10/2012    Acute tracheitis with airway obstruction     [facility] pectoris ([facility])     ARF (acute renal failure) ([facility]) 9/28/2018    Arthritis     Asthma     Cellulitis of buttock     Cellulitis of sacral region     CHF (congestive heart failure) ([facility])     Claustrophobia     Colitis     COPD (chronic obstructive pulmonary disease) ([facility])     Decubitus ulcer     Decubitus ulcer of left buttock, stage 2 10/17/2017    Diabetes mellitus ([address]     Diverticulitis     DVT (deep venous thrombosis) ([facility])     Gangrene ([facility])     on lower back to upper thigh    Gout     HCAP (healthcare-associated pneumonia)     Hyperlipidemia     Hypertension     Low iron     MRSA (methicillin resistant staph aureus) culture positive     tracheobronchitis, hx per pt.     Panic attack     Pressure ulcer of coccygeal region, stage 2 9/24/2016    Pressure ulcer of left heel, stage 2 10/10/2017    Tracheostomy infection ([address] 11/3/2016    Tracheostomy infection [facility])     9/18    Unspecified cerebral artery occlusion with cerebral infarction     UTI (urinary tract infection)        Past Surgical History:        Procedure Laterality Date    BACK SURGERY      ENDOSCOPY, COLON, DIAGNOSTIC      KNEE SURGERY      X 4    LUMBAR FUSION      LUMBAR LAMINECTOMY      OTHER SURGICAL HISTORY Right 05/2017    Pain Pump insertion    [place] [address] W/BRNCL ALVEOLAR LAVAGE N/A 9/29/2018    BRONCHOSCOPY ALVEOLAR LAVAGE performed by Christin Wheeler MD at [address] [facility] [address]      x2    TONSILLECTOMY AND ADENOIDECTOMY      TOTAL KNEE ARTHROPLASTY      TRACHEOSTOMY      over 27 trach operations due to MRSA infections in the trach    UPPER GASTROINTESTINAL ENDOSCOPY  1/8/16    removal of foreign body    UVULOPALATOPHARYGOPLASTY      VENA CAVA FILTER PLACEMENT  2002       Medications Prior to Admission:    Prior to Admission medications    Medication Sig Start Date End Date Taking? Authorizing Provider   HYDROmorphone (DILAUDID) 4 MG tablet Take 4 mg by mouth every 8 hours. .   Yes Historical Provider, MD   Tamsulosin HCl (FLOMAX PO) Take 0.4 mg by mouth daily   Yes Historical Provider, MD   metroNIDAZOLE (FLAGYL) 250 MG tablet 250 mg three times a week   Yes Historical Provider, MD   allopurinol (ZYLOPRIM) 300 MG tablet Take 300 mg by mouth daily   Yes Historical Provider, MD   eplerenone (INSPRA) 25 MG tablet Take 25 mg by mouth daily   Yes Historical Provider, MD   fluticasone (FLONASE) 50 MCG/ACT nasal spray 1 spray by Each Nare route daily   Yes Historical Provider, MD   insulin regular (NOVOLIN R) 100 UNIT/ML injection Inject 35 Units into the skin 3 times daily Indications: up to 35 units with each meal    Yes Historical Provider, MD   ondansetron (ZOFRAN) 8 MG tablet Take 8 mg by mouth every 8 hours as needed for Nausea or Vomiting   Yes Historical Provider, MD   sildenafil (VIAGRA) 100 MG tablet Take 100 mg by mouth as needed for Erectile Dysfunction   Yes Historical Provider, MD   zafirlukast (ACCOLATE) 20 MG tablet Take 20 mg by mouth 2 times daily   Yes Historical Provider, MD   Gabapentin (NEURONTIN PO) Take 1,200 mg by mouth nightly    Yes Historical Provider, MD   sodium chloride 0.9 % SOLN 40 mL with SUFentanil 50 MCG/ML SOLN 5 mcg/mL continuous Pt unsure of delivered dose   Yes Historical Provider, MD   BuPROPion HCl (WELLBUTRIN XL PO) Take 400 mg by mouth daily   Yes Historical Provider, MD   insulin glargine (LANTUS) 100 UNIT/ML injection vial Inject 30 Units into the skin nightly 30-35 units hs   Yes Historical Provider, MD   ARIPiprazole (ABILIFY) 30 MG tablet Take 30 mg by mouth daily    Yes Historical Provider, MD   Atenolol (TENORMIN PO) Take 37.5 mg by mouth daily    Yes Historical Provider, MD   Dexmethylphenidate HCl (FOCALIN PO) Take 20 mg by mouth daily as needed (takes 20 mg - 40 mg morning)   Yes Historical Provider, MD   ferrous sulfate 325 (65 Fe) MG tablet Take 325 mg by mouth daily (with breakfast)   Yes Historical Provider, MD   Fexofenadine HCl (ALLEGRA PO) Take 180 mg by mouth daily    Yes Historical Provider, MD   lidocaine (XYLOCAINE) 2 % jelly Apply topically as needed for Trach change 3/9/18  Yes Juan Reed MD   moexipril (UNIVASC) 15 MG tablet Take 0.5 tablets by mouth daily 3/7/18  Yes Juan Reed MD   Multiple Vitamins-Minerals (THERAPEUTIC MULTIVITAMIN-MINERALS) tablet Take 1 tablet by mouth daily   Yes Historical Provider, MD   zinc gluconate 50 MG tablet Take 100 mg by mouth 2 times daily    Yes Historical Provider, MD   ADVAIR DISKUS 250-50 MCG/DOSE AEPB Inhale 1 puff into the lungs 2 times daily 3/1/17  Yes Historical Provider, MD   glipiZIDE (GLUCOTROL XL) 10 MG extended release tablet Take 10 mg by mouth 2 times daily 1/15/17  Yes Historical Provider, MD   ALBUTEROL IN Inhale 2 each into the lungs every 4 hours as needed    Yes Historical Provider, MD   potassium chloride SA (K-DUR;KLOR-CON M) 20 MEQ tablet Take 20 mEq by mouth daily   Yes Historical Provider, MD   docusate sodium (COLACE) 100 MG capsule Take 100 mg by mouth 2 times daily    Yes Historical Provider, MD   nitroGLYCERIN (NITROSTAT) 0.4 MG SL tablet Place 0.4 mg under the tongue every 5 minutes as needed. Yes Historical Provider, MD   pregabalin (LYRICA) 100 MG capsule Take 100 mg by mouth 2 times daily    Yes Historical Provider, MD   pravastatin (PRAVACHOL) 40 MG tablet Take 40 mg by mouth daily. Yes Historical Provider, MD   TORSEMIDE PO Take 40 mg by mouth daily    Historical Provider, MD       Allergies:  Aspartame and phenylalanine; Cefuroxime axetil; Clindamycin hcl; Erythromycin; Feldene [piroxicam]; Guanfacine hcl; Iodine; Pcn [penicillins]; Pletal [cilostazol]; Robaxin [methocarbamol]; Arthrotec [diclofenac-misoprostol]; Betadine [povidone iodine]; Claritin [loratadine]; Clindamycin/lincomycin; Indocin [indometacin sodium]; Tenex [guanfacine hcl]; Vasotec; and Vioxx    Social History:  The patient currently lives at home. TOBACCO:   reports that he quit smoking about 19 years ago. His smoking use included cigars. He has a 2.50 pack-year smoking history. He has never used smokeless tobacco.  ETOH:   reports that he does not drink alcohol.       Family History:   Positive as follows:        Problem Relation Age of Onset    High Blood Pressure Mother     High Blood Pressure Father     Diabetes Sister     Hypothyroidism Sister     Alzheimer's Disease Maternal Grandmother     Migraines Maternal Grandfather     Heart Attack Maternal Grandfather     Cancer Paternal Grandmother         lymph node    Diabetes Paternal Grandfather        REVIEW OF SYSTEMS:     Constitutional: Negative for fever   HENT: Negative for sore throat   Eyes: Negative for redness   Respiratory: Negative  for dyspnea, cough   Cardiovascular: Negative for chest pain   Gastrointestinal: Negative for vomiting, diarrhea   Genitourinary: Negative for hematuria 03/23/2018    GLUCOSEU 500 04/08/2012    AMORPHOUS 2+ 03/05/2016       ABG    Lab Results   Component Value Date    JLE5WZE 21.0 03/05/2016    BEART -1.9 03/05/2016    M7BMQEJA 96.3 03/05/2016    PHART 7.465 03/05/2016    LSY9ZDV 29.9 03/05/2016    PO2ART 96.7 03/05/2016    CVB1BSU 21.9 03/05/2016       CULTURES  N/A    RADIOLOGY  N/A    ASSESSMENT/PLAN:    Cellulitis of Buttocks  2/2 Pressure Ulcers of Buttocks, Sacrum and Coccyx  - wheelchair bound  - seen OP by Dr. Melania Elizabeth in [facility] to Med Surg  - IVF  - Aztreonam and Vanc    TRICIA  - Cr on admission: 2.2  - baseline: ~ 1.2  - IVF, hold ACEi  - monitor    COPD/Asthma - no AE  Chronic Tracheostomy  Chronic Respiratory Failure  - states he wears 6L NC at baseline, sats at 96% on RA  - supp O2, wean as tolerated  - cont Dulera, Singulair and PRN Albuterol    HTN  - stable, BP running borderline low  - held home ACEi 2/2 TRICIA, IVF  - monitor    Type II DM  - controlled  - Lantus 30 units nightly, low dose SSI  - POC Glucose, monitor    HLD  - cont statin    Normocytic Anemia  - Hgb on arrival: 12.9  - Baseline: ~12  - stable, cont Ferrous Sulfate    Obesity  - Body mass index is 37.33 kg/m². - Complicating assessment and treatment. Placing patient at risk for multiple co-morbidities as well as early death and contributing to the patient's presentation.   - Counseled on weight loss.      Chronic Pain Syndrome with Opioid Dependence  - cont [address] Dilaudid and pain pump    Schizophrenia - atypical  - cont Abilify, Wellbutrin    Hx of Gout  - cont Allopurinol      DVT Prophylaxis: Lovenox  Diet: DIET GENERAL; Carb Control: 4 carb choices (60 gms)/meal  Code Status: Full Code    Andrez Doctor MAXIME 4:40 PM 3/15/2019

## 2019-03-15 NOTE — H&P
 Unspecified cerebral artery occlusion with cerebral infarction     UTI (urinary tract infection)        Past Surgical History:        Procedure Laterality Date    BACK SURGERY      ENDOSCOPY, COLON, DIAGNOSTIC      KNEE SURGERY      X 4    LUMBAR FUSION      LUMBAR LAMINECTOMY      OTHER SURGICAL HISTORY Right 05/2017    Pain Pump insertion    IN [address] W/BRNCL ALVEOLAR LAVAGE N/A 9/29/2018    BRONCHOSCOPY ALVEOLAR LAVAGE performed by Lawanda Costello MD at [address] [facility] [address]      x2    TONSILLECTOMY AND ADENOIDECTOMY      TOTAL KNEE ARTHROPLASTY      TRACHEOSTOMY      over 27 trach operations due to MRSA infections in the trach    UPPER GASTROINTESTINAL ENDOSCOPY  1/8/16    removal of foreign body    UVULOPALATOPHARYGOPLASTY      VENA CAVA FILTER PLACEMENT  2002       Medications Prior to Admission:    Prior to Admission medications    Medication Sig Start Date End Date Taking? Authorizing Provider   HYDROmorphone (DILAUDID) 4 MG tablet Take 4 mg by mouth every 8 hours. .   Yes Historical Provider, MD   Tamsulosin HCl (FLOMAX PO) Take 0.4 mg by mouth daily   Yes Historical Provider, MD   metroNIDAZOLE (FLAGYL) 250 MG tablet 250 mg three times a week   Yes Historical Provider, MD   allopurinol (ZYLOPRIM) 300 MG tablet Take 300 mg by mouth daily   Yes Historical Provider, MD   eplerenone (INSPRA) 25 MG tablet Take 25 mg by mouth daily   Yes Historical Provider, MD   fluticasone (FLONASE) 50 MCG/ACT nasal spray 1 spray by Each Nare route daily   Yes Historical Provider, MD   insulin regular (NOVOLIN R) 100 UNIT/ML injection Inject 35 Units into the skin 3 times daily Indications: up to 35 units with each meal    Yes Historical Provider, MD   ondansetron (ZOFRAN) 8 MG tablet Take 8 mg by mouth every 8 hours as needed for Nausea or Vomiting   Yes Historical Provider, MD   sildenafil (VIAGRA) 100 MG tablet Take 100 mg by mouth as needed for Erectile Dysfunction   Yes Historical Provider, MD zafirlukast (ACCOLATE) 20 MG tablet Take 20 mg by mouth 2 times daily   Yes Historical Provider, MD   Gabapentin (NEURONTIN PO) Take 1,200 mg by mouth nightly    Yes Historical Provider, MD   sodium chloride 0.9 % SOLN 40 mL with SUFentanil 50 MCG/ML SOLN 5 mcg/mL continuous Pt unsure of delivered dose   Yes Historical Provider, MD   BuPROPion HCl (WELLBUTRIN XL PO) Take 400 mg by mouth daily   Yes Historical Provider, MD   insulin glargine (LANTUS) 100 UNIT/ML injection vial Inject 30 Units into the skin nightly 30-35 units hs   Yes Historical Provider, MD   ARIPiprazole (ABILIFY) 30 MG tablet Take 30 mg by mouth daily    Yes Historical Provider, MD   Atenolol (TENORMIN PO) Take 37.5 mg by mouth daily    Yes Historical Provider, MD   Dexmethylphenidate HCl (FOCALIN PO) Take 20 mg by mouth daily as needed (takes 20 mg - 40 mg morning)   Yes Historical Provider, MD   ferrous sulfate 325 (65 Fe) MG tablet Take 325 mg by mouth daily (with breakfast)   Yes Historical Provider, MD   Fexofenadine HCl (ALLEGRA PO) Take 180 mg by mouth daily    Yes Historical Provider, MD   lidocaine (XYLOCAINE) 2 % jelly Apply topically as needed for Trach change 3/9/18  Yes Juan Reed MD   moexipril (UNIVASC) 15 MG tablet Take 0.5 tablets by mouth daily 3/7/18  Yes Juan Reed MD   Multiple Vitamins-Minerals (THERAPEUTIC MULTIVITAMIN-MINERALS) tablet Take 1 tablet by mouth daily   Yes Historical Provider, MD   zinc gluconate 50 MG tablet Take 100 mg by mouth 2 times daily    Yes Historical Provider, MD   ADVAIR DISKUS 250-50 MCG/DOSE AEPB Inhale 1 puff into the lungs 2 times daily 3/1/17  Yes Historical Provider, MD   glipiZIDE (GLUCOTROL XL) 10 MG extended release tablet Take 10 mg by mouth 2 times daily 1/15/17  Yes Historical Provider, MD   ALBUTEROL IN Inhale 2 each into the lungs every 4 hours as needed    Yes Historical Provider, MD   potassium chloride SA (K-DUR;KLOR-CON M) 20 MEQ tablet Take 20 mEq by mouth daily   Yes Historical Provider, MD   docusate sodium (COLACE) 100 MG capsule Take 100 mg by mouth 2 times daily    Yes Historical Provider, MD   nitroGLYCERIN (NITROSTAT) 0.4 MG SL tablet Place 0.4 mg under the tongue every 5 minutes as needed. Yes Historical Provider, MD   pregabalin (LYRICA) 100 MG capsule Take 100 mg by mouth 2 times daily    Yes Historical Provider, MD   pravastatin (PRAVACHOL) 40 MG tablet Take 40 mg by mouth daily. Yes Historical Provider, MD   TORSEMIDE PO Take 40 mg by mouth daily    Historical Provider, MD       Allergies:  Aspartame and phenylalanine; Cefuroxime axetil; Clindamycin hcl; Erythromycin; Feldene [piroxicam]; Guanfacine hcl; Iodine; Pcn [penicillins]; Pletal [cilostazol]; Robaxin [methocarbamol]; Arthrotec [diclofenac-misoprostol]; Betadine [povidone iodine]; Claritin [loratadine]; Clindamycin/lincomycin; Indocin [indometacin sodium]; Tenex [guanfacine hcl]; Vasotec; and Vioxx    Social History:  The patient currently lives at home    TOBACCO:   reports that he quit smoking about 19 years ago. His smoking use included cigars. He has a 2.50 pack-year smoking history. He has never used smokeless tobacco.  ETOH:   reports that he does not drink alcohol.       Family History:   Positive as follows:        Problem Relation Age of Onset    High Blood Pressure Mother     High Blood Pressure Father     Diabetes Sister     Hypothyroidism Sister     Alzheimer's Disease Maternal Grandmother     Migraines Maternal Grandfather     Heart Attack Maternal Grandfather     Cancer Paternal Grandmother         lymph node    Diabetes Paternal Grandfather        REVIEW OF SYSTEMS:     Constitutional: + malaise Negative for fever   HENT: Negative for sore throat   Eyes: Negative for redness   Respiratory: Negative  for dyspnea, cough   Cardiovascular: Negative for chest pain   Gastrointestinal: + vomiting, Negative for diarrhea   Genitourinary: Negative for hematuria Musculoskeletal: + for arthralgias   Skin: + chronic wounds  Neurological: Negative for syncope   Hematological: Negative for adenopathy   Psychiatric/Behavorial: Negative for anxiety    PHYSICAL EXAM:    /68   Pulse 78   Temp 97.3 °F (36.3 °C) (Oral)   Resp 17   Ht 5' 10\" (1.778 m)   Wt 260 lb 2.3 oz (118 kg)   SpO2 96%   BMI 37.33 kg/m²   Gen: Elderly, chronically ill appearing male. No distress. Alert. Eyes: PERRL. No sclera icterus. No conjunctival injection. ENT: No discharge. Pharynx clear. Trach in place  Neck: No JVD. Trachea midline. Resp: No accessory muscle use. No crackles. No wheezes. Scattered rhonchi. CV: Regular rate. Regular rhythm. No murmur. No rub. No edema. Capillary Refill: Brisk,< 3 seconds   Peripheral Pulses: +2 palpable, equal bilaterally   GI: Non-tender. Non-distended. No masses. No organomegaly. Normal bowel sounds. No hernia. Skin: Chronic wounds to the buttock and sacrum, purulent drainage, chronic skin changes to bilateral lower extremities   M/S: No cyanosis. No joint deformity. No clubbing. Neuro: Awake. Grossly nonfocal    Psych: Oriented x 3. No anxiety or agitation. CBC:   Recent Labs     03/15/19  0954   WBC 8.8   HGB 12.9*   HCT 40.1*   MCV 80.2        BMP:   Recent Labs     03/15/19  0954      K 4.7   CL 97*   CO2 30   BUN 43*   CREATININE 2.2*     LIVER PROFILE:   Recent Labs     03/15/19  0954   AST 14*   ALT 18   BILITOT 0.3   ALKPHOS 145*       CULTURES  None     EKG:    No EKG from this admission for review    RADIOLOGY  No orders to display     Pertinent previous results reviewed   Echo 2015  Summary   Left ventricular systolic function is normal .   Ejection fraction is visually estimated to be 55-60 %.   Mild concentric left ventricular hypertrophy is present.   Diastolic filling parameters suggests grade I diastolic dysfunction .     ASSESSMENT/PLAN:  Cellulitis   Chronic sacral wound   - Follows with Dr. Jose Cruz Noel, consult - Azactam per Dr. Kesha Pryor   - Wound Cx   - Wound care   - speciality mattress to reduced pressure     Chronic dCHF  - appears well compensated   - home torsemide held 2/2 to TRICIA   - gentle IV hydration   - Patient is very concerned about Torsemide being held and would like it to be re-started as soon as possible     COPD  - no AE   - continue IBD    HTN  - home ACE-I held 2/2 to TRICIA   - BP on lower end, continue to monitor     HLD  - Continue statin     Gout   - Continue allopurinol      Hx of stroke   - Continue HTN control, statin, and     Chronic tracheostomy    -supportive care     TRICIA   - Baseline ~1.2-1.3   - Creatinine on admission 2.2   - IVF and continues monitoring, holding home HTN, diuretics, and DM medications that may be contributing     DM  - hgb A1c pending   - Continue Lantus  - SSI   - home oral Rx held     Chronic Pain, opioid dependence   - Continue home regimen from pain medicine with Dilaudid and Neurontin  - Add PRN pain control      DVT Prophylaxis: Lovenox  Diet: DIET GENERAL; Carb Control: 4 carb choices (60 gms)/meal   Code Status: Full Code      Laisha Shah PA-C  3/15/2019 4:21 PM

## 2019-03-16 LAB
ANION GAP SERPL CALCULATED.3IONS-SCNC: 11 MMOL/L (ref 3–16)
BASOPHILS ABSOLUTE: 0.1 K/UL (ref 0–0.2)
BASOPHILS RELATIVE PERCENT: 1 %
BILIRUBIN URINE: NEGATIVE
BLOOD, URINE: NEGATIVE
BUN BLDV-MCNC: 32 MG/DL (ref 7–20)
CALCIUM SERPL-MCNC: 8.7 MG/DL (ref 8.3–10.6)
CHLORIDE BLD-SCNC: 101 MMOL/L (ref 99–110)
CLARITY: CLEAR
CO2: 27 MMOL/L (ref 21–32)
COLOR: YELLOW
CREAT SERPL-MCNC: 1.6 MG/DL (ref 0.8–1.3)
EOSINOPHILS ABSOLUTE: 0.2 K/UL (ref 0–0.6)
EOSINOPHILS RELATIVE PERCENT: 3.3 %
ESTIMATED AVERAGE GLUCOSE: 159.9 MG/DL
GFR AFRICAN AMERICAN: 53
GFR NON-AFRICAN AMERICAN: 44
GLUCOSE BLD-MCNC: 102 MG/DL (ref 70–99)
GLUCOSE BLD-MCNC: 108 MG/DL (ref 70–99)
GLUCOSE BLD-MCNC: 118 MG/DL (ref 70–99)
GLUCOSE BLD-MCNC: 119 MG/DL (ref 70–99)
GLUCOSE BLD-MCNC: 132 MG/DL (ref 70–99)
GLUCOSE BLD-MCNC: 153 MG/DL (ref 70–99)
GLUCOSE URINE: NEGATIVE MG/DL
HBA1C MFR BLD: 7.2 %
HCT VFR BLD CALC: 34.7 % (ref 40.5–52.5)
HEMOGLOBIN: 11.3 G/DL (ref 13.5–17.5)
KETONES, URINE: NEGATIVE MG/DL
LEUKOCYTE ESTERASE, URINE: NEGATIVE
LYMPHOCYTES ABSOLUTE: 0.8 K/UL (ref 1–5.1)
LYMPHOCYTES RELATIVE PERCENT: 14.6 %
MCH RBC QN AUTO: 26.4 PG (ref 26–34)
MCHC RBC AUTO-ENTMCNC: 32.7 G/DL (ref 31–36)
MCV RBC AUTO: 80.9 FL (ref 80–100)
MICROSCOPIC EXAMINATION: NORMAL
MONOCYTES ABSOLUTE: 0.5 K/UL (ref 0–1.3)
MONOCYTES RELATIVE PERCENT: 9.2 %
NEUTROPHILS ABSOLUTE: 4.1 K/UL (ref 1.7–7.7)
NEUTROPHILS RELATIVE PERCENT: 71.9 %
NITRITE, URINE: NEGATIVE
PDW BLD-RTO: 15.2 % (ref 12.4–15.4)
PERFORMED ON: ABNORMAL
PH UA: 6 (ref 5–8)
PLATELET # BLD: 160 K/UL (ref 135–450)
PMV BLD AUTO: 8.4 FL (ref 5–10.5)
POTASSIUM REFLEX MAGNESIUM: 4 MMOL/L (ref 3.5–5.1)
PROTEIN UA: NEGATIVE MG/DL
RBC # BLD: 4.29 M/UL (ref 4.2–5.9)
SODIUM BLD-SCNC: 139 MMOL/L (ref 136–145)
SPECIFIC GRAVITY UA: 1.01 (ref 1–1.03)
URINE REFLEX TO CULTURE: NORMAL
URINE TYPE: NORMAL
UROBILINOGEN, URINE: 0.2 E.U./DL
WBC # BLD: 5.7 K/UL (ref 4–11)

## 2019-03-16 PROCEDURE — 2500000003 HC RX 250 WO HCPCS: Performed by: PHYSICIAN ASSISTANT

## 2019-03-16 PROCEDURE — 97530 THERAPEUTIC ACTIVITIES: CPT

## 2019-03-16 PROCEDURE — 6370000000 HC RX 637 (ALT 250 FOR IP): Performed by: PHYSICIAN ASSISTANT

## 2019-03-16 PROCEDURE — 94761 N-INVAS EAR/PLS OXIMETRY MLT: CPT

## 2019-03-16 PROCEDURE — 85025 COMPLETE CBC W/AUTO DIFF WBC: CPT

## 2019-03-16 PROCEDURE — 97166 OT EVAL MOD COMPLEX 45 MIN: CPT

## 2019-03-16 PROCEDURE — 1200000000 HC SEMI PRIVATE

## 2019-03-16 PROCEDURE — 97535 SELF CARE MNGMENT TRAINING: CPT

## 2019-03-16 PROCEDURE — 2580000003 HC RX 258: Performed by: PHYSICIAN ASSISTANT

## 2019-03-16 PROCEDURE — 94640 AIRWAY INHALATION TREATMENT: CPT

## 2019-03-16 PROCEDURE — 6360000002 HC RX W HCPCS: Performed by: PHYSICIAN ASSISTANT

## 2019-03-16 PROCEDURE — 80048 BASIC METABOLIC PNL TOTAL CA: CPT

## 2019-03-16 PROCEDURE — 2700000000 HC OXYGEN THERAPY PER DAY

## 2019-03-16 PROCEDURE — 6370000000 HC RX 637 (ALT 250 FOR IP): Performed by: INTERNAL MEDICINE

## 2019-03-16 PROCEDURE — 81003 URINALYSIS AUTO W/O SCOPE: CPT

## 2019-03-16 PROCEDURE — 97162 PT EVAL MOD COMPLEX 30 MIN: CPT

## 2019-03-16 PROCEDURE — 36415 COLL VENOUS BLD VENIPUNCTURE: CPT

## 2019-03-16 RX ORDER — PANTOPRAZOLE SODIUM 40 MG/1
40 TABLET, DELAYED RELEASE ORAL DAILY
COMMUNITY
End: 2019-09-18 | Stop reason: ALTCHOICE

## 2019-03-16 RX ORDER — POTASSIUM CHLORIDE 750 MG/1
20 TABLET, EXTENDED RELEASE ORAL DAILY
COMMUNITY

## 2019-03-16 RX ADMIN — ZINC SULFATE 220 MG (50 MG) CAPSULE 220 MG: CAPSULE at 09:04

## 2019-03-16 RX ADMIN — ARIPIPRAZOLE 30 MG: 10 TABLET ORAL at 09:04

## 2019-03-16 RX ADMIN — DOCUSATE SODIUM 100 MG: 100 CAPSULE, LIQUID FILLED ORAL at 09:04

## 2019-03-16 RX ADMIN — ALLOPURINOL 300 MG: 300 TABLET ORAL at 09:04

## 2019-03-16 RX ADMIN — Medication 2 PUFF: at 06:50

## 2019-03-16 RX ADMIN — FERROUS SULFATE TAB 325 MG (65 MG ELEMENTAL FE) 325 MG: 325 (65 FE) TAB at 10:13

## 2019-03-16 RX ADMIN — GABAPENTIN 600 MG: 300 CAPSULE ORAL at 21:38

## 2019-03-16 RX ADMIN — AZTREONAM 1 G: 2 INJECTION, POWDER, LYOPHILIZED, FOR SOLUTION INTRAMUSCULAR; INTRAVENOUS at 21:43

## 2019-03-16 RX ADMIN — INSULIN LISPRO 25 UNITS: 100 INJECTION, SOLUTION INTRAVENOUS; SUBCUTANEOUS at 12:49

## 2019-03-16 RX ADMIN — TAMSULOSIN HYDROCHLORIDE 0.4 MG: 0.4 CAPSULE ORAL at 09:04

## 2019-03-16 RX ADMIN — VANCOMYCIN HYDROCHLORIDE 1.5 G: 1 INJECTION, POWDER, LYOPHILIZED, FOR SOLUTION INTRAVENOUS at 15:16

## 2019-03-16 RX ADMIN — OXYCODONE HYDROCHLORIDE 5 MG: 5 TABLET ORAL at 10:19

## 2019-03-16 RX ADMIN — Medication 2 PUFF: at 19:21

## 2019-03-16 RX ADMIN — AZTREONAM 1 G: 2 INJECTION, POWDER, LYOPHILIZED, FOR SOLUTION INTRAMUSCULAR; INTRAVENOUS at 05:53

## 2019-03-16 RX ADMIN — MONTELUKAST SODIUM 10 MG: 10 TABLET, FILM COATED ORAL at 21:39

## 2019-03-16 RX ADMIN — ENOXAPARIN SODIUM 40 MG: 100 INJECTION SUBCUTANEOUS at 09:04

## 2019-03-16 RX ADMIN — AZTREONAM 1 G: 2 INJECTION, POWDER, LYOPHILIZED, FOR SOLUTION INTRAMUSCULAR; INTRAVENOUS at 14:38

## 2019-03-16 RX ADMIN — INSULIN LISPRO 1 UNITS: 100 INJECTION, SOLUTION INTRAVENOUS; SUBCUTANEOUS at 12:49

## 2019-03-16 RX ADMIN — INSULIN LISPRO 25 UNITS: 100 INJECTION, SOLUTION INTRAVENOUS; SUBCUTANEOUS at 17:13

## 2019-03-16 RX ADMIN — BUPROPION HYDROCHLORIDE 400 MG: 100 TABLET, EXTENDED RELEASE ORAL at 09:03

## 2019-03-16 RX ADMIN — HYDROMORPHONE HYDROCHLORIDE 4 MG: 2 TABLET ORAL at 09:03

## 2019-03-16 RX ADMIN — PRAVASTATIN SODIUM 40 MG: 40 TABLET ORAL at 21:38

## 2019-03-16 RX ADMIN — INSULIN GLARGINE 30 UNITS: 100 INJECTION, SOLUTION SUBCUTANEOUS at 21:52

## 2019-03-16 RX ADMIN — HYDROMORPHONE HYDROCHLORIDE 4 MG: 2 TABLET ORAL at 00:10

## 2019-03-16 RX ADMIN — DOCUSATE SODIUM 100 MG: 100 CAPSULE, LIQUID FILLED ORAL at 21:39

## 2019-03-16 RX ADMIN — HYDROMORPHONE HYDROCHLORIDE 4 MG: 2 TABLET ORAL at 15:58

## 2019-03-16 ASSESSMENT — PAIN SCALES - GENERAL
PAINLEVEL_OUTOF10: 9
PAINLEVEL_OUTOF10: 7
PAINLEVEL_OUTOF10: 9
PAINLEVEL_OUTOF10: 0
PAINLEVEL_OUTOF10: 8
PAINLEVEL_OUTOF10: 9
PAINLEVEL_OUTOF10: 9
PAINLEVEL_OUTOF10: 7
PAINLEVEL_OUTOF10: 9

## 2019-03-16 ASSESSMENT — PAIN - FUNCTIONAL ASSESSMENT
PAIN_FUNCTIONAL_ASSESSMENT: PREVENTS OR INTERFERES SOME ACTIVE ACTIVITIES AND ADLS
PAIN_FUNCTIONAL_ASSESSMENT: PREVENTS OR INTERFERES SOME ACTIVE ACTIVITIES AND ADLS

## 2019-03-16 ASSESSMENT — PAIN DESCRIPTION - PAIN TYPE
TYPE: CHRONIC PAIN

## 2019-03-16 ASSESSMENT — PAIN DESCRIPTION - ONSET: ONSET: ON-GOING

## 2019-03-16 ASSESSMENT — PAIN DESCRIPTION - LOCATION
LOCATION: BUTTOCKS

## 2019-03-16 ASSESSMENT — PAIN DESCRIPTION - DESCRIPTORS
DESCRIPTORS: BURNING;STABBING

## 2019-03-16 ASSESSMENT — PAIN DESCRIPTION - FREQUENCY: FREQUENCY: CONTINUOUS

## 2019-03-16 ASSESSMENT — PAIN DESCRIPTION - PROGRESSION: CLINICAL_PROGRESSION: GRADUALLY WORSENING

## 2019-03-16 ASSESSMENT — PAIN DESCRIPTION - ORIENTATION: ORIENTATION: MID

## 2019-03-16 NOTE — PLAN OF CARE
Continue with current plan of care, schedule dilaudid for pain.  Prn oxycodone, pt pivot transfer to bsc, chair, no lift needed

## 2019-03-16 NOTE — PROGRESS NOTES
4 Eyes Skin Assessment     The patient is being assess for   Admission    I agree that 2 RN's have performed a thorough Head to Toe Skin Assessment on the patient. ALL assessment sites listed below have been assessed. Areas assessed by both nurses:   [x]   Head, Face, and Ears   [x]   Shoulders, Back, and Chest, Abdomen  [x]   Arms, Elbows, and Hands   [x]   Coccyx, Sacrum, and Ischium  [x]   Legs, Feet, and Heels       Dressing on buttocks, pre-existing wound from wound clinic. Groin & scrotum red. BLE are red and radha. Feet are dry and flaky, heels red blanchable. **SHARE this note so that the co-signing nurse is able to place an eSignature**    Co-signer eSignature: Electronically signed by Georgie Aragon RN on 3/15/19 at 10:16 PM    Does the Patient have Skin Breakdown?   Yes [facility] WOUND CARE was Initiated documentation include the Dominique-wound, Wound Assessment, Measurements, Dressing Treatment, Drainage, and Color\",          Ramirez Prevention initiated:  Yes   Wound Care Orders initiated:  Yes      [id number]  nurse consulted for Pressure Injury (Stage 3,4, Unstageable, DTI, NWPT, Complex wounds)and New or Established Ostomies:  Yes      Primary Nurse eSignature: Electronically signed by Agus Espinoza RN on 3/15/19 at 8:11 PM

## 2019-03-16 NOTE — PROGRESS NOTES
Physical Therapy  Inpatient Physical Therapy Evaluation and Treatment    Unit: 2west  Date:  3/16/2019  Patient Name:    Elvis Beatty  \"Madan\"  Admitting diagnosis:  Cellulitis of buttock [S68.258]  Admit Date:  3/15/2019  Precautions/Restrictions/WB Status/ Lines/ Wounds/ Oxygen:  Tracheostomy (do not lay patient flat as trach collapses per patient if laid flat). Treatment Time:  845-930  Treatment Number:  1     Patient Goals for Therapy: \"not to lose anything, my abilities\"        Discharge Recommendations:  Home PT. Home Health S4 Level Recommendation:      DME needs for discharge:  W/c being ordered with modifications (seat lower to floor). AM-PAC Mobility Score       AM-PAC Inpatient Mobility without Stair Climbing Raw Score : 17     Preadmission Environment    Pt. Lives alone in [facility]. Home environment:  Apartment. Steps to enter first floor: no steps, no ramp. Steps to second floor:  NA.  Bathroom:  Walk in shower with shower chair, but sponging off in w/c. Equipment owned:  W/c, oxygen (4 to 6 L at home), hospital bed, shower chair (has not used in 3 years), lifeline. Preadmission Status:  Pt. 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 Required assistance from [facility] (dressing), Nurse, PT, OT, cleaning lady, private help to cook and do laundry. Pt. Fully independent for transfers and w/c mobility (usu uses arms, legs don't reach floor very well), using his w/c exclusively for 1 year, does a sit pivot transfer and does not remove amrest (does contact armrest sometimes with pivoting), angles surfaces. History of falls [x]Yes  []No (forgetting to lock w/c). Pain   [x]Yes  []No  Ratin/10  Location:  Gluteal region (1year old wound)  Pain Medicine Status: [] Denies need  [x] Pain med requested  [x] RN notified. Cognition    A&Ox4, patient appropriate and cooperative. Trach with ability to voice with voice piece inserted.   Follows 1 step and 2 step commands. Subjective  Patient lying supine in bed with no family present. Pt agreeable to this PT eval & tx. Upper Extremity ROM/Strength  Please see OT evaluation. Lower Extremity ROM / Strength    [x] AROM WFL except  [x] ROM limitations:  Lacking 45 degrees into full extension R knee (crepitus and pain) and lacking 60 degrees into full L knee extension (no pain). NT but able to do SLR with available knee extension B. Able to scoot to [facility] on own power with HOB up. []All Strength 5/5 unless otherwise listed below  Right LE   /5 quads       LEFT   /5 quads                                        /5 ant tibs                 /5 ant tibs                                          /5 hams                    /5 hams                                           /5 iliopsoas               /5 iliopsoas       Lower Extremity Sensation    Numbness B hands and feet. Lower Extremity Proprioception:   NT.    Coordination and Tone  WNL    Balance  Static Sitting:  [x] Good [] Fair [] Poor  Dynamic Sitting:  [x] Good [] Fair [] Poor  Static Standing: [] Good [] Fair [] Poor unable to stand fully with extended legs and trunk (lacks full ROM in knees), but steady doing partial stand with B UE support for partial stand pivot transfer. Dynamic Standing: [] Good [] Fair [] Poor NT. Bed mobility    Supine to sit:  SBA for lines. Sit to supine:  SBA for lines. Scooting to head of bed:  SBA for lines. Scooting in sitting:  SBA for lines. Rolling:  SBA for lines. Transfers    Sit to stand:  SBA for lines. Stand to sit:  SBA for lines. Bed to [facility] to Bed:  SBA for lines (bed not deflated, but discussed how bed could be deflated to lower bed surface for BSC to bed transfer, did lay bed flat for return to bed transfer, but raised HOB before laying down with trach present). Gait    Ambulated with NA. Gait deviations included: Activity Tolerance   2 L oxygen.   SpO2:  95  HR:  73  BP:  113/68 (R forearm)    SPO2 90 with transfer but had to remove oxygen due to line too short. Extra line added at session's end. W/C Mobility  NT. Positioning Needs   Returned to bed with bed alarm on. Exercises Initiated    NT. Patient/Family Education   Role of Acute care PT, plan of care, d/c planning. Assessment  Pt admitted with cellulitis of buttock wound (has had would x3 years), demonstrated decreased activity tolerance, decreased strength, decreased ROM, decreased balance, and decreased independence with bed mobility, transfers and w/c mobility. Expect he will return home at d/c with home PT. SBA for mobility tested for lines. Will see for consistency and check w/c mobility. Pt. Limited during evaluation by none. Goals : To be met in 3 visits:  1). Bed to w/c and return SBA. To be met in 6 visits:  1). Supine to/from sit IND. 2). Bed to [facility] and return IND. 3). Bed to w/c and return IND. 4). W/c mobility in room or halls as able IND. 5). Tolerate B LE exercises 10 reps. Rehabilitation Potential   Good for goals listed above. Strengths for achieving goals include:  Cooperative. Barriers to achieving goals include:  Gluteal wound, pain. Plan    To be seen 5x/week while in acute care setting for therapeutic exercises, bed mobility, transfers, balance training, and family/patient education. Timed Code Treatment Minutes: 25 minutes  Total Treatment Time:  45 minutes    Romayne Clarke, PT  #[id number]      If patient discharges from this facility prior to next visit, this note will serve as the Discharge Summary.

## 2019-03-16 NOTE — PROGRESS NOTES
Inpatient Occupational Therapy  Evaluation and Treatment    Unit: 2w  Date:  3/16/2019  Patient Name:    Di Cristobal  Admitting diagnosis:  Cellulitis of buttock [D75.165]  Admit Date:  3/15/2019  Precautions/Restrictions/WB Status/ Lines/ Wounds/ Oxygen: Fall Risk, IV, Tracheostomy (do not lay patient flat as trach collapses per patient if laid flat). Treatment Time:  530-383  Treatment Number: 1   Total Treatment Time:  65  minutes    Patient Goals for Therapy:  \" not to lose anything \"    Discharge Recommendations: Home with PRN assist and Home Therapy   DME needs for discharge: Needs Met    Home Health S4 Level Recommendation:  Level 2 Social  AM-PAC Score:  18      Preadmission Environment    Pt. Lives alone in [facility]. Home environment:  Apartment. Steps to enter first floor: no steps, no ramp. Steps to second floor:  NA.  Bathroom:  Walk in shower with shower chair, but sponging off in w/c. Equipment owned:  W/c, oxygen (4 to 6 L at home), hospital bed, shower chair (has not used in 3 years), lifeline.     Preadmission Status:  Pt. 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 Required assistance from [facility] (dressing), Nurse, PT, OT, cleaning lady, private help to cook and do laundry. Pt. Fully independent for transfers and w/c mobility (usu uses arms, legs don't reach floor very well), using his w/c exclusively for 1 year, does a sit pivot transfer and does not remove amrest (does contact armrest sometimes with pivoting). History of falls          [x]Yes  []No (forgetting to lock w/c). Pain   [x]Yes  []No  Ratin/10  Location:  Gluteal region (1year old wound)  Pain Medicine Status: [] Denies need  [x] Pain med requested  [x] RN notified. Cognition    A&O x4   Able to follow 2 step commands    Upper Extremity ROM:    WFL, pt able to perform all bed mobility, transfers, and gait without ROM limitation.     Upper Extremity Strength:    WFL, pt able to perform all bed mobility, transfers, and gait without ROM limitation. Upper Extremity Sensation    WNL    Upper Extremity Proprioception:   WNL    Coordination and Tone  WNL    Balance  Static Sitting:  Good   Dynamic Sitting:  Good   Static Standing: Good   Unable to achieve full stand, baseline   Dynamic Standing: Good -     Bed mobility:    Supine to sit:   Supervision  Sit to supine:   Supervision  Scooting to head of bed:   Supervision  Scooting in sitting:  Supervision and SBA  Rolling:   Supervision  Bridging:   NT    Transfers:    Sit to stand:  Supervision  Stand to sit:  Supervision  Bed to Chair:  NT  Bed to [facility]:  Supervision    Dressing:      UE:  ModA 2/2 lines   LE:   MaxA to don socks   Bathing:    UE: Mod Indpt with set up   LE: MaxA below knee; Mod Indpt with set up above the knee   Eating: Indpt    Activity Tolerance   2 L oxygen. SpO2:  95  HR:  73  BP:  113/68 (R forearm)  Remained above 90% throughout session. Pt reports he is on 4-6L O2 at home, at all times. Pt educated by RN about current O2 sat on 2L and how he may be able to decreased requirements at home. Continue to monitor. Positioning Needs: Returned to bed, call light and needs in reach. Exercise / Activities Initiated:   N/A    Patient/Family Education: Role of OT ; Recommendations for DC     Assessment of Deficits: Pt seen for Occupational therapy evaluation in acute care setting. Pt demonstrated decreased Activity Tolerance, ADLs, transfers. Pt may benefit from OT in his home in order to address safety with transfers and any other equipment needs. Thank you. Pt reports he is on 4-6L O2 at home, at all times. Pt educated by RN about current O2 sat on 2L and how he may be able to decreased requirements at home. Continue to monitor. Goal(s) : To be met in 3 Visits:  1). Indep with UE ex x 10 reps    To be met in 5 Visits:  1). Supine to Sit Independent  2). Bed to Chair/BSC Independent  3). Upper Body Bathing  Supervision  4).  Lower Body Bathing  MIN A  5). Upper Body Dressing Supervision  6). Lower Body Dressing MIN A  7). Pt to carina UE exs [id number]    Rehabilitation Potential:  Good for goals listed above. Strengths for achieving goals include: Pt motivated and Pt cooperative  Barriers to achieving goals include:  Pain      Plan: To be seen 3-5 x / week  while in acute care setting for therapeutic exercises, bed mobility, transfers, dressing, bathing,family/patient education with adaptive equipment, breathing technique instruction.        STANISLAV King, OTR/L   [id number]       If patient discharges from this facility prior to next visit, this note will serve as the Discharge Summary

## 2019-03-16 NOTE — PROGRESS NOTES
PM assessment completed. See flow sheet. Pt A&O X 4. Resp E&E with no distress noted. Trach remains mid-line and in place. Pt.denies any needs at this time. Call light within reach. Will continue to monitor.

## 2019-03-16 NOTE — PROGRESS NOTES
Progress Note    Admit Date:  3/15/2019    Admitted from wound clinic for infected sacral decubitus ulcer  MRSA positive    Subjective:  Mr. Talya Lewis is stable . Afebrile    no SOB . No back pain     Objective:   /68   Pulse 74   Temp 97.3 °F (36.3 °C) (Oral)   Resp 16   Ht 5' 10\" (1.778 m)   Wt 260 lb 2.3 oz (118 kg)   SpO2 95%   BMI 37.33 kg/m²       Intake/Output Summary (Last 24 hours) at 3/16/2019 1327  Last data filed at 3/16/2019 0930  Gross per 24 hour   Intake 1060 ml   Output --   Net 1060 ml         Physical Exam:  General:  Awake, alert, NAD  Chronic trach  Passy-Deepthi valve in place. Skin:  Warm and dry  Neck:  JVD absent. Neck supple  Chest:  Clear to auscultation, respiration easy. No wheezes, rales or rhonchi. Cardiovascular:  RRR ,S1S2 normal  Abdomen:  Soft, non tender, non distended, BS +  Back: Chronic wounds to the buttock and sacrum,  Dressing +   Extremities:  No edema. , chronic skin changes to bilateral lower extremities  Intact peripheral pulses.  Brisk cap refill, < 2 secs  Neuro: non focal      Medications:   Scheduled Meds:   insulin lispro  25 Units Subcutaneous TID WC    sodium chloride flush  10 mL Intravenous 2 times per day    enoxaparin  40 mg Subcutaneous Daily    aztreonam  1 g Intravenous Q8H    vancomycin  1,500 mg Intravenous Q24H    mometasone-formoterol  2 puff Inhalation BID    allopurinol  300 mg Oral Daily    ARIPiprazole  30 mg Oral Daily    docusate sodium  100 mg Oral BID    ferrous sulfate  325 mg Oral Daily with breakfast    fluticasone  1 spray Each Nare Daily    gabapentin  600 mg Oral Nightly    HYDROmorphone  4 mg Oral Q8H    insulin glargine  30 Units Subcutaneous Nightly    pravastatin  40 mg Oral Nightly    tamsulosin  0.4 mg Oral Daily    zinc sulfate  220 mg Oral Daily    insulin lispro  0-6 Units Subcutaneous TID WC    insulin lispro  0-3 Units Subcutaneous Nightly    montelukast  10 mg Oral Nightly    buPROPion  400 mg Oral Daily       Continuous Infusions:   sodium chloride 100 mL/hr at 03/15/19 2213    dextrose         Data:  CBC:   Recent Labs     03/15/19  0954 03/16/19  0622   WBC 8.8 5.7   RBC 5.01 4.29   HGB 12.9* 11.3*   HCT 40.1* 34.7*   MCV 80.2 80.9   RDW 14.8 15.2    160     BMP:   Recent Labs     03/15/19  0954 03/16/19  0622    139   K 4.7 4.0   CL 97* 101   CO2 30 27   BUN 43* 32*   CREATININE 2.2* 1.6*     BNP: No results for input(s): BNP in the last 72 hours. PT/INR: No results for input(s): PROTIME, INR in the last 72 hours. APTT: No results for input(s): APTT in the last 72 hours. CARDIAC ENZYMES: No results for input(s): CKMB, CKMBINDEX, TROPONINI in the last 72 hours. Invalid input(s): CKTOTAL;3  FASTING LIPID PANEL:No results found for: CHOL, HDL, TRIG  LIVER PROFILE:   Recent Labs     03/15/19  0954   AST 14*   ALT 18   BILITOT 0.3   ALKPHOS 145*          No orders to display         Assessment:  Active Problems:    Type II diabetes mellitus, well controlled (HCC)    Pain syndrome, chronic    Pressure injury of sacral region, stage 3 (HCC)    Cellulitis of buttock    Acute kidney injury ([facility] [address]    Hyperlipidemia    Normocytic anemia  Resolved Problems:    * No resolved hospital problems.  *      Plan:  Cellulitis   Chronic sacral wound   - Follows with Dr. Melania Elizabeth, consult   - Continue vancomycin and Azactam per Dr. Melania Elizabeth   - Wound Cx   - Wound care   - speciality mattress to reduced pressure      Chronic dCHF  - appears well compensated   - home torsemide held 2/2 to TRICIA   - gentle IV hydration   - monitor BMP , resume torsemide soon        COPD  - no AE   - continue IBD     HTN  - home ACE-I held 2/2 to TRICIA   - BP on lower end, continue to monitor      HLD  - Continue statin      Gout   - Continue allopurinol       Hx of stroke   - Continue HTN control, statin, and      Chronic tracheostomy    -supportive care      TRICIA   - Baseline ~1.2-1.3   - Creatinine on admission 2.2 -- creatinine down to 1.6. Continue to hold torsemide and ACE inhibitor's  - IVF and continued monitoring, holding home HTN, diuretics, and DM medications that may be contributing      DM  - hgb A1c pending   - Continue Lantus.   Discussed with patient about his home regimen,  will add 25 units of Humalog 3 times daily with meals continue sliding scale continue Lantus 30 units nightly  - SSI     Chronic Pain, opioid dependence   - Continue home regimen from pain medicine with Dilaudid and Neurontin  - Add PRN pain control       DVT Prophylaxis: Lovenox  Diet: DIET GENERAL; Carb Control: 4 carb choices (60 gms)/meal   Code Status: Full Code         Shyla Patterson MD 3/16/2019 1:27 PM

## 2019-03-17 LAB
ANION GAP SERPL CALCULATED.3IONS-SCNC: 8 MMOL/L (ref 3–16)
BUN BLDV-MCNC: 20 MG/DL (ref 7–20)
CALCIUM SERPL-MCNC: 8.6 MG/DL (ref 8.3–10.6)
CHLORIDE BLD-SCNC: 106 MMOL/L (ref 99–110)
CO2: 28 MMOL/L (ref 21–32)
CREAT SERPL-MCNC: 1.4 MG/DL (ref 0.8–1.3)
GFR AFRICAN AMERICAN: >60
GFR NON-AFRICAN AMERICAN: 51
GLUCOSE BLD-MCNC: 106 MG/DL (ref 70–99)
GLUCOSE BLD-MCNC: 109 MG/DL (ref 70–99)
GLUCOSE BLD-MCNC: 149 MG/DL (ref 70–99)
GLUCOSE BLD-MCNC: 200 MG/DL (ref 70–99)
GLUCOSE BLD-MCNC: 86 MG/DL (ref 70–99)
GLUCOSE BLD-MCNC: 90 MG/DL (ref 70–99)
HCT VFR BLD CALC: 34.2 % (ref 40.5–52.5)
HEMOGLOBIN: 11.2 G/DL (ref 13.5–17.5)
MAGNESIUM: 2.3 MG/DL (ref 1.8–2.4)
MCH RBC QN AUTO: 26.6 PG (ref 26–34)
MCHC RBC AUTO-ENTMCNC: 32.8 G/DL (ref 31–36)
MCV RBC AUTO: 81.2 FL (ref 80–100)
PDW BLD-RTO: 14.5 % (ref 12.4–15.4)
PERFORMED ON: ABNORMAL
PERFORMED ON: NORMAL
PHOSPHORUS: 3.1 MG/DL (ref 2.5–4.9)
PLATELET # BLD: 131 K/UL (ref 135–450)
PMV BLD AUTO: 8.7 FL (ref 5–10.5)
POTASSIUM SERPL-SCNC: 4.3 MMOL/L (ref 3.5–5.1)
RBC # BLD: 4.21 M/UL (ref 4.2–5.9)
SODIUM BLD-SCNC: 142 MMOL/L (ref 136–145)
WBC # BLD: 4.4 K/UL (ref 4–11)

## 2019-03-17 PROCEDURE — 83735 ASSAY OF MAGNESIUM: CPT

## 2019-03-17 PROCEDURE — 2700000000 HC OXYGEN THERAPY PER DAY

## 2019-03-17 PROCEDURE — 6370000000 HC RX 637 (ALT 250 FOR IP): Performed by: PHYSICIAN ASSISTANT

## 2019-03-17 PROCEDURE — 1200000000 HC SEMI PRIVATE

## 2019-03-17 PROCEDURE — 94640 AIRWAY INHALATION TREATMENT: CPT

## 2019-03-17 PROCEDURE — 85027 COMPLETE CBC AUTOMATED: CPT

## 2019-03-17 PROCEDURE — 80048 BASIC METABOLIC PNL TOTAL CA: CPT

## 2019-03-17 PROCEDURE — 94761 N-INVAS EAR/PLS OXIMETRY MLT: CPT

## 2019-03-17 PROCEDURE — 2500000003 HC RX 250 WO HCPCS: Performed by: PHYSICIAN ASSISTANT

## 2019-03-17 PROCEDURE — 36415 COLL VENOUS BLD VENIPUNCTURE: CPT

## 2019-03-17 PROCEDURE — 2580000003 HC RX 258: Performed by: PHYSICIAN ASSISTANT

## 2019-03-17 PROCEDURE — 6370000000 HC RX 637 (ALT 250 FOR IP): Performed by: INTERNAL MEDICINE

## 2019-03-17 PROCEDURE — 6360000002 HC RX W HCPCS: Performed by: PHYSICIAN ASSISTANT

## 2019-03-17 PROCEDURE — 84100 ASSAY OF PHOSPHORUS: CPT

## 2019-03-17 RX ORDER — PANTOPRAZOLE SODIUM 40 MG/1
40 TABLET, DELAYED RELEASE ORAL DAILY
Status: DISCONTINUED | OUTPATIENT
Start: 2019-03-17 | End: 2019-03-21 | Stop reason: HOSPADM

## 2019-03-17 RX ORDER — POTASSIUM CHLORIDE 20 MEQ/1
20 TABLET, EXTENDED RELEASE ORAL DAILY
Status: DISCONTINUED | OUTPATIENT
Start: 2019-03-17 | End: 2019-03-21 | Stop reason: HOSPADM

## 2019-03-17 RX ORDER — TORSEMIDE 20 MG/1
20 TABLET ORAL DAILY
Status: DISCONTINUED | OUTPATIENT
Start: 2019-03-17 | End: 2019-03-21 | Stop reason: HOSPADM

## 2019-03-17 RX ADMIN — MONTELUKAST SODIUM 10 MG: 10 TABLET, FILM COATED ORAL at 20:17

## 2019-03-17 RX ADMIN — HYDROMORPHONE HYDROCHLORIDE 4 MG: 2 TABLET ORAL at 08:50

## 2019-03-17 RX ADMIN — POTASSIUM CHLORIDE 20 MEQ: 20 TABLET, EXTENDED RELEASE ORAL at 13:30

## 2019-03-17 RX ADMIN — ARIPIPRAZOLE 30 MG: 10 TABLET ORAL at 08:50

## 2019-03-17 RX ADMIN — AZTREONAM 1 G: 2 INJECTION, POWDER, LYOPHILIZED, FOR SOLUTION INTRAMUSCULAR; INTRAVENOUS at 22:13

## 2019-03-17 RX ADMIN — Medication 10 ML: at 22:13

## 2019-03-17 RX ADMIN — Medication 2 PUFF: at 19:50

## 2019-03-17 RX ADMIN — INSULIN LISPRO 1 UNITS: 100 INJECTION, SOLUTION INTRAVENOUS; SUBCUTANEOUS at 20:20

## 2019-03-17 RX ADMIN — OXYCODONE HYDROCHLORIDE 5 MG: 5 TABLET ORAL at 07:18

## 2019-03-17 RX ADMIN — HYDROMORPHONE HYDROCHLORIDE 4 MG: 2 TABLET ORAL at 00:13

## 2019-03-17 RX ADMIN — AZTREONAM 1 G: 2 INJECTION, POWDER, LYOPHILIZED, FOR SOLUTION INTRAMUSCULAR; INTRAVENOUS at 13:30

## 2019-03-17 RX ADMIN — GABAPENTIN 600 MG: 300 CAPSULE ORAL at 20:17

## 2019-03-17 RX ADMIN — PRAVASTATIN SODIUM 40 MG: 40 TABLET ORAL at 20:17

## 2019-03-17 RX ADMIN — FERROUS SULFATE TAB 325 MG (65 MG ELEMENTAL FE) 325 MG: 325 (65 FE) TAB at 08:50

## 2019-03-17 RX ADMIN — INSULIN GLARGINE 30 UNITS: 100 INJECTION, SOLUTION SUBCUTANEOUS at 20:19

## 2019-03-17 RX ADMIN — Medication 2 PUFF: at 07:38

## 2019-03-17 RX ADMIN — BUPROPION HYDROCHLORIDE 400 MG: 100 TABLET, EXTENDED RELEASE ORAL at 08:50

## 2019-03-17 RX ADMIN — VANCOMYCIN HYDROCHLORIDE 1.5 G: 1 INJECTION, POWDER, LYOPHILIZED, FOR SOLUTION INTRAVENOUS at 14:48

## 2019-03-17 RX ADMIN — DOCUSATE SODIUM 100 MG: 100 CAPSULE, LIQUID FILLED ORAL at 08:50

## 2019-03-17 RX ADMIN — INSULIN LISPRO 25 UNITS: 100 INJECTION, SOLUTION INTRAVENOUS; SUBCUTANEOUS at 08:49

## 2019-03-17 RX ADMIN — OXYCODONE HYDROCHLORIDE 5 MG: 5 TABLET ORAL at 14:48

## 2019-03-17 RX ADMIN — INSULIN LISPRO 25 UNITS: 100 INJECTION, SOLUTION INTRAVENOUS; SUBCUTANEOUS at 12:30

## 2019-03-17 RX ADMIN — DOCUSATE SODIUM 100 MG: 100 CAPSULE, LIQUID FILLED ORAL at 20:17

## 2019-03-17 RX ADMIN — AZTREONAM 1 G: 2 INJECTION, POWDER, LYOPHILIZED, FOR SOLUTION INTRAMUSCULAR; INTRAVENOUS at 05:45

## 2019-03-17 RX ADMIN — TAMSULOSIN HYDROCHLORIDE 0.4 MG: 0.4 CAPSULE ORAL at 08:50

## 2019-03-17 RX ADMIN — HYDROMORPHONE HYDROCHLORIDE 4 MG: 2 TABLET ORAL at 16:07

## 2019-03-17 RX ADMIN — TORSEMIDE 20 MG: 20 TABLET ORAL at 13:30

## 2019-03-17 RX ADMIN — ENOXAPARIN SODIUM 40 MG: 100 INJECTION SUBCUTANEOUS at 08:51

## 2019-03-17 RX ADMIN — SODIUM CHLORIDE: 9 INJECTION, SOLUTION INTRAVENOUS at 00:12

## 2019-03-17 RX ADMIN — OXYCODONE HYDROCHLORIDE 5 MG: 5 TABLET ORAL at 20:17

## 2019-03-17 RX ADMIN — INSULIN LISPRO 1 UNITS: 100 INJECTION, SOLUTION INTRAVENOUS; SUBCUTANEOUS at 12:29

## 2019-03-17 RX ADMIN — ALLOPURINOL 300 MG: 300 TABLET ORAL at 08:50

## 2019-03-17 RX ADMIN — ZINC SULFATE 220 MG (50 MG) CAPSULE 220 MG: CAPSULE at 08:50

## 2019-03-17 RX ADMIN — SODIUM CHLORIDE: 9 INJECTION, SOLUTION INTRAVENOUS at 12:35

## 2019-03-17 ASSESSMENT — PAIN SCALES - GENERAL
PAINLEVEL_OUTOF10: 8
PAINLEVEL_OUTOF10: 0
PAINLEVEL_OUTOF10: 9
PAINLEVEL_OUTOF10: 7
PAINLEVEL_OUTOF10: 8
PAINLEVEL_OUTOF10: 6
PAINLEVEL_OUTOF10: 9
PAINLEVEL_OUTOF10: 8
PAINLEVEL_OUTOF10: 7
PAINLEVEL_OUTOF10: 9
PAINLEVEL_OUTOF10: 9
PAINLEVEL_OUTOF10: 8
PAINLEVEL_OUTOF10: 8

## 2019-03-17 ASSESSMENT — PAIN DESCRIPTION - DESCRIPTORS
DESCRIPTORS: BURNING;STABBING
DESCRIPTORS: BURNING;STABBING
DESCRIPTORS: BURNING
DESCRIPTORS: BURNING;STABBING

## 2019-03-17 ASSESSMENT — PAIN - FUNCTIONAL ASSESSMENT
PAIN_FUNCTIONAL_ASSESSMENT: PREVENTS OR INTERFERES SOME ACTIVE ACTIVITIES AND ADLS

## 2019-03-17 ASSESSMENT — PAIN DESCRIPTION - PAIN TYPE
TYPE: CHRONIC PAIN

## 2019-03-17 ASSESSMENT — PAIN DESCRIPTION - LOCATION
LOCATION: BUTTOCKS

## 2019-03-17 ASSESSMENT — PAIN DESCRIPTION - FREQUENCY: FREQUENCY: CONTINUOUS

## 2019-03-17 ASSESSMENT — PAIN DESCRIPTION - ORIENTATION: ORIENTATION: MID

## 2019-03-17 NOTE — PROGRESS NOTES
Progress Note    Admit Date:  3/15/2019    Admitted from wound clinic for infected sacral decubitus ulcer  MRSA positive    Subjective:  Mr. Willard Anthony is stable . Afebrile    no SOB . No back pain    better today     Objective:   BP (!) 96/53   Pulse 80   Temp 97.4 °F (36.3 °C) (Axillary)   Resp 16   Ht 5' 10\" (1.778 m)   Wt 241 lb 14.4 oz (109.7 kg)   SpO2 95%   BMI 34.71 kg/m²       Intake/Output Summary (Last 24 hours) at 3/17/2019 1316  Last data filed at 3/17/2019 1313  Gross per 24 hour   Intake 4500 ml   Output --   Net 4500 ml         Physical Exam:  General:  Awake, alert, NAD  Chronic trach  Passy-Saxis valve in place. Skin:  Warm and dry  Neck:  JVD absent. Neck supple  Chest:  Clear to auscultation, respiration easy. No wheezes, rales or rhonchi. Cardiovascular:  RRR ,S1S2 normal  Abdomen:  Soft, non tender, non distended, BS +  Back: Chronic wounds to the buttock and sacrum,  Dressing +   Extremities:  No edema. , chronic skin changes to bilateral lower extremities  Intact peripheral pulses.  Brisk cap refill, < 2 secs  Neuro: non focal      Medications:   Scheduled Meds:   insulin lispro  25 Units Subcutaneous TID WC    sodium chloride flush  10 mL Intravenous 2 times per day    enoxaparin  40 mg Subcutaneous Daily    aztreonam  1 g Intravenous Q8H    vancomycin  1,500 mg Intravenous Q24H    mometasone-formoterol  2 puff Inhalation BID    allopurinol  300 mg Oral Daily    ARIPiprazole  30 mg Oral Daily    docusate sodium  100 mg Oral BID    ferrous sulfate  325 mg Oral Daily with breakfast    fluticasone  1 spray Each Nare Daily    gabapentin  600 mg Oral Nightly    HYDROmorphone  4 mg Oral Q8H    insulin glargine  30 Units Subcutaneous Nightly    pravastatin  40 mg Oral Nightly    tamsulosin  0.4 mg Oral Daily    zinc sulfate  220 mg Oral Daily    insulin lispro  0-6 Units Subcutaneous TID WC    insulin lispro  0-3 Units Subcutaneous Nightly    montelukast  10 mg Oral Nightly

## 2019-03-17 NOTE — PROGRESS NOTES
Bedside report given to Amt RN, no signs of distress noted. PRN pain med given per request. Pt rated buttock pain 8/10. SEE MAR. Pt denies any further needs.

## 2019-03-17 NOTE — CARE COORDINATION
Case Management Assessment  Initial Evaluation    Date/Time of Evaluation: 3/17/2019 2:24 PM  Assessment Completed by: Sneha Reed    Patient Name: Glorianne Klinefelter  YOB: 1955  Diagnosis: Cellulitis of buttock [U59.312]  Date / Time: 3/15/2019 12:48 PM  Admission status/Date:INPT 3/15/19  Chart Reviewed: Yes      Patient Interviewed: Yes   Family Interviewed:  No      Hospitalization in the last 30 days:  No    Contacts  :     Relationship to Patient:   Phone Number:    Alternate Contact:     Relationship to Patient:     Phone Number:    Met with:    Current PCP Zaynab 10 required for SNF : Y, N        3 night stay required - Y, N    ADLS  Support Systems: Friends/Neighbors, [employer]  Transportation: senior services    Meal Preparation: private duty care provider    Housing  Home Environment: home alone  Steps: n/a  Plans to Return to Present Housing: Yes  Other Identified Issues: -    [address]  Currently active with [address] : Yes - [facility]- SN,HHA, PT/OT  Type of Home Care Services: Nursing Services, OT, Aide Services  Passport/Waiver : No  :                      Phone Number:    Passport/Waiver Services: -           Durable Medical Equipment   DME Provider: PrO2  Equipment: Walker__Cane__RTS__ BSC__Shower Chair_X_  02__ HHN__ CPAP__  BiPap__  Hospital Bed__ W/C_X__ Other__________      Has Home O2 in place on admit:  Yes  Informed of need to bring portable home O2 tank on day of discharge for nursing to connect prior to leaving:   Yes  Verbalized agreement/Understanding:   Yes    Community Service Affiliation  Dialysis:  No    · Name:  · Location  · Dialysis Schedule:  · Phone:   · Fax:     Outpatient PT/OT: No    Cancer Center: No     CHF Clinic: No     Pulmonary Rehab: No  Pain Clinic: Yes, pain pump  Community Mental Health: No    Wound Clinic: No     Other: -    DISCHARGE PLAN: reviewed chart and met with pt. Pt reports to live at home alone and is active with [facility] for SN, HHA, PT/OT. Pt states that he also has a private duty care provider for cooking and shopping. Pt states that he would like to return home at discharge with current services but states that he is agreeable to STR if indicated. Noted PT/OT notes recommend home PT/OT at this time. Will cont to follow. Explained Case Management role/services.

## 2019-03-17 NOTE — PLAN OF CARE
Continue with current plan of care. Pt has been educated to hospital falls prevention policy. They are aware they will be assessed every shift, and with any condition changes, by the nursing staff on their ability to perform their ADL's without need for assistance. Pt understands that based on the number of their score they are given a base score that will assign a level of low, medium, or high risk for falls. This patient has rated a high which requires a bed / chair alarm for their safety to prevent a fall. The patient is alert and oriented, and acknowledges and understands the need for intervention but refuses the application and use of the bed / chair alarm that is required per policy. Pt agrees to use call light and wait for help to arrive to assist them to get up when they need to. Call light is within reach and all other safety measures in place.

## 2019-03-18 LAB
ANION GAP SERPL CALCULATED.3IONS-SCNC: 10 MMOL/L (ref 3–16)
BUN BLDV-MCNC: 18 MG/DL (ref 7–20)
CALCIUM SERPL-MCNC: 8.8 MG/DL (ref 8.3–10.6)
CHLORIDE BLD-SCNC: 105 MMOL/L (ref 99–110)
CO2: 27 MMOL/L (ref 21–32)
CREAT SERPL-MCNC: 1.2 MG/DL (ref 0.8–1.3)
GFR AFRICAN AMERICAN: >60
GFR NON-AFRICAN AMERICAN: >60
GLUCOSE BLD-MCNC: 184 MG/DL (ref 70–99)
GLUCOSE BLD-MCNC: 69 MG/DL (ref 70–99)
GLUCOSE BLD-MCNC: 82 MG/DL (ref 70–99)
GLUCOSE BLD-MCNC: 88 MG/DL (ref 70–99)
GLUCOSE BLD-MCNC: 92 MG/DL (ref 70–99)
GLUCOSE BLD-MCNC: 97 MG/DL (ref 70–99)
GLUCOSE BLD-MCNC: 98 MG/DL (ref 70–99)
GRAM STAIN RESULT: ABNORMAL
HCT VFR BLD CALC: 35 % (ref 40.5–52.5)
HEMOGLOBIN: 11.2 G/DL (ref 13.5–17.5)
MAGNESIUM: 2.2 MG/DL (ref 1.8–2.4)
MCH RBC QN AUTO: 25.8 PG (ref 26–34)
MCHC RBC AUTO-ENTMCNC: 32.2 G/DL (ref 31–36)
MCV RBC AUTO: 80.2 FL (ref 80–100)
ORGANISM: ABNORMAL
ORGANISM: ABNORMAL
PDW BLD-RTO: 14.9 % (ref 12.4–15.4)
PERFORMED ON: ABNORMAL
PERFORMED ON: ABNORMAL
PERFORMED ON: NORMAL
PHOSPHORUS: 3.4 MG/DL (ref 2.5–4.9)
PLATELET # BLD: 143 K/UL (ref 135–450)
PMV BLD AUTO: 8.8 FL (ref 5–10.5)
POTASSIUM SERPL-SCNC: 4.1 MMOL/L (ref 3.5–5.1)
RBC # BLD: 4.36 M/UL (ref 4.2–5.9)
SODIUM BLD-SCNC: 142 MMOL/L (ref 136–145)
WBC # BLD: 5.5 K/UL (ref 4–11)
WOUND/ABSCESS: ABNORMAL

## 2019-03-18 PROCEDURE — 85027 COMPLETE CBC AUTOMATED: CPT

## 2019-03-18 PROCEDURE — 97530 THERAPEUTIC ACTIVITIES: CPT

## 2019-03-18 PROCEDURE — 84100 ASSAY OF PHOSPHORUS: CPT

## 2019-03-18 PROCEDURE — 2500000003 HC RX 250 WO HCPCS: Performed by: PHYSICIAN ASSISTANT

## 2019-03-18 PROCEDURE — 94640 AIRWAY INHALATION TREATMENT: CPT

## 2019-03-18 PROCEDURE — 83735 ASSAY OF MAGNESIUM: CPT

## 2019-03-18 PROCEDURE — 6370000000 HC RX 637 (ALT 250 FOR IP): Performed by: PHYSICIAN ASSISTANT

## 2019-03-18 PROCEDURE — 2580000003 HC RX 258: Performed by: PHYSICIAN ASSISTANT

## 2019-03-18 PROCEDURE — 1200000000 HC SEMI PRIVATE

## 2019-03-18 PROCEDURE — 6370000000 HC RX 637 (ALT 250 FOR IP): Performed by: INTERNAL MEDICINE

## 2019-03-18 PROCEDURE — 80048 BASIC METABOLIC PNL TOTAL CA: CPT

## 2019-03-18 PROCEDURE — 97110 THERAPEUTIC EXERCISES: CPT

## 2019-03-18 PROCEDURE — 36415 COLL VENOUS BLD VENIPUNCTURE: CPT

## 2019-03-18 PROCEDURE — 6360000002 HC RX W HCPCS: Performed by: PHYSICIAN ASSISTANT

## 2019-03-18 PROCEDURE — 99232 SBSQ HOSP IP/OBS MODERATE 35: CPT | Performed by: PHYSICIAN ASSISTANT

## 2019-03-18 PROCEDURE — 94761 N-INVAS EAR/PLS OXIMETRY MLT: CPT

## 2019-03-18 PROCEDURE — 2700000000 HC OXYGEN THERAPY PER DAY

## 2019-03-18 RX ORDER — DOXYCYCLINE HYCLATE 100 MG
100 TABLET ORAL EVERY 12 HOURS SCHEDULED
Status: COMPLETED | OUTPATIENT
Start: 2019-03-18 | End: 2019-03-21

## 2019-03-18 RX ADMIN — Medication 2 PUFF: at 19:31

## 2019-03-18 RX ADMIN — ALLOPURINOL 300 MG: 300 TABLET ORAL at 08:35

## 2019-03-18 RX ADMIN — TAMSULOSIN HYDROCHLORIDE 0.4 MG: 0.4 CAPSULE ORAL at 08:36

## 2019-03-18 RX ADMIN — INSULIN LISPRO 25 UNITS: 100 INJECTION, SOLUTION INTRAVENOUS; SUBCUTANEOUS at 17:15

## 2019-03-18 RX ADMIN — FERROUS SULFATE TAB 325 MG (65 MG ELEMENTAL FE) 325 MG: 325 (65 FE) TAB at 08:36

## 2019-03-18 RX ADMIN — AZTREONAM 1 G: 2 INJECTION, POWDER, LYOPHILIZED, FOR SOLUTION INTRAMUSCULAR; INTRAVENOUS at 05:49

## 2019-03-18 RX ADMIN — GABAPENTIN 600 MG: 300 CAPSULE ORAL at 21:45

## 2019-03-18 RX ADMIN — HYDROMORPHONE HYDROCHLORIDE 4 MG: 2 TABLET ORAL at 16:05

## 2019-03-18 RX ADMIN — ARIPIPRAZOLE 30 MG: 10 TABLET ORAL at 08:35

## 2019-03-18 RX ADMIN — DOXYCYCLINE HYCLATE 100 MG: 100 TABLET, COATED ORAL at 14:54

## 2019-03-18 RX ADMIN — ENOXAPARIN SODIUM 40 MG: 100 INJECTION SUBCUTANEOUS at 08:34

## 2019-03-18 RX ADMIN — TORSEMIDE 20 MG: 20 TABLET ORAL at 08:35

## 2019-03-18 RX ADMIN — DOCUSATE SODIUM 100 MG: 100 CAPSULE, LIQUID FILLED ORAL at 08:36

## 2019-03-18 RX ADMIN — HYDROMORPHONE HYDROCHLORIDE 4 MG: 2 TABLET ORAL at 00:25

## 2019-03-18 RX ADMIN — DOCUSATE SODIUM 100 MG: 100 CAPSULE, LIQUID FILLED ORAL at 21:45

## 2019-03-18 RX ADMIN — ZINC SULFATE 220 MG (50 MG) CAPSULE 220 MG: CAPSULE at 08:34

## 2019-03-18 RX ADMIN — INSULIN LISPRO 1 UNITS: 100 INJECTION, SOLUTION INTRAVENOUS; SUBCUTANEOUS at 17:15

## 2019-03-18 RX ADMIN — OXYCODONE HYDROCHLORIDE 5 MG: 5 TABLET ORAL at 12:05

## 2019-03-18 RX ADMIN — PRAVASTATIN SODIUM 40 MG: 40 TABLET ORAL at 21:45

## 2019-03-18 RX ADMIN — Medication 10 ML: at 08:36

## 2019-03-18 RX ADMIN — INSULIN LISPRO 25 UNITS: 100 INJECTION, SOLUTION INTRAVENOUS; SUBCUTANEOUS at 08:35

## 2019-03-18 RX ADMIN — Medication 10 ML: at 21:46

## 2019-03-18 RX ADMIN — HYDROMORPHONE HYDROCHLORIDE 4 MG: 2 TABLET ORAL at 08:36

## 2019-03-18 RX ADMIN — Medication 2 PUFF: at 06:38

## 2019-03-18 RX ADMIN — POTASSIUM CHLORIDE 20 MEQ: 20 TABLET, EXTENDED RELEASE ORAL at 08:35

## 2019-03-18 RX ADMIN — OXYCODONE HYDROCHLORIDE 5 MG: 5 TABLET ORAL at 18:11

## 2019-03-18 RX ADMIN — MONTELUKAST SODIUM 10 MG: 10 TABLET, FILM COATED ORAL at 21:45

## 2019-03-18 RX ADMIN — OXYCODONE HYDROCHLORIDE 5 MG: 5 TABLET ORAL at 06:35

## 2019-03-18 RX ADMIN — BUPROPION HYDROCHLORIDE 400 MG: 100 TABLET, EXTENDED RELEASE ORAL at 08:36

## 2019-03-18 ASSESSMENT — PAIN DESCRIPTION - DESCRIPTORS
DESCRIPTORS: BURNING;SHARP
DESCRIPTORS: BURNING;SHARP
DESCRIPTORS: ACHING;BURNING;DISCOMFORT
DESCRIPTORS: BURNING;SHARP;DISCOMFORT
DESCRIPTORS: BURNING;SHARP

## 2019-03-18 ASSESSMENT — PAIN SCALES - GENERAL
PAINLEVEL_OUTOF10: 0
PAINLEVEL_OUTOF10: 9
PAINLEVEL_OUTOF10: 8
PAINLEVEL_OUTOF10: 9
PAINLEVEL_OUTOF10: 9
PAINLEVEL_OUTOF10: 8
PAINLEVEL_OUTOF10: 8
PAINLEVEL_OUTOF10: 9
PAINLEVEL_OUTOF10: 7

## 2019-03-18 ASSESSMENT — PAIN DESCRIPTION - PAIN TYPE
TYPE: CHRONIC PAIN

## 2019-03-18 ASSESSMENT — PAIN DESCRIPTION - LOCATION
LOCATION: BUTTOCKS

## 2019-03-18 ASSESSMENT — PAIN - FUNCTIONAL ASSESSMENT: PAIN_FUNCTIONAL_ASSESSMENT: PREVENTS OR INTERFERES SOME ACTIVE ACTIVITIES AND ADLS

## 2019-03-18 ASSESSMENT — PAIN DESCRIPTION - FREQUENCY: FREQUENCY: CONTINUOUS

## 2019-03-18 NOTE — CARE COORDINATION
[address] Transitions Interview     3/18/2019    Patient: Maureen Da Silva Patient : 1955   MRN: [id number]  Reason for Admission: cellulitis (chronic sacral wound)  RARS: Readmission Risk Score: 29         Spoke with: Lauren Be (patient)      Readmission Risk  Patient Active Problem List   Diagnosis    Chronic thrombocytopenia    Chronic respiratory failure with hypoxia on 6 L home O2 tent over trach    Type II diabetes mellitus, well controlled ([address]    HTN (hypertension)    Non morbid obesity due to excess calories    Anxiety and depression    Tracheostomy dependent (HCC)    Pain syndrome, chronic    Asthma/COPD    Chronic ischemic heart disease    Bilateral lower extremity edema    Hiatal hernia with GERD    Low back pain    TESS    Calcification of bronchus or trachea    Atypical schizophrenia ([address]    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    Chronic microcytic Iron-deficiency anemia    Chronic dCHF (grade 1 LVDD)    Agoraphobia    Arthritis    Claustrophobia    Congenital stenosis of trachea    Pressure ulcer of coccygeal region, stage 4 (HCC)    Debility    Arthritis of right knee    Submandibular sialolithiasis    Lung nodule    Pressure injury of sacral region, stage 3 (HCC)    Degenerative arthritis of lumbar spine    Ambulatory dysfunction    Pressure injury of buttock, stage 2    Cellulitis of buttock    Acute kidney injury ([address]    Hyperlipidemia    Normocytic anemia       Inpatient Assessment  Care Transitions Summary    Care Transitions Inpatient Review  Medication Review  Are you able to afford your medications?:  Yes  How often do you have difficulty taking your medications?:  I always take them as prescribed.   Housing Review  Who do you live with?:  Alone  Are you an active caregiver in your home?:  No  Social Support  Do you have a ?:  No  Do you have a [address] [place] [address]?:  Yes  [facility] Name:  [id number] Equipment  Patient DME:

## 2019-03-18 NOTE — PROGRESS NOTES
Inpatient Physical Therapy Daily Treatment Note    Unit: 2West  Date:  3/18/2019  Patient Name:    Grant Gaitan  Admitting diagnosis:  Cellulitis of buttock [R56.374]  Admit Date:  3/15/2019  Precautions/Restrictions:  Tracheostomy (do not lay patient flat as trach collapses per patient if laid flat); fall risk, IV, contact (MRSA)    Discharge Recommendations: SNF  Home Health S4 Level Recommendation:  NA if going to SNF  DME needs for discharge:  W/c being ordered with modifications (seat lower to floor). AM-PAC Mobility Score      AM-PAC Inpatient Mobility without Stair Climbing Raw Score : 15    Treatment Time:  1303  Treatment number: 2    Subjective:    Pt. Supine in bed with no family present. Pt. Agreeable to tx, \"but nothing that will cause too much friction\"  Pt agreeable to in-bed exercises. Pt then requested to get to [facility] at end of tx. Cognition  [x] intact  [] impaired     Pain   [x]Yes  []No  Ratin/10   Location: buttocks, R knee   Pain Medicine Status: [x] Denies need ( already received pain med)  [] Pain med requested  [] RN notified. Bed Mobility   Supine to Sit: mod I  Sit to Supine: mod I  Rolling: IND to each side   Scooting: IND to HOB  Bridging:  IND , x8    Transfer Training    Sit to stand: CGA from EOB, SBA from [facility]  Stand to sit: SBA to EOB, [facility]  Bed to/from [facility]: stand pivot to/from [facility] with CGA   Pt did not want therapist holding onto him during transfer, but due to knee pain, fair balance, and air mattress, pt required at least CGA to prevent a fall and to complete transfer with improved safety.      Gait Training pt declined to complete today   Patient ambulated with     Therapeutic Exercise   Ankle Pumps: x20 B  Heel Slides: x15 B (completed in sidelying 2/2 pt feeling \"friction\" to buttocks with completing in supine)  Clam shells: x15 B  Sidelying hip flexion: x15 B  Sidelying Hip ABD/ADD: x15 B  SAQ: x15 B  Sidelying HS curls: x15 B    Balance    Static Sitting:  [x] Good [] Fair [] Poor  Dynamic Sitting:  [x] Good [] Fair [] Poor  Static Standing: [] Good [x] Fair [] Poor  Dynamic Standing: [] Good [x] Fair [] Poor    Patient Education       Educated on role of PT, DC recommendations, use of call light, POC, and safety awareness. Positioning Needs       Pt reclined in bed,  call light and needs in reach. Pt has refused bed/chair alarms per nursing documentation. Activity Tolerance   SpO2:   HR:  BP:   Limited by pain in buttocks     Other      None   Assessment   Patient participating fairly this afternoon, self-limiting tx to in-bed exercises only. Pt was apprehensive to complete mobility out of fear of shearing forces and pain. Pt did complete a transfer to /from [facility], requiring no more than CGA. Pt will continue to benefit from acute PT to safely restore mobility. Recommending pt DC to SNF to maximize independence with mobility before returning home and to receive a higher level of nursing care than he can receive at home to maximize healing potential to wounds. Goals (all goals ongoing unless otherwise indicated)  To be met in 3 visits:  1). Bed to w/c and return SBA.     To be met in 6 visits:  1). Supine to/from sit IND. - Met 3/18  2). Bed to [facility] and return IND. 3). Bed to w/c and return IND. 4). W/c mobility in room or halls as able IND. 5). Tolerate B LE exercises 10 reps. - Met 3/18    Plan   Continue with plan of care. Time Coded Treatment Minutes:  32 minutes    Total Treatment Time:  32 minutes    Chu Mary PT, DPT #[id number]    If patient discharges from this facility prior to next visit, this note will serve as the Discharge Summary.

## 2019-03-18 NOTE — PROGRESS NOTES
Progress Note    Admit Date:  3/15/2019    Admitted from wound clinic for infected sacral decubitus ulcer  MRSA positive    Subjective:  Mr. Dye Saliva feels like his pain is improved with the PRN pain control. No fevers. Objective:   /71   Pulse 63   Temp 97.1 °F (36.2 °C) (Oral)   Resp 14   Ht 5' 10\" (1.778 m)   Wt 237 lb 4.8 oz (107.6 kg)   SpO2 96%   BMI 34.05 kg/m²       Intake/Output Summary (Last 24 hours) at 3/18/2019 1108  Last data filed at 3/18/2019 0837  Gross per 24 hour   Intake 1924 ml   Output 1450 ml   Net 474 ml         Physical Exam:  General:  Awake, alert, NAD  Chronic trach  Passy-Deepthi valve in place. Skin:  Warm and dry  Neck:  JVD absent. Neck supple  Chest:  Clear to auscultation, respiration easy. No wheezes, rales or rhonchi. Cardiovascular:  RRR ,S1S2 normal  Abdomen:  Soft, non tender, non distended, BS +  Back: Chronic wounds to the buttock and sacrum,  Dressing +   Extremities:  No edema. , chronic skin changes to bilateral lower extremities  Intact peripheral pulses.  Brisk cap refill, < 2 secs  Neuro: non focal      Medications:   Scheduled Meds:   pantoprazole  40 mg Oral Daily    potassium chloride  20 mEq Oral Daily    torsemide  20 mg Oral Daily    insulin lispro  25 Units Subcutaneous TID WC    sodium chloride flush  10 mL Intravenous 2 times per day    enoxaparin  40 mg Subcutaneous Daily    aztreonam  1 g Intravenous Q8H    vancomycin  1,500 mg Intravenous Q24H    mometasone-formoterol  2 puff Inhalation BID    allopurinol  300 mg Oral Daily    ARIPiprazole  30 mg Oral Daily    docusate sodium  100 mg Oral BID    ferrous sulfate  325 mg Oral Daily with breakfast    fluticasone  1 spray Each Nare Daily    gabapentin  600 mg Oral Nightly    HYDROmorphone  4 mg Oral Q8H    insulin glargine  30 Units Subcutaneous Nightly    pravastatin  40 mg Oral Nightly    tamsulosin  0.4 mg Oral Daily    zinc sulfate  220 mg Oral Daily    insulin lispro  0-6 Units Subcutaneous TID     insulin lispro  0-3 Units Subcutaneous Nightly    montelukast  10 mg Oral Nightly    buPROPion  400 mg Oral Daily       Continuous Infusions:   dextrose         Data:  CBC:   Recent Labs     03/16/19  0622 03/17/19  0536 03/18/19  0520   WBC 5.7 4.4 5.5   RBC 4.29 4.21 4.36   HGB 11.3* 11.2* 11.2*   HCT 34.7* 34.2* 35.0*   MCV 80.9 81.2 80.2   RDW 15.2 14.5 14.9    131* 143     BMP:   Recent Labs     03/16/19  0622 03/17/19  0535 03/18/19  0520    142 142   K 4.0 4.3 4.1    106 105   CO2 27 28 27   PHOS  --  3.1 3.4   BUN 32* 20 18   CREATININE 1.6* 1.4* 1.2     Staph aureus mrsa (1)     Antibiotic Interpretation JORGE Status    ceFAZolin Resistant >16 mcg/mL     ciprofloxacin Resistant >2 mcg/mL     clindamycin Sensitive <=0.5 mcg/mL     erythromycin Sensitive <=0.5 mcg/mL     oxacillin Resistant >2 mcg/mL     tetracycline Sensitive <=4 mcg/mL     trimethoprim-sulfamethoxazole Resistant >2/38 mcg/mL     vancomycin Sensitive 1 mcg/mL           Assessment:  Active Problems:    Type II diabetes mellitus, well controlled (HCC)    Pain syndrome, chronic    Pressure injury of sacral region, stage 3 (HCC)    Cellulitis of buttock    Acute kidney injury ([address]    Hyperlipidemia    Normocytic anemia  Resolved Problems:    * No resolved hospital problems.  *      Plan:  Cellulitis   Chronic sacral wound   - Follows with Dr. Gosia Arana, consult for recommendations for POC, suspect ARU needed for continued care   - Continue vancomycin and Azactam per Dr. Gosia Arana   - Wound Cx--MRSA   - Wound care   - speciality mattress to reduced pressure      Chronic dCHF  - appears well compensated   - home torsemide held 2/2 to TRICIA   - S/P gentle IV hydration   - monitor BMP , crtn better today - >  resume torsemide at a lower dose today      COPD  - no AE   - continue IBD     HTN  - home ACE-I held 2/2 to TRICIA--TRICIA resolved but pressures no elevated  - BP on lower end, continue to monitor    HLD  - Continue statin      Gout   - Continue allopurinol       Hx of stroke   - Continue HTN control, statin, and      Chronic tracheostomy    -supportive care      TRICIA   - Baseline ~1.2-1.3   - Creatinine on admission 2.2 -- creatinine down to 1.6-- > 1.4-->1.2. Continue to hold  ACE inhibitor's. Resume torsemide at a lower dose . Monitor BMP .  - DC IVF      DM  - hgb A1c: 7.2   - Continue Lantus. Discussed with patient about his home regimen,  will add 25 units of Humalog 3 times daily with meals continue sliding scale continue Lantus 30 units nightly  - SSI     Chronic Pain, opioid dependence   - Continue home regimen from pain medicine with Dilaudid and Neurontin  - Add PRN pain control       DVT Prophylaxis: Lovenox  DIET GENERAL; Carb Control: 4 carb choices (60 gms)/meal  Code Status: Full Code     Hospital course and plan of care discussed with Dr. Brandt Polanco.       Henrique Álvarez PA-C  3/18/2019 11:09 AM

## 2019-03-18 NOTE — PROGRESS NOTES
Physical Therapy  Attempting PT follow up visit at this time, but pt politely declined stating \"my bottom really hurts right now. \" Pt rated his pain 9/10. Per pt's whiteboard, pt is not due for pain medicine until 1135. Assisted pt with pillow under R hip for improved comfort. Will re-attempt later this date as schedules allow.      Med Warner, PT, DPT #[id number]

## 2019-03-18 NOTE — PROGRESS NOTES
Dressing to buttocks changed, aquacel-ag in place, zinc paste placed surrounding wound, mepilex in place. Small amount of serosanguinous drainage noted. Elijah Almonte RN\

## 2019-03-18 NOTE — PROGRESS NOTES
Occupational Therapy Daily Treatment Note    Unit:  2West  Date:  3/18/2019  Patient Name:    Tammy Cordova  Admitting diagnosis:  Cellulitis of buttock [W08.667]  Admit Date:  3/15/2019  Precautions/Restrictions:  Fall Risk, IV, Tracheostomy (do not lay patient flat as trach collapses per patient if laid flat). Discharge Recommendations: home with PRN assist    AM-PAC Score: 19   Home Health S4 Level: [] NA   [x] Level 1- Standard  []  Level 2- Social  [] Level 3- Safety  []  Level 4- Sick    DME needs for discharge:   Possible BSC     Treatment Time:  9:07-9:33  Treatment number: 2     Subjective:  Pt areeable to tx     Pain   [x]Yes  []No  Rating:no number given   Location: buttocks  Pain Medicine Status: [x] Denies need  [] Pain med requested  [] RN notified. Bed Mobility:   Supine to Sit: SBA    Sit to Supine: SBA   Rolling: Ind   Scooting: SBA     Transfer Training:   Sit to stand: SBA sit pivot transfer to chair   Stand to sit: SBA sit pivot   Bed to Chair/BSC: SBA    ADL Training: Pt compelted transfer to w/c with SBA then completed HEP with 15 reps B UE in all planes. Pt then transferred back to bed with SBA. Pt was Ind don/doff socks seated at EOB     Therapeutic Exercise:   Hand flex/ext:15 reps B UE   Elbow flex/ext: 15 reps B UE  Forearm sup/pron: 15 reps B UE  Shld flex/ext: 15 reps B UE  Shld abd/add: 15 reps B UE    Patient Education: Role of OT, safety with transfers     Positioning Needs: Pt supine in bed with pillow under R side for comfort     Family Present:  []Yes  [x]No    Assessment:  Pt would benefit from con't OT services to work toward Ind level with ADL's, IADL'd, transfers, balance, safety and overall strength to return home alone     GOALS  1). Indep with UE ex x 10 reps- MET 15 reps      To be met in 5 Visits:  1). Supine to Sit SBA   2). Bed to Chair/BSC SBA  3). Upper Body Bathing  Supervision- NA   4). Lower Body Bathing  MIN A- NA   5).  Upper Body Dressing Supervision- NA 6). Lower Body Dressing Ind don/doff socks   7). Pt to carina UE exs x15 reps in all planes- MET          Plan: cont with POC    At end of treatment, patient in bed with call light and needs within reach.   Timed Code Treatment Minutes:   26 minutes  Total Treatment Time:  26 minutes    Signature, License #  Jacek Berry OTR/L #[id number]    If patient discharges from this facility prior to next visit, this note will serve as the Discharge Summary

## 2019-03-18 NOTE — CARE COORDINATION
INTERDISCIPLINARY PLAN OF CARE CONFERENCE    Date/Time: 3/18/2019 3:14 PM  Completed by: Sheila Elizalde, Case Management      Patient Name:  Brandon Welch  YOB: 1955  Admitting Diagnosis: Cellulitis of buttock [V72.723]     Admit Date/Time:  3/15/2019 12:48 PM    Chart reviewed. Interdisciplinary team met to discuss patient progress and discharge plans. Disciplines included Case Management, Nursing, and Dietitian. Current Status:stable    PT/OT recommendation:snf    Anticipated Discharge Date: tbd  Expected D/C Disposition:  Skilled nursing facility  Confirmed plan with patient and/or family Yes  Discharge Plan Comments: Reviewed chart. Met with pt. Pt chose [facility] [place] for STR,+eCOC,+HENS. Referral called to [facility]. [facility]. Follow.           Home O2 in place on admit: to STR  Pt informed of need to bring portable home O2 tank on day of discharge for nursing to connect prior to leaving:  Not Indicated  Verbalized agreement/Understanding:  Not Indicated

## 2019-03-19 PROBLEM — R11.14 BILIOUS VOMITING WITHOUT NAUSEA: Status: RESOLVED | Noted: 2019-03-19 | Resolved: 2019-03-19

## 2019-03-19 PROBLEM — R11.14 BILIOUS VOMITING WITHOUT NAUSEA: Status: ACTIVE | Noted: 2019-03-19

## 2019-03-19 LAB
ANION GAP SERPL CALCULATED.3IONS-SCNC: 9 MMOL/L (ref 3–16)
BUN BLDV-MCNC: 18 MG/DL (ref 7–20)
CALCIUM SERPL-MCNC: 9.1 MG/DL (ref 8.3–10.6)
CHLORIDE BLD-SCNC: 105 MMOL/L (ref 99–110)
CO2: 28 MMOL/L (ref 21–32)
CREAT SERPL-MCNC: 1.3 MG/DL (ref 0.8–1.3)
GFR AFRICAN AMERICAN: >60
GFR NON-AFRICAN AMERICAN: 56
GLUCOSE BLD-MCNC: 110 MG/DL (ref 70–99)
GLUCOSE BLD-MCNC: 117 MG/DL (ref 70–99)
GLUCOSE BLD-MCNC: 146 MG/DL (ref 70–99)
GLUCOSE BLD-MCNC: 152 MG/DL (ref 70–99)
GLUCOSE BLD-MCNC: 177 MG/DL (ref 70–99)
GLUCOSE BLD-MCNC: 261 MG/DL (ref 70–99)
HCT VFR BLD CALC: 35.7 % (ref 40.5–52.5)
HEMOGLOBIN: 11.7 G/DL (ref 13.5–17.5)
MAGNESIUM: 2 MG/DL (ref 1.8–2.4)
MCH RBC QN AUTO: 26.5 PG (ref 26–34)
MCHC RBC AUTO-ENTMCNC: 32.8 G/DL (ref 31–36)
MCV RBC AUTO: 80.8 FL (ref 80–100)
PDW BLD-RTO: 14.5 % (ref 12.4–15.4)
PERFORMED ON: ABNORMAL
PHOSPHORUS: 3.6 MG/DL (ref 2.5–4.9)
PLATELET # BLD: 147 K/UL (ref 135–450)
PMV BLD AUTO: 8.2 FL (ref 5–10.5)
POTASSIUM SERPL-SCNC: 4.3 MMOL/L (ref 3.5–5.1)
RBC # BLD: 4.42 M/UL (ref 4.2–5.9)
SODIUM BLD-SCNC: 142 MMOL/L (ref 136–145)
WBC # BLD: 5.6 K/UL (ref 4–11)

## 2019-03-19 PROCEDURE — 97110 THERAPEUTIC EXERCISES: CPT

## 2019-03-19 PROCEDURE — 36415 COLL VENOUS BLD VENIPUNCTURE: CPT

## 2019-03-19 PROCEDURE — 99232 SBSQ HOSP IP/OBS MODERATE 35: CPT | Performed by: INTERNAL MEDICINE

## 2019-03-19 PROCEDURE — 6370000000 HC RX 637 (ALT 250 FOR IP): Performed by: PHYSICIAN ASSISTANT

## 2019-03-19 PROCEDURE — 6370000000 HC RX 637 (ALT 250 FOR IP): Performed by: INTERNAL MEDICINE

## 2019-03-19 PROCEDURE — 80048 BASIC METABOLIC PNL TOTAL CA: CPT

## 2019-03-19 PROCEDURE — 83735 ASSAY OF MAGNESIUM: CPT

## 2019-03-19 PROCEDURE — 94640 AIRWAY INHALATION TREATMENT: CPT

## 2019-03-19 PROCEDURE — 2580000003 HC RX 258: Performed by: PHYSICIAN ASSISTANT

## 2019-03-19 PROCEDURE — 1200000000 HC SEMI PRIVATE

## 2019-03-19 PROCEDURE — 97535 SELF CARE MNGMENT TRAINING: CPT

## 2019-03-19 PROCEDURE — 99221 1ST HOSP IP/OBS SF/LOW 40: CPT | Performed by: INTERNAL MEDICINE

## 2019-03-19 PROCEDURE — 2700000000 HC OXYGEN THERAPY PER DAY

## 2019-03-19 PROCEDURE — 84100 ASSAY OF PHOSPHORUS: CPT

## 2019-03-19 PROCEDURE — 97530 THERAPEUTIC ACTIVITIES: CPT

## 2019-03-19 PROCEDURE — 6360000002 HC RX W HCPCS: Performed by: PHYSICIAN ASSISTANT

## 2019-03-19 PROCEDURE — 94761 N-INVAS EAR/PLS OXIMETRY MLT: CPT

## 2019-03-19 PROCEDURE — 85027 COMPLETE CBC AUTOMATED: CPT

## 2019-03-19 RX ORDER — DEXMETHYLPHENIDATE HYDROCHLORIDE 10 MG/1
20 TABLET ORAL DAILY
Status: DISCONTINUED | OUTPATIENT
Start: 2019-03-19 | End: 2019-03-20 | Stop reason: CLARIF

## 2019-03-19 RX ORDER — TORSEMIDE 20 MG/1
20 TABLET ORAL DAILY
Qty: 30 TABLET | Refills: 3 | DISCHARGE
Start: 2019-03-20

## 2019-03-19 RX ORDER — INSULIN GLARGINE 100 [IU]/ML
30 INJECTION, SOLUTION SUBCUTANEOUS NIGHTLY
Qty: 1 VIAL | Refills: 3 | DISCHARGE
Start: 2019-03-19

## 2019-03-19 RX ORDER — GABAPENTIN 300 MG/1
600 CAPSULE ORAL NIGHTLY
DISCHARGE
Start: 2019-03-19 | End: 2019-05-28

## 2019-03-19 RX ORDER — DEXTROSE MONOHYDRATE 50 MG/ML
100 INJECTION, SOLUTION INTRAVENOUS PRN
Status: DISCONTINUED | OUTPATIENT
Start: 2019-03-19 | End: 2019-03-21 | Stop reason: HOSPADM

## 2019-03-19 RX ORDER — NICOTINE POLACRILEX 4 MG
15 LOZENGE BUCCAL PRN
Status: DISCONTINUED | OUTPATIENT
Start: 2019-03-19 | End: 2019-03-21 | Stop reason: HOSPADM

## 2019-03-19 RX ORDER — DEXTROSE MONOHYDRATE 25 G/50ML
12.5 INJECTION, SOLUTION INTRAVENOUS PRN
Status: DISCONTINUED | OUTPATIENT
Start: 2019-03-19 | End: 2019-03-21 | Stop reason: HOSPADM

## 2019-03-19 RX ORDER — DOXYCYCLINE HYCLATE 100 MG
100 TABLET ORAL EVERY 12 HOURS SCHEDULED
Qty: 4 TABLET | Refills: 0 | DISCHARGE
Start: 2019-03-19 | End: 2019-03-21

## 2019-03-19 RX ORDER — HYDROMORPHONE HYDROCHLORIDE 4 MG/1
4 TABLET ORAL EVERY 8 HOURS
Qty: 9 TABLET | Refills: 0 | Status: SHIPPED | OUTPATIENT
Start: 2019-03-19 | End: 2019-03-22

## 2019-03-19 RX ADMIN — DOCUSATE SODIUM 100 MG: 100 CAPSULE, LIQUID FILLED ORAL at 21:47

## 2019-03-19 RX ADMIN — Medication 10 ML: at 21:48

## 2019-03-19 RX ADMIN — Medication 2 PUFF: at 07:10

## 2019-03-19 RX ADMIN — INSULIN LISPRO 5 UNITS: 100 INJECTION, SOLUTION INTRAVENOUS; SUBCUTANEOUS at 21:47

## 2019-03-19 RX ADMIN — HYDROMORPHONE HYDROCHLORIDE 4 MG: 2 TABLET ORAL at 15:50

## 2019-03-19 RX ADMIN — GABAPENTIN 600 MG: 300 CAPSULE ORAL at 21:47

## 2019-03-19 RX ADMIN — HYDROMORPHONE HYDROCHLORIDE 4 MG: 2 TABLET ORAL at 00:37

## 2019-03-19 RX ADMIN — TAMSULOSIN HYDROCHLORIDE 0.4 MG: 0.4 CAPSULE ORAL at 08:28

## 2019-03-19 RX ADMIN — TORSEMIDE 20 MG: 20 TABLET ORAL at 08:28

## 2019-03-19 RX ADMIN — POTASSIUM CHLORIDE 20 MEQ: 20 TABLET, EXTENDED RELEASE ORAL at 08:28

## 2019-03-19 RX ADMIN — INSULIN LISPRO 3 UNITS: 100 INJECTION, SOLUTION INTRAVENOUS; SUBCUTANEOUS at 16:49

## 2019-03-19 RX ADMIN — Medication 10 ML: at 08:29

## 2019-03-19 RX ADMIN — ALLOPURINOL 300 MG: 300 TABLET ORAL at 08:28

## 2019-03-19 RX ADMIN — ZINC SULFATE 220 MG (50 MG) CAPSULE 220 MG: CAPSULE at 08:40

## 2019-03-19 RX ADMIN — ANORECTAL OINTMENT: 15.7; .44; 24; 20.6 OINTMENT TOPICAL at 15:51

## 2019-03-19 RX ADMIN — FERROUS SULFATE TAB 325 MG (65 MG ELEMENTAL FE) 325 MG: 325 (65 FE) TAB at 08:28

## 2019-03-19 RX ADMIN — PRAVASTATIN SODIUM 40 MG: 40 TABLET ORAL at 21:55

## 2019-03-19 RX ADMIN — INSULIN LISPRO 25 UNITS: 100 INJECTION, SOLUTION INTRAVENOUS; SUBCUTANEOUS at 12:16

## 2019-03-19 RX ADMIN — ENOXAPARIN SODIUM 40 MG: 100 INJECTION SUBCUTANEOUS at 08:28

## 2019-03-19 RX ADMIN — MONTELUKAST SODIUM 10 MG: 10 TABLET, FILM COATED ORAL at 21:47

## 2019-03-19 RX ADMIN — DOXYCYCLINE HYCLATE 100 MG: 100 TABLET, COATED ORAL at 10:54

## 2019-03-19 RX ADMIN — INSULIN LISPRO 1 UNITS: 100 INJECTION, SOLUTION INTRAVENOUS; SUBCUTANEOUS at 12:20

## 2019-03-19 RX ADMIN — DOXYCYCLINE HYCLATE 100 MG: 100 TABLET, COATED ORAL at 21:47

## 2019-03-19 RX ADMIN — OXYCODONE HYDROCHLORIDE 5 MG: 5 TABLET ORAL at 11:00

## 2019-03-19 RX ADMIN — Medication 2 PUFF: at 20:07

## 2019-03-19 RX ADMIN — BUPROPION HYDROCHLORIDE 400 MG: 100 TABLET, EXTENDED RELEASE ORAL at 08:28

## 2019-03-19 RX ADMIN — DOCUSATE SODIUM 100 MG: 100 CAPSULE, LIQUID FILLED ORAL at 08:28

## 2019-03-19 RX ADMIN — ARIPIPRAZOLE 30 MG: 10 TABLET ORAL at 08:28

## 2019-03-19 RX ADMIN — INSULIN GLARGINE 30 UNITS: 100 INJECTION, SOLUTION SUBCUTANEOUS at 21:46

## 2019-03-19 RX ADMIN — INSULIN LISPRO 15 UNITS: 100 INJECTION, SOLUTION INTRAVENOUS; SUBCUTANEOUS at 16:49

## 2019-03-19 RX ADMIN — HYDROMORPHONE HYDROCHLORIDE 4 MG: 2 TABLET ORAL at 08:28

## 2019-03-19 ASSESSMENT — PAIN DESCRIPTION - PROGRESSION: CLINICAL_PROGRESSION: NOT CHANGED

## 2019-03-19 ASSESSMENT — PAIN DESCRIPTION - DESCRIPTORS: DESCRIPTORS: SHARP;BURNING

## 2019-03-19 ASSESSMENT — PAIN DESCRIPTION - ONSET: ONSET: GRADUAL

## 2019-03-19 ASSESSMENT — PAIN SCALES - GENERAL
PAINLEVEL_OUTOF10: 9
PAINLEVEL_OUTOF10: 7
PAINLEVEL_OUTOF10: 8
PAINLEVEL_OUTOF10: 8
PAINLEVEL_OUTOF10: 7

## 2019-03-19 ASSESSMENT — PAIN DESCRIPTION - ORIENTATION: ORIENTATION: RIGHT;LEFT

## 2019-03-19 ASSESSMENT — PAIN DESCRIPTION - LOCATION: LOCATION: BUTTOCKS

## 2019-03-19 ASSESSMENT — PAIN DESCRIPTION - PAIN TYPE: TYPE: CHRONIC PAIN

## 2019-03-19 ASSESSMENT — PAIN DESCRIPTION - FREQUENCY: FREQUENCY: CONTINUOUS

## 2019-03-19 NOTE — PROGRESS NOTES
Shift assessment complete. Vital signs stable. Pt with speaking valve in place. Nasal cannula at 2L. No needs at this time. Will continue to monitor. Pt refusing bed alarm. Educated on safety precautions, fall risk      Pt has been educated to hospital falls prevention policy. They are aware they will be assessed every shift, and with any condition changes, by the nursing staff on their ability to perform their ADL's without need for assistance. Pt understands that based on the number of their score they are given a base score that will assign a level of low, medium, or high risk for falls. This patient has rated a high which requires a bed / chair alarm for their safety to prevent a fall. The patient is alert and oriented, and acknowledges and understands the need for intervention but refuses the application and use of the bed / chair alarm that is required per policy. Pt agrees to use call light and wait for help to arrive to assist them to get up when they need to. Call light is within reach and all other safety measures in place.

## 2019-03-19 NOTE — PROGRESS NOTES
Progress Note    Admit Date:  3/15/2019    Admitted from wound clinic for infected sacral decubitus ulcer  MRSA positive    Subjective:  Mr. Jacob Sparks is better . Wound examined by Dr. Fausto Peter. See his notes for details, wound dressing instructions    off IV abx, on PO abx now . pain is improved with the PRN pain control. No fevers. Objective:   /74   Pulse 70   Temp 97.1 °F (36.2 °C) (Oral)   Resp 16   Ht 5' 10\" (1.778 m)   Wt 233 lb 14.4 oz (106.1 kg)   SpO2 98%   BMI 33.56 kg/m²       Intake/Output Summary (Last 24 hours) at 3/19/2019 1413  Last data filed at 3/19/2019 0857  Gross per 24 hour   Intake 600 ml   Output 350 ml   Net 250 ml         Physical Exam:  General:  Awake, alert, NAD  Chronic trach  Passy-Deepthi valve in place. Skin:  Warm and dry  Neck:  JVD absent. Neck supple  Chest:  Clear to auscultation, respiration easy. No wheezes, rales or rhonchi. Cardiovascular:  RRR ,S1S2 normal  Abdomen:  Soft, non tender, non distended, BS +  Back: Chronic wounds to the buttock and sacrum,  Dressing +   Extremities:  No edema. , chronic skin changes to bilateral lower extremities  Intact peripheral pulses.  Brisk cap refill, < 2 secs  Neuro: non focal      Medications:   Scheduled Meds:   insulin lispro  15 Units Subcutaneous TID WC    insulin lispro  0-18 Units Subcutaneous TID WC    insulin lispro  0-9 Units Subcutaneous Nightly    dexmethylphenidate  20 mg Oral Daily    doxycycline hyclate  100 mg Oral 2 times per day    pantoprazole  40 mg Oral Daily    potassium chloride  20 mEq Oral Daily    torsemide  20 mg Oral Daily    sodium chloride flush  10 mL Intravenous 2 times per day    enoxaparin  40 mg Subcutaneous Daily    mometasone-formoterol  2 puff Inhalation BID    allopurinol  300 mg Oral Daily    ARIPiprazole  30 mg Oral Daily    docusate sodium  100 mg Oral BID    ferrous sulfate  325 mg Oral Daily with breakfast    fluticasone  1 spray Each Nare Daily    gabapentin 600 mg Oral Nightly    HYDROmorphone  4 mg Oral Q8H    insulin glargine  30 Units Subcutaneous Nightly    pravastatin  40 mg Oral Nightly    tamsulosin  0.4 mg Oral Daily    zinc sulfate  220 mg Oral Daily    montelukast  10 mg Oral Nightly    buPROPion  400 mg Oral Daily       Continuous Infusions:   dextrose      dextrose         Data:  CBC:   Recent Labs     03/17/19  0536 03/18/19  0520 03/19/19  0645   WBC 4.4 5.5 5.6   RBC 4.21 4.36 4.42   HGB 11.2* 11.2* 11.7*   HCT 34.2* 35.0* 35.7*   MCV 81.2 80.2 80.8   RDW 14.5 14.9 14.5   * 143 147     BMP:   Recent Labs     03/17/19  0535 03/18/19  0520 03/19/19  0645    142 142   K 4.3 4.1 4.3    105 105   CO2 28 27 28   PHOS 3.1 3.4 3.6   BUN 20 18 18   CREATININE 1.4* 1.2 1.3     Staph aureus mrsa (1)     Antibiotic Interpretation JORGE Status    ceFAZolin Resistant >16 mcg/mL     ciprofloxacin Resistant >2 mcg/mL     clindamycin Sensitive <=0.5 mcg/mL     erythromycin Sensitive <=0.5 mcg/mL     oxacillin Resistant >2 mcg/mL     tetracycline Sensitive <=4 mcg/mL     trimethoprim-sulfamethoxazole Resistant >2/38 mcg/mL     vancomycin Sensitive 1 mcg/mL           Assessment:  Active Problems:    Type II diabetes mellitus, well controlled ([facility])    Pain syndrome, chronic    Pressure ulcer of coccygeal region, stage 4 ([facility])    Pressure ulcer of sacral region, stage 3 ([facility])    Ambulatory dysfunction    Pressure ulcer of buttock, stage 2    Cellulitis of buttock    Acute kidney injury ([facility]    Hyperlipidemia    Normocytic anemia  Resolved Problems:    * No resolved hospital problems.  *      Plan:  Cellulitis   Chronic sacral wound   - Follows with Dr. Codie Greer, consulted   - treated with IV abx  vancomycin and Azactam, now switched to Po Doxy  per Dr. Codie Greer   - Wound Cx--MRSA   - continue Wound care    - speciality mattress to reduced pressure      Chronic dCHF  - appears well compensated   - home torsemide held 2/2 to TRICIA   - S/P gentle IV hydration   -  crtn stable. - >  resumed torsemide at a lower dose . Monitor BMP     COPD  - no AE   - continue IBD     HTN  - home ACE-I held 2/2 to TRICIA--TRICIA resolved but pressures not elevated  - BP on lower end, continue to monitor . Hold ACE-I     HLD  - Continue statin      Gout   - Continue allopurinol       Hx of stroke   - Continue statin     Chronic tracheostomy    -supportive care      TRICIA   - Baseline ~1.2-1.3   - Creatinine on admission 2.2 -- hydrated . creatinine down to 1.6-- > 1.4-->1.2. - > 1.3 today . Continue to hold  ACE inhibitor's. Resumed torsemide at a lower dose . Monitor BMP .  - DC IVF      DM, type 2, insulin requiring   - hgb A1c: 7.2   - Continue Lantus 30 units nightly   BS low today , decrease humalog dose from 25 units 3 times daily to 15 UNits TID.  Cont SSI     Chronic Pain, opioid dependence   - Continue home regimen from pain medicine with Dilaudid and Neurontin       DVT Prophylaxis: Lovenox  Code Status: Full Code         Jus Gutiérrez MD  3/19/2019 2:13 PM

## 2019-03-19 NOTE — DISCHARGE INSTR - COC
Continuity of Care Form    Patient Name: Maureen Da Silva   :  1955  MRN:  [id number]    [facility] date:  3/15/2019  Discharge date:  3/21/2019    Code Status Order: Full Code   Advance Directives:   Advance Care Flowsheet Documentation     Date/Time Healthcare Directive Type of Healthcare Directive Copy in [address] Agent's Name Healthcare Agent's Phone Number    03/15/19 [phone]  Yes, patient has an advance directive for healthcare treatment  Durable power of  for health care  No, copy requested from family --  Edward Loza  [phone]          Admitting Physician:  Kyree Rodriguez MD  PCP: Willis Randolph    Discharging Nurse: Javid Phan R.N.   [address] Unit/Room#: 0218/0218-01  Discharging Unit Phone Number: [phone]    Emergency Contact:   Extended Emergency Contact Information  Primary Emergency Contact: [facility]  Address: [address] Phone: [phone]  Relation: Parent  Secondary Emergency Contact: [address] Phone: [phone]  Relation: Other    Past Surgical History:  Past Surgical History:   Procedure Laterality Date    BACK SURGERY      ENDOSCOPY, COLON, DIAGNOSTIC      KNEE SURGERY      X 4    LUMBAR FUSION      LUMBAR LAMINECTOMY      OTHER SURGICAL HISTORY Right 2017    Pain Pump insertion    [place] [address] W/BRNCL ALVEOLAR LAVAGE N/A 2018    BRONCHOSCOPY ALVEOLAR LAVAGE performed by Radha Hale MD at [address] [address]      x2    TONSILLECTOMY AND ADENOIDECTOMY      TOTAL KNEE ARTHROPLASTY      TRACHEOSTOMY      over 30 trach operations due to MRSA infections in the trach    UPPER GASTROINTESTINAL ENDOSCOPY  16    removal of foreign body    UVULOPALATOPHARYGOPLASTY      VENA CAVA FILTER PLACEMENT         Immunization History:   Immunization History   Administered Date(s) Administered    Influenza A (H1N1) Vaccine IM 2009    Influenza Virus Vaccine 10/01/2007, 10/01/2008, 10/06/2009, 10/20/2009, 01/13/2011, 09/11/2012    Pneumococcal Polysaccharide ([id number]) 01/01/2004, 04/09/2012, 03/10/2016, 08/31/2016       Active Problems:  Patient Active Problem List   Diagnosis Code    Chronic thrombocytopenia D69.6    Chronic respiratory failure with hypoxia on 6 L home O2 tent over trach J96.11    Type II diabetes mellitus, well controlled ([facility] E11.9    HTN (hypertension) I10    Non morbid obesity due to excess calories E66.09    Anxiety and depression F41.9, F32.9    Tracheostomy dependent ([facility] Z93.0    Pain syndrome, chronic G89.4    Asthma/COPD J44.9    Chronic ischemic heart disease I25.9    Bilateral lower extremity edema R60.0    Hiatal hernia with GERD K21.9, K44.9    Low back pain M54.5    TESS G47.33    Calcification of bronchus or trachea J39.8, J98.09    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    CKD2/3 N18.3    Chronic microcytic Iron-deficiency anemia D50.9    Chronic dCHF (grade 1 LVDD) I50.32    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    Arthritis M19.90    Claustrophobia F40.240    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    Pressure ulcer of coccygeal region, stage 4 (HCC) L89.154    Debility R53.81    Arthritis of right knee M17.11    Submandibular sialolithiasis K11.5    Lung nodule R91.1    Pressure ulcer of sacral region, stage 3 (HCC) L89.153    Degenerative arthritis of lumbar spine M47.816    Ambulatory dysfunction R26.2    Pressure ulcer of buttock, stage 2 L89.302    Cellulitis of buttock L03.317    Acute kidney injury (HCC) N17.9    Hyperlipidemia E78.5    Normocytic anemia D64.9       Isolation/Infection:   Isolation          Contact        Patient Infection Status     Infection Encounter Level?  Onset Date Added Added By Resolved Resolved By Review Date    MRSA No  10/03/18 Wound Culture             Nurse Assessment:  Last Vital Signs: /74   Pulse 70   Temp 97.1 °F (36.2 °C) (Oral)   Resp 16   Ht 5' 10\" (1.778 m)   Wt 233 lb 14.4 oz (106.1 kg)   SpO2 98%   BMI 33.56 kg/m²     Last documented pain score (0-10 scale): Pain Level: 7  Last Weight:   Wt Readings from Last 1 Encounters:   03/19/19 233 lb 14.4 oz (106.1 kg)     Mental Status:  oriented, alert, coherent, logical, thought processes intact and able to concentrate and follow conversation    IV Access:  - None    Nursing Mobility/ADLs:  Walking   Assisted  Transfer  Assisted  Bathing  Assisted  Dressing  Assisted  Toileting  Assisted  Feeding  Independent  Med [address] [place] [address] Delivery   whole    Wound Care Documentation and Therapy:  Wound 01/30/19 #23, Coccyx, Pressure, Stage 4, Onset 5/2017, pressure (Active)   Wound Pressure Stage  4 3/15/2019  8:42 AM   Dressing Status Clean; Intact;Dry 3/15/2019 10:05 PM   Dressing Changed Dressing reinforced 3/17/2019  8:00 PM   Dressing/Treatment Alginate with Ag;Calmoseptine; Foam 3/16/2019  2:11 PM   Wound Cleansed Rinsed/Irrigated with saline; Wound cleanser 3/15/2019  8:42 AM   Wound Length (cm) 2.4 cm 3/15/2019  8:42 AM   Wound Width (cm) 1 cm 3/15/2019  8:42 AM   Wound Depth (cm) 0.1 cm 3/15/2019  8:42 AM   Wound Surface Area (cm^2) 2.4 cm^2 3/15/2019  8:42 AM   Change in Wound Size % (l*w) -380 3/15/2019  8:42 AM   Wound Volume (cm^3) 0.24 cm^3 3/15/2019  8:42 AM   Wound Healing % 20 3/15/2019  8:42 AM   Post-Procedure Length (cm) 1.4 cm 2/20/2019 10:10 AM   Post-Procedure Width (cm) 1 cm 2/20/2019 10:10 AM   Post-Procedure Depth (cm) 1.3 cm 2/20/2019 10:10 AM   Post-Procedure Surface Area (cm^2) 1.4 cm^2 2/20/2019 10:10 AM   Post-Procedure Volume (cm^3) 1.82 cm^3 2/20/2019 10:10 AM   Distance Tunneling (cm) 0 cm 3/15/2019  8:42 AM   Tunneling Position ___ O'Clock 0 3/15/2019  8:42 AM   Undermining Starts ___ O'Clock 0 3/15/2019  8:42 AM   Undermining Ends___ O'Clock 0 3/15/2019  8:42 AM   Undermining Maxium Distance (cm) 0 3/15/2019  8:42 AM   Wound Assessment Red 3/15/2019  8:42 AM   Drainage Amount Large 3/15/2019  8:42 AM   Drainage Description Serosanguinous 3/15/2019  8:42 AM   Odor None 3/15/2019  8:42 AM   Margins Attached edges 3/6/2019  9:46 AM   Dominique-wound Assessment Denuded;Fragile 3/15/2019  8:42 AM   Number of days: 48       Wound 03/06/19 Sacrum # 25, Sacrum, Pressure, Stage 3, onset 2/28/19, pressure (Active)   Wound Image   3/15/2019  8:42 AM   Wound Pressure Stage  3 3/15/2019  8:42 AM   Dressing Status Clean;Dry; Intact 3/15/2019 10:05 PM   Dressing Changed Changed/New 3/18/2019  6:25 PM   Dressing/Treatment Alginate with Ag;Calmoseptine; Foam 3/18/2019  6:25 PM   Wound Cleansed Rinsed/Irrigated with saline; Wound cleanser 3/15/2019  8:42 AM   Wound Length (cm) 5 cm 3/15/2019  8:42 AM   Wound Width (cm) 3 cm 3/15/2019  8:42 AM   Wound Depth (cm) 0.1 cm 3/15/2019  8:42 AM   Wound Surface Area (cm^2) 15 cm^2 3/15/2019  8:42 AM   Change in Wound Size % (l*w) 41.41 3/15/2019  8:42 AM   Wound Volume (cm^3) 1.5 cm^3 3/15/2019  8:42 AM   Wound Healing % 71 3/15/2019  8:42 AM   Distance Tunneling (cm) 0 cm 3/15/2019  8:42 AM   Tunneling Position ___ O'Clock 0 3/15/2019  8:42 AM   Undermining Starts ___ O'Clock 0 3/15/2019  8:42 AM   Undermining Ends___ O'Clock 0 3/15/2019  8:42 AM   Undermining Maxium Distance (cm) 0 3/15/2019  8:42 AM   Wound Assessment Pink;Red;Yellow 3/15/2019  8:42 AM   Drainage Amount Small 3/18/2019  6:25 PM   Drainage Description Serosanguinous 3/18/2019  6:25 PM   Odor None 3/15/2019  8:42 AM   Margins Attached edges 3/6/2019 10:01 AM   Dominique-wound Assessment Denuded;Fragile 3/15/2019  8:42 AM   Number of days: 13       Wound 03/06/19 Buttocks Left # 26, Left Buttocks, Pressure, Stage 2, Onset 2/28/19, pressure (Active)   Wound Pressure Stage  2 3/15/2019  8:42 AM   Dressing Status Clean;Dry; Intact 3/15/2019 10:05 PM   Dressing Changed Dressing reinforced 3/17/2019  8:00 PM   Dressing/Treatment Alginate with Ag;Calmoseptine; Foam 3/16/2019  2:11 PM   Wound Cleansed Rinsed/Irrigated with saline; Wound cleanser 3/15/2019  8:42 AM   Wound Length (cm) 3 cm 3/15/2019  8:42 AM   Wound Width (cm) 2 cm 3/15/2019  8:42 AM   Wound Depth (cm) 0.1 cm 3/15/2019  8:42 AM   Wound Surface Area (cm^2) 6 cm^2 3/15/2019  8:42 AM   Change in Wound Size % (l*w) 42.86 3/15/2019  8:42 AM   Wound Volume (cm^3) 0.6 cm^3 3/15/2019  8:42 AM   Wound Healing % 43 3/15/2019  8:42 AM   Distance Tunneling (cm) 0 cm 3/15/2019  8:42 AM   Tunneling Position ___ O'Clock 0 3/15/2019  8:42 AM   Undermining Starts ___ O'Clock 0 3/15/2019  8:42 AM   Undermining Ends___ O'Clock 0 3/15/2019  8:42 AM   Undermining Maxium Distance (cm) 0 3/15/2019  8:42 AM   Wound Assessment Red 3/15/2019  8:42 AM   Drainage Amount Large 3/15/2019  8:42 AM   Drainage Description Serosanguinous 3/15/2019  8:42 AM   Odor None 3/15/2019  8:42 AM   Dominique-wound Assessment Denuded;Fragile 3/15/2019  8:42 AM   Number of days: 13       Wound 03/06/19 Buttocks Right #27, Right Buttocks, pressure, stage 2, onset 2/27/19, pressure (Active)   Wound Pressure Stage  2 3/15/2019  8:42 AM   Dressing Status Clean;Dry; Intact 3/15/2019 10:05 PM   Dressing Changed Dressing reinforced 3/17/2019  8:00 PM   Dressing/Treatment Alginate with Ag;Calmoseptine; Foam 3/16/2019  2:11 PM   Wound Cleansed Rinsed/Irrigated with saline; Wound cleanser 3/15/2019  8:42 AM   Wound Length (cm) 3 cm 3/15/2019  8:42 AM   Wound Width (cm) 4 cm 3/15/2019  8:42 AM   Wound Depth (cm) 0.1 cm 3/15/2019  8:42 AM   Wound Surface Area (cm^2) 12 cm^2 3/15/2019  8:42 AM   Change in Wound Size % (l*w) -42.86 3/15/2019  8:42 AM   Wound Volume (cm^3) 1.2 cm^3 3/15/2019  8:42 AM   Wound Healing % -43 3/15/2019  8:42 AM   Distance Tunneling (cm) 0 cm 3/15/2019  8:42 AM   Tunneling Position ___ O'Clock 0 3/15/2019  8:42 AM   Undermining Starts ___ O'Clock 0 3/15/2019  8:42 AM   Undermining Ends___ O'Clock 0 3/15/2019  8:42 AM   Undermining Maxium Distance (cm) 0 3/15/2019  8:42 AM   Wound Assessment Red 3/15/2019  8:42 AM   Drainage Amount Large 3/15/2019  8:42 AM   Drainage Description Serosanguinous 3/15/2019  8:42 AM   Odor None 3/15/2019  8:42 AM   Dominique-wound Assessment Denuded;Fragile 3/15/2019  8:42 AM   Number of days: 13        Elimination:  Continence:   · Bowel: Yes  · Bladder: Yes  Urinary Catheter: None   Colostomy/Ileostomy/Ileal Conduit: No       Date of Last BM: 3/20/2019    Intake/Output Summary (Last 24 hours) at 3/19/2019 1414  Last data filed at 3/19/2019 0857  Gross per 24 hour   Intake 600 ml   Output 350 ml   Net 250 ml     I/O last 3 completed shifts: In: 360 [P.O.:360]  Out: 1950 [Urine:1950]    Safety Concerns: At Risk for Falls    Impairments/Disabilities:      Vision    Nutrition Therapy:  Current Nutrition Therapy:   - Oral Diet:  General    Routes of Feeding: Oral  Liquids: Thin Liquids  Daily Fluid Restriction: no  Last Modified Barium Swallow with Video (Video Swallowing Test): not done    Treatments at the Time of Hospital Discharge:   Respiratory Treatments: See [facility]  Oxygen Therapy:  is on oxygen at 4 L/min per nasal cannula.   Ventilator:    - T-piece (4 L)     Rehab Therapies: Physical Therapy and Occupational Therapy  Weight Bearing Status/Restrictions: No weight bearing restirctions  Other Medical Equipment (for information only, NOT a DME order):  bedside commode  Other Treatments: ***    Patient's personal belongings (please select all that are sent with patient):  Glasses    RN SIGNATURE:  Electronically signed by Gerri Brambila RN on 3/21/19 at 4:49 PM    CASE MANAGEMENT/SOCIAL WORK SECTION    Inpatient Status Date: 3/15/2019    Readmission Risk Assessment Score:  Readmission Risk              Risk of Unplanned Readmission:        29           Discharging to Facility/ Agency   Name: [facility]  Address: [address] [place] [address], [facility]  Phone: [phone]  Fax: [phone]  · :  · Fax:    Dialysis Facility (if applicable) · Name:  · Address:  · Dialysis Schedule:  · Phone:  · Fax:    / signature: Electronically signed by Sushil Earl RN on 3/21/19 at 3:52 PM    PHYSICIAN SECTION    Prognosis: Good    Condition at Discharge: Stable    Rehab Potential (if transferring to Rehab): Good    Recommended Labs or Other Treatments After Discharge:     Physician Certification: I certify the above information and transfer of Grant Gaitan  is necessary for the continuing treatment of the diagnosis listed and that he requires [facility] for less 30 days.      Update Admission H&P: No change in H&P    PHYSICIAN SIGNATURE:  Electronically signed by Heron Adkins MD on 3/21/19 at 2:14 PM

## 2019-03-19 NOTE — PROGRESS NOTES
Nutrition Assessment    Type and Reason for Visit: Reassess    Nutrition Recommendations:    1. Continue CCC-4 diet order and add double portions of protein at all meals  2. Monitor intakes, weights, and skin integrity     Nutrition Assessment: Patient is unchanged from a nutritional standpoint. He remains at risk for further compromise d/t multiple wounds, increased protein needs and inadequate protein consumption. Will continue CCC-4 diet order and offer double portions of protein with each meal.     Malnutrition Assessment:  · Malnutrition Status: At risk for malnutrition  · Context: Acute illness or injury  · Findings of the 6 clinical characteristics of malnutrition (Minimum of 2 out of 6 clinical characteristics is required to make the diagnosis of moderate or severe Protein Calorie Malnutrition based on AND/ASPEN Guidelines):  1. Energy Intake-(adequate intake), Greater than or equal to 5 days    2. Weight Loss-No significant weight loss, in 1 month  3. Fat Loss-No significant subcutaneous fat loss  4. Muscle Loss-No significant muscle mass loss  5. Fluid Accumulation-No significant fluid accumulation    6.  Strength-Not measured    Nutrition Risk Level: Moderate    Nutrient Needs:  · Estimated Daily Total Kcal: 0086-1633(15-18kcal/kg CBW)  · Estimated Daily Protein (g): 113 - 150 g protein based on 1.5-2.0 g/kg/IBW  · Estimated Daily Total Fluid (ml/day): 1800    Nutrition Diagnosis:   · Problem: Increased nutrient needs(protein for multiple non-healing stage 2+ wounds/pressure ulcers)  · Etiology: related to Increased demand for energy/nutrients, Insufficient energy/nutrient consumption     Signs and symptoms:  as evidenced by Patient report of, Diet history of poor intake, Presence of wounds    Objective Information:  · Nutrition-Focused Physical Findings: Patient sitting in bed A&O. Patient appears obese. Trach dependent. No edema + no physical s/s of muscle or fat loss.    · Wound Type: Multiple,

## 2019-03-19 NOTE — PROGRESS NOTES
Shift assessment complete; see flow sheet. Scheduled medications administered; See MAR. A/Ox4. O2 2LPM nc in place. Blood glucose 98. Refused Insulin. Pt denies any needs at this time. Call light within reach, bed in low locked position, bed alarm on.

## 2019-03-19 NOTE — PROGRESS NOTES
Bedside report and pt care transferred to THE [facility]. Jovanni Che RN

## 2019-03-19 NOTE — PROGRESS NOTES
Occupational Therapy Daily Treatment Note    Unit:  [address]  Date:  3/19/2019  Patient Name:    Brandon Welch  Admitting diagnosis:  Cellulitis of buttock [I58.274]  Admit Date:  3/15/2019  Precautions/Restrictions:  Fall Risk, IV, Tracheostomy (do not lay patient flat as trach collapses per patient if laid flat). Discharge Recommendations: SNF   AM-PAC Score: 16   Home Health S4 Level: [x] NA   [x] Level 1- Standard  []  Level 2- Social  [] Level 3- Safety  []  Level 4- Sick    DME needs for discharge: none if going to SNF     Treatment Time:    Treatment number: 3     Subjective:  Pt areeable to tx however pt stated he did not want to sit edge of bed or perform activities where he has to scoot. The pt stated he did UE exercises with PT earlier. Pain   [x]Yes  []No  Ratin/10   Location: buttocks  Pain Medicine Status: [x] Denies need  [] Pain med requested  [] RN notified. Bed Mobility:   NT pt declined     Transfer Training:   NT- PT declined    ADL Training: Pt having difficulty with self feeding due to decreased AROM and grasp on the Rt hand. Pt states he has used a universal cuff in the past however it did not work for him. The kitchen is going to send built up handled utensils on his tray to use. Therapeutic Exercise:   Hand flex/ext:15 reps B UE   Elbow flex/ext: 15 reps B UE  Forearm sup/pron: 15 reps B UE  Radial/ ulnar deviation 15x BUE  Hand grasp 15x BUE   Thumb opposition 15x  BUE  Wrist flex.  Extension 15x BUE   Pt given exercise sponges pink for Rt hand and green for the left hand 15x reps for hand grasp      Shld flex/ext: declined   Shld abd/add: declined     Patient Education: Role of OT, safety with transfers     Positioning Needs: Pt supine in bed with pillow under R side for comfort     Family Present:  []Yes  [x]No    Assessment:  Pt would benefit from con't OT services to work toward Ind level with ADL's, IADL'd, transfers, balance, safety and overall strength to

## 2019-03-19 NOTE — CONSULTS
(methicillin resistant staph aureus) culture positive, MRSA (methicillin resistant staph aureus) culture positive, Panic attack, Pressure ulcer of coccygeal region, stage 2, Pressure ulcer of left heel, stage 2, Tracheostomy infection ([address] Tracheostomy infection ([address] Unspecified cerebral artery occlusion with cerebral infarction, and UTI (urinary tract infection). He has a past surgical history that includes tracheostomy; Tonsillectomy and adenoidectomy; Uvulopalatopharygoplasty; back surgery; knee surgery; Upper gastrointestinal endoscopy (1/8/16); lumbar laminectomy; lumbar fusion; Vena Cava Filter Placement (2002); Total knee arthroplasty; rhinoplasty; other surgical history (Right, 05/2017); Endoscopy, colon, diagnostic; and pr [facility] w/brncl alveolar lavage (N/A, 9/29/2018). His family history includes Alzheimer's Disease in his maternal grandmother; Cancer in his paternal grandmother; Diabetes in his paternal grandfather and sister; Heart Attack in his maternal grandfather; High Blood Pressure in his father and mother; Hypothyroidism in his sister; Migraines in his maternal grandfather. Mr. Kodak Gutierrez reports that he quit smoking about 19 years ago. His smoking use included cigars. He has a 2.50 pack-year smoking history. He has never used smokeless tobacco. He reports that he does not drink alcohol or use drugs. His current medication list consists of ARIPiprazole, Atenolol, Dexmethylphenidate HCl, Gabapentin, HYDROmorphone, MLO NITROGLYCERIN 0.8 MG/HR PATCH AND REMOVAL (INPATIENT), Tamsulosin HCl, Torsemide, buPROPion, docusate sodium, eplerenone, ferrous sulfate, fluticasone-salmeterol, glipiZIDE, insulin glargine, insulin regular, lidocaine, moexipril, nitroGLYCERIN, ondansetron, pantoprazole, potassium chloride, pravastatin, pregabalin, sodium chloride 0.9 % SOLN 40 mL with SUFentanil 50 MCG/ML SOLN 5 mcg/mL, therapeutic multivitamin-minerals, zafirlukast, and zinc gluconate.     Allergies: Aspartame and phenylalanine; Cefuroxime axetil; Clindamycin hcl; Erythromycin; Feldene [piroxicam]; Guanfacine hcl; Iodine; Pcn [penicillins]; Pletal [cilostazol]; Robaxin [methocarbamol]; Arthrotec [diclofenac-misoprostol]; Betadine [povidone iodine]; Claritin [loratadine]; Clindamycin/lincomycin; Indocin [indometacin sodium]; Tenex [guanfacine hcl]; Vasotec; and Vioxx    Pertinent items from the review of systems are discussed in the HPI; the remainder of the ROS was reviewed and is negative. Objective:     Vitals:    03/18/19 1935 03/19/19 0030 03/19/19 0215 03/19/19 0713   BP:  125/72 133/76    Pulse:  71 69    Resp: 18  18    Temp:   97.3 °F (36.3 °C)    TempSrc:   Oral    SpO2: 97%  98% 97%   Weight:   233 lb 14.4 oz (106.1 kg)    Height:           Constitutional:  well-developed, well-nourished, more comfortable, NAD  Psychiatric:  oriented to person, place and time; mood and affect appropriate for the situation   Eyes:  pupils equal, round and reactive to light; sclerae anicteric, conjunctivae not pale  ENT: no thrush or oral ulcers, mucous membranes moist  Abd: soft, NT, ND, good BS  Cardiovascular:  bilateral pedal pulses palpable; no lower extremity edema; LUE IV site with no palpable cord or swelling  Musculoskeletal:  no clubbing, cyanosis or petechiae; pelvis and hips with no gross effusions, joint misalignment or acute arthritis  Dominique-ulcer skin:  less indurated, less erythematous, fewer friction changes, a bit less tender. Ulcer(s):  a bit smaller, less drainage, stable depth, no pus, a bit of new granulation, stable superficial necrosis and debris.  Photos also saved in electronic chart.  ______________________________    Lab Results   Component Value Date    LABALBU 3.6 03/15/2019     Lab Results   Component Value Date    CREATININE 1.3 03/19/2019     Lab Results   Component Value Date    HGB 11.7 (L) 03/19/2019     Lab Results   Component Value Date    LABA1C 7.2 03/15/2019     WCx -- MRSA and E faecalis (light growth of both). Creat on admission was 2.2 WBC count remained normal. plts normal.     Assessment:     Patient Active Problem List   Diagnosis Code    Chronic thrombocytopenia D69.6    Chronic respiratory failure with hypoxia on 6 L home O2 tent over trach J96.11    Type II diabetes mellitus, well controlled ([facility] E11.9    HTN (hypertension) I10    Non morbid obesity due to excess calories E66.09    Anxiety and depression F41.9, F32.9    Tracheostomy dependent ([facility] Z93.0    Pain syndrome, chronic G89.4    Asthma/COPD J44.9    Chronic ischemic heart disease I25.9    Bilateral lower extremity edema R60.0    Hiatal hernia with GERD K21.9, K44.9    Low back pain M54.5    TESS G47.33    Calcification of bronchus or trachea J39.8, J98.09    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    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 N18.3    Chronic microcytic Iron-deficiency anemia D50.9    Chronic dCHF (grade 1 LVDD) I50.32    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    Arthritis M19.90    Claustrophobia F40.240    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    Pressure ulcer of coccygeal region, stage 4 (HCC) L89.154    Debility R53.81    Arthritis of right knee M17.11    Submandibular sialolithiasis K11.5    Lung nodule R91.1    Pressure ulcer of sacral region, stage 3 (HCC) L89.153    Degenerative arthritis of lumbar spine M47.816    Ambulatory dysfunction R26.2    Pressure ulcer of buttock, stage 2 L89.302    Cellulitis of buttock L03.317    Acute kidney injury (HCC) N17.9    Hyperlipidemia E78.5    Normocytic anemia D64.9       Assessment of today's active condition(s): spinal stenosis, DJD, chronic pain, poor mobility, longstanding stage 4 coccygeal pressure ulcer, plus more recent-onset and worsening stage 2-3 buttock and sacral pressure ulcers, complicated by increased friction injury and cellulitis.  Infection improved significantly, but he's at very high risk of the ulcers worsening and infection recurring if he goes straight back home without some time in subacute rehab to work on local wound care, PT and especially transfer techniques. Factors contributing to occurrence and/or persistence of the chronic ulcer include diabetes, chronic pressure, decreased mobility, shear force and obesity. Medical necessity of today's visit is shown by the above documentation. Sharp debridement is not indicated today, based upon the exam findings in the wound(s) above. Treatment plan:     Treatment today: Wound 03/06/19 Sacrum # 25, Sacrum, Pressure, Stage 3, onset 2/28/19, pressure-Dressing/Treatment: Alginate with Ag, Calmoseptine, Foam  Wound 03/06/19 Buttocks Left # 26, Left Buttocks, Pressure, Stage 2, Onset 2/28/19, pressure-Dressing/Treatment: Alginate with Ag, Calmoseptine, Foam  Wound 03/06/19 Buttocks Right #27, Right Buttocks, pressure, stage 2, onset 2/27/19, pressure-Dressing/Treatment: Alginate with Ag, Calmoseptine, Foam  Wound 01/30/19 #23, Coccyx, Pressure, Stage 4, Onset 5/2017, pressure-Dressing/Treatment: Alginate with Ag, Calmoseptine, Foam.    Stopped aztreonam.  Was going to keep vancomycin for another 24 hours, but with his IV catheter failing and him being a rather tough stick, I just stopped that also, and started a short course of oral doxycycline. I doubt the Entercoccus is a true pathogen, so no additional Rx needed for that. Await final plans for subacute rehab / skilled nursing placement for the short-term. Keep working with PT-OT. Obviously continue to emphasize offloading, protein intake, glucose control. Home treatment: good handwashing before and after any dressing changes. Cleanse ulcer with saline or soap & water before dressing change. May use Vaseline (petrolatum), Aquaphor, Aveeno, CeraVe, Cetaphil, Eucerin, Lubriderm, etc for dry skin. Dressing type for home: Other: as above, daily. Written discharge instructions given to patient.  Follow up with me would probably be best next Wednesday, if possible (assuming he goes to subacute rehab in a day or so). D/W Dr. Angela Mallory.      Electronically signed by Yunior Carvalho MD on 3/19/2019 at 8:19 AM.

## 2019-03-19 NOTE — PLAN OF CARE
Nutrition Problem: Increased nutrient needs(protein for multiple non-healing stage 2+ wounds/pressure ulcers)  Intervention: Food and/or Nutrient Delivery: Continue current diet, Discontinue ONS  Nutritional Goals: Patient will consume >75% of three meals per day

## 2019-03-19 NOTE — CARE COORDINATION
INTERDISCIPLINARY PLAN OF CARE CONFERENCE    Date/Time: 3/19/2019 11:48 AM  Completed by: Katey Butterfield Case Management      Patient Name:  Kesha Root  YOB: 1955  Admitting Diagnosis: Cellulitis of buttock [A10.226]     Admit Date/Time:  3/15/2019 12:48 PM    Chart reviewed. Interdisciplinary team met to discuss patient progress and discharge plans. Disciplines included Case Management, Nursing, and Dietitian. Current Status:stable    PT/OT recommendation:SNT    Anticipated Discharge Date: tbd  Expected D/C Disposition:  Skilled nursing facility  Confirmed plan with patient and/or family Yes  Discharge Plan Comments: Reviewed chart. Plan [facility] for STR,+eCOC,+HENS. Left vm for Caren with [facility] r/t referral sent yesterday. Pt second choice is [facility]. Follow. [facility] declined pt. [facility] Not in network    Updated pt. Next choice [facility] called and faxed    Kim- Not in network    [facility]-ref called to Berta Suarez).  Needs f/u    Home O2 in place on admit: to SNF  Pt informed of need to bring portable home O2 tank on day of discharge for nursing to connect prior to leaving:  Not Indicated  Verbalized agreement/Understanding:  Not Indicated

## 2019-03-19 NOTE — CARE COORDINATION
[address] Transitions Interview     3/19/2019    Patient: Naomy Ana Patient : 1955   MRN: [id number]  Reason for Admission: cellulitis  RARS: Readmission Risk Score: 29         Spoke with: Lon Everett (patient)    Discussed with patient that his insurance is also out of network with [address] (aka [employer]). Provided him with number recommended by [employer] for [employer]. He states he will call and see if they can offer any help. States he has a [facility]  who is also working on this project. Writer will also contact [facility]. Secure email sent [facility] management [facility] requesting exception to the rule for covering VPA or other option so this patient can have a covered PCP. Will await response. Patient plans rehab immediately after discharge before returning home alone.        Readmission Risk  Patient Active Problem List   Diagnosis    Chronic thrombocytopenia    Chronic respiratory failure with hypoxia on 6 L home O2 tent over trach    Type II diabetes mellitus, well controlled ([address]    HTN (hypertension)    Non morbid obesity due to excess calories    Anxiety and depression    Tracheostomy dependent (HCC)    Pain syndrome, chronic    Asthma/COPD    Chronic ischemic heart disease    Bilateral lower extremity edema    Hiatal hernia with GERD    Low back pain    TESS    Calcification of bronchus or trachea    Atypical schizophrenia (HCC)    CKD2/3    Chronic microcytic Iron-deficiency anemia    Chronic dCHF (grade 1 LVDD)    Agoraphobia    Arthritis    Claustrophobia    Congenital stenosis of trachea    Pressure ulcer of coccygeal region, stage 4 (HCC)    Debility    Arthritis of right knee    Submandibular sialolithiasis    Lung nodule    Pressure ulcer of sacral region, stage 3 (HCC)    Degenerative arthritis of lumbar spine    Ambulatory dysfunction    Pressure ulcer of buttock, stage 2    Cellulitis of

## 2019-03-19 NOTE — PROGRESS NOTES
B    Shoulder horizontal abduction/adduction (in sidelying): x15 B  Shoulder flexion/extension with 2# weight: x20 B  Reverse bicep curl with 2# weight: x20 B  Upward punches with 2# weight: x20 B  Scapular retraction with PLBing: x20     Balance    Static Sitting:  [x] Good [] Fair [] Poor  Dynamic Sitting:  [x] Good [] Fair [] Poor  Static Standing: [] Good [x] Fair [] Poor  Dynamic Standing: [] Good [x] Fair [] Poor    Patient Education       Educated on role of PT, DC recommendations, use of call light, POC, and safety awareness. Positioning Needs       Pt using BSC upon therapist's exit. Pt requesting to use BSC for a few minutes to try to have BM. PCA alerted to pt's location, pt has call light in reach. Activity Tolerance   SpO2:   HR:  BP:   Limited by pain in buttocks, knee     Other      None   Assessment   Patient participates well with in-bed Ford, tolerating the addition of UE exercises this morning. Pt again completed 2 transfers to/from [facility], requiring no more than CGA. Pt will continue to benefit from acute PT to safely restore mobility. Recommending pt DC to SNF to maximize independence with mobility before returning home and to receive a higher level of nursing care than he can receive at home to maximize healing potential to wounds. Goals (all goals ongoing unless otherwise indicated)  To be met in 3 visits:  1). Bed to w/c and return SBA.     To be met in 6 visits:  1). Supine to/from sit IND. - Met 3/18  2). Bed to [facility] and return IND. 3). Bed to w/c and return IND. 4). W/c mobility in room or halls as able IND. 5). Tolerate B LE exercises 10 reps. - Met 3/18    Plan   Continue with plan of care. Time Coded Treatment Minutes: 39 minutes    Total Treatment Time: 39 minutes    Nils Joshi PT, DPT #[id number]    If patient discharges from this facility prior to next visit, this note will serve as the Discharge Summary.

## 2019-03-20 LAB
ANION GAP SERPL CALCULATED.3IONS-SCNC: 11 MMOL/L (ref 3–16)
BUN BLDV-MCNC: 19 MG/DL (ref 7–20)
CALCIUM SERPL-MCNC: 9 MG/DL (ref 8.3–10.6)
CHLORIDE BLD-SCNC: 103 MMOL/L (ref 99–110)
CO2: 27 MMOL/L (ref 21–32)
CREAT SERPL-MCNC: 1.3 MG/DL (ref 0.8–1.3)
GFR AFRICAN AMERICAN: >60
GFR NON-AFRICAN AMERICAN: 56
GLUCOSE BLD-MCNC: 115 MG/DL (ref 70–99)
GLUCOSE BLD-MCNC: 116 MG/DL (ref 70–99)
GLUCOSE BLD-MCNC: 131 MG/DL (ref 70–99)
GLUCOSE BLD-MCNC: 165 MG/DL (ref 70–99)
GLUCOSE BLD-MCNC: 166 MG/DL (ref 70–99)
GLUCOSE BLD-MCNC: 88 MG/DL (ref 70–99)
HCT VFR BLD CALC: 36.1 % (ref 40.5–52.5)
HEMOGLOBIN: 11.6 G/DL (ref 13.5–17.5)
MAGNESIUM: 2.1 MG/DL (ref 1.8–2.4)
MCH RBC QN AUTO: 26 PG (ref 26–34)
MCHC RBC AUTO-ENTMCNC: 32.3 G/DL (ref 31–36)
MCV RBC AUTO: 80.5 FL (ref 80–100)
PDW BLD-RTO: 14.9 % (ref 12.4–15.4)
PERFORMED ON: ABNORMAL
PERFORMED ON: NORMAL
PHOSPHORUS: 3.9 MG/DL (ref 2.5–4.9)
PLATELET # BLD: 146 K/UL (ref 135–450)
PMV BLD AUTO: 8.8 FL (ref 5–10.5)
POTASSIUM REFLEX MAGNESIUM: 4.3 MMOL/L (ref 3.5–5.1)
RBC # BLD: 4.48 M/UL (ref 4.2–5.9)
SODIUM BLD-SCNC: 141 MMOL/L (ref 136–145)
WBC # BLD: 6.2 K/UL (ref 4–11)

## 2019-03-20 PROCEDURE — 6360000002 HC RX W HCPCS: Performed by: PHYSICIAN ASSISTANT

## 2019-03-20 PROCEDURE — 6370000000 HC RX 637 (ALT 250 FOR IP): Performed by: PHYSICIAN ASSISTANT

## 2019-03-20 PROCEDURE — 6370000000 HC RX 637 (ALT 250 FOR IP): Performed by: INTERNAL MEDICINE

## 2019-03-20 PROCEDURE — 94761 N-INVAS EAR/PLS OXIMETRY MLT: CPT

## 2019-03-20 PROCEDURE — 80048 BASIC METABOLIC PNL TOTAL CA: CPT

## 2019-03-20 PROCEDURE — 84100 ASSAY OF PHOSPHORUS: CPT

## 2019-03-20 PROCEDURE — 99232 SBSQ HOSP IP/OBS MODERATE 35: CPT | Performed by: INTERNAL MEDICINE

## 2019-03-20 PROCEDURE — 2580000003 HC RX 258: Performed by: PHYSICIAN ASSISTANT

## 2019-03-20 PROCEDURE — 1200000000 HC SEMI PRIVATE

## 2019-03-20 PROCEDURE — 97530 THERAPEUTIC ACTIVITIES: CPT

## 2019-03-20 PROCEDURE — 97535 SELF CARE MNGMENT TRAINING: CPT

## 2019-03-20 PROCEDURE — 97110 THERAPEUTIC EXERCISES: CPT

## 2019-03-20 PROCEDURE — 85027 COMPLETE CBC AUTOMATED: CPT

## 2019-03-20 PROCEDURE — 2700000000 HC OXYGEN THERAPY PER DAY

## 2019-03-20 PROCEDURE — 83735 ASSAY OF MAGNESIUM: CPT

## 2019-03-20 PROCEDURE — 94640 AIRWAY INHALATION TREATMENT: CPT

## 2019-03-20 PROCEDURE — 36415 COLL VENOUS BLD VENIPUNCTURE: CPT

## 2019-03-20 RX ORDER — METHYLPHENIDATE HYDROCHLORIDE 10 MG/1
20 TABLET ORAL 2 TIMES DAILY
Status: DISCONTINUED | OUTPATIENT
Start: 2019-03-20 | End: 2019-03-21 | Stop reason: HOSPADM

## 2019-03-20 RX ADMIN — MONTELUKAST SODIUM 10 MG: 10 TABLET, FILM COATED ORAL at 20:57

## 2019-03-20 RX ADMIN — Medication 2 PUFF: at 08:26

## 2019-03-20 RX ADMIN — ARIPIPRAZOLE 30 MG: 10 TABLET ORAL at 09:29

## 2019-03-20 RX ADMIN — ENOXAPARIN SODIUM 40 MG: 100 INJECTION SUBCUTANEOUS at 09:28

## 2019-03-20 RX ADMIN — Medication 10 ML: at 09:28

## 2019-03-20 RX ADMIN — HYDROMORPHONE HYDROCHLORIDE 4 MG: 2 TABLET ORAL at 09:30

## 2019-03-20 RX ADMIN — INSULIN LISPRO 2 UNITS: 100 INJECTION, SOLUTION INTRAVENOUS; SUBCUTANEOUS at 20:57

## 2019-03-20 RX ADMIN — DOXYCYCLINE HYCLATE 100 MG: 100 TABLET, COATED ORAL at 10:39

## 2019-03-20 RX ADMIN — DOCUSATE SODIUM 100 MG: 100 CAPSULE, LIQUID FILLED ORAL at 09:29

## 2019-03-20 RX ADMIN — HYDROMORPHONE HYDROCHLORIDE 4 MG: 2 TABLET ORAL at 00:13

## 2019-03-20 RX ADMIN — INSULIN LISPRO 15 UNITS: 100 INJECTION, SOLUTION INTRAVENOUS; SUBCUTANEOUS at 12:01

## 2019-03-20 RX ADMIN — DOXYCYCLINE HYCLATE 100 MG: 100 TABLET, COATED ORAL at 23:32

## 2019-03-20 RX ADMIN — ALLOPURINOL 300 MG: 300 TABLET ORAL at 09:29

## 2019-03-20 RX ADMIN — TAMSULOSIN HYDROCHLORIDE 0.4 MG: 0.4 CAPSULE ORAL at 09:29

## 2019-03-20 RX ADMIN — INSULIN LISPRO 15 UNITS: 100 INJECTION, SOLUTION INTRAVENOUS; SUBCUTANEOUS at 16:47

## 2019-03-20 RX ADMIN — Medication 2 PUFF: at 20:07

## 2019-03-20 RX ADMIN — PRAVASTATIN SODIUM 40 MG: 40 TABLET ORAL at 20:57

## 2019-03-20 RX ADMIN — FERROUS SULFATE TAB 325 MG (65 MG ELEMENTAL FE) 325 MG: 325 (65 FE) TAB at 09:30

## 2019-03-20 RX ADMIN — INSULIN LISPRO 3 UNITS: 100 INJECTION, SOLUTION INTRAVENOUS; SUBCUTANEOUS at 11:55

## 2019-03-20 RX ADMIN — INSULIN GLARGINE 30 UNITS: 100 INJECTION, SOLUTION SUBCUTANEOUS at 20:57

## 2019-03-20 RX ADMIN — POTASSIUM CHLORIDE 20 MEQ: 20 TABLET, EXTENDED RELEASE ORAL at 09:29

## 2019-03-20 RX ADMIN — DOCUSATE SODIUM 100 MG: 100 CAPSULE, LIQUID FILLED ORAL at 20:58

## 2019-03-20 RX ADMIN — GABAPENTIN 600 MG: 300 CAPSULE ORAL at 20:57

## 2019-03-20 RX ADMIN — HYDROMORPHONE HYDROCHLORIDE 4 MG: 2 TABLET ORAL at 23:32

## 2019-03-20 RX ADMIN — ZINC SULFATE 220 MG (50 MG) CAPSULE 220 MG: CAPSULE at 09:30

## 2019-03-20 RX ADMIN — HYDROMORPHONE HYDROCHLORIDE 4 MG: 2 TABLET ORAL at 16:16

## 2019-03-20 RX ADMIN — BUPROPION HYDROCHLORIDE 400 MG: 100 TABLET, EXTENDED RELEASE ORAL at 09:30

## 2019-03-20 RX ADMIN — TORSEMIDE 20 MG: 20 TABLET ORAL at 09:28

## 2019-03-20 ASSESSMENT — PAIN DESCRIPTION - DESCRIPTORS
DESCRIPTORS: ACHING;BURNING;CONSTANT
DESCRIPTORS: BURNING;SHARP
DESCRIPTORS: ACHING
DESCRIPTORS: BURNING

## 2019-03-20 ASSESSMENT — PAIN DESCRIPTION - ORIENTATION
ORIENTATION: LEFT
ORIENTATION: LEFT
ORIENTATION: RIGHT;MID

## 2019-03-20 ASSESSMENT — PAIN DESCRIPTION - PAIN TYPE
TYPE: CHRONIC PAIN;ACUTE PAIN
TYPE: CHRONIC PAIN
TYPE: ACUTE PAIN;CHRONIC PAIN
TYPE: CHRONIC PAIN

## 2019-03-20 ASSESSMENT — PAIN SCALES - GENERAL
PAINLEVEL_OUTOF10: 7
PAINLEVEL_OUTOF10: 8
PAINLEVEL_OUTOF10: 8
PAINLEVEL_OUTOF10: 9

## 2019-03-20 ASSESSMENT — PAIN DESCRIPTION - LOCATION
LOCATION: COCCYX
LOCATION: BUTTOCKS

## 2019-03-20 ASSESSMENT — PAIN DESCRIPTION - ONSET
ONSET: ON-GOING
ONSET: ON-GOING

## 2019-03-20 ASSESSMENT — PAIN DESCRIPTION - FREQUENCY
FREQUENCY: CONTINUOUS

## 2019-03-20 ASSESSMENT — PAIN DESCRIPTION - PROGRESSION
CLINICAL_PROGRESSION: NOT CHANGED
CLINICAL_PROGRESSION: NOT CHANGED

## 2019-03-20 ASSESSMENT — PAIN - FUNCTIONAL ASSESSMENT: PAIN_FUNCTIONAL_ASSESSMENT: PREVENTS OR INTERFERES SOME ACTIVE ACTIVITIES AND ADLS

## 2019-03-20 NOTE — PROGRESS NOTES
Occupational Therapy Daily Treatment Note    Unit:  2[facility]  Date:  3/20/2019  Patient Name:    Raven Snider  Admitting diagnosis:  Cellulitis of buttock [X43.815]  Admit Date:  3/15/2019  Precautions/Restrictions:  Fall Risk, IV, Tracheostomy (do not lay patient flat as trach collapses per patient if laid flat). Discharge Recommendations: SNF   AM-PAC Score: 19   Home Health S4 Level: [x] NA   [x] Level 1- Standard  []  Level 2- Social  [] Level 3- Safety  []  Level 4- Sick    DME needs for discharge: none if going to SNF     Treatment Time:  1270-3791  Treatment number: 4     Subjective:  Pt areeable to tx and wants to bathe   02 SPO2 at 97% and HR 84   Pain   [x]Yes  []No  Ratin 1/2 /10   Location: buttocks  Pain Medicine Status: [x] Denies need  [] Pain med requested  [] RN notified. Pt stated he wanted to try to not take pain meds at this time    Bed Mobility: pt sat on the edge of the bed for approx 30 mins with good balance  Mod I for supine to sit and sit to supine  Pt was IND for scooting to the head of the bed   Pt held onto chair that was put in front of pt while the pt lifted his buttocks for therapist to be able to pull shorts/underwear over hips with max assist   Transfer Training:   NT- PT declined    ADL Training: pt able to bath UB mod I and max assist to bathe back. the pt was IND with combing hair and beard . Pt was IND with donning and doffing  T Shirt . Pt held onto chair that was put in front of pt while the pt lifted his buttocks for therapist to be able to pull shorts/underwear over hips with max assist. The pt was min assist for donning shorts and underwear over feet. The pt stated that the built up handled utensils did assist him with feeding self and gave pt a catalog for the utensils that the pt stated he wanted to order. Therapeutic Exercise:   Pt stated he had been using the hand exercisers given to him.      Patient Education: Role of OT, safety with transfers Positioning Needs: Pt supine in bed with pillow under R side for comfort     Family Present:  []Yes  [x]No    Assessment:  Pt would benefit from con't OT services to work toward Ind level with ADL's, IADL'd, transfers, balance, safety and overall strength to return home alone     GOALS  1). Indep with UE ex x 10 reps- MET 15 reps      To be met in 5 Visits:  1). Supine to Sit SBA - MET - new goal IND   2). Bed to Chair/BSC SBA  3). Upper Body Bathing  Supervision- MET except for back   4). Lower Body Bathing  MIN A- NA   5). Upper Body Dressing Supervision-  MET   6). Lower Body Dressing Ind don/doff socks   7). Pt to carina UE exs x15 reps in all planes- MET      Plan: cont with POC    At end of treatment, patient in bed with call light and needs within reach.   Timed Code Treatment Minutes:   45 minutes  Total Treatment Time:  45 minutes    Signature, License #  Jerica Argueta OTR/L [id number]    If patient discharges from this facility prior to next visit, this note will serve as the Discharge Summary

## 2019-03-20 NOTE — PROGRESS NOTES
Inpatient Physical Therapy Daily Treatment Note    Unit: 2West  Date:  3/20/2019  Patient Name:    Parviz Blankenship  Admitting diagnosis:  Cellulitis of buttock [L51.819]  Admit Date:  3/15/2019  Precautions/Restrictions:  Tracheostomy (do not lay patient flat as trach collapses per patient if laid flat); fall risk, IV, contact (MRSA)    Discharge Recommendations: SNF  Home Health S4 Level Recommendation:  NA if going to SNF  DME needs for discharge:  W/c being ordered with modifications (seat lower to floor). AM-PAC Mobility Score      AM-PAC Inpatient Mobility without Stair Climbing Raw Score : 15    Treatment Time:  1404 - 1440  Treatment number: 4    Subjective:    Pt. Supine in bed with no family present. Pt. Agreeable to tx, for primarily in-bed exercises 2/2 not wanting to cause too much shearing to his bottom. Cognition  [x] intact  [] impaired     Pain   [x]Yes  []No  Ratin/10   Location: buttocks, R knee   Pain Medicine Status: [x] Denies need (\"I'm trying to hold back\")  [] Pain med requested  [] RN notified.      Bed Mobility   Supine to Sit: DNT  Sit to Supine: DNT  Rolling: IND to each side   Scooting: IND to [address]  Bridging:  IND    Transfer Training  DNT  Sit to stand:   Stand to sit:   Bed to/from [facility]:     Gait Training pt declined to complete today   Patient ambulated with     Therapeutic Exercise   Ankle Pumps: x20 B  Heel Slides: x15 B (completed in sidelying), 5# wt on L, 3# wt on R   Clam shells: x15 B  Sidelying hip flexion: x15 B, 5# wt on L, 3# wt on R   Sidelying Hip ABD/ADD: x15 B, 5# wt on L, 3# wt on R   SAQ (sidelying): x15 B, 5# wt on L, 3# wt on R     Forward punches: x15 B, 9# wt  Shoulder press: x15 B, 9# wt  Shoulder ER/IR: x15 B, 9# wt  Shoulder flexion/extension: x15 B, 9# wt, cross body   Reverse bicep curl: x15 B, 9# wt  Bicep curls: x15 B, 9# wt  Side raises: x15, 9# wt  Front raises: x15 B, 9# wt  Shoulder horizontal abduction/adduction: x15 B, 9# wt     Balance DNT  Static Sitting:  [] Good [] Fair [] Poor  Dynamic Sitting:  [] Good [] Fair [] Poor  Static Standing: [] Good [] Fair [] Poor  Dynamic Standing: [] Good [] Fair [] Poor    Patient Education        Educated on role of PT, POC, and safety awareness. Positioning Needs       Pt reclined in bed, call light and needs in reach. Pt has refused bed alarms per nursing documentation. Activity Tolerance   SpO2:   HR:  BP:   Limited by pain in buttocks, knee     Other      None   Assessment   Patient tolerating treatment well this afternoon, including the addition of dumbbells and ankle weights for in-bed Ford. Pt's mobility continues to be limited pain & sacral wounds. Pt will continue to benefit from acute PT to safely restore mobility. Recommending pt DC to SNF to maximize independence with mobility before returning home and to receive a higher level of nursing care than he can receive at home to maximize healing potential to wounds. Goals (all goals ongoing unless otherwise indicated)  To be met in 3 visits:  1). Bed to w/c and return SBA.     To be met in 6 visits:  1). Supine to/from sit IND. - Met 3/18  2). Bed to [facility] and return IND. 3). Bed to w/c and return IND. 4). W/c mobility in room or halls as able IND. 5). Tolerate B LE exercises 10 reps. - Met 3/18    Plan   Continue with plan of care. Time Coded Treatment Minutes: 30 minutes    Total Treatment Time: 36 minutes    Krissy Fonseca PT, DPT #[id number]    If patient discharges from this facility prior to next visit, this note will serve as the Discharge Summary.

## 2019-03-20 NOTE — PROGRESS NOTES
Patient is laying with eyes closed. No s/s of distress at this time. Call light and bedside table within reach. Will continue to monitor.

## 2019-03-20 NOTE — CARE COORDINATION
[address] Transitions Interview     3/20/2019    Patient: Armaan Parry Patient : 1955   MRN: [id number]  Reason for Admission: cellulitis  RARS: Readmission Risk Score: 30         Spoke with: Chey Newman (patient)    Received response from Toan Machuca case management at [facility] regarding finding in network visiting PCP and reviewed information with patient:     \"The first place I would have the member start is to call customer service at the number on the back of his card or [phone] and verify any visiting physicians in his area.  if, like you said, there are none in network, I believe that his old PCP can file a prior authorization/O exception for services to be paid at INN rates based on the unavailability of providers in the area. The phone number to file that is [phone], or fax [phone]. On the last contact with the member, the [facility]  reached out to Robin, who said they were not taking [facility] patients at this time and per her notes, [facility] would try to help the member get a visiting physician set up. If it is helpful, the member does have a benefit called [employer] that offers transportation to and from medical/dental appointments up to 25 miles one way, unlimited trips and $0 copay. they can be reached at [phone] to set up a ride. Three days advanced notice is needed. Another thought I had--We have his HMO PCP listed as Azucena Kennedy [phone]. I am not sure if he could, or would be willing to collaborate/co-manage care with the palliative NP who sees this member in his home. I left a message with the Clinical Access Team (CAT) here at [facility]. Today I got instructions from them on how we might help the member. Per their instructions, I called customer service with [facility] and had them open a case which will be sent over to CAT to see if they are able to locate any provider INN that can visit the member at home.  They will reach out to the member with information. She did not provide me with a turn around time for this. My best guess would be, a few days. \"    Patient reports that Dr Kenneth Vazquez has never been his PCP so this is not an option for him. Patient did call [employer] yesterday after our conversation and a representative Nate Ramirez) plans to meet with him in person bedside today to discuss options. Patient states that [employer] may be able to work an in network rate with [employer], he may be able to go back to his original coverage for a fee, and that he has until 3/31/2019 to make changes to elections he has made. He is going to discuss his options but is thankful for the time spent getting this researched. He was aware of the transportation benefit and states he tried to use it once and round trip to [place] [address] took >6 hours and he couldn't tolerate that. Follow Up  No future appointments.     Health Maintenance  Health Maintenance Due   Topic Date Due    Lipid screen  05/07/1966    Diabetic microalbuminuria test  05/07/1974       Vikash Whitmore RN

## 2019-03-20 NOTE — PROGRESS NOTES
Patient resting in bed. Patient is alert and oriented x4. Trach in place - site wnl. Pt respirations even and easy. Patient complains of pain 9/10 on pain scale in left buttocks. Pt repositioned. Speciality air mattress in place. Denies further needs at this time. Shift assessment completed, see flow sheet. Will cont to monitor. Call light in reach.

## 2019-03-20 NOTE — PROGRESS NOTES
Progress Note    Admit Date:  3/15/2019    Admitted from wound clinic for infected sacral decubitus ulcer  MRSA positive    Subjective:  Mr. Oliver Herrera is better . Wound examined by Dr. Soledad Sparks. See his notes for details, wound dressing instructions    off IV abx, on PO abx now . pain is improved with the PRN pain control. No fevers. BS stable. crtn stable . awaiting precert for SNF placement      Objective:   /72   Pulse 83   Temp 97.6 °F (36.4 °C) (Oral)   Resp 17   Ht 5' 10\" (1.778 m)   Wt 233 lb 11.2 oz (106 kg)   SpO2 95%   BMI 33.53 kg/m²       Intake/Output Summary (Last 24 hours) at 3/20/2019 1620  Last data filed at 3/20/2019 1500  Gross per 24 hour   Intake 920 ml   Output 3200 ml   Net -2280 ml         Physical Exam:  General:  Awake, alert, NAD  Chronic trach  Passy-Deepthi valve in place. Skin:  Warm and dry  Neck:  JVD absent. Neck supple  Chest:  Clear to auscultation, respiration easy. No wheezes, rales or rhonchi. Cardiovascular:  RRR ,S1S2 normal  Abdomen:  Soft, non tender, non distended, BS +  Back: Chronic wounds to the buttock and sacrum,  Dressing +   Extremities:  No edema. , chronic skin changes to bilateral lower extremities  Intact peripheral pulses.  Brisk cap refill, < 2 secs  Neuro: non focal      Medications:   Scheduled Meds:   methylphenidate  20 mg Oral BID- 8&2    insulin lispro  15 Units Subcutaneous TID WC    insulin lispro  0-18 Units Subcutaneous TID WC    insulin lispro  0-9 Units Subcutaneous Nightly    doxycycline hyclate  100 mg Oral 2 times per day    pantoprazole  40 mg Oral Daily    potassium chloride  20 mEq Oral Daily    torsemide  20 mg Oral Daily    sodium chloride flush  10 mL Intravenous 2 times per day    enoxaparin  40 mg Subcutaneous Daily    mometasone-formoterol  2 puff Inhalation BID    allopurinol  300 mg Oral Daily    ARIPiprazole  30 mg Oral Daily    docusate sodium  100 mg Oral BID    ferrous sulfate  325 mg Oral Daily with breakfast    fluticasone  1 spray Each Nare Daily    gabapentin  600 mg Oral Nightly    HYDROmorphone  4 mg Oral Q8H    insulin glargine  30 Units Subcutaneous Nightly    pravastatin  40 mg Oral Nightly    tamsulosin  0.4 mg Oral Daily    zinc sulfate  220 mg Oral Daily    montelukast  10 mg Oral Nightly    buPROPion  400 mg Oral Daily       Continuous Infusions:   dextrose      dextrose         Data:  CBC:   Recent Labs     03/18/19 0520 03/19/19  0645 03/20/19  0515   WBC 5.5 5.6 6.2   RBC 4.36 4.42 4.48   HGB 11.2* 11.7* 11.6*   HCT 35.0* 35.7* 36.1*   MCV 80.2 80.8 80.5   RDW 14.9 14.5 14.9    147 146     BMP:   Recent Labs     03/18/19 0520 03/19/19  0645 03/20/19  0515    142 141   K 4.1 4.3 4.3    105 103   CO2 27 28 27   PHOS 3.4 3.6 3.9   BUN 18 18 19   CREATININE 1.2 1.3 1.3     Staph aureus mrsa (1)     Antibiotic Interpretation JORGE Status    ceFAZolin Resistant >16 mcg/mL     ciprofloxacin Resistant >2 mcg/mL     clindamycin Sensitive <=0.5 mcg/mL     erythromycin Sensitive <=0.5 mcg/mL     oxacillin Resistant >2 mcg/mL     tetracycline Sensitive <=4 mcg/mL     trimethoprim-sulfamethoxazole Resistant >2/38 mcg/mL     vancomycin Sensitive 1 mcg/mL           Assessment:  Active Problems:    Type II diabetes mellitus, well controlled ([facility])    Pain syndrome, chronic    Pressure ulcer of coccygeal region, stage 4 (HCC)    Pressure ulcer of sacral region, stage 3 (HCC)    Ambulatory dysfunction    Pressure ulcer of buttock, stage 2    Cellulitis of buttock    Acute kidney injury ([facility] [address]    Hyperlipidemia    Normocytic anemia  Resolved Problems:    * No resolved hospital problems.  *      Plan:  Cellulitis   Chronic sacral wound   - Follows with Dr. Codie Greer, consulted   - treated with IV abx  vancomycin and Azactam, now switched to Po Doxy  per Dr. Codie Greer   - Wound Cx--MRSA   - continue Wound care    - speciality mattress to reduced pressure      Chronic dCHF  - appears well compensated   - home torsemide held 2/2 to TRICIA   - S/P gentle IV hydration   -  crtn stable. - >  resumed torsemide at a lower dose . Monitor BMP     COPD  - no AE   - continue IBD     HTN  - home ACE-I held 2/2 to TRICIA--TRICIA resolved but pressures not elevated  - BP on lower end, continue to monitor . Hold ACE-I     HLD  - Continue statin      Gout   - Continue allopurinol       Hx of stroke   - Continue statin     Chronic tracheostomy    -supportive care      TRICIA   - Baseline ~1.2-1.3   - Creatinine on admission 2.2 -- hydrated . creatinine down to 1.6-- > 1.4-->1.2. - > 1.3 today . Continue to hold  ACE inhibitor's. Resumed torsemide at a lower dose . Monitor BMP .  - DC IVF      DM, type 2, insulin requiring   - hgb A1c: 7.2   - Continue Lantus 30 units nightly . Adjusted  humalog dose from 25 units 3 times daily to 15 Units TID. Cont  High SSI .  BS controlled today    Chronic Pain, opioid dependence   - Continue home regimen from pain medicine with Dilaudid and Neurontin       DVT Prophylaxis: Lovenox  Code Status: Full Code         Charlotte Griffiths MD  3/20/2019 4:20 PM

## 2019-03-20 NOTE — PROGRESS NOTES
Pt up to [facility]. Dressing to coccyx no longer intact. Re applied. Discussed ambulation with pt and agreed he would call and give staff a chance to assist him. Pt agreeable. Denies other needs. Call light within reach.

## 2019-03-20 NOTE — PROGRESS NOTES
Shift assessment completed. Pt is alert and oriented. Vital signs are WNL. Respirations are even & easy. No complaints voiced. Repositioned. Heels checked pink and blanchable. Pt reports numbness to all extremities. Limited use of extremities. Pt denies needs at this time. SR up x 2 and bed in low position. Call light is within reach. Will monitor.

## 2019-03-21 VITALS
HEIGHT: 70 IN | DIASTOLIC BLOOD PRESSURE: 87 MMHG | OXYGEN SATURATION: 92 % | TEMPERATURE: 97 F | BODY MASS INDEX: 33.13 KG/M2 | WEIGHT: 231.4 LBS | SYSTOLIC BLOOD PRESSURE: 140 MMHG | HEART RATE: 82 BPM | RESPIRATION RATE: 16 BRPM

## 2019-03-21 LAB
ANION GAP SERPL CALCULATED.3IONS-SCNC: 11 MMOL/L (ref 3–16)
BUN BLDV-MCNC: 19 MG/DL (ref 7–20)
CALCIUM SERPL-MCNC: 9.3 MG/DL (ref 8.3–10.6)
CHLORIDE BLD-SCNC: 104 MMOL/L (ref 99–110)
CO2: 29 MMOL/L (ref 21–32)
CREAT SERPL-MCNC: 1.3 MG/DL (ref 0.8–1.3)
GFR AFRICAN AMERICAN: >60
GFR NON-AFRICAN AMERICAN: 56
GLUCOSE BLD-MCNC: 117 MG/DL (ref 70–99)
GLUCOSE BLD-MCNC: 118 MG/DL (ref 70–99)
GLUCOSE BLD-MCNC: 139 MG/DL (ref 70–99)
GLUCOSE BLD-MCNC: 155 MG/DL (ref 70–99)
GLUCOSE BLD-MCNC: 171 MG/DL (ref 70–99)
HCT VFR BLD CALC: 36.9 % (ref 40.5–52.5)
HEMOGLOBIN: 12 G/DL (ref 13.5–17.5)
MAGNESIUM: 2.1 MG/DL (ref 1.8–2.4)
MCH RBC QN AUTO: 26.2 PG (ref 26–34)
MCHC RBC AUTO-ENTMCNC: 32.5 G/DL (ref 31–36)
MCV RBC AUTO: 80.7 FL (ref 80–100)
PDW BLD-RTO: 14.6 % (ref 12.4–15.4)
PERFORMED ON: ABNORMAL
PHOSPHORUS: 4.1 MG/DL (ref 2.5–4.9)
PLATELET # BLD: 142 K/UL (ref 135–450)
PMV BLD AUTO: 8.8 FL (ref 5–10.5)
POTASSIUM SERPL-SCNC: 4.2 MMOL/L (ref 3.5–5.1)
RBC # BLD: 4.57 M/UL (ref 4.2–5.9)
SODIUM BLD-SCNC: 144 MMOL/L (ref 136–145)
WBC # BLD: 6.1 K/UL (ref 4–11)

## 2019-03-21 PROCEDURE — 36415 COLL VENOUS BLD VENIPUNCTURE: CPT

## 2019-03-21 PROCEDURE — 85027 COMPLETE CBC AUTOMATED: CPT

## 2019-03-21 PROCEDURE — 84100 ASSAY OF PHOSPHORUS: CPT

## 2019-03-21 PROCEDURE — 6370000000 HC RX 637 (ALT 250 FOR IP): Performed by: INTERNAL MEDICINE

## 2019-03-21 PROCEDURE — 80048 BASIC METABOLIC PNL TOTAL CA: CPT

## 2019-03-21 PROCEDURE — 6370000000 HC RX 637 (ALT 250 FOR IP): Performed by: PHYSICIAN ASSISTANT

## 2019-03-21 PROCEDURE — 94640 AIRWAY INHALATION TREATMENT: CPT

## 2019-03-21 PROCEDURE — 97530 THERAPEUTIC ACTIVITIES: CPT

## 2019-03-21 PROCEDURE — 99238 HOSP IP/OBS DSCHRG MGMT 30/<: CPT | Performed by: INTERNAL MEDICINE

## 2019-03-21 PROCEDURE — 83735 ASSAY OF MAGNESIUM: CPT

## 2019-03-21 PROCEDURE — 6360000002 HC RX W HCPCS: Performed by: PHYSICIAN ASSISTANT

## 2019-03-21 PROCEDURE — 97110 THERAPEUTIC EXERCISES: CPT

## 2019-03-21 RX ADMIN — PANTOPRAZOLE SODIUM 40 MG: 40 TABLET, DELAYED RELEASE ORAL at 08:12

## 2019-03-21 RX ADMIN — ENOXAPARIN SODIUM 40 MG: 100 INJECTION SUBCUTANEOUS at 08:13

## 2019-03-21 RX ADMIN — HYDROMORPHONE HYDROCHLORIDE 4 MG: 2 TABLET ORAL at 16:20

## 2019-03-21 RX ADMIN — HYDROMORPHONE HYDROCHLORIDE 4 MG: 2 TABLET ORAL at 08:13

## 2019-03-21 RX ADMIN — INSULIN LISPRO 3 UNITS: 100 INJECTION, SOLUTION INTRAVENOUS; SUBCUTANEOUS at 13:55

## 2019-03-21 RX ADMIN — METHYLPHENIDATE HYDROCHLORIDE 20 MG: 10 TABLET ORAL at 08:11

## 2019-03-21 RX ADMIN — INSULIN LISPRO 15 UNITS: 100 INJECTION, SOLUTION INTRAVENOUS; SUBCUTANEOUS at 13:54

## 2019-03-21 RX ADMIN — DOCUSATE SODIUM 100 MG: 100 CAPSULE, LIQUID FILLED ORAL at 08:12

## 2019-03-21 RX ADMIN — INSULIN LISPRO 15 UNITS: 100 INJECTION, SOLUTION INTRAVENOUS; SUBCUTANEOUS at 08:15

## 2019-03-21 RX ADMIN — DOXYCYCLINE HYCLATE 100 MG: 100 TABLET, COATED ORAL at 08:12

## 2019-03-21 RX ADMIN — ALLOPURINOL 300 MG: 300 TABLET ORAL at 08:12

## 2019-03-21 RX ADMIN — BUPROPION HYDROCHLORIDE 400 MG: 100 TABLET, EXTENDED RELEASE ORAL at 08:12

## 2019-03-21 RX ADMIN — TORSEMIDE 20 MG: 20 TABLET ORAL at 08:13

## 2019-03-21 RX ADMIN — Medication 2 PUFF: at 07:18

## 2019-03-21 RX ADMIN — POTASSIUM CHLORIDE 20 MEQ: 20 TABLET, EXTENDED RELEASE ORAL at 08:12

## 2019-03-21 RX ADMIN — FERROUS SULFATE TAB 325 MG (65 MG ELEMENTAL FE) 325 MG: 325 (65 FE) TAB at 08:12

## 2019-03-21 RX ADMIN — ARIPIPRAZOLE 30 MG: 10 TABLET ORAL at 08:12

## 2019-03-21 RX ADMIN — METHYLPHENIDATE HYDROCHLORIDE 20 MG: 10 TABLET ORAL at 14:02

## 2019-03-21 RX ADMIN — TAMSULOSIN HYDROCHLORIDE 0.4 MG: 0.4 CAPSULE ORAL at 08:11

## 2019-03-21 ASSESSMENT — PAIN DESCRIPTION - ORIENTATION: ORIENTATION: RIGHT

## 2019-03-21 ASSESSMENT — PAIN DESCRIPTION - ONSET: ONSET: ON-GOING

## 2019-03-21 ASSESSMENT — PAIN SCALES - GENERAL
PAINLEVEL_OUTOF10: 2
PAINLEVEL_OUTOF10: 8
PAINLEVEL_OUTOF10: 9

## 2019-03-21 ASSESSMENT — PAIN DESCRIPTION - PAIN TYPE
TYPE: CHRONIC PAIN
TYPE: CHRONIC PAIN

## 2019-03-21 ASSESSMENT — PAIN DESCRIPTION - DESCRIPTORS: DESCRIPTORS: SHARP;PRESSURE

## 2019-03-21 ASSESSMENT — PAIN DESCRIPTION - FREQUENCY: FREQUENCY: INTERMITTENT

## 2019-03-21 ASSESSMENT — PAIN DESCRIPTION - LOCATION
LOCATION: BUTTOCKS
LOCATION: BUTTOCKS

## 2019-03-21 NOTE — PROGRESS NOTES
Physical Therapy  Attempted PT f/u. Pt just getting set up to eat lunch. Agreeable to re-attempt this afternoon as schedule allows.     Krystal Cruz, PT DPT #[id number]

## 2019-03-21 NOTE — PROGRESS NOTES
Patient requested to wait to receive Humalog due to family brining in food for him. To call writer when lunch will be arriving. Emptied 750 ml of urine from [facility]. Patient denies pain/discomfort or additional needs. Call light in reach. Will continue with P.O.C.

## 2019-03-21 NOTE — DISCHARGE SUMMARY
Physician Discharge Summary     Patient ID:  Di Cristobal  [id number]  33 y.o.  1955    Admit date: 3/15/2019    Discharge date and time: 3/21/2019  7:04 PM     Admitting Physician: Maria Eugenia Gibbs MD     Discharge Physician: Brooke Mukherjee    Admission Diagnoses: Cellulitis of buttock [L03.317]    Discharge Diagnoses: Active Problems:    Type II diabetes mellitus, well controlled (HCC)    Pain syndrome, chronic    Pressure ulcer of coccygeal region, stage 4 (HCC)    Pressure ulcer of sacral region, stage 3 (HCC)    Ambulatory dysfunction    Pressure ulcer of buttock, stage 2    Cellulitis of buttock    Acute kidney injury ([facility] [address]    Hyperlipidemia    Normocytic anemia  Resolved Problems:    * No resolved hospital problems. *      Admission Condition: fair    Discharged Condition: stable    Indication for Admission:    per HPI  The patient is a 59 y.o. male with chronic sacral wound who is treated by Dr. Luis Modi, CHF, COPD, DM, prior DVT, gout, HTN, HLD, prior stroke, s/p trach who presented to OP wound clinic and was found to have worsening cellulitis and pain and was direct admitted. Patient states that this has been an ongoing problem. He works hard to keep his wound clean but with his difficulties transferring he continues to have worsening skin breakdown. Lately he has been noticing increased wound drainage and worsening pain to the point where he will vomit from the pain. He denies any fevers or chills. No abdominal pain, CP or shortness of breath.          Hospital Course:   Admitted from wound clinic for infected sacral decubitus ulcer  MRSA positive  Wound examined by  ID - Dr. Luis Modi. See his notes for details, wound dressing instructions. Treated with IV abx .    off IV abx, on PO abx now . pain is improved with the PRN pain control. No fevers. BS stable. crtn stable .   DC to SNF        Cellulitis   Chronic sacral wound   - Follows with Dr. Luis Modi, consulted   - treated with IV abx  vancomycin NEURONTIN  Take 2 capsules by mouth nightly for 30 days. What changed:    · medication strength  · how much to take     insulin glargine 100 UNIT/ML injection vial  Commonly known as:  LANTUS  Inject 30 Units into the skin nightly  What changed:  additional instructions     torsemide 20 MG tablet  Commonly known as:  DEMADEX  Take 1 tablet by mouth daily  What changed:    · medication strength  · how much to take        CONTINUE taking these medications    ADVAIR DISKUS 250-50 MCG/DOSE Aepb  Generic drug:  fluticasone-salmeterol     ARIPiprazole 30 MG tablet  Commonly known as:  ABILIFY     buPROPion 200 MG extended release tablet  Commonly known as:  WELLBUTRIN SR     docusate sodium 100 MG capsule  Commonly known as:  COLACE     ferrous sulfate 325 (65 Fe) MG tablet     FLOMAX PO     FOCALIN PO     glipiZIDE 10 MG extended release tablet  Commonly known as:  GLUCOTROL XL     HYDROmorphone 4 MG tablet  Commonly known as:  DILAUDID  Take 1 tablet by mouth every 8 hours for 3 days.      lidocaine 2 % jelly  Commonly known as:  XYLOCAINE  Apply topically as needed for Trach change     nitroGLYCERIN 0.4 MG SL tablet  Commonly known as:  NITROSTAT     ondansetron 8 MG tablet  Commonly known as:  ZOFRAN     pantoprazole 40 MG tablet  Commonly known as:  PROTONIX     potassium chloride 10 MEQ extended release tablet  Commonly known as:  KLOR-CON M     pravastatin 40 MG tablet  Commonly known as:  PRAVACHOL     sodium chloride 0.9 % SOLN 40 mL with SUFentanil 50 MCG/ML SOLN 5 mcg/mL     therapeutic multivitamin-minerals tablet     zafirlukast 20 MG tablet  Commonly known as:  ACCOLATE     zinc gluconate 50 MG tablet        STOP taking these medications    eplerenone 25 MG tablet  Commonly known as:  INSPRA     MLO NITROGLYCERIN 0.8 MG/HR PATCH AND REMOVAL (INPATIENT)     moexipril 15 MG tablet  Commonly known as:  UNIVASC     NOVOLIN R 100 UNIT/ML injection  Generic drug:  insulin regular     pregabalin 100 MG

## 2019-03-21 NOTE — PROGRESS NOTES
Inpatient Physical Therapy Daily Treatment Note    Unit: 2West  Date:  3/21/2019  Patient Name:    Cherylin Buerger  Admitting diagnosis:  Cellulitis of buttock [B83.036]  Admit Date:  3/15/2019  Precautions/Restrictions:  Tracheostomy (do not lay patient flat as trach collapses per patient if laid flat); fall risk, IV, contact (MRSA)    Discharge Recommendations: SNF  Home Health S4 Level Recommendation:  NA if going to SNF  DME needs for discharge:  W/c being ordered with modifications (seat lower to floor). AM-PAC Mobility Score      AM-PAC Inpatient Mobility without Stair Climbing Raw Score : 15    Treatment Time: 7425-3259  Treatment number: 5    Subjective:    Pt. Supine in bed with no family present. Pt. Agreeable to tx, for primarily in-bed exercises 2/2 not wanting to cause too much shearing to his bottom. Cognition  [x] intact  [] impaired     Pain   [x]Yes  []No  Ratin/10 buttocks and 8/10 R knee  Location: buttocks, R knee   Pain Medicine Status: [] Denies need (\"I'm trying to hold back\")  [x] Pain med requested  [] RN notified.      Bed Mobility   Supine to Sit: DNT - requesting to defer to avoid shear stresses to buttock  Sit to Supine: DNT - \"  Rolling:  Modified IND to each side x 3 reps with use of siderails  Scooting: modified IND to HOB x 3 reps with use of siderails  Bridging:  Modified IND x 3 reps (remainder deferred due to shearing force)    Transfer Training    Sit to stand:  - deferred per pt to preserve energy for transfers for toileting later this evening  Stand to sit:  - deferred per pt  Bed to/from [facility]: educated extensively about energy efficiency and safety with performing 90 degree turns to BSC and wc vs 180 degree turns, including hand placement as well as mattress adjustment; encouraged patient to participate in training today however reports he fatigues too easily and requests to trial it when he actually has to use commode; encouraged to perform for first time with guarding of staff    Gait Training NA: pt non-ambulatory    Therapeutic Exercise   Ankle Pumps: x20 B  Heel Slides: x15 B (completed in sidelying), 5# wt on L, 3# wt on R   Clam shells: x15 B  Sidelying hip flexion: x15 B, 5# wt on L, 3# wt on R   Sidelying Hip ABD/ADD: x15 B, 5# wt on L, 3# wt on R   SAQ (sidelying): x15 B, 5# wt on L, 3# wt on R     Forward punches: x15 B, 9# wt  Shoulder press: x15 B, 9# wt  Shoulder ER/IR: x15 B, 9# wt on R, 5# on L to avoid grinding of L shoulder  Shoulder flexion/extension: x15 B, 9# wt, cross body   Reverse bicep curl: x15 B, 9# wt  Bicep curls: x15 B, 9# wt  Side raises: x15, 9# wt  Front raises: x15 B, 9# wt  Shoulder horizontal abduction/adduction: x10 B, 9# wt remainder deferred due to grinding in shoulder  Rowing x 15 reps 9#  Pronation/supination x 15 reps 9# wt  Wrist flexion/extension 5# x 15 reps    Cues for slow controlled motion especially eccentrically    Balance  DNT- pt declines  Static Sitting:  [] Good [] Fair [] Poor  Dynamic Sitting:  [] Good [] Fair [] Poor  Static Standing: [] Good [] Fair [] Poor  Dynamic Standing: [] Good [] Fair [] Poor    Patient Education        Educated on role of PT, POC, and safety awareness. Positioning Needs       Pt reclined in bed, call light and needs in reach. Pt has refused bed alarms per nursing documentation. Activity Tolerance   SpO2:   HR:  BP:   Limited by pain in buttocks, knee     Other      None   Assessment   Patient tolerating treatment well this afternoon, including weighted exercises. . Pt's mobility continues to be limited due pain & sacral wounds. Pt will continue to benefit from acute PT to safely restore mobility. Recommending pt DC to SNF to maximize independence with mobility before returning home and to receive a higher level of nursing care than he can receive at home to maximize healing potential to wounds. Goals (all goals ongoing unless otherwise indicated)  To be met in 3 visits:  1). Bed to w/c and return SBA.     To be met in 6 visits:  1). Supine to/from sit IND. - Met 3/18  2). Bed to [facility] and return IND. 3). Bed to w/c and return IND. 4). W/c mobility in room or halls as able IND. 5). Tolerate B LE exercises 10 reps. - Met 3/18    Plan   Continue with plan of care. Time Coded Treatment Minutes: 60 minutes    Total Treatment Time: 60  minutes    Ni Euceda PT DPT #[id number]      If patient discharges from this facility prior to next visit, this note will serve as the Discharge Summary.

## 2019-03-21 NOTE — PROGRESS NOTES
Shift assessment completed. Pt is alert and oriented. Vital signs are WNL. Respirations are even & easy. No complaints voiced. Dressing changed. Wound cleansed ointment applied. Pt denies needs at this time. SR up x 2 and bed in low position. Call light is within reach. Will monitor.

## 2019-03-21 NOTE — CARE COORDINATION
DISCHARGE ORDER  Date/Time 3/21/2019 4:08 PM  Completed by: Ulisses Lobo Case Management    Patient Name: Raven Snider    : 1955  Admitting Diagnosis: Cellulitis of buttock [E62.749]  Admit Date/Time: 3/15/2019 12:48 PM    Noted discharge order. Confirmed discharge plan with patient / family (pt): Yes   Discharge Plan: Chart reviewed and pt remains agreeable to STR @ [facility]. Klaudia and Yelena form [facility] here this morning to speak with the pt and will now approve him for admission. Pt is floor to [place] [address]. Pt aware of  time of 1830. Discharge orders and Continuity of Care faxed to facility: Yes  Hospital Exemption Notification System complete: Yes  Transportation arranged: Yes - Modesto Hammonds @ 5792. Patient / Family (pt) aware of  time: Yes   Nursing aware of  time: Yes-Sonam  Receiving facility aware of  time: Yes-Klaudia  Pre-cert obtained?   Floor to [facility] 15

## 2019-03-21 NOTE — PROGRESS NOTES
[facility] arrived to transport patient to [facility] at this time. Patient discharged in stable condition.

## 2019-05-28 ENCOUNTER — HOSPITAL ENCOUNTER (EMERGENCY)
Age: 64
Discharge: HOME | End: 2019-05-28
Attending: EMERGENCY MEDICINE
Payer: MEDICARE

## 2019-05-28 ENCOUNTER — APPOINTMENT (OUTPATIENT)
Dept: CT IMAGING | Age: 64
End: 2019-05-28
Payer: MEDICARE

## 2019-05-28 ENCOUNTER — APPOINTMENT (OUTPATIENT)
Dept: GENERAL RADIOLOGY | Age: 64
End: 2019-05-28
Payer: MEDICARE

## 2019-05-28 VITALS
HEART RATE: 64 BPM | HEIGHT: 70 IN | WEIGHT: 260 LBS | TEMPERATURE: 98 F | OXYGEN SATURATION: 99 % | SYSTOLIC BLOOD PRESSURE: 115 MMHG | RESPIRATION RATE: 14 BRPM | DIASTOLIC BLOOD PRESSURE: 52 MMHG | BODY MASS INDEX: 37.22 KG/M2

## 2019-05-28 DIAGNOSIS — J95.02 INFECTION OF TRACHEOSTOMY STOMA (HCC): Primary | ICD-10-CM

## 2019-05-28 LAB
A/G RATIO: 1.5 (ref 1.1–2.2)
ALBUMIN SERPL-MCNC: 4 G/DL (ref 3.4–5)
ALP BLD-CCNC: 133 U/L (ref 40–129)
ALT SERPL-CCNC: 23 U/L (ref 10–40)
ANION GAP SERPL CALCULATED.3IONS-SCNC: 9 MMOL/L (ref 3–16)
AST SERPL-CCNC: 17 U/L (ref 15–37)
BASOPHILS ABSOLUTE: 0.1 K/UL (ref 0–0.2)
BASOPHILS RELATIVE PERCENT: 0.7 %
BILIRUB SERPL-MCNC: 0.4 MG/DL (ref 0–1)
BUN BLDV-MCNC: 23 MG/DL (ref 7–20)
CALCIUM SERPL-MCNC: 9.6 MG/DL (ref 8.3–10.6)
CHLORIDE BLD-SCNC: 100 MMOL/L (ref 99–110)
CO2: 33 MMOL/L (ref 21–32)
CREAT SERPL-MCNC: 1.7 MG/DL (ref 0.8–1.3)
EOSINOPHILS ABSOLUTE: 0.2 K/UL (ref 0–0.6)
EOSINOPHILS RELATIVE PERCENT: 2.3 %
GFR AFRICAN AMERICAN: 49
GFR NON-AFRICAN AMERICAN: 41
GLOBULIN: 2.6 G/DL
GLUCOSE BLD-MCNC: 130 MG/DL (ref 70–99)
HCT VFR BLD CALC: 40.1 % (ref 40.5–52.5)
HEMOGLOBIN: 13.1 G/DL (ref 13.5–17.5)
LACTIC ACID, SEPSIS: 1.6 MMOL/L (ref 0.4–1.9)
LYMPHOCYTES ABSOLUTE: 0.9 K/UL (ref 1–5.1)
LYMPHOCYTES RELATIVE PERCENT: 11 %
MCH RBC QN AUTO: 26.2 PG (ref 26–34)
MCHC RBC AUTO-ENTMCNC: 32.7 G/DL (ref 31–36)
MCV RBC AUTO: 80 FL (ref 80–100)
MONOCYTES ABSOLUTE: 0.9 K/UL (ref 0–1.3)
MONOCYTES RELATIVE PERCENT: 10.4 %
NEUTROPHILS ABSOLUTE: 6.3 K/UL (ref 1.7–7.7)
NEUTROPHILS RELATIVE PERCENT: 75.6 %
PDW BLD-RTO: 15 % (ref 12.4–15.4)
PLATELET # BLD: 116 K/UL (ref 135–450)
PMV BLD AUTO: 8.8 FL (ref 5–10.5)
POTASSIUM SERPL-SCNC: 4 MMOL/L (ref 3.5–5.1)
RBC # BLD: 5.02 M/UL (ref 4.2–5.9)
SODIUM BLD-SCNC: 142 MMOL/L (ref 136–145)
TOTAL PROTEIN: 6.6 G/DL (ref 6.4–8.2)
WBC # BLD: 8.3 K/UL (ref 4–11)

## 2019-05-28 PROCEDURE — 99284 EMERGENCY DEPT VISIT MOD MDM: CPT

## 2019-05-28 PROCEDURE — 2700000000 HC OXYGEN THERAPY PER DAY

## 2019-05-28 PROCEDURE — 6370000000 HC RX 637 (ALT 250 FOR IP): Performed by: EMERGENCY MEDICINE

## 2019-05-28 PROCEDURE — 96360 HYDRATION IV INFUSION INIT: CPT

## 2019-05-28 PROCEDURE — 71046 X-RAY EXAM CHEST 2 VIEWS: CPT

## 2019-05-28 PROCEDURE — 87040 BLOOD CULTURE FOR BACTERIA: CPT

## 2019-05-28 PROCEDURE — 2580000003 HC RX 258: Performed by: EMERGENCY MEDICINE

## 2019-05-28 PROCEDURE — 70490 CT SOFT TISSUE NECK W/O DYE: CPT

## 2019-05-28 PROCEDURE — 94761 N-INVAS EAR/PLS OXIMETRY MLT: CPT

## 2019-05-28 PROCEDURE — 83605 ASSAY OF LACTIC ACID: CPT

## 2019-05-28 PROCEDURE — 80053 COMPREHEN METABOLIC PANEL: CPT

## 2019-05-28 PROCEDURE — 85025 COMPLETE CBC W/AUTO DIFF WBC: CPT

## 2019-05-28 RX ORDER — PREGABALIN 100 MG/1
100 CAPSULE ORAL 2 TIMES DAILY
Status: ON HOLD | COMMUNITY
End: 2022-04-13 | Stop reason: SDUPTHER

## 2019-05-28 RX ORDER — 0.9 % SODIUM CHLORIDE 0.9 %
1000 INTRAVENOUS SOLUTION INTRAVENOUS ONCE
Status: COMPLETED | OUTPATIENT
Start: 2019-05-28 | End: 2019-05-28

## 2019-05-28 RX ORDER — CIPROFLOXACIN AND DEXAMETHASONE 3; 1 MG/ML; MG/ML
4 SUSPENSION/ DROPS AURICULAR (OTIC) 2 TIMES DAILY
Status: DISCONTINUED | OUTPATIENT
Start: 2019-05-28 | End: 2019-05-28 | Stop reason: HOSPADM

## 2019-05-28 RX ORDER — CIPROFLOXACIN AND DEXAMETHASONE 3; 1 MG/ML; MG/ML
SUSPENSION/ DROPS AURICULAR (OTIC)
Qty: 7.5 ML | Refills: 0 | Status: ON HOLD | OUTPATIENT
Start: 2019-05-28 | End: 2019-09-20 | Stop reason: HOSPADM

## 2019-05-28 RX ORDER — LEVOFLOXACIN 250 MG/1
250 TABLET ORAL DAILY
COMMUNITY
End: 2019-09-18 | Stop reason: ALTCHOICE

## 2019-05-28 RX ORDER — LISINOPRIL 10 MG/1
10 TABLET ORAL DAILY
Status: ON HOLD | COMMUNITY
End: 2022-04-13 | Stop reason: HOSPADM

## 2019-05-28 RX ORDER — HYDROMORPHONE HYDROCHLORIDE 4 MG/1
4 TABLET ORAL EVERY 8 HOURS PRN
COMMUNITY
End: 2019-09-18 | Stop reason: ALTCHOICE

## 2019-05-28 RX ORDER — EPLERENONE 25 MG/1
25 TABLET, FILM COATED ORAL DAILY
COMMUNITY

## 2019-05-28 RX ORDER — FEXOFENADINE HCL 180 MG/1
180 TABLET ORAL DAILY
COMMUNITY

## 2019-05-28 RX ADMIN — CIPROFLOXACIN AND DEXAMETHASONE 4 DROP: 3; 1 SUSPENSION/ DROPS AURICULAR (OTIC) at 20:18

## 2019-05-28 RX ADMIN — SODIUM CHLORIDE 1000 ML: 9 INJECTION, SOLUTION INTRAVENOUS at 14:44

## 2019-05-28 ASSESSMENT — PAIN DESCRIPTION - LOCATION: LOCATION: BUTTOCKS

## 2019-05-28 ASSESSMENT — PAIN DESCRIPTION - PAIN TYPE: TYPE: ACUTE PAIN

## 2019-05-28 ASSESSMENT — PAIN SCALES - GENERAL: PAINLEVEL_OUTOF10: 9

## 2019-05-28 NOTE — ED PROVIDER NOTES
CHIEF COMPLAINT  Other (patient believes that he has an infection around his trach site that started developing on Friday. Reports it is becoming swollen around the site and his tongue is swelling up as well because of this. already on levoquin PO for this infection)      HISTORY OF PRESENT ILLNESS  Renny Aleman is a 62 y.o. male with a history of tracheal stenosis status post tracheostomy placement, renal failure, decubitus ulcer, CHF, and COPD who presents to the ED complaining of suspected tracheal infection. Patient reports that he has had difficulty suctioning his trach site secondary to thick secretions. He was recently started on by mouth Levaquin by his nurse practitioner but states this is not improving. No other complaints, modifying factors or associated symptoms. I have reviewed the following from the nursing documentation. Past Medical History:   Diagnosis Date    Abscess or cellulitis of leg 4/8/2012    Acute renal failure ([address] 4/10/2012    Acute tracheitis with airway obstruction     [facility]is [facility])     ARF (acute renal failure) ([facility]) 9/28/2018    Arthritis     Asthma     Cellulitis of buttock     Cellulitis of sacral region     CHF (congestive heart failure) ([facility])     Claustrophobia     Colitis     COPD (chronic obstructive pulmonary disease) ([facility])     Decubitus ulcer     Decubitus ulcer of left buttock, stage 2 10/17/2017    Diabetes mellitus [facility]     Diverticulitis     DVT (deep venous thrombosis) ([facility])     Gangrene ([facility])     on lower back to upper thigh    Gout     HCAP (healthcare-associated pneumonia)     Hyperlipidemia     Hypertension     Low iron     MRSA (methicillin resistant staph aureus) culture positive     tracheobronchitis, hx per pt.     MRSA (methicillin resistant staph aureus) culture positive 03/15/2019    buttocks    Panic attack     Pressure ulcer of coccygeal region, stage 2 9/24/2016    Pressure ulcer of left heel, stage 2 tobacco: Never Used   Substance and Sexual Activity    Alcohol use: No    Drug use: No    Sexual activity: Yes     Partners: Female   Lifestyle    Physical activity:     Days per week: Not on file     Minutes per session: Not on file    Stress: Not on file   Relationships    Social connections:     Talks on phone: Not on file     Gets together: Not on file     Attends Christianity service: Not on file     Active member of club or organization: Not on file     Attends meetings of clubs or organizations: Not on file     Relationship status: Not on file    Intimate partner violence:     Fear of current or ex partner: Not on file     Emotionally abused: Not on file     Physically abused: Not on file     Forced sexual activity: Not on file   Other Topics Concern    Not on file   Social History Narrative    Not on file     Current Facility-Administered Medications   Medication Dose Route Frequency Provider Last Rate Last Dose    0.9 % sodium chloride bolus  1,000 mL Intravenous Once Elysia Bras, DO 1,000 mL/hr at 05/28/19 1444 1,000 mL at 05/28/19 1444     Current Outpatient Medications   Medication Sig Dispense Refill    levofloxacin (LEVAQUIN) 250 MG tablet Take 250 mg by mouth daily      lisinopril (PRINIVIL;ZESTRIL) 10 MG tablet Take 10 mg by mouth daily      HYDROmorphone (DILAUDID) 4 MG tablet Take 4 mg by mouth every 8 hours as needed for Pain.  eplerenone (INSPRA) 25 MG tablet Take 25 mg by mouth daily      fexofenadine (ALLEGRA) 180 MG tablet Take 180 mg by mouth daily      pregabalin (LYRICA) 100 MG capsule Take 100 mg by mouth 2 times daily.       insulin glargine (LANTUS) 100 UNIT/ML injection vial Inject 30 Units into the skin nightly 1 vial 3    insulin lispro (HUMALOG) 100 UNIT/ML injection vial Inject 0-18 Units into the skin 3 times daily (with meals)      insulin lispro (HUMALOG) 100 UNIT/ML injection vial Inject 0-9 Units into the skin nightly      insulin lispro (HUMALOG) 100 UNIT/ML injection vial Inject 15 Units into the skin 3 times daily (with meals)      torsemide (DEMADEX) 20 MG tablet Take 1 tablet by mouth daily 30 tablet 3    potassium chloride (KLOR-CON M) 10 MEQ extended release tablet Take 20 mEq by mouth daily      pantoprazole (PROTONIX) 40 MG tablet Take 40 mg by mouth daily      buPROPion (WELLBUTRIN SR) 200 MG extended release tablet Take 400 mg by mouth daily      zafirlukast (ACCOLATE) 20 MG tablet Take 20 mg by mouth 2 times daily      sodium chloride 0.9 % SOLN 40 mL with SUFentanil 50 MCG/ML SOLN 5 mcg/mL continuous Pt unsure of delivered dose      ARIPiprazole (ABILIFY) 30 MG tablet Take 30 mg by mouth daily       Dexmethylphenidate HCl (FOCALIN PO) Take 20 mg by mouth daily as needed (takes 20 mg - 40 mg morning)      ferrous sulfate 325 (65 Fe) MG tablet Take 325 mg by mouth daily (with breakfast)      lidocaine (XYLOCAINE) 2 % jelly Apply topically as needed for Trach change 1 Tube 0    zinc gluconate 50 MG tablet Take 100 mg by mouth 2 times daily       ADVAIR DISKUS 250-50 MCG/DOSE AEPB Inhale 1 puff into the lungs 2 times daily      glipiZIDE (GLUCOTROL XL) 10 MG extended release tablet Take 10 mg by mouth 2 times daily      pravastatin (PRAVACHOL) 40 MG tablet Take 40 mg by mouth daily.         Tamsulosin HCl (FLOMAX PO) Take 0.4 mg by mouth daily      Multiple Vitamins-Minerals (THERAPEUTIC MULTIVITAMIN-MINERALS) tablet Take 1 tablet by mouth daily       Allergies   Allergen Reactions    Aspartame And Phenylalanine Anaphylaxis    Cefuroxime Axetil Anaphylaxis    Clindamycin Hcl Anaphylaxis    Erythromycin Anaphylaxis and Rash    Feldene [Piroxicam] Anaphylaxis    Guanfacine Hcl Anaphylaxis    Iodine Anaphylaxis    Pcn [Penicillins] Anaphylaxis    Pletal [Cilostazol] Anaphylaxis    Robaxin [Methocarbamol] Anaphylaxis and Shortness Of Breath    Arthrotec [Diclofenac-Misoprostol]     Betadine [Povidone Iodine]     Claritin [Loratadine]     Clindamycin/Lincomycin     Indocin [Indometacin Sodium]     Tenex [Guanfacine Hcl]     Vasotec     Vioxx        REVIEW OF SYSTEMS  10 systems reviewed, pertinent positives per HPI otherwise noted to be negative. PHYSICAL EXAM  BP (!) 121/56   Pulse 64   Temp 98.3 °F (36.8 °C) (Oral)   Resp 18   Ht 5' 10\" (1.778 m)   Wt 260 lb (117.9 kg)   SpO2 100%   BMI 37.31 kg/m²   GENERAL APPEARANCE: Awake and alert. Cooperative. No acute distress. HEAD: Normocephalic. Atraumatic. EYES: PERRL. EOM's grossly intact. ENT: Mucous membranes are moist.  Tracheostomy site: Clean and dry. NECK: Supple, trachea midline. HEART: RRR. Normal S1S2, no rubs, gallops, or murmurs noted  LUNGS: Respirations unlabored. CTAB. Good air exchange. No wheezes, rales, or rhonchi. Speaking comfortably in full sentences. ABDOMEN: Soft. Non-distended. Non-tender. No guarding or rebound. Normal bowel sounds. EXTREMITIES: No peripheral edema. MAEE. No acute deformities. SKIN: Warm and dry. No acute rashes. NEUROLOGICAL: Alert and oriented X 3. CN II-XII intact. No gross facial drooping. Strength 5/5, sensation intact. Normal coordination. No pronator drift. Gait normal.   PSYCHIATRIC: Normal mood and affect. LABS  I have reviewed all labs for this visit.    Results for orders placed or performed during the hospital encounter of 05/28/19   CBC Auto Differential   Result Value Ref Range    WBC 8.3 4.0 - 11.0 K/uL    RBC 5.02 4.20 - 5.90 M/uL    Hemoglobin 13.1 (L) 13.5 - 17.5 g/dL    Hematocrit 40.1 (L) 40.5 - 52.5 %    MCV 80.0 80.0 - 100.0 fL    MCH 26.2 26.0 - 34.0 pg    MCHC 32.7 31.0 - 36.0 g/dL    RDW 15.0 12.4 - 15.4 %    Platelets 877 (L) 254 - 450 K/uL    MPV 8.8 5.0 - 10.5 fL    Neutrophils % 75.6 %    Lymphocytes % 11.0 %    Monocytes % 10.4 %    Eosinophils % 2.3 %    Basophils % 0.7 %    Neutrophils # 6.3 1.7 - 7.7 K/uL    Lymphocytes # 0.9 (L) 1.0 - 5.1 K/uL    Monocytes # 0.9 0.0 - 1.3 K/uL Eosinophils # 0.2 0.0 - 0.6 K/uL    Basophils # 0.1 0.0 - 0.2 K/uL   Comprehensive Metabolic Panel   Result Value Ref Range    Sodium 142 136 - 145 mmol/L    Potassium 4.0 3.5 - 5.1 mmol/L    Chloride 100 99 - 110 mmol/L    CO2 33 (H) 21 - 32 mmol/L    Anion Gap 9 3 - 16    Glucose 130 (H) 70 - 99 mg/dL    BUN 23 (H) 7 - 20 mg/dL    CREATININE 1.7 (H) 0.8 - 1.3 mg/dL    GFR Non- 41 (A) >60    GFR  49 (A) >60    Calcium 9.6 8.3 - 10.6 mg/dL    Total Protein 6.6 6.4 - 8.2 g/dL    Alb 4.0 3.4 - 5.0 g/dL    Albumin/Globulin Ratio 1.5 1.1 - 2.2    Total Bilirubin 0.4 0.0 - 1.0 mg/dL    Alkaline Phosphatase 133 (H) 40 - 129 U/L    ALT 23 10 - 40 U/L    AST 17 15 - 37 U/L    Globulin 2.6 g/dL   Lactate, Sepsis   Result Value Ref Range    Lactic Acid, Sepsis 1.6 0.4 - 1.9 mmol/L           RADIOLOGY  X-RAYS:  I have reviewed radiologic plain film image(s). ALL OTHER NON-PLAIN FILM IMAGES SUCH AS CT, ULTRASOUND AND MRI HAVE BEEN READ BY THE RADIOLOGIST. CT SOFT TISSUE NECK WO CONTRAST   Final Result   No acute abnormality of the soft tissue structures of the neck. The tracheostomy site appears unremarkable. No inflammatory changes or   abnormal fluid collections. XR CHEST STANDARD (2 VW)   Final Result   No acute findings. Rechecks: Physical assessment performed. Patient stable with O2 sats 100% on room air. No acute distress. Consult: EENT: I discussed patient with on-call ENT services from . I discussed patient's symptoms, exam, and imaging results. Recommendations for endotracheal Ciprodex were provided. Patient is to follow-up with ENT tomorrow. ED COURSE/MDM  Patient seen and evaluated. Old records reviewed. Labs and imaging reviewed and results discussed with patient. Patient had suctioning completed by respiratory staff in the ED with good symptomatic relief. Patient was reassessed as noted above.   Patient's labs and imaging

## 2019-05-28 NOTE — ED NOTES
Bed: 07  Expected date:   Expected time:   Means of arrival:   Comments:  [facility]  05/28/19 1169

## 2019-05-29 NOTE — ED NOTES
Have attempted to call report to the rehab unit at [facility] twice with no success at reaching anybody. Will attempt again in the future.       Stephanie Munoz RN  05/28/19 2029

## 2019-05-29 NOTE — ED NOTES
--Patient provided with discharge instructions and any prescriptions. --Instructions, dosing, and follow-up appointments reviewed with patient/family. No further questions or needs at this time. --Vital signs and patient stable upon discharge. --Patient ambulatory to [facility].          Beltran Coto RN  05/28/19 3443

## 2019-05-29 NOTE — ED NOTES
Report given to [facility] transport. All questions answered.       Willa Longoria RN  05/28/19 6028

## 2019-06-02 LAB
BLOOD CULTURE, ROUTINE: NORMAL
CULTURE, BLOOD 2: NORMAL

## 2019-07-25 ENCOUNTER — HOSPITAL ENCOUNTER (OUTPATIENT)
Age: 64
Setting detail: SPECIMEN
Discharge: HOME | End: 2019-07-25
Payer: MEDICARE

## 2019-07-25 PROCEDURE — 87070 CULTURE OTHR SPECIMN AEROBIC: CPT

## 2019-07-25 PROCEDURE — 87186 SC STD MICRODIL/AGAR DIL: CPT

## 2019-07-25 PROCEDURE — 87205 SMEAR GRAM STAIN: CPT

## 2019-07-25 PROCEDURE — 87077 CULTURE AEROBIC IDENTIFY: CPT

## 2019-07-28 LAB
GRAM STAIN RESULT: ABNORMAL
ORGANISM: ABNORMAL
WOUND/ABSCESS: ABNORMAL

## 2019-09-11 ENCOUNTER — HOSPITAL ENCOUNTER (OUTPATIENT)
Age: 64
Setting detail: SPECIMEN
Discharge: HOME | End: 2019-09-11
Payer: COMMERCIAL

## 2019-09-11 LAB
BILIRUBIN URINE: NEGATIVE
BLOOD, URINE: NEGATIVE
CLARITY: CLEAR
COLOR: NORMAL
GLUCOSE URINE: NEGATIVE MG/DL
KETONES, URINE: NEGATIVE MG/DL
LEUKOCYTE ESTERASE, URINE: NEGATIVE
MICROSCOPIC EXAMINATION: NORMAL
NITRITE, URINE: NEGATIVE
PH UA: 5.5 (ref 5–8)
PROTEIN UA: NEGATIVE MG/DL
SPECIFIC GRAVITY UA: 1.01 (ref 1–1.03)
URINE TYPE: NORMAL
UROBILINOGEN, URINE: 0.2 E.U./DL

## 2019-09-11 PROCEDURE — 87086 URINE CULTURE/COLONY COUNT: CPT

## 2019-09-11 PROCEDURE — 81003 URINALYSIS AUTO W/O SCOPE: CPT

## 2019-09-13 LAB — URINE CULTURE, ROUTINE: NORMAL

## 2019-09-18 ENCOUNTER — HOSPITAL ENCOUNTER (OUTPATIENT)
Age: 64
Setting detail: OBSERVATION
Discharge: NURSING, REHAB OR LONG-TERM CARE | End: 2019-09-20
Attending: EMERGENCY MEDICINE | Admitting: EMERGENCY MEDICINE
Payer: MEDICARE

## 2019-09-18 ENCOUNTER — APPOINTMENT (OUTPATIENT)
Dept: GENERAL RADIOLOGY | Age: 64
End: 2019-09-18
Payer: MEDICARE

## 2019-09-18 DIAGNOSIS — D72.825 BANDEMIA: ICD-10-CM

## 2019-09-18 DIAGNOSIS — D69.6 THROMBOCYTOPENIA (HCC): ICD-10-CM

## 2019-09-18 DIAGNOSIS — R74.01 TRANSAMINITIS: ICD-10-CM

## 2019-09-18 DIAGNOSIS — R29.6 FREQUENT FALLS: Primary | ICD-10-CM

## 2019-09-18 DIAGNOSIS — S31.000D WOUND OF SACRAL REGION, SUBSEQUENT ENCOUNTER: ICD-10-CM

## 2019-09-18 DIAGNOSIS — M25.561 ACUTE PAIN OF RIGHT KNEE: ICD-10-CM

## 2019-09-18 LAB
A/G RATIO: 1.2 (ref 1.1–2.2)
ALBUMIN SERPL-MCNC: 3.5 G/DL (ref 3.4–5)
ALP BLD-CCNC: 140 U/L (ref 40–129)
ALT SERPL-CCNC: 76 U/L (ref 10–40)
ANION GAP SERPL CALCULATED.3IONS-SCNC: 8 MMOL/L (ref 3–16)
ANISOCYTOSIS: ABNORMAL
AST SERPL-CCNC: 51 U/L (ref 15–37)
ATYPICAL LYMPHOCYTE RELATIVE PERCENT: 5 % (ref 0–6)
BANDED NEUTROPHILS RELATIVE PERCENT: 10 % (ref 0–7)
BASOPHILS ABSOLUTE: 0 K/UL (ref 0–0.2)
BASOPHILS RELATIVE PERCENT: 0 %
BILIRUB SERPL-MCNC: <0.2 MG/DL (ref 0–1)
BILIRUBIN URINE: NEGATIVE
BLOOD, URINE: NEGATIVE
BUN BLDV-MCNC: 28 MG/DL (ref 7–20)
CALCIUM SERPL-MCNC: 9.2 MG/DL (ref 8.3–10.6)
CHLORIDE BLD-SCNC: 102 MMOL/L (ref 99–110)
CLARITY: CLEAR
CO2: 30 MMOL/L (ref 21–32)
COLOR: YELLOW
CREAT SERPL-MCNC: 1.3 MG/DL (ref 0.8–1.3)
EOSINOPHILS ABSOLUTE: 0.7 K/UL (ref 0–0.6)
EOSINOPHILS RELATIVE PERCENT: 6 %
GFR AFRICAN AMERICAN: >60
GFR NON-AFRICAN AMERICAN: 56
GLOBULIN: 3 G/DL
GLUCOSE BLD-MCNC: 118 MG/DL (ref 70–99)
GLUCOSE URINE: NEGATIVE MG/DL
HCT VFR BLD CALC: 33.9 % (ref 40.5–52.5)
HEMOGLOBIN: 11.2 G/DL (ref 13.5–17.5)
KETONES, URINE: NEGATIVE MG/DL
LEUKOCYTE ESTERASE, URINE: NEGATIVE
LYMPHOCYTES ABSOLUTE: 1.9 K/UL (ref 1–5.1)
LYMPHOCYTES RELATIVE PERCENT: 12 %
MCH RBC QN AUTO: 27 PG (ref 26–34)
MCHC RBC AUTO-ENTMCNC: 33.1 G/DL (ref 31–36)
MCV RBC AUTO: 81.8 FL (ref 80–100)
MICROSCOPIC EXAMINATION: NORMAL
MONOCYTES ABSOLUTE: 1.2 K/UL (ref 0–1.3)
MONOCYTES RELATIVE PERCENT: 11 %
NEUTROPHILS ABSOLUTE: 7.3 K/UL (ref 1.7–7.7)
NEUTROPHILS RELATIVE PERCENT: 56 %
NITRITE, URINE: NEGATIVE
PDW BLD-RTO: 14.5 % (ref 12.4–15.4)
PH UA: 6.5 (ref 5–8)
PLATELET # BLD: 74 K/UL (ref 135–450)
PLATELET SLIDE REVIEW: ABNORMAL
PMV BLD AUTO: 10.1 FL (ref 5–10.5)
POIKILOCYTES: ABNORMAL
POTASSIUM REFLEX MAGNESIUM: 4.8 MMOL/L (ref 3.5–5.1)
PROTEIN UA: NEGATIVE MG/DL
RBC # BLD: 4.15 M/UL (ref 4.2–5.9)
SLIDE REVIEW: ABNORMAL
SODIUM BLD-SCNC: 140 MMOL/L (ref 136–145)
SPECIFIC GRAVITY UA: 1.01 (ref 1–1.03)
TOTAL PROTEIN: 6.5 G/DL (ref 6.4–8.2)
URINE REFLEX TO CULTURE: NORMAL
URINE TYPE: NORMAL
UROBILINOGEN, URINE: 0.2 E.U./DL
WBC # BLD: 11.1 K/UL (ref 4–11)

## 2019-09-18 PROCEDURE — 99284 EMERGENCY DEPT VISIT MOD MDM: CPT

## 2019-09-18 PROCEDURE — 81003 URINALYSIS AUTO W/O SCOPE: CPT

## 2019-09-18 PROCEDURE — 85025 COMPLETE CBC W/AUTO DIFF WBC: CPT

## 2019-09-18 PROCEDURE — G0378 HOSPITAL OBSERVATION PER HR: HCPCS

## 2019-09-18 PROCEDURE — 73560 X-RAY EXAM OF KNEE 1 OR 2: CPT

## 2019-09-18 PROCEDURE — 80053 COMPREHEN METABOLIC PANEL: CPT

## 2019-09-18 RX ORDER — SODIUM CHLORIDE 0.9 % (FLUSH) 0.9 %
10 SYRINGE (ML) INJECTION EVERY 12 HOURS SCHEDULED
Status: DISCONTINUED | OUTPATIENT
Start: 2019-09-19 | End: 2019-09-20 | Stop reason: HOSPADM

## 2019-09-18 RX ORDER — POTASSIUM CHLORIDE 20 MEQ/1
20 TABLET, EXTENDED RELEASE ORAL DAILY
Status: DISCONTINUED | OUTPATIENT
Start: 2019-09-19 | End: 2019-09-20 | Stop reason: HOSPADM

## 2019-09-18 RX ORDER — BUPROPION HYDROCHLORIDE 100 MG/1
400 TABLET, EXTENDED RELEASE ORAL DAILY
Status: DISCONTINUED | OUTPATIENT
Start: 2019-09-19 | End: 2019-09-20 | Stop reason: HOSPADM

## 2019-09-18 RX ORDER — SODIUM CHLORIDE 0.9 % (FLUSH) 0.9 %
10 SYRINGE (ML) INJECTION PRN
Status: DISCONTINUED | OUTPATIENT
Start: 2019-09-18 | End: 2019-09-20 | Stop reason: HOSPADM

## 2019-09-18 RX ORDER — PRAVASTATIN SODIUM 40 MG
40 TABLET ORAL DAILY
Status: DISCONTINUED | OUTPATIENT
Start: 2019-09-19 | End: 2019-09-20 | Stop reason: HOSPADM

## 2019-09-18 RX ORDER — ARIPIPRAZOLE 10 MG/1
30 TABLET ORAL DAILY
Status: DISCONTINUED | OUTPATIENT
Start: 2019-09-19 | End: 2019-09-19

## 2019-09-18 RX ORDER — DEXTROSE MONOHYDRATE 25 G/50ML
12.5 INJECTION, SOLUTION INTRAVENOUS PRN
Status: DISCONTINUED | OUTPATIENT
Start: 2019-09-18 | End: 2019-09-20 | Stop reason: HOSPADM

## 2019-09-18 RX ORDER — LINEZOLID 2 MG/ML
600 INJECTION, SOLUTION INTRAVENOUS ONCE
Status: DISCONTINUED | OUTPATIENT
Start: 2019-09-18 | End: 2019-09-18

## 2019-09-18 RX ORDER — INSULIN GLARGINE 100 [IU]/ML
30 INJECTION, SOLUTION SUBCUTANEOUS NIGHTLY
Status: DISCONTINUED | OUTPATIENT
Start: 2019-09-19 | End: 2019-09-20 | Stop reason: HOSPADM

## 2019-09-18 RX ORDER — FERROUS SULFATE 325(65) MG
325 TABLET ORAL
Status: DISCONTINUED | OUTPATIENT
Start: 2019-09-19 | End: 2019-09-20 | Stop reason: HOSPADM

## 2019-09-18 RX ORDER — TORSEMIDE 20 MG/1
20 TABLET ORAL DAILY
Status: DISCONTINUED | OUTPATIENT
Start: 2019-09-19 | End: 2019-09-20 | Stop reason: HOSPADM

## 2019-09-18 RX ORDER — ACETAMINOPHEN 325 MG/1
650 TABLET ORAL EVERY 4 HOURS PRN
Status: DISCONTINUED | OUTPATIENT
Start: 2019-09-18 | End: 2019-09-20 | Stop reason: HOSPADM

## 2019-09-18 RX ORDER — SODIUM CHLORIDE 9 MG/ML
INJECTION, SOLUTION INTRAVENOUS CONTINUOUS
Status: DISPENSED | OUTPATIENT
Start: 2019-09-19 | End: 2019-09-19

## 2019-09-18 RX ORDER — ONDANSETRON 2 MG/ML
4 INJECTION INTRAMUSCULAR; INTRAVENOUS EVERY 6 HOURS PRN
Status: DISCONTINUED | OUTPATIENT
Start: 2019-09-18 | End: 2019-09-20 | Stop reason: HOSPADM

## 2019-09-18 RX ORDER — DEXTROSE MONOHYDRATE 50 MG/ML
100 INJECTION, SOLUTION INTRAVENOUS PRN
Status: DISCONTINUED | OUTPATIENT
Start: 2019-09-18 | End: 2019-09-20 | Stop reason: HOSPADM

## 2019-09-18 RX ORDER — NICOTINE POLACRILEX 4 MG
15 LOZENGE BUCCAL PRN
Status: DISCONTINUED | OUTPATIENT
Start: 2019-09-18 | End: 2019-09-20 | Stop reason: HOSPADM

## 2019-09-18 RX ORDER — LISINOPRIL 10 MG/1
10 TABLET ORAL DAILY
Status: DISCONTINUED | OUTPATIENT
Start: 2019-09-19 | End: 2019-09-20 | Stop reason: HOSPADM

## 2019-09-18 RX ORDER — TAMSULOSIN HYDROCHLORIDE 0.4 MG/1
0.4 CAPSULE ORAL DAILY
Status: DISCONTINUED | OUTPATIENT
Start: 2019-09-19 | End: 2019-09-20 | Stop reason: HOSPADM

## 2019-09-18 ASSESSMENT — PAIN SCALES - GENERAL
PAINLEVEL_OUTOF10: 10

## 2019-09-18 ASSESSMENT — PAIN DESCRIPTION - PAIN TYPE: TYPE: ACUTE PAIN

## 2019-09-18 ASSESSMENT — ENCOUNTER SYMPTOMS
SHORTNESS OF BREATH: 0
EYES NEGATIVE: 1
ABDOMINAL PAIN: 0

## 2019-09-18 ASSESSMENT — PAIN DESCRIPTION - LOCATION: LOCATION: KNEE

## 2019-09-18 ASSESSMENT — PAIN DESCRIPTION - ORIENTATION: ORIENTATION: RIGHT

## 2019-09-18 NOTE — ED PROVIDER NOTES
Skin: Positive for wound (chronic sacral wound). Neurological: Negative for weakness and headaches. All other systems reviewed and are negative. Exceptas noted above in the ROS, all other systems were reviewed and negative. PAST MEDICAL HISTORY:     Past Medical History:   Diagnosis Date    Abscess or cellulitis of leg 4/8/2012    Acute renal failure ([address] 4/10/2012    Acute tracheitis with airway obstruction     [facility]is [facility])     ARF (acute renal failure) ([facility]) 9/28/2018    Arthritis     Asthma     Cellulitis of buttock     Cellulitis of sacral region     CHF (congestive heart failure) ([facility])     Claustrophobia     Colitis     COPD (chronic obstructive pulmonary disease) ([facility])     Decubitus ulcer     Decubitus ulcer of left buttock, stage 2 10/17/2017    Diabetes mellitus ([address]     Diverticulitis     DVT (deep venous thrombosis) ([facility])     Gangrene ([facility])     on lower back to upper thigh    Gout     HCAP (healthcare-associated pneumonia)     Hyperlipidemia     Hypertension     Low iron     MRSA (methicillin resistant staph aureus) culture positive     tracheobronchitis, hx per pt.     MRSA (methicillin resistant staph aureus) culture positive 03/15/2019    buttocks    Panic attack     Pressure ulcer of coccygeal region, stage 2 9/24/2016    Pressure ulcer of left heel, stage 2 10/10/2017    Tracheostomy infection ([address] 11/3/2016    Tracheostomy infection ([address]     9/18    Unspecified cerebral artery occlusion with cerebral infarction     UTI (urinary tract infection)          SURGICAL HISTORY:      Past Surgical History:   Procedure Laterality Date    BACK SURGERY      ENDOSCOPY, COLON, DIAGNOSTIC      KNEE SURGERY      X 4    LUMBAR FUSION      LUMBAR LAMINECTOMY      OTHER SURGICAL HISTORY Right 05/2017    Pain Pump insertion    DC [address] W/BRNCL ALVEOLAR LAVAGE N/A 9/29/2018    BRONCHOSCOPY ALVEOLAR LAVAGE performed by Edith Sparks MD at [address] [place] [address] ENDOSCOPY    RHINOPLASTY      x2    TONSILLECTOMY AND ADENOIDECTOMY      TOTAL KNEE ARTHROPLASTY      TRACHEOSTOMY      over 27 trach operations due to MRSA infections in the trach    UPPER GASTROINTESTINAL ENDOSCOPY  1/8/16    removal of foreign body    UVULOPALATOPHARYGOPLASTY      VENA CAVA FILTER PLACEMENT  2002         CURRENT MEDICATIONS:       Previous Medications    ADVAIR DISKUS 250-50 MCG/DOSE AEPB    Inhale 1 puff into the lungs 2 times daily    ARIPIPRAZOLE (ABILIFY) 30 MG TABLET    Take 30 mg by mouth daily     BUPROPION (WELLBUTRIN SR) 200 MG EXTENDED RELEASE TABLET    Take 400 mg by mouth daily    CIPROFLOXACIN-DEXAMETHASONE (CIPRODEX) 0.3-0.1 % OTIC SUSPENSION    Please place 5 drops directly into the trach T piece twice daily.     DEXMETHYLPHENIDATE HCL (FOCALIN PO)    Take 20 mg by mouth daily as needed (takes 20 mg - 40 mg morning)    EPLERENONE (INSPRA) 25 MG TABLET    Take 25 mg by mouth daily    FERROUS SULFATE 325 (65 FE) MG TABLET    Take 325 mg by mouth daily (with breakfast)    FEXOFENADINE (ALLEGRA) 180 MG TABLET    Take 180 mg by mouth daily    GLIPIZIDE (GLUCOTROL XL) 10 MG EXTENDED RELEASE TABLET    Take 10 mg by mouth 2 times daily    INSULIN GLARGINE (LANTUS) 100 UNIT/ML INJECTION VIAL    Inject 30 Units into the skin nightly    INSULIN LISPRO (HUMALOG) 100 UNIT/ML INJECTION VIAL    Inject 0-18 Units into the skin 3 times daily (with meals)    INSULIN LISPRO (HUMALOG) 100 UNIT/ML INJECTION VIAL    Inject 0-9 Units into the skin nightly    INSULIN LISPRO (HUMALOG) 100 UNIT/ML INJECTION VIAL    Inject 15 Units into the skin 3 times daily (with meals)    LIDOCAINE (XYLOCAINE) 2 % JELLY    Apply topically as needed for Trach change    LISINOPRIL (PRINIVIL;ZESTRIL) 10 MG TABLET    Take 10 mg by mouth daily    MOEXIPRIL HCL PO    Take by mouth    MULTIPLE VITAMINS-MINERALS (THERAPEUTIC MULTIVITAMIN-MINERALS) TABLET    Take 1 tablet by mouth daily    POTASSIUM CHLORIDE (KLOR-CON M) 10

## 2019-09-19 LAB
ALBUMIN SERPL-MCNC: 3.5 G/DL (ref 3.4–5)
ALP BLD-CCNC: 111 U/L (ref 40–129)
ALT SERPL-CCNC: 63 U/L (ref 10–40)
ANION GAP SERPL CALCULATED.3IONS-SCNC: 9 MMOL/L (ref 3–16)
AST SERPL-CCNC: 31 U/L (ref 15–37)
BASOPHILS ABSOLUTE: 0 K/UL (ref 0–0.2)
BASOPHILS RELATIVE PERCENT: 0.7 %
BILIRUB SERPL-MCNC: <0.2 MG/DL (ref 0–1)
BILIRUBIN DIRECT: <0.2 MG/DL (ref 0–0.3)
BILIRUBIN, INDIRECT: ABNORMAL MG/DL (ref 0–1)
BUN BLDV-MCNC: 25 MG/DL (ref 7–20)
CALCIUM SERPL-MCNC: 9.1 MG/DL (ref 8.3–10.6)
CHLORIDE BLD-SCNC: 105 MMOL/L (ref 99–110)
CO2: 30 MMOL/L (ref 21–32)
CREAT SERPL-MCNC: 1.2 MG/DL (ref 0.8–1.3)
EOSINOPHILS ABSOLUTE: 0.2 K/UL (ref 0–0.6)
EOSINOPHILS RELATIVE PERCENT: 3.3 %
GFR AFRICAN AMERICAN: >60
GFR NON-AFRICAN AMERICAN: >60
GLUCOSE BLD-MCNC: 118 MG/DL (ref 70–99)
GLUCOSE BLD-MCNC: 148 MG/DL (ref 70–99)
GLUCOSE BLD-MCNC: 160 MG/DL (ref 70–99)
GLUCOSE BLD-MCNC: 193 MG/DL (ref 70–99)
GLUCOSE BLD-MCNC: 221 MG/DL (ref 70–99)
GLUCOSE BLD-MCNC: 97 MG/DL (ref 70–99)
HCT VFR BLD CALC: 32 % (ref 40.5–52.5)
HEMOGLOBIN: 10.8 G/DL (ref 13.5–17.5)
LYMPHOCYTES ABSOLUTE: 1 K/UL (ref 1–5.1)
LYMPHOCYTES RELATIVE PERCENT: 16.9 %
MCH RBC QN AUTO: 27.3 PG (ref 26–34)
MCHC RBC AUTO-ENTMCNC: 33.7 G/DL (ref 31–36)
MCV RBC AUTO: 80.9 FL (ref 80–100)
MONOCYTES ABSOLUTE: 0.9 K/UL (ref 0–1.3)
MONOCYTES RELATIVE PERCENT: 14.9 %
NEUTROPHILS ABSOLUTE: 3.8 K/UL (ref 1.7–7.7)
NEUTROPHILS RELATIVE PERCENT: 64.2 %
PDW BLD-RTO: 14 % (ref 12.4–15.4)
PERFORMED ON: ABNORMAL
PLATELET # BLD: 59 K/UL (ref 135–450)
PMV BLD AUTO: 9 FL (ref 5–10.5)
POTASSIUM REFLEX MAGNESIUM: 4.1 MMOL/L (ref 3.5–5.1)
RBC # BLD: 3.95 M/UL (ref 4.2–5.9)
SODIUM BLD-SCNC: 144 MMOL/L (ref 136–145)
TOTAL PROTEIN: 5.7 G/DL (ref 6.4–8.2)
WBC # BLD: 6 K/UL (ref 4–11)

## 2019-09-19 PROCEDURE — 6370000000 HC RX 637 (ALT 250 FOR IP): Performed by: NURSE PRACTITIONER

## 2019-09-19 PROCEDURE — 2580000003 HC RX 258: Performed by: NURSE PRACTITIONER

## 2019-09-19 PROCEDURE — 6360000002 HC RX W HCPCS: Performed by: NURSE PRACTITIONER

## 2019-09-19 PROCEDURE — 96360 HYDRATION IV INFUSION INIT: CPT

## 2019-09-19 PROCEDURE — 85025 COMPLETE CBC W/AUTO DIFF WBC: CPT

## 2019-09-19 PROCEDURE — 80048 BASIC METABOLIC PNL TOTAL CA: CPT

## 2019-09-19 PROCEDURE — 97167 OT EVAL HIGH COMPLEX 60 MIN: CPT

## 2019-09-19 PROCEDURE — 36415 COLL VENOUS BLD VENIPUNCTURE: CPT

## 2019-09-19 PROCEDURE — 96361 HYDRATE IV INFUSION ADD-ON: CPT

## 2019-09-19 PROCEDURE — 96372 THER/PROPH/DIAG INJ SC/IM: CPT

## 2019-09-19 PROCEDURE — 97535 SELF CARE MNGMENT TRAINING: CPT

## 2019-09-19 PROCEDURE — 97162 PT EVAL MOD COMPLEX 30 MIN: CPT

## 2019-09-19 PROCEDURE — 94761 N-INVAS EAR/PLS OXIMETRY MLT: CPT

## 2019-09-19 PROCEDURE — G0378 HOSPITAL OBSERVATION PER HR: HCPCS

## 2019-09-19 PROCEDURE — 80076 HEPATIC FUNCTION PANEL: CPT

## 2019-09-19 PROCEDURE — 97110 THERAPEUTIC EXERCISES: CPT

## 2019-09-19 PROCEDURE — 97530 THERAPEUTIC ACTIVITIES: CPT

## 2019-09-19 PROCEDURE — 2700000000 HC OXYGEN THERAPY PER DAY

## 2019-09-19 RX ORDER — ARIPIPRAZOLE 10 MG/1
20 TABLET ORAL DAILY
Status: DISCONTINUED | OUTPATIENT
Start: 2019-09-20 | End: 2019-09-20 | Stop reason: HOSPADM

## 2019-09-19 RX ORDER — SODIUM HYPOCHLORITE 1.25 MG/ML
SOLUTION TOPICAL DAILY
Status: DISCONTINUED | OUTPATIENT
Start: 2019-09-20 | End: 2019-09-20 | Stop reason: HOSPADM

## 2019-09-19 RX ADMIN — INSULIN LISPRO 1 UNITS: 100 INJECTION, SOLUTION INTRAVENOUS; SUBCUTANEOUS at 17:51

## 2019-09-19 RX ADMIN — INSULIN GLARGINE 30 UNITS: 100 INJECTION, SOLUTION SUBCUTANEOUS at 02:22

## 2019-09-19 RX ADMIN — INSULIN LISPRO 1 UNITS: 100 INJECTION, SOLUTION INTRAVENOUS; SUBCUTANEOUS at 22:15

## 2019-09-19 RX ADMIN — Medication 10 ML: at 10:25

## 2019-09-19 RX ADMIN — ENOXAPARIN SODIUM 40 MG: 40 INJECTION SUBCUTANEOUS at 10:24

## 2019-09-19 RX ADMIN — TORSEMIDE 20 MG: 20 TABLET ORAL at 10:23

## 2019-09-19 RX ADMIN — SODIUM CHLORIDE: 9 INJECTION, SOLUTION INTRAVENOUS at 02:05

## 2019-09-19 RX ADMIN — FERROUS SULFATE TAB 325 MG (65 MG ELEMENTAL FE) 325 MG: 325 (65 FE) TAB at 10:23

## 2019-09-19 RX ADMIN — Medication 10 ML: at 22:14

## 2019-09-19 RX ADMIN — PREGABALIN 100 MG: 75 CAPSULE ORAL at 22:13

## 2019-09-19 RX ADMIN — POTASSIUM CHLORIDE 20 MEQ: 20 TABLET, EXTENDED RELEASE ORAL at 10:24

## 2019-09-19 RX ADMIN — INSULIN LISPRO 1 UNITS: 100 INJECTION, SOLUTION INTRAVENOUS; SUBCUTANEOUS at 02:22

## 2019-09-19 RX ADMIN — BUPROPION HYDROCHLORIDE 400 MG: 100 TABLET, EXTENDED RELEASE ORAL at 10:22

## 2019-09-19 RX ADMIN — PREGABALIN 100 MG: 75 CAPSULE ORAL at 10:22

## 2019-09-19 RX ADMIN — INSULIN GLARGINE 30 UNITS: 100 INJECTION, SOLUTION SUBCUTANEOUS at 22:15

## 2019-09-19 RX ADMIN — ACETAMINOPHEN 650 MG: 325 TABLET ORAL at 02:22

## 2019-09-19 RX ADMIN — LISINOPRIL 10 MG: 10 TABLET ORAL at 10:23

## 2019-09-19 RX ADMIN — INSULIN LISPRO 2 UNITS: 100 INJECTION, SOLUTION INTRAVENOUS; SUBCUTANEOUS at 13:09

## 2019-09-19 RX ADMIN — TAMSULOSIN HYDROCHLORIDE 0.4 MG: 0.4 CAPSULE ORAL at 10:24

## 2019-09-19 RX ADMIN — PREGABALIN 100 MG: 75 CAPSULE ORAL at 02:22

## 2019-09-19 RX ADMIN — PRAVASTATIN SODIUM 40 MG: 40 TABLET ORAL at 10:24

## 2019-09-19 ASSESSMENT — PAIN DESCRIPTION - LOCATION
LOCATION: KNEE;SACRUM
LOCATION: KNEE

## 2019-09-19 ASSESSMENT — PAIN DESCRIPTION - DESCRIPTORS
DESCRIPTORS: DISCOMFORT
DESCRIPTORS: DISCOMFORT

## 2019-09-19 ASSESSMENT — PAIN SCALES - GENERAL
PAINLEVEL_OUTOF10: 10

## 2019-09-19 ASSESSMENT — PAIN DESCRIPTION - ORIENTATION
ORIENTATION: RIGHT
ORIENTATION: RIGHT;MID

## 2019-09-19 ASSESSMENT — PAIN DESCRIPTION - FREQUENCY: FREQUENCY: INTERMITTENT

## 2019-09-19 ASSESSMENT — PAIN DESCRIPTION - PAIN TYPE
TYPE: ACUTE PAIN

## 2019-09-19 ASSESSMENT — PAIN DESCRIPTION - PROGRESSION
CLINICAL_PROGRESSION: NOT CHANGED
CLINICAL_PROGRESSION: NOT CHANGED

## 2019-09-19 ASSESSMENT — PAIN DESCRIPTION - ONSET
ONSET: ON-GOING
ONSET: ON-GOING

## 2019-09-19 ASSESSMENT — PAIN - FUNCTIONAL ASSESSMENT: PAIN_FUNCTIONAL_ASSESSMENT: PREVENTS OR INTERFERES WITH MANY ACTIVE NOT PASSIVE ACTIVITIES

## 2019-09-19 NOTE — PROGRESS NOTES
..Patient transferred from ED , A&O, call light within reach, bed in lowest position and locked. Skin assessment completed. ..  Vitals:    09/18/19 1909 09/18/19 2014 09/18/19 2207 09/18/19 2352   BP: 124/73 130/65 (!) 131/48 125/74   Pulse: 83 70 75 71   Resp: 16 16 16 16   Temp:    97.9 °F (36.6 °C)   TempSrc:    Oral   SpO2: 100% 100% 100% 99%   Weight:       Height: Cristiano Kern .Pt scoring pain on 0-10 scale. Pain medications given per MAR. Pt instructed to call out when pain level increasing. Call light within reach. Nurse will continue to reassess and monitor. Cristiano Kern .Bed in lowest position, wheels locked, 2/4 side rails up, nonskid footwear on. Bed/ chair check alarm in place, call light within reach. Pt instructed to call out when needing assistance. Pt stated understanding. Nurse will continue to monitor.        Francine Ocasio

## 2019-09-19 NOTE — CARE COORDINATION
Writer spoke with Dr Kelly Gavin about dc planning, pt has Medicare primary now and does not qualify for Medicare to pay for SNF stay due to being in Observation and not having a recent 3 night stay. PT/OT recommending SNF. Writer met with pt and Dr Kelly Gavin at bedside, pt wants to go to rehab but understands he is in Observation and does not qualify for [facility], but would for an inpatient rehab. Dr Kelly Gavin agreed to consult to Dr Gorman/PM&R at [facility], to see if pt appropriate for ARU. If pt is not appropriate, he can return home with increased home care, maybe daily PT visits. Writer spoke with Bailey/admissions at ARU, aware of consult for Dr Stephon Sanchez. Will continue to follow and coordinate discharge arrangements.   BRIA Payan-RN

## 2019-09-19 NOTE — H&P
have reviewed the EKG with the following interpretation:     XR KNEE RIGHT (1-2 VIEWS)   Preliminary Result   Osteopenia and degenerative change. A displaced fracture is not identified. ASSESSMENT:    Active Hospital Problems    Diagnosis Date Noted    Frequent falls [R29.6] 09/18/2019    Hyperlipidemia [E78.5]     Pressure ulcer of coccygeal region, stage 4 ([facility] [L89.154] 07/20/2017    Chronic dCHF (grade 1 LVDD) [I50.32] 09/24/2016    HTN (hypertension) [I10] 10/14/2015    Asthma/COPD [J44.9] 08/27/2009     PLAN:    Frequent falls and patient with bilateral leg weakness due to arthritis in wheelchair-bound patient  -X-ray right knee revealed osteopenia and degenerative change. Displaced fracture is not identified  -Zyvox given in ED  -PT OT evaluation- thank you  -case management consult - rehab placement  -up with assistance  -MIVF  -continue lyrica    Pressure ulcer of coccygeal region, stage IV  -wound care consult - thank you  -Turn patient every 2 hours    Acute transaminitis   -Pt with elevated AST 51 /ALT 76  -unclear etiology  -will trend    DM2, controlled  -  -hemglobin a1c 7.2 on admission, repeat in am  -hold home medications  -Continue Lantus  -ldssi  -poct ac/hs  -hypoglycemia protocol  -carb control diet    Obesity  With Body mass index is 37.4 kg/m². Complicating assessment and treatment. Placing patient at risk for multiple co-morbidities as well as early death and contributing to the patient's presentation. Counseled on weight loss    Leukocytosis, 11.1 on admission  -May be secondary to pressure ulcer  -CBC in a.m. Essential hypertension,  -Continue lisinopril    Hyperlipidemia,  -Continue pravastatin    Chronic diastolic CHF, stable  -Does not appear to be in an acute exacerbation  -Echo on 10/15/2015 : left ventricular systolic function is normal. Ejection fraction is visually estimated to be 55-60 %. Mild concentric left ventricular hypertrophy is present. Diastolic filling parameters suggests grade I diastolic dysfunction .  -Continue Demadex  -Strict I&O  -Daily weights     COPD, stable  -Does not appear to be in acute exacerbation  -Continue Dulera  -Oxygen therapy protocol    Chronic normocytic anemia, 11.2/33.9 on admission  -No signs or symptoms of bleeding at this time  -CBC in a.m.  -Continue ferrous sulfate    DVT Prophylaxis: Lovenox    Diet: No diet orders on file     Code Status: Prior    PT/OT Eval Status: Ordered    Dispo -1 to 2 days pending clinical improvement     NEGRITA Mccracken - CNP    Thank you Deborah Cain [facility] for the opportunity to be involved in this patient's care.  If you have any questions or concerns please feel free to contact me at [phone].  ---------------------------- Dr. Thor jones--------------------------------

## 2019-09-19 NOTE — PROGRESS NOTES
week: 3-5 x wk  Specific instructions for Next Treatment: ex, functional mob  Current Treatment Recommendations: Strengthening, ROM, Functional Mobility Training, Endurance Training, Transfer Training, Safety Education & Training  Safety Devices  Type of devices: All fall risk precautions in place, Bed alarm in place, Call light within reach, Patient at risk for falls, Gait belt, Left in bed, Nurse notified               AM-PAC Score     AM-PAC Inpatient Mobility without Stair Climbing Raw Score : 10 (09/19/19 1027)  AM-PAC Inpatient without Stair Climbing T-Scale Score : 34.07 (09/19/19 1027)  Mobility Inpatient CMS 0-100% Score: 71.66 (09/19/19 1027)  Mobility Inpatient without Stair CMS G-Code Modifier : CL (09/19/19 1027)       Goals  Short term goals  Time Frame for Short term goals: 1 week (9/26) unless otherwise specified  Short term goal 1: pt to perform bed mob Supervised  Short term goal 2: pt to transfer bed to w/c min assist  Short term goal 3: pt to participate in LE ex 12-15 reps to improve ROM and mobility by 9/22  Patient Goals   Patient goals : \"Decreased knee pain, be able to transfer indep bed to w/c\"       Therapy Time   Individual Concurrent Group Co-treatment   Time In 1005         Time Out 1025         Minutes 20         Timed Code Treatment Minutes: 10 Minutes     If pt discharges prior to next PT session this note will serve as discharge summary.   Micky Smith, [id number]

## 2019-09-19 NOTE — PLAN OF CARE
Pt resting in beds, no c/o pain at this time. Wound care changed sacral dressing. Pt requesting low air loss mattress, will provide for comfort.

## 2019-09-19 NOTE — CONSULTS
[address] Contin[facility] Nurse  Consult Note       NAME:  Damir Noyola RECORD NUMBER:  [id number]  AGE: 62 y.o. GENDER: male  : 1955  TODAY'S DATE:  2019    Subjective  I follow Dr Tk Ahuja and last visit was Tuesday. Reason for WOCN Evaluation and Assessment:       Florence Vega is a 62 y.o. male referred by:   [] Physician  [x] Nursing  [] Other:     Wound Identification:  Wound Type:  Stage 4 pressure injury mid coccyx known since May of 2017. Contributing Factors: diabetes, chronic pressure, decreased mobility, shear force and obesity    Wound History: Long standing chronic wound, initially started out as pressure. Had originally been following Dr Amira Cox at [facility] wound care center and now Barber Ahuja at [address] [place] [address] [phone]. Patient reports wound is being cleaned with Dakins solution them packed with OpticelAg/ Silver alginate and foam dressing daily. Call to wound care center office for report of status. Patient reports Dr Tk Ahuja is planning to biopsy the wound on an upcoming visit. Current Wound Care Treatment: AquacelAg and foam dressing.     Patient Goal of Care:  [x] Wound Healing  [] Odor Control  [] Palliative Care  [] Pain Control   [] Other:         PAST MEDICAL HISTORY        Diagnosis Date    Abscess or cellulitis of leg 2012    Acute renal failure ([facility] 4/10/2012    Acute tracheitis with airway obstruction     [facility]ina p[facility] ([facility])     ARF (acute renal failure) ([facility]) 2018    Arthritis     Asthma     Cellulitis of buttock     Cellulitis of sacral region     CHF (congestive heart failure) ([facility])     Claustrophobia     Colitis     COPD (chronic obstructive pulmonary disease) ([facility])     Decubitus ulcer     Decubitus ulcer of left buttock, stage 2 10/17/2017    Diabetes mellitus ([facility]     Diverticulitis     DVT (deep venous thrombosis) ([facility])     Gangrene ([facility])     on lower back to upper thigh    Gout

## 2019-09-19 NOTE — PROGRESS NOTES
Occupational Therapy   Occupational Therapy Initial Assessment and Treatment Note  Date: 2019   Patient Name: Hubert Montoya  MRN: [id number]     : 1955    Date of Service: 2019    Discharge Recommendations:  Subacute/Skilled Nursing Facility     Assessment   Performance deficits / Impairments: Decreased functional mobility ; Decreased ADL status; Decreased balance;Decreased endurance;Decreased strength  Assessment: Pt is functioning below baseline for ADLs and transfers. He is limited by c/o R knee and sacral pain which limit his ability to perform functional mobility and ADL tasks. Pt unable to tolerate transfer OOB today. Recommend SNF for skilled OT services. Cont OT. Prognosis: Fair  Decision Making: High Complexity  OT Education: OT Role;Plan of Care;ADL Adaptive Strategies  REQUIRES OT FOLLOW UP: Yes  Activity Tolerance  Activity Tolerance: Patient limited by pain  Safety Devices  Safety Devices in place: Yes  Type of devices: Left in bed;Bed alarm in place;Call light within reach;Gait belt;Nurse notified         Patient Diagnosis(es): The primary encounter diagnosis was Frequent falls. Diagnoses of Acute pain of right knee, Wound of sacral region, subsequent encounter, Thrombocytopenia ([address] Bandemia, and Transaminitis were also pertinent to this visit.      has a past medical history of Abscess or cellulitis of leg, Acute renal failure ([address] Acute tracheitis with airway obstruction, Angina pectoris ([address] ARF (acute renal failure) ([address] Arthritis, Asthma, Cellulitis of buttock, Cellulitis of sacral region, CHF (congestive heart failure) ([address] Claustrophobia, Colitis, COPD (chronic obstructive pulmonary disease) ([address] Decubitus ulcer, Decubitus ulcer of left buttock, stage 2, Diabetes mellitus ([address] Diverticulitis, DVT (deep venous thrombosis) ([address] Gangrene ([address] Gout, HCAP (healthcare-associated pneumonia), Hx of blood clots, Hyperlipidemia, Hypertension, Low iron, MRSA (methicillin resistant staph aureus) culture positive, MRSA (methicillin resistant staph aureus) culture positive, Panic attack, Pressure ulcer of coccygeal region, stage 2, Pressure ulcer of left heel, stage 2, Tracheostomy infection ([address] Tracheostomy infection ([address] Unspecified cerebral artery occlusion with cerebral infarction, and UTI (urinary tract infection). has a past surgical history that includes tracheostomy; Tonsillectomy and adenoidectomy; Uvulopalatopharygoplasty; back surgery; knee surgery; Upper gastrointestinal endoscopy (1/8/16); lumbar laminectomy; lumbar fusion; Vena Cava Filter Placement (2002); Total knee arthroplasty; rhinoplasty; other surgical history (Right, 05/2017); Endoscopy, colon, diagnostic; and pr [facility] w/brncl alveolar lavage (N/A, 9/29/2018).        Restrictions  Restrictions/Precautions  Restrictions/Precautions: Fall Risk, Up as Tolerated, Contact Precautions(MRSA)  Position Activity Restriction  Other position/activity restrictions: stage IV sacral wound, high fall risk per nsg assessment, trach, 4LO2    Subjective   General  Chart Reviewed: Yes  Patient assessed for rehabilitation services?: Yes  Family / Caregiver Present: No  Referring Practitioner: SUKI López  Diagnosis: R knee pain, multiple falls  Subjective  Subjective: Pt agreeable to OT  General Comment  Comments: RN approved therapy  Patient Currently in Pain: Yes  Pain Assessment  Pain Assessment: 0-10  Pain Level: 10  Pain Type: Acute pain  Pain Location: Knee;Sacrum  Pain Orientation: Right  Pain Descriptors: Discomfort  Pain Frequency: Intermittent  Pain Onset: On-going  Clinical Progression: Not changed  Functional Pain Assessment: Prevents or interferes with many active not passive activities  Non-Pharmaceutical Pain Intervention(s): Emotional support;Repositioned  Response to Pain Intervention: Patient Satisfied  Pre Treatment Pain Screening  Intervention List: Patient able to continue with treatment  Vital Fluid And Coordinated: Yes  Quality of Movement Other  Comment: lack of R hand grasp due to neuropathy; pt able to use BUE for bed mobility     Bed mobility  Supine to Sit: Contact guard assistance  Sit to Supine: Contact guard assistance        Cognition  Overall Cognitive Status: WFL               LUE AROM (degrees)  LUE General AROM: L shoulder 0-90*, L elbow to hand WFL, reports hx of L RTC injury  RUE AROM (degrees)  RUE AROM : WFL  LUE Strength  LUE Strength Comment: 3+/5-4-/5  RUE Strength  RUE Strength Comment: R shoulder WFL, R elbow WFL, neuropathy in R hand, affecting  strength (3/5)      Plan   Plan  Times per week: 3-5x  AM-PAC Score   AM-[facility] Inpatient Daily Activity Raw Score: 15 (09/19/19 1059)  AM-PAC Inpatient ADL T-Scale Score : 34.69 (09/19/19 1059)  ADL Inpatient CMS 0-100% Score: 56.46 (09/19/19 1059)  ADL Inpatient CMS G-Code Modifier : CK (09/19/19 1059)  Goals  Short term goals  Time Frame for Short term goals: for 1 week (9/26) unless noted  Short term goal 1: Perform LE dressing with min A by 9/25  Short term goal 2: Perform functional transfer with min x2   Short term goal 3: Perform UE exer 15x each  Patient Goals   Patient goals :  \"Be able to get out of bed\"     Therapy Time   Individual Concurrent Group Co-treatment   Time In 0930         Time Out 1004         Minutes 34         Timed Code Treatment Minutes: 24 Minutes(10 min eval )     Zackary Licona OTR/L

## 2019-09-20 VITALS
DIASTOLIC BLOOD PRESSURE: 80 MMHG | BODY MASS INDEX: 37.22 KG/M2 | OXYGEN SATURATION: 100 % | HEART RATE: 77 BPM | HEIGHT: 70 IN | SYSTOLIC BLOOD PRESSURE: 132 MMHG | RESPIRATION RATE: 16 BRPM | TEMPERATURE: 98.2 F | WEIGHT: 260 LBS

## 2019-09-20 LAB
GLUCOSE BLD-MCNC: 122 MG/DL (ref 70–99)
GLUCOSE BLD-MCNC: 122 MG/DL (ref 70–99)
GLUCOSE BLD-MCNC: 159 MG/DL (ref 70–99)
PERFORMED ON: ABNORMAL

## 2019-09-20 PROCEDURE — 6360000002 HC RX W HCPCS: Performed by: NURSE PRACTITIONER

## 2019-09-20 PROCEDURE — 6370000000 HC RX 637 (ALT 250 FOR IP): Performed by: INTERNAL MEDICINE

## 2019-09-20 PROCEDURE — 2700000000 HC OXYGEN THERAPY PER DAY

## 2019-09-20 PROCEDURE — 6370000000 HC RX 637 (ALT 250 FOR IP): Performed by: NURSE PRACTITIONER

## 2019-09-20 PROCEDURE — 97530 THERAPEUTIC ACTIVITIES: CPT

## 2019-09-20 PROCEDURE — G0378 HOSPITAL OBSERVATION PER HR: HCPCS

## 2019-09-20 PROCEDURE — 96372 THER/PROPH/DIAG INJ SC/IM: CPT

## 2019-09-20 PROCEDURE — 94761 N-INVAS EAR/PLS OXIMETRY MLT: CPT

## 2019-09-20 PROCEDURE — 2580000003 HC RX 258: Performed by: NURSE PRACTITIONER

## 2019-09-20 RX ORDER — METHYLPHENIDATE HYDROCHLORIDE 10 MG/1
40 TABLET ORAL 2 TIMES DAILY
Status: DISCONTINUED | OUTPATIENT
Start: 2019-09-20 | End: 2019-09-20 | Stop reason: HOSPADM

## 2019-09-20 RX ORDER — ATENOLOL 25 MG/1
25 TABLET ORAL DAILY
Status: DISCONTINUED | OUTPATIENT
Start: 2019-09-20 | End: 2019-09-20 | Stop reason: HOSPADM

## 2019-09-20 RX ORDER — ATENOLOL 25 MG/1
25 TABLET ORAL DAILY
Qty: 30 TABLET | Refills: 0
Start: 2019-09-20

## 2019-09-20 RX ADMIN — ENOXAPARIN SODIUM 40 MG: 40 INJECTION SUBCUTANEOUS at 08:59

## 2019-09-20 RX ADMIN — TAMSULOSIN HYDROCHLORIDE 0.4 MG: 0.4 CAPSULE ORAL at 09:00

## 2019-09-20 RX ADMIN — DAKIN'S SOLUTION 0.125% (QUARTER STRENGTH): 0.12 SOLUTION at 07:12

## 2019-09-20 RX ADMIN — POTASSIUM CHLORIDE 20 MEQ: 20 TABLET, EXTENDED RELEASE ORAL at 09:00

## 2019-09-20 RX ADMIN — BUPROPION HYDROCHLORIDE 400 MG: 100 TABLET, EXTENDED RELEASE ORAL at 10:14

## 2019-09-20 RX ADMIN — PREGABALIN 100 MG: 75 CAPSULE ORAL at 09:00

## 2019-09-20 RX ADMIN — INSULIN LISPRO 1 UNITS: 100 INJECTION, SOLUTION INTRAVENOUS; SUBCUTANEOUS at 12:18

## 2019-09-20 RX ADMIN — LISINOPRIL 10 MG: 10 TABLET ORAL at 09:00

## 2019-09-20 RX ADMIN — FERROUS SULFATE TAB 325 MG (65 MG ELEMENTAL FE) 325 MG: 325 (65 FE) TAB at 09:00

## 2019-09-20 RX ADMIN — PRAVASTATIN SODIUM 40 MG: 40 TABLET ORAL at 09:34

## 2019-09-20 RX ADMIN — METHYLPHENIDATE HYDROCHLORIDE 40 MG: 10 TABLET ORAL at 12:17

## 2019-09-20 RX ADMIN — Medication 10 ML: at 09:00

## 2019-09-20 RX ADMIN — ATENOLOL 25 MG: 25 TABLET ORAL at 12:17

## 2019-09-20 RX ADMIN — TORSEMIDE 20 MG: 20 TABLET ORAL at 09:00

## 2019-09-20 RX ADMIN — ARIPIPRAZOLE 20 MG: 10 TABLET ORAL at 09:00

## 2019-09-20 ASSESSMENT — PAIN DESCRIPTION - ORIENTATION: ORIENTATION: RIGHT

## 2019-09-20 ASSESSMENT — PAIN SCALES - GENERAL: PAINLEVEL_OUTOF10: 10

## 2019-09-20 ASSESSMENT — PAIN DESCRIPTION - LOCATION: LOCATION: KNEE

## 2019-09-20 NOTE — DISCHARGE SUMMARY
Hospital Medicine Discharge Summary    Patient ID: Hubert Flair      Patient's PCP: Earlene Chao    Admit Date: 9/18/2019     Discharge Date:   09/20/19    Admitting Physician: Elois Leventhal, MD     Discharge Physician: Mynor Orellana MD     Discharge Diagnoses: Active Hospital Problems    Diagnosis    Frequent falls [R29.6]    Hyperlipidemia [E78.5]    Pressure ulcer of coccygeal region, stage 4 (HCC) [L89.154]    Chronic dCHF (grade 1 LVDD) [I50.32]    HTN (hypertension) [I10]    Asthma/COPD [J44.9]       The patient was seen and examined on day of discharge and this discharge summary is in conjunction with any daily progress note from day of discharge. Hospital Course:   62 y.o. male, with past medical history of arthritis, diabetes, hypertension, hyperlipidemia, COPD, CHF and obesity, who presented to [facility] with frequent falls. History obtained for the patient review of EMR. The patient stated he has been in a wheelchair for the last 2 years due to arthritis of the knees and chronic leg pain. He stated he is typically able to transfer from his wheelchair to the bed or to a chair, but recently has been falling frequently. Patient stated he fell last night and has had right knee pain since. He stated he does have a home health nurse that does come and visit with him and she told the patient he is not safe to be at home by himself at this time. She recommended the patient go to the emergency room to be evaluated for rehab. Overall the patient is not in good health. 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 The patient stated he does follow with wound care at [facility]. The patient will be admitted for further evaluation with a consult to case management and wound care. He denied any other associated symptoms as well as any aggravating and/or alleviating factors.  At the time of this assessment, the patient was resting comfortably in bed. He currently denies any chest pain, back pain, abdominal pain, shortness of breath, numbness, tingling, N/V/C/D, fever and/or chills. Frequent falls with bilateral leg weakness due to arthritis   - wheelchair-bound at baseline  - X-ray right knee revealed osteopenia and degenerative change.  Displaced fracture is not identified  - PT/OT  - up with assistance  - off IVF  - continued lyrica     Chronic Pressure ulcer of coccygeal region, stage IV  - wound care consulted, to continue on discharge  - Turn patient every 2 hours     Acute transaminitis   - AST 51 /ALT 76 on admission  - unclear etiology  - improving with conservative management     DMII  - well controlled  - restarting home regimen     Obesity  With Body mass index is 37.4 kg/m². Complicating assessment and treatment. Placing patient at risk for multiple co-morbidities as well as early death and contributing to the patient's presentation. Counseled on weight loss     Leukocytosis  - May be secondary to pressure ulcer  - improved without intervention     HTN  - well controlled  - Continue lisinopril, insrpa, atenolol     HLD  - Continue pravastatin     Chronic diastolic heart failure  - no evidence of acute exacerbation  - Echo on 10/15/2015 : left ventricular systolic function is normal. Ejection fraction is visually estimated to be 55-60 %. Mild concentric left ventricular hypertrophy is present.  Diastolic filling parameters suggests grade I diastolic dysfunction .  - Continue Demadex  - Strict I&O  - daily weights     COPD  - no evidence of acute exacerbation  -Continue Dulera  -Oxygen therapy protocol     Chronic normocytic anemia  - No signs or symptoms of bleeding at this time  - CBC stable  - Continue ferrous sulfate    Physical Exam Performed:     /80   Pulse 77   Temp 98.2 °F (36.8 °C) (Oral)   Resp 16   Ht 5' 10\" (1.778 m)   Wt 260 lb (117.9 kg)   SpO2 100%   BMI 37.31 kg/m²       General activity as tolerated    Diet: diabetic diet      Discharge Medications:     Current Discharge Medication List           Details   atenolol (TENORMIN) 25 MG tablet Take 1 tablet by mouth daily  Qty: 30 tablet, Refills: 0              Details   lisinopril (PRINIVIL;ZESTRIL) 10 MG tablet Take 10 mg by mouth daily      eplerenone (INSPRA) 25 MG tablet Take 25 mg by mouth daily      fexofenadine (ALLEGRA) 180 MG tablet Take 180 mg by mouth daily      pregabalin (LYRICA) 100 MG capsule Take 100 mg by mouth 2 times daily. insulin glargine (LANTUS) 100 UNIT/ML injection vial Inject 30 Units into the skin nightly  Qty: 1 vial, Refills: 3      !! insulin lispro (HUMALOG) 100 UNIT/ML injection vial Inject 0-18 Units into the skin 3 times daily (with meals)      ! ! insulin lispro (HUMALOG) 100 UNIT/ML injection vial Inject 0-9 Units into the skin nightly      !! insulin lispro (HUMALOG) 100 UNIT/ML injection vial Inject 15 Units into the skin 3 times daily (with meals)      torsemide (DEMADEX) 20 MG tablet Take 1 tablet by mouth daily  Qty: 30 tablet, Refills: 3      buPROPion (WELLBUTRIN SR) 200 MG extended release tablet Take 400 mg by mouth daily      zafirlukast (ACCOLATE) 20 MG tablet Take 20 mg by mouth 2 times daily      sodium chloride 0.9 % SOLN 40 mL with SUFentanil 50 MCG/ML SOLN 5 mcg/mL continuous Pt unsure of delivered dose      ARIPiprazole (ABILIFY) 30 MG tablet Take 30 mg by mouth daily       Dexmethylphenidate HCl (FOCALIN PO) Take 20 mg by mouth daily as needed (takes 20 mg - 40 mg morning)      lidocaine (XYLOCAINE) 2 % jelly Apply topically as needed for Trach change  Qty: 1 Tube, Refills: 0      ADVAIR DISKUS 250-50 MCG/DOSE AEPB Inhale 1 puff into the lungs 2 times daily      glipiZIDE (GLUCOTROL XL) 10 MG extended release tablet Take 10 mg by mouth 2 times daily      pravastatin (PRAVACHOL) 40 MG tablet Take 40 mg by mouth daily.         potassium chloride (KLOR-CON M) 10 MEQ extended release tablet

## 2019-09-20 NOTE — DISCHARGE INSTR - COC
Continuity of Care Form    Patient Name: Wale Singleton   :  1955  MRN:  [id number]    Admit date:  2019  Discharge date:  ***    Code Status Order: Full Code   Advance Directives:   Advance Care Flowsheet Documentation     Date/Time Healthcare Directive Type of Healthcare Directive Copy in [address] Agent's Name Healthcare Agent's Phone Number    19 0007  --  --  --  --  --  [phone]    19 0006  --  --  --  --  --  19 0005  Yes, patient has an advance directive for healthcare treatment  Living will  No, copy requested from family  [facility] power of   Morris Solano  [address] Physician:  Terrence Powers MD  PCP: Blake Baez    Discharging Nurse: [facility] Unit/Room#: 2792/5213-70  Discharging Unit Phone Number: ***    Emergency Contact:   Extended Emergency Contact Information  Primary Emergency Contact: [address] [place] [address] Road Phone: [phone]  Relation: Brother/Sister    Past Surgical History:  Past Surgical History:   Procedure Laterality Date    BACK SURGERY      ENDOSCOPY, COLON, DIAGNOSTIC      KNEE SURGERY      X 4    LUMBAR FUSION      LUMBAR LAMINECTOMY      OTHER SURGICAL HISTORY Right 2017    Pain Pump insertion    DC [address] W/BRNCL ALVEOLAR LAVAGE N/A 2018    BRONCHOSCOPY ALVEOLAR LAVAGE performed by Bridgette Briceño MD at [address] [facility] [address]      x2   [address]      over 30 trach operations due to MRSA infections in the trach    UPPER GASTROINTESTINAL ENDOSCOPY  16    removal of foreign body    UVULOPALATOPHARYGOPLASTY      VENA CAVA FILTER PLACEMENT         Immunization History:   Immunization History   Administered Date(s) Administered    Influenza A (X5G2-96) Vaccine IM 2009    Influenza Virus Vaccine 10/01/2007, 10/01/2008, 10/06/2009, 10/20/2009, 2011, 09/11/2012    Pneumococcal Polysaccharide ([id number]) 01/01/2004, 04/09/2012, 03/10/2016, 08/31/2016       Active Problems:  Patient Active Problem List   Diagnosis Code    Chronic thrombocytopenia D69.6    Chronic respiratory failure with hypoxia on 6 L home O2 tent over trach J96.11    Type II diabetes mellitus, well controlled ([address] E11.9    HTN (hypertension) I10    Non morbid obesity due to excess calories E66.09    Anxiety and depression F41.9, F32.9    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    Pain syndrome, chronic G89.4    Asthma/COPD J44.9    Chronic ischemic heart disease I25.9    Bilateral lower extremity edema R60.0    Hiatal hernia with GERD K21.9, K44.9    Low back pain M54.5    TESS G47.33    Calcification of bronchus or trachea J39.8, J98.09    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    CKD2/3 N18.3    Chronic microcytic Iron-deficiency anemia D50.9    Chronic dCHF (grade 1 LVDD) I50.32    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    Arthritis M19.90    Claustrophobia F40.240    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    Pressure ulcer of coccygeal region, stage 4 (HCC) L89.154    Decubitus ulcer of left buttock, stage 2 L89.322    Debility R53.81    Arthritis of right knee M17.11    Submandibular sialolithiasis K11.5    Lung nodule R91.1    Pressure ulcer of sacral region, stage 3 (HCC) L89.153    Degenerative arthritis of lumbar spine M47.816    Ambulatory dysfunction R26.2    Pressure ulcer of buttock, stage 2 L89.302    Cellulitis of buttock L03.317    Acute kidney injury (HCC) N17.9    Hyperlipidemia E78.5    Normocytic anemia D64.9    Frequent falls R29.6       Isolation/Infection:   Isolation          Contact        Patient Infection Status     Infection Onset Added Last Indicated Last Indicated By Review Planned Expiration Resolved Resolved By    MRSA  10/03/18 07/25/19 Wound Culture              Nurse Assessment:  Last Vital Signs: /85   Pulse 74   Temp 97.4 °F (36.3 °C) (Oral)

## 2019-09-20 NOTE — PLAN OF CARE
..Bed in lowest position, wheels locked, 2/4 side rails up, nonskid footwear on. Bed/ chair check alarm in place, call light within reach. Pt instructed to call out when needing assistance. Pt stated understanding. Nurse will continue to monitor. Nichole Ferrell .Pt scoring pain on 0-10 scale. Pain medications given per MAR. Pt instructed to call out when pain level increasing. Call light within reach. Nurse will continue to reassess and monitor.

## 2019-09-20 NOTE — CONSULTS
Patient:    [id number]  Date: 9/20/2019      Chief Complaint: Frequent falls    History of Present Illness/Hospital Course: The patient is a 56-year-old gentleman with a history of arthritis, diabetes, hypertension, hyperlipidemia, COPD, CHF, and obesity who presented to [facility] emergency department with frequent falls. At baseline he has been wheelchair dependent due to arthritis and chronic pain. He has a home health aide several days per week. He also has a chronic tracheostomy, and a stage IV sacral ulcer. He is generally independent for transfers, but recently has struggled to complete transfers due to knee pain. He was admitted for further evaluation. He is interested in rehab.     has a past medical history of Abscess or cellulitis of leg, Acute renal failure ([address] Acute tracheitis with airway obstruction, Angina pectoris ([address] ARF (acute renal failure) ([address] Arthritis, Asthma, Cellulitis of buttock, Cellulitis of sacral region, CHF (congestive heart failure) ([address] Claustrophobia, Colitis, COPD (chronic obstructive pulmonary disease) ([address] Decubitus ulcer, Decubitus ulcer of left buttock, stage 2, Diabetes mellitus ([address] Diverticulitis, DVT (deep venous thrombosis) ([address] Gangrene ([address] Gout, HCAP (healthcare-associated pneumonia), Hx of blood clots, Hyperlipidemia, Hypertension, Low iron, MRSA (methicillin resistant staph aureus) culture positive, MRSA (methicillin resistant staph aureus) culture positive, Panic attack, Pressure ulcer of coccygeal region, stage 2, Pressure ulcer of left heel, stage 2, Tracheostomy infection ([address] Tracheostomy infection ([address] Unspecified cerebral artery occlusion with cerebral infarction, and UTI (urinary tract infection). has a past surgical history that includes tracheostomy; Tonsillectomy and adenoidectomy; Uvulopalatopharygoplasty; back surgery; knee surgery;  Upper gastrointestinal endoscopy (1/8/16); lumbar laminectomy; lumbar fusion; Vena Cava Filter Placement (2002); Total knee arthroplasty; rhinoplasty; other surgical history (Right, 05/2017); Endoscopy, colon, diagnostic; and pr [facility] w/brncl alveolar lavage (N/A, 9/29/2018). reports that he quit smoking about 19 years ago. His smoking use included cigars. He has a 2.50 pack-year smoking history. He has never used smokeless tobacco. He reports that he does not drink alcohol or use drugs. family history includes Alzheimer's Disease in his maternal grandmother; Cancer in his paternal grandmother; Diabetes in his paternal grandfather and sister; Heart Attack in his maternal grandfather; High Blood Pressure in his father and mother; Hypothyroidism in his sister; Migraines in his maternal grandfather. REVIEW OF SYSTEMS:   CONSTITUTIONAL: negative for fevers, chills, diaphoresis, activity change, appetite change, fatigue, night sweats and unexpected weight change.    EYES: negative for blurred vision, eye discharge, visual disturbance and icterus  HEENT: negative for hearing loss, tinnitus, ear drainage, sinus pressure, nasal congestion, epistaxis and snoring  RESPIRATORY: Negative for hemoptysis, cough, sputum production  CARDIOVASCULAR: negative for chest pain, palpitations, exertional chest pressure/discomfort, edema, syncope  GASTROINTESTINAL: negative for nausea, vomiting, diarrhea, constipation, blood in stool and abdominal pain  GENITOURINARY: negative for frequency, dysuria, urinary incontinence, decreased urine volume, and hematuria  HEMATOLOGIC/LYMPHATIC: negative for easy bruising, bleeding and lymphadenopathy  ALLERGIC/IMMUNOLOGIC: negative for recurrent infections, angioedema, anaphylaxis and drug reactions  ENDOCRINE: negative for weight changes and diabetic symptoms including polyuria, polydipsia and polyphagia  MUSCULOSKELETAL: negative for pain, joint swelling, decreased range of motion and muscle weakness  NEUROLOGICAL: negative for headaches, slurred speech,

## 2020-02-28 ENCOUNTER — HOSPITAL ENCOUNTER (OUTPATIENT)
Age: 65
Setting detail: SPECIMEN
Discharge: HOME | End: 2020-02-28
Payer: MEDICARE

## 2020-02-28 PROCEDURE — 87075 CULTR BACTERIA EXCEPT BLOOD: CPT

## 2020-02-28 PROCEDURE — 87077 CULTURE AEROBIC IDENTIFY: CPT

## 2020-02-28 PROCEDURE — 87186 SC STD MICRODIL/AGAR DIL: CPT

## 2020-02-28 PROCEDURE — 87070 CULTURE OTHR SPECIMN AEROBIC: CPT

## 2020-02-28 PROCEDURE — 87205 SMEAR GRAM STAIN: CPT

## 2020-03-04 LAB
ANAEROBIC CULTURE: ABNORMAL
GRAM STAIN RESULT: ABNORMAL
ORGANISM: ABNORMAL
WOUND/ABSCESS: ABNORMAL

## 2020-05-08 ENCOUNTER — HOSPITAL ENCOUNTER (EMERGENCY)
Age: 65
Discharge: HOME | End: 2020-05-08
Attending: EMERGENCY MEDICINE
Payer: MEDICARE

## 2020-05-08 ENCOUNTER — APPOINTMENT (OUTPATIENT)
Dept: CT IMAGING | Age: 65
End: 2020-05-08
Payer: MEDICARE

## 2020-05-08 ENCOUNTER — APPOINTMENT (OUTPATIENT)
Dept: GENERAL RADIOLOGY | Age: 65
End: 2020-05-08
Payer: MEDICARE

## 2020-05-08 VITALS
DIASTOLIC BLOOD PRESSURE: 70 MMHG | BODY MASS INDEX: 35.5 KG/M2 | HEART RATE: 80 BPM | HEIGHT: 70 IN | WEIGHT: 248 LBS | SYSTOLIC BLOOD PRESSURE: 134 MMHG | RESPIRATION RATE: 19 BRPM | OXYGEN SATURATION: 100 % | TEMPERATURE: 98.4 F

## 2020-05-08 LAB
A/G RATIO: 1.5 (ref 1.1–2.2)
A/G RATIO: 1.6 (ref 1.1–2.2)
ALBUMIN SERPL-MCNC: 3.7 G/DL (ref 3.4–5)
ALBUMIN SERPL-MCNC: 4 G/DL (ref 3.4–5)
ALP BLD-CCNC: 130 U/L (ref 40–129)
ALP BLD-CCNC: 149 U/L (ref 40–129)
ALT SERPL-CCNC: 28 U/L (ref 10–40)
ALT SERPL-CCNC: 30 U/L (ref 10–40)
ANION GAP SERPL CALCULATED.3IONS-SCNC: 11 MMOL/L (ref 3–16)
ANION GAP SERPL CALCULATED.3IONS-SCNC: 9 MMOL/L (ref 3–16)
AST SERPL-CCNC: 21 U/L (ref 15–37)
AST SERPL-CCNC: 33 U/L (ref 15–37)
BILIRUB SERPL-MCNC: 0.3 MG/DL (ref 0–1)
BILIRUB SERPL-MCNC: 0.3 MG/DL (ref 0–1)
BUN BLDV-MCNC: 23 MG/DL (ref 7–20)
BUN BLDV-MCNC: 24 MG/DL (ref 7–20)
CALCIUM SERPL-MCNC: 9 MG/DL (ref 8.3–10.6)
CALCIUM SERPL-MCNC: 9.9 MG/DL (ref 8.3–10.6)
CHLORIDE BLD-SCNC: 101 MMOL/L (ref 99–110)
CHLORIDE BLD-SCNC: 99 MMOL/L (ref 99–110)
CO2: 30 MMOL/L (ref 21–32)
CO2: 30 MMOL/L (ref 21–32)
CREAT SERPL-MCNC: 1.5 MG/DL (ref 0.8–1.3)
CREAT SERPL-MCNC: 1.7 MG/DL (ref 0.8–1.3)
EKG ATRIAL RATE: 77 BPM
EKG DIAGNOSIS: NORMAL
EKG P AXIS: 33 DEGREES
EKG P-R INTERVAL: 212 MS
EKG Q-T INTERVAL: 372 MS
EKG QRS DURATION: 120 MS
EKG QTC CALCULATION (BAZETT): 420 MS
EKG R AXIS: -17 DEGREES
EKG T AXIS: 31 DEGREES
EKG VENTRICULAR RATE: 77 BPM
GFR AFRICAN AMERICAN: 49
GFR AFRICAN AMERICAN: 57
GFR NON-AFRICAN AMERICAN: 41
GFR NON-AFRICAN AMERICAN: 47
GLOBULIN: 2.4 G/DL
GLOBULIN: 2.5 G/DL
GLUCOSE BLD-MCNC: 90 MG/DL (ref 70–99)
GLUCOSE BLD-MCNC: 93 MG/DL (ref 70–99)
HCT VFR BLD CALC: 41.3 % (ref 40.5–52.5)
HEMOGLOBIN: 13.8 G/DL (ref 13.5–17.5)
MCH RBC QN AUTO: 28 PG (ref 26–34)
MCHC RBC AUTO-ENTMCNC: 33.4 G/DL (ref 31–36)
MCV RBC AUTO: 83.8 FL (ref 80–100)
PDW BLD-RTO: 15.9 % (ref 12.4–15.4)
PLATELET # BLD: 117 K/UL (ref 135–450)
PMV BLD AUTO: 9.2 FL (ref 5–10.5)
POTASSIUM SERPL-SCNC: 4.2 MMOL/L (ref 3.5–5.1)
POTASSIUM SERPL-SCNC: 4.7 MMOL/L (ref 3.5–5.1)
PRO-BNP: 118 PG/ML (ref 0–124)
RBC # BLD: 4.93 M/UL (ref 4.2–5.9)
SODIUM BLD-SCNC: 140 MMOL/L (ref 136–145)
SODIUM BLD-SCNC: 140 MMOL/L (ref 136–145)
TOTAL PROTEIN: 6.1 G/DL (ref 6.4–8.2)
TOTAL PROTEIN: 6.5 G/DL (ref 6.4–8.2)
TROPONIN: <0.01 NG/ML
WBC # BLD: 11.6 K/UL (ref 4–11)

## 2020-05-08 PROCEDURE — 80053 COMPREHEN METABOLIC PANEL: CPT

## 2020-05-08 PROCEDURE — 84484 ASSAY OF TROPONIN QUANT: CPT

## 2020-05-08 PROCEDURE — 2580000003 HC RX 258: Performed by: NURSE PRACTITIONER

## 2020-05-08 PROCEDURE — 71045 X-RAY EXAM CHEST 1 VIEW: CPT

## 2020-05-08 PROCEDURE — 83880 ASSAY OF NATRIURETIC PEPTIDE: CPT

## 2020-05-08 PROCEDURE — 85027 COMPLETE CBC AUTOMATED: CPT

## 2020-05-08 PROCEDURE — 93005 ELECTROCARDIOGRAM TRACING: CPT | Performed by: NURSE PRACTITIONER

## 2020-05-08 PROCEDURE — 70490 CT SOFT TISSUE NECK W/O DYE: CPT

## 2020-05-08 PROCEDURE — 93010 ELECTROCARDIOGRAM REPORT: CPT | Performed by: INTERNAL MEDICINE

## 2020-05-08 PROCEDURE — 99285 EMERGENCY DEPT VISIT HI MDM: CPT

## 2020-05-08 RX ORDER — DOXYCYCLINE 100 MG/1
100 TABLET ORAL 2 TIMES DAILY
Qty: 14 TABLET | Refills: 0 | Status: SHIPPED | OUTPATIENT
Start: 2020-05-08 | End: 2020-05-15

## 2020-05-08 RX ORDER — 0.9 % SODIUM CHLORIDE 0.9 %
1000 INTRAVENOUS SOLUTION INTRAVENOUS ONCE
Status: COMPLETED | OUTPATIENT
Start: 2020-05-08 | End: 2020-05-08

## 2020-05-08 RX ADMIN — SODIUM CHLORIDE 1000 ML: 9 INJECTION, SOLUTION INTRAVENOUS at 18:10

## 2020-05-08 ASSESSMENT — ENCOUNTER SYMPTOMS
WHEEZING: 0
VOMITING: 0
BACK PAIN: 0
ALLERGIC/IMMUNOLOGIC NEGATIVE: 1
ABDOMINAL DISTENTION: 0
EYES NEGATIVE: 1
DIARRHEA: 0
NAUSEA: 0
SHORTNESS OF BREATH: 1
ABDOMINAL PAIN: 0
COUGH: 0

## 2020-05-08 ASSESSMENT — PAIN DESCRIPTION - ORIENTATION: ORIENTATION: RIGHT;LEFT

## 2020-05-08 ASSESSMENT — PAIN DESCRIPTION - LOCATION: LOCATION: EAR

## 2020-05-08 ASSESSMENT — PAIN SCALES - GENERAL: PAINLEVEL_OUTOF10: 10

## 2020-05-08 NOTE — ED NOTES
Bed: 17  Expected date:   Expected time:   Means of arrival:   Comments:  M11     Jonny Gentile RN  05/08/20 [id number]

## 2020-05-08 NOTE — ED NOTES
Pt ate ice chips with no vomiting. Pt reports mild difficulty swallowing due to throat soreness.        Ayesha Pavon RN  05/08/20 1924

## 2020-05-08 NOTE — ED PROVIDER NOTES
SCREENINGS             PHYSICAL EXAM    (up to 7 for level 4, 8 or more for level 5)     ED Triage Vitals   BP Temp Temp Source Pulse Resp SpO2 Height Weight   05/08/20 1520 05/08/20 1520 05/08/20 1520 05/08/20 1520 05/08/20 1520 05/08/20 1520 05/08/20 1524 05/08/20 1524   125/67 98.4 °F (36.9 °C) Oral 83 18 100 % 5' 10\" (1.778 m) 248 lb (112.5 kg)       Physical Exam  Constitutional:       General: He is not in acute distress. Appearance: Normal appearance. He is normal weight. He is not ill-appearing, toxic-appearing or diaphoretic. HENT:      Head: Normocephalic and atraumatic. Right Ear: Tympanic membrane normal.      Left Ear: Tympanic membrane normal.      Nose: Nose normal.      Mouth/Throat:      Mouth: Mucous membranes are moist.      Pharynx: Oropharynx is clear. No oropharyngeal exudate. Eyes:      Extraocular Movements: Extraocular movements intact. Conjunctiva/sclera: Conjunctivae normal.      Pupils: Pupils are equal, round, and reactive to light. Neck:      Musculoskeletal: Normal range of motion and neck supple. Comments: Juanetta Few site appears clean with no purulent discharge or erythema  Cardiovascular:      Rate and Rhythm: Normal rate and regular rhythm. Pulses: Normal pulses. Heart sounds: Normal heart sounds. No murmur. No friction rub. No gallop. Pulmonary:      Effort: Pulmonary effort is normal. No respiratory distress. Breath sounds: Normal breath sounds. No stridor. No wheezing, rhonchi or rales. Chest:      Chest wall: No tenderness. Abdominal:      General: Abdomen is flat. Bowel sounds are normal. There is no distension. Tenderness: There is no abdominal tenderness. There is no guarding. Musculoskeletal: Normal range of motion. Lymphadenopathy:      Cervical: No cervical adenopathy. Skin:     General: Skin is warm and dry. Capillary Refill: Capillary refill takes less than 2 seconds.    Neurological:      General: No focal deficit present. Mental Status: He is alert and oriented to person, place, and time. Psychiatric:         Mood and Affect: Mood normal.         Behavior: Behavior normal.         Thought Content: Thought content normal.         Judgment: Judgment normal.         DIAGNOSTIC RESULTS   LABS:    Labs Reviewed   CBC - Abnormal; Notable for the following components:       Result Value    WBC 11.6 (*)     RDW 15.9 (*)     Platelets 853 (*)     All other components within normal limits    Narrative:     Performed at:  [place] [address] [employer]   Phone [phone]   COMPREHENSIVE METABOLIC PANEL - Abnormal; Notable for the following components:    BUN 24 (*)     CREATININE 1.7 (*)     GFR Non- 41 (*)     GFR African American 49 (*)     Alkaline Phosphatase 149 (*)     All other components within normal limits    Narrative:     Performed at:  [place] [address] [employer]   Phone [phone]   COMPREHENSIVE METABOLIC PANEL - Abnormal; Notable for the following components:    BUN 23 (*)     CREATININE 1.5 (*)     GFR Non- 47 (*)     GFR  57 (*)     Total Protein 6.1 (*)     Alkaline Phosphatase 130 (*)     All other components within normal limits    Narrative:     Performed at:  [place] [address] [employer]   Phone [phone]   TROPONIN    Narrative:     Performed at:  [place] [address] [employer]   Phone [phone]    Narrative:     Performed at:  [place] [address] [employer]   Phone [phone]       All other labs were within normal range or not returned as of this dictation. EKG:  All EKG's are interpreted by the Emergency Department Physician in the absence of a cardiologist.  Please see their note for interpretation of EKG. RADIOLOGY:   Non-plain film images such as CT, Ultrasound and MRI are read by the radiologist. Plain radiographic images are visualized and preliminarily interpreted by the ED Provider with the below findings:        Interpretation per the Radiologist below, if available at the time of this note:    CT SOFT TISSUE NECK WO CONTRAST   Final Result   1. Tracheostomy tube is intact and in satisfactory position. No evidence of   tracheal stenosis. 2. Infiltrate in the posterior right upper lobe, incompletely imaged. This   may be related to atelectasis versus early pneumonia. 3. Dental caries. XR CHEST PORTABLE   Final Result   1. Low lung volumes with mild pulmonary vascular congestion. Xr Chest Portable    Result Date: 5/8/2020  EXAMINATION: ONE XRAY VIEW OF THE CHEST 5/8/2020 3:51 pm COMPARISON: 05/28/2019 HISTORY: ORDERING SYSTEM PROVIDED HISTORY: SOB TECHNOLOGIST PROVIDED HISTORY: Reason for exam:->SOB Reason for Exam: sob Acuity: Acute Type of Exam: Initial FINDINGS: The cardiac silhouette is enlarged. Vascular calcifications are noted along the aortic arch. The lung volumes are low with pulmonary vascular congestion. The visualized osseous structures are unremarkable. 1. Low lung volumes with mild pulmonary vascular congestion.            PROCEDURES   Unless otherwise noted below, none     Procedures    CRITICAL CARE TIME   N/A    CONSULTS:  None      EMERGENCY DEPARTMENT COURSE and DIFFERENTIAL DIAGNOSIS/MDM:   Vitals:    Vitals:    05/08/20 1713 05/08/20 1743 05/08/20 1843 05/08/20 1913   BP: (!) 142/69 (!) 147/74 133/68 134/70   Pulse: 76 80 80 80   Resp: 15 15 20 19   Temp:       TempSrc:       SpO2: 98% 97% 100% 100%   Weight:       Height:           Patient was given the following medications:  Medications   0.9 % sodium chloride bolus (0 mLs Intravenous Stopped 5/8/20 1920)       Patient is

## 2020-05-09 ENCOUNTER — CARE COORDINATION (OUTPATIENT)
Dept: CARE COORDINATION | Age: 65
End: 2020-05-09

## 2020-07-14 ENCOUNTER — OFFICE VISIT (OUTPATIENT)
Dept: PRIMARY CARE CLINIC | Age: 65
End: 2020-07-14
Payer: MEDICARE

## 2020-07-14 PROCEDURE — G8428 CUR MEDS NOT DOCUMENT: HCPCS | Performed by: NURSE PRACTITIONER

## 2020-07-14 PROCEDURE — G8417 CALC BMI ABV UP PARAM F/U: HCPCS | Performed by: NURSE PRACTITIONER

## 2020-07-14 PROCEDURE — 99211 OFF/OP EST MAY X REQ PHY/QHP: CPT | Performed by: NURSE PRACTITIONER

## 2020-07-14 NOTE — PROGRESS NOTES
Brigid Brambila received a viral test for COVID-19. They were educated on isolation and quarantine as appropriate. For any symptoms, they were directed to seek care from their PCP, given contact information to establish with a doctor, directed to an urgent care or the emergency room.

## 2020-07-15 LAB — SARS-COV-2, PCR: NOT DETECTED

## 2020-10-07 RX ORDER — MOEXIPRIL HCL 15 MG
TABLET ORAL
Qty: 31 TABLET | Refills: 0 | OUTPATIENT
Start: 2020-10-07

## 2020-10-07 NOTE — TELEPHONE ENCOUNTER
CALLED TONE THEY STATED THIS WAS FOR JIM MARMOLEJO I LET THEM KNOW SHE IS NO LONGER WITH US AND THIS PATIENT HAS NEVER BEEN SEEN HERE OR PRESCRIBED ANYTHING FROM OUR OFFICE. Janett Pompa

## 2020-10-09 RX ORDER — MOEXIPRIL HCL 15 MG
TABLET ORAL
Qty: 31 TABLET | Refills: 0 | OUTPATIENT
Start: 2020-10-09

## 2021-01-08 RX ORDER — GABAPENTIN 300 MG/1
CAPSULE ORAL
Qty: 62 CAPSULE | Refills: 0 | OUTPATIENT
Start: 2021-01-08

## 2021-02-15 ENCOUNTER — HOSPITAL ENCOUNTER (EMERGENCY)
Age: 66
Discharge: HOME | End: 2021-02-15
Attending: EMERGENCY MEDICINE
Payer: MEDICARE

## 2021-02-15 VITALS
DIASTOLIC BLOOD PRESSURE: 94 MMHG | SYSTOLIC BLOOD PRESSURE: 147 MMHG | HEIGHT: 66 IN | WEIGHT: 250 LBS | HEART RATE: 110 BPM | BODY MASS INDEX: 40.18 KG/M2 | OXYGEN SATURATION: 100 % | RESPIRATION RATE: 20 BRPM | TEMPERATURE: 97.7 F

## 2021-02-15 DIAGNOSIS — J95.00 TRACHEOSTOMY COMPLICATION, UNSPECIFIED COMPLICATION TYPE (HCC): Primary | ICD-10-CM

## 2021-02-15 PROCEDURE — 31575 DIAGNOSTIC LARYNGOSCOPY: CPT | Performed by: OTOLARYNGOLOGY

## 2021-02-15 PROCEDURE — 99285 EMERGENCY DEPT VISIT HI MDM: CPT

## 2021-02-15 PROCEDURE — 99283 EMERGENCY DEPT VISIT LOW MDM: CPT | Performed by: OTOLARYNGOLOGY

## 2021-02-15 ASSESSMENT — ENCOUNTER SYMPTOMS
COUGH: 0
EYE PAIN: 0
WHEEZING: 0
TROUBLE SWALLOWING: 0
CHEST TIGHTNESS: 0
VOMITING: 0
COLOR CHANGE: 0
SINUS PAIN: 0
SHORTNESS OF BREATH: 1
ABDOMINAL PAIN: 0
NAUSEA: 0

## 2021-02-15 NOTE — ED NOTES
@0449 called  transfer center for ENT, re: trach suctioning issues per   @5003 Marisol Aguirre returned call, referred to [facility]NT  @8749 Stephani Given returned call & spoke to [address] [place] [address]  02/15/21 3739

## 2021-02-15 NOTE — ED NOTES
RT contacted to come and assess/suction trach. Dr. Lise Saleh notified.      Jin Howe RN  02/15/21 0106

## 2021-02-15 NOTE — CONSULTS
Payal      Patient Name: [id number]  Record Number:  [id number]  Primary Care Physician:  Otilia Muhammad [facility]  Date of Consultation: 2/15/2021    Chief Complaint: Difficulty breathing    HISTORY OF PRESENT ILLNESS  Usman Ramesh is a(n) 60 y.o. 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 Had new oncoming T-tube placed January 2021 at [address]. He is unable to manipulate this due to neuropathy in his hands and has been unable to suction or clean the tube. Developed difficulty breathing with plug in place and presented to ED. ENT consulted for management.       Patient Active Problem List   Diagnosis    Chronic thrombocytopenia    Chronic respiratory failure with hypoxia on 6 L home O2 tent over trach    Type II diabetes mellitus, well controlled ([address]    HTN (hypertension)    Non morbid obesity due to excess calories    Anxiety and depression    Tracheostomy dependent (HCC)    Pain syndrome, chronic    Asthma/COPD    Chronic ischemic heart disease    Bilateral lower extremity edema    Hiatal hernia with GERD    Low back pain    TESS    Calcification of bronchus or trachea    Atypical schizophrenia ([address]    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    Chronic microcytic Iron-deficiency anemia    Chronic dCHF (grade 1 LVDD)    Agoraphobia    Arthritis    Claustrophobia    Congenital stenosis of trachea    Pressure ulcer of coccygeal region, stage 4 (HCC)    Decubitus ulcer of left buttock, stage 2 (HCC)    Debility    Arthritis of right knee    Submandibular sialolithiasis    Lung nodule    Pressure ulcer of sacral region, stage 3 (HCC)    Degenerative arthritis of lumbar spine    Ambulatory dysfunction    Pressure ulcer of buttock, stage 2    Cellulitis of buttock    Acute kidney injury ([address]    Hyperlipidemia    Normocytic anemia    Frequent falls     Past Surgical History:   Procedure Laterality Date    BACK SURGERY      ENDOSCOPY, COLON, DIAGNOSTIC      KNEE SURGERY      X 4    LUMBAR FUSION      LUMBAR LAMINECTOMY      OTHER SURGICAL HISTORY Right 2017    Pain Pump insertion    [address] W/BRNCL ALVEOLAR LAVAGE N/A 2018    BRONCHOSCOPY ALVEOLAR LAVAGE performed by [address], MD at [address]      x2    TONSILLECTOMY AND ADENOIDECTOMY      TOTAL KNEE ARTHROPLASTY      TRACHEOSTOMY      over 27 trach operations due to MRSA infections in the trach    UPPER GASTROINTESTINAL ENDOSCOPY  16    removal of foreign body    UVULOPALATOPHARYGOPLASTY      VENA CAVA FILTER PLACEMENT       Family History   Problem Relation Age of Onset    High Blood Pressure Mother     High Blood Pressure Father     Diabetes Sister     Hypothyroidism Sister     Alzheimer's Disease Maternal Grandmother     Migraines Maternal Grandfather     Heart Attack Maternal Grandfather     Cancer Paternal Grandmother         lymph node    Diabetes Paternal Grandfather      Social History     Tobacco Use    Smoking status: Former Smoker     Packs/day: 0.25     Years: 10.00     Pack years: 2.50     Types: Cigars     Quit date: 2000     Years since quittin.1    Smokeless tobacco: Never Used   Substance Use Topics    Alcohol use: No    Drug use: No       DRUG/FOOD ALLERGIES: Aspartame and phenylalanine, Cefuroxime axetil, Clindamycin hcl, Erythromycin, Feldene [piroxicam], Guanfacine hcl, Iodine, Pcn [penicillins], Pletal [cilostazol], Robaxin [methocarbamol], Arthrotec [diclofenac-misoprostol], Betadine [povidone iodine], Claritin [loratadine], Clindamycin/lincomycin, Indocin [indometacin sodium], Tenex [guanfacine hcl], Vasotec, Vioxx, and Zyvox [linezolid]    CURRENT MEDICATIONS  Prior to Admission medications    Medication Sig Start Date End Date Taking?  Authorizing Provider   atenolol (TENORMIN) 25 MG tablet Take 1 tablet by mouth daily 19  Yes Isabella Bartlett MD   eplerenone (INSPRA) 25 MG tablet Take 25 mg by mouth daily   Yes Historical Provider, MD   pregabalin (LYRICA) 100 MG capsule Take 100 mg by mouth 2 times daily. Yes Historical Provider, MD   insulin glargine (LANTUS) 100 UNIT/ML injection vial Inject 30 Units into the skin nightly 3/19/19  Yes Siria Gottlieb MD   insulin lispro (HUMALOG) 100 UNIT/ML injection vial Inject 0-18 Units into the skin 3 times daily (with meals) 3/19/19  Yes Siria Gottlieb MD   torsemide (DEMADEX) 20 MG tablet Take 1 tablet by mouth daily 3/20/19  Yes Siria Gottlieb MD   potassium chloride (KLOR-CON M) 10 MEQ extended release tablet Take 20 mEq by mouth daily   Yes Historical Provider, MD   buPROPion (WELLBUTRIN SR) 200 MG extended release tablet Take 400 mg by mouth daily   Yes Historical Provider, MD   Tamsulosin HCl (FLOMAX PO) Take 0.4 mg by mouth daily   Yes Historical Provider, MD   zafirlukast (ACCOLATE) 20 MG tablet Take 20 mg by mouth 2 times daily   Yes Historical Provider, MD   ARIPiprazole (ABILIFY) 30 MG tablet Take 30 mg by mouth daily    Yes Historical Provider, MD   Dexmethylphenidate HCl (FOCALIN PO) Take 20 mg by mouth daily as needed (takes 20 mg - 40 mg morning)   Yes Historical Provider, MD   lidocaine (XYLOCAINE) 2 % jelly Apply topically as needed for Trach change 3/9/18  Yes Jordi Alcocer MD   Multiple Vitamins-Minerals (THERAPEUTIC MULTIVITAMIN-MINERALS) tablet Take 1 tablet by mouth daily   Yes Historical Provider, MD   pravastatin (PRAVACHOL) 40 MG tablet Take 40 mg by mouth daily.      Yes Historical Provider, MD   lisinopril (PRINIVIL;ZESTRIL) 10 MG tablet Take 10 mg by mouth daily    Historical Provider, MD   fexofenadine (ALLEGRA) 180 MG tablet Take 180 mg by mouth daily    Historical Provider, MD   insulin lispro (HUMALOG) 100 UNIT/ML injection vial Inject 0-9 Units into the skin nightly 3/19/19   Siria Gottlieb MD   insulin lispro (HUMALOG) 100 UNIT/ML injection vial Inject 15 Units into the skin 3 times daily (with meals) 3/19/19   Jesus Wiley MD   sodium chloride 0.9 % SOLN 40 mL with SUFentanil 50 MCG/ML SOLN 5 mcg/mL continuous Pt unsure of delivered dose    Historical Provider, MD   ferrous sulfate 325 (65 Fe) MG tablet Take 325 mg by mouth daily (with breakfast)    Historical Provider, MD   zinc gluconate 50 MG tablet Take 100 mg by mouth 2 times daily     Historical Provider, MD   ADVAIR DISKUS 250-50 MCG/DOSE AEPB Inhale 1 puff into the lungs 2 times daily 3/1/17   Historical Provider, MD   glipiZIDE (GLUCOTROL XL) 10 MG extended release tablet Take 10 mg by mouth 2 times daily 1/15/17   Historical Provider, MD       REVIEW OF SYSTEMS  The following systems were reviewed and revealed the following in addition to any already discussed in the HPI:    CONSTITUTIONAL: denies weight loss, no fever, no night sweats, no chills  EYES: no vision changes, no blurry vision  EARS: no changes in hearing, no otalgia  NOSE: no epistaxis, no rhinorrhea  RESPIRATORY: no difficulty breathing, no shortness of breath  CV: no chest pain, no palpitations  HEME: No coagulation disorder, no bleeding disorder  NEURO: no TIA or stroke-like symptoms  SKIN: No new rashes in the head and neck, no recent skin cancers  MOUTH: No new ulcers, no recent teeth infections  GASTROINTESTINAL: No diarrhea, stomach pain  PSYCH: No anxiety, no depression      PHYSICAL EXAM  BP (!) 165/83   Pulse 90   Temp 97.7 °F (36.5 °C) (Oral)   Resp 18   Ht 5' 6\" (1.676 m)   Wt 250 lb (113.4 kg)   SpO2 94%   BMI 40.35 kg/m²     GENERAL: No Acute Distress, Alert and Oriented  EYES: EOMI, Anti-icteric  NOSE: No epistaxis, nasal mucosa within normal limits, no purulent drainage  EARS: Normal external canal appearance, EAC patent bilaterally  FACE: 1/6 House-Brackmann Scale, symmetric, sensation equal bilaterally  ORAL CAVITY: No masses or lesions palpated, uvula is midline, moist mucous membranes,   NECK: None, ET tube in place, audible gurgling-resolved with suctioning  CHEST: Normal respiratory effort, no retractions, breathing comfortably  SKIN: No rashes, normal appearing skin, no evidence of skin lesions/tumors    RADIOLOGY  Summary of findings:  n/a    PROCEDURE  Flexible Laryngoscopy    Pre op: T tube dependent, tracheal stenosis  Post op: same  Procedure : Flexible Laryngoscopy  Surgeon: Olga Douglas DO  Anesthesia: Afrin with 4% lidocaine  Indication: Laryngeal mirror examination was not tolerated due to gag reflex  Description:  The scope was passed along the floor of the right naris to the level of the larynx. There was no evidence of concerning masses or lesions of the base of tongue, vallecula, epiglottis, aryepiglottic folds, arytenoids, false vocal folds, true vocal folds, or pyriform sinuses. Vertical segment of Rosales T tube visible below the true vocal cords without crusting or obstruction present. The patient tolerated the procedure without difficulty. There were no complications. Pertinent findings: Rosales T-tube in correct position. No crusting or obstruction within vertical or horizontal flanges. Mucus suctioned from T-tube without complication. Patient reported improvement in breathing. ASSESSMENT/PLAN  Vikas Caldera is a very pleasant 60 y.o. male with tracheal stenosis, T-tube dependent    -T Tube well positioned and cleared of mucus with resolution of obstructive sensation  -Able to breathe comfortably with plug in place  -Has follow-up scheduled with  on Friday for placement of tube he is able to manipulate and clean himself    Return to the ED if any difficulty in breathing    Olga Douglas DO  Otolaryngology    Medical Decision Making:   The following items were considered in medical decision making:  Independent review of images  Review / order clinical lab tests  Review / order radiology tests  Decision to obtain old records

## 2021-02-15 NOTE — ED PROVIDER NOTES
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 EMS reports suctioning without getting any mucus, pt talking and oxygen 100% at triages, wears 4L o2 at baseline       HISTORY OFPRESENT ILLNESS   (Location/Symptom, Timing/Onset, Context/Setting, Quality, Duration, Modifying Factors,Severity)  Note limiting factors. Tonda Sever is a 60 y.o. male presenting today due to concern for having a T-tube placed by ENT at  1 month ago and stating that he has had issues with it since that time but over the last week he feels that is progressively clogging to the point where he is having some shortness of breath related to this. He is normally on 4 L nasal cannula at baseline and is currently saturating 100% without any respiratory distress. He has a history of prior tracheitis but states he has no neck pain and this does not feel like tracheitis. He denies any fever or chills. No chest pain or abdominal pain. No Covid concerns. EMS attempted to suction him out prior to arrival but did not obtain any secretions. He did attempt to go to  and requested to go there but due to weather conditions, EMS did not feel comfortable driving downtown and therefore brought him to [facility] for further assessment. REVIEW OF SYSTEMS    (2-9 systems for level 4, 10 or more for level 5)     Review of Systems   Constitutional: Negative for chills, diaphoresis, fatigue and fever. HENT: Negative for congestion, sinus pain and trouble swallowing. Eyes: Negative for pain. Respiratory: Positive for shortness of breath (related to tracheostomy only).  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 Historical Med      insulin glargine (LANTUS) 100 UNIT/ML injection vial Inject 30 Units into the skin nightly, Disp-1 vial, R-3DC to SNF      !! insulin lispro (HUMALOG) 100 UNIT/ML injection vial Inject 0-18 Units into the skin 3 times daily (with meals)DC to SNF      torsemide (DEMADEX) 20 MG tablet Take 1 tablet by mouth daily, Disp-30 tablet, R-3DC to SNF      potassium chloride (KLOR-CON M) 10 MEQ extended release tablet Take 20 mEq by mouth dailyHistorical Med      buPROPion (WELLBUTRIN SR) 200 MG extended release tablet Take 400 mg by mouth dailyHistorical Med      Tamsulosin HCl (FLOMAX PO) Take 0.4 mg by mouth dailyHistorical Med      zafirlukast (ACCOLATE) 20 MG tablet Take 20 mg by mouth 2 times dailyHistorical Med      ARIPiprazole (ABILIFY) 30 MG tablet Take 30 mg by mouth daily Historical Med      Dexmethylphenidate HCl (FOCALIN PO) Take 20 mg by mouth daily as needed (takes 20 mg - 40 mg morning)Historical Med lidocaine (XYLOCAINE) 2 % jelly Apply topically as needed for Trach change, Disp-1 Tube, R-0, NO PRINT      Multiple Vitamins-Minerals (THERAPEUTIC MULTIVITAMIN-MINERALS) tablet Take 1 tablet by mouth dailyHistorical Med      pravastatin (PRAVACHOL) 40 MG tablet Take 40 mg by mouth daily. lisinopril (PRINIVIL;ZESTRIL) 10 MG tablet Take 10 mg by mouth dailyHistorical Med      fexofenadine (ALLEGRA) 180 MG tablet Take 180 mg by mouth dailyHistorical Med      !! insulin lispro (HUMALOG) 100 UNIT/ML injection vial Inject 0-9 Units into the skin nightlyDC to SNF      !! insulin lispro (HUMALOG) 100 UNIT/ML injection vial Inject 15 Units into the skin 3 times daily (with meals)DC to [facility]      sodium chloride 0.9 % SOLN 40 mL with SUFentanil 50 MCG/ML SOLN 5 mcg/mL continuous Pt unsure of delivered doseHistorical Med      ferrous sulfate 325 (65 Fe) MG tablet Take 325 mg by mouth daily (with breakfast)Historical Med      zinc gluconate 50 MG tablet Take 100 mg by mouth 2 times daily Historical Med      ADVAIR DISKUS 250-50 MCG/DOSE AEPB Inhale 1 puff into the lungs 2 times daily, DAWHistorical Med      glipiZIDE (GLUCOTROL XL) 10 MG extended release tablet Take 10 mg by mouth 2 times dailyHistorical Med       !! - Potential duplicate medications found. Please discuss with provider.           ALLERGIES     Aspartame and phenylalanine, Cefuroxime axetil, Clindamycin hcl, Erythromycin, Feldene [piroxicam], Guanfacine hcl, Iodine, Pcn [penicillins], Pletal [cilostazol], Robaxin [methocarbamol], Arthrotec [diclofenac-misoprostol], Betadine [povidone iodine], Claritin [loratadine], Clindamycin/lincomycin, Indocin [indometacin sodium], Tenex [guanfacine hcl], Vasotec, Vioxx, and Zyvox [linezolid]    FAMILY HISTORY       Family History   Problem Relation Age of Onset    High Blood Pressure Mother     High Blood Pressure Father     Diabetes Sister     Hypothyroidism Sister     Alzheimer's Disease Maternal Grandmother  Migraines Maternal Grandfather     Heart Attack Maternal Grandfather     Cancer Paternal Grandmother         lymph node    Diabetes Paternal Grandfather           SOCIAL HISTORY       Social History     Socioeconomic History    Marital status:      Spouse name: None    Number of children: None    Years of education: None    Highest education level: None   Occupational History    Occupation: disabled   Social Needs    Financial resource strain: None    Food insecurity     Worry: None     Inability: None    Transportation needs     Medical: None     Non-medical: None   Tobacco Use    Smoking status: Former Smoker     Packs/day: 0.25     Years: 10.00     Pack years: 2.50     Types: Cigars     Quit date: 2000     Years since quittin.1    Smokeless tobacco: Never Used   Substance and Sexual Activity    Alcohol use: No    Drug use: No    Sexual activity: Yes     Partners: Female   Lifestyle    Physical activity     Days per week: None     Minutes per session: None    Stress: None   Relationships    Social connections     Talks on phone: None     Gets together: None     Attends Adventism service: None     Active member of club or organization: None     Attends meetings of clubs or organizations: None     Relationship status: None    Intimate partner violence     Fear of current or ex partner: None     Emotionally abused: None     Physically abused: None     Forced sexual activity: None   Other Topics Concern    None   Social History Narrative    None       SCREENINGS    Maximino Coma Scale  Eye Opening: Spontaneous  Best Verbal Response: Oriented  Best Motor Response: Obeys commands  Kinsley Coma Scale Score: 15           PHYSICAL EXAM    (up to 7 for level 4, 8 or more for level 5)     ED Triage Vitals [02/15/21 0555]   BP Temp Temp Source Pulse Resp SpO2 Height Weight   (!) 143/82 97.7 °F (36.5 °C) Oral 91 18 100 % 5' 6\" (1.676 m) 250 lb (113.4 kg)       Physical Exam  Vitals signs and nursing note reviewed. Constitutional:       General: He is awake. He is not in acute distress. Appearance: Normal appearance. He is well-developed and well-groomed. He is morbidly obese. He is not ill-appearing, toxic-appearing or diaphoretic. Interventions: He is not intubated. HENT:      Head: Normocephalic and atraumatic. Right Ear: External ear normal.      Left Ear: External ear normal.      Nose: Nose normal.      Mouth/Throat:      Mouth: Mucous membranes are moist.   Eyes:      General:         Right eye: No discharge. Left eye: No discharge. Conjunctiva/sclera: Conjunctivae normal.      Pupils: Pupils are equal, round, and reactive to light. Neck:      Musculoskeletal: Full passive range of motion without pain, normal range of motion and neck supple. Normal range of motion. No edema, erythema, neck rigidity, crepitus, injury, pain with movement, torticollis, spinous process tenderness or muscular tenderness. Thyroid: No thyroid mass, thyromegaly or thyroid tenderness. Trachea: Phonation normal. Tracheostomy present. No tracheal tenderness, abnormal tracheal secretions or tracheal deviation. Cardiovascular:      Rate and Rhythm: Normal rate and regular rhythm. Pulses: Normal pulses. Radial pulses are 2+ on the right side and 2+ on the left side. Pulmonary:      Effort: Pulmonary effort is normal. No tachypnea, bradypnea, accessory muscle usage, prolonged expiration, respiratory distress or retractions. He is not intubated. Breath sounds: Normal breath sounds and air entry. No stridor, decreased air movement or transmitted upper airway sounds. No decreased breath sounds, wheezing, rhonchi or rales. Abdominal:      General: Abdomen is flat. Bowel sounds are normal.      Palpations: Abdomen is soft. Tenderness: There is no abdominal tenderness. There is no guarding or rebound.  Negative signs include Vinson's sign and McBurney's sign.   Musculoskeletal:         General: No deformity or signs of injury. Right lower le+ Pitting Edema present. Left lower le+ Pitting Edema present. Lymphadenopathy:      Cervical: No cervical adenopathy. Right cervical: No superficial cervical adenopathy. Left cervical: No superficial cervical adenopathy. Skin:     General: Skin is warm and dry. Coloration: Skin is not ashen, cyanotic, jaundiced or pale. Findings: No abrasion, abscess, acne, bruising, burn, ecchymosis, erythema, signs of injury, laceration, lesion, petechiae, rash or wound. Neurological:      Mental Status: He is alert and oriented to person, place, and time. Mental status is at baseline. GCS: GCS eye subscore is 4. GCS verbal subscore is 5. GCS motor subscore is 6. Cranial Nerves: No dysarthria. Motor: Weakness (both legs is chronic ) present. No tremor, atrophy or seizure activity. Psychiatric:         Attention and Perception: Attention normal.         Mood and Affect: Mood and affect normal.         Speech: Speech normal.         Behavior: Behavior normal. Behavior is cooperative. DIAGNOSTIC RESULTS   :    Labs Reviewed - No data to display    All other labs were within normal range or not returned asof this dictation. EKG:  All EKG's are interpreted by the Emergency Department Physician who either signs or Co-signs this chart in the absence of a cardiologist.        RADIOLOGY:   Non-plain film images such as CT, Ultrasound and MRI are read by the radiologist. Asim Wagner images are visualized and preliminarily interpreted by the  ED Provider with the belowfindings:        Interpretation per the Radiologist below, if available at the time of this note:    No orders to display         PROCEDURES   Unless otherwise noted below, none     Procedures    CRITICAL CARE TIME   N/A    CONSULTS: Spoke with Dr. Liseth Barroso at 8643 at [facility] with ENT and he stated he was unsure how far to suction in regards to the trach and the call ENT to do a flexible laryngoscopy to determine this length and to figure out what is causing the clogging sensation. Spoke with Dr. Maycol Rose at 4680 and she saw the patient at bedside at 0920 and states that she removed the mucous plug causing the symptoms and he was doing much better and can be safely discharged. IP CONSULT TO OTOLARYNGOLOGY    EMERGENCY DEPARTMENT COURSE and DIFFERENTIAL DIAGNOSIS/MDM:   Vitals:    Vitals:    02/15/21 0555 02/15/21 0723 02/15/21 0828 02/15/21 0945   BP: (!) 143/82 129/74 (!) 165/83 (!) 147/94   Pulse: 91 86 90 110   Resp: 18 16 18 20   Temp: 97.7 °F (36.5 °C)      TempSrc: Oral      SpO2: 100% 97% 94% 100%   Weight: 250 lb (113.4 kg)      Height: 5' 6\" (1.676 m)          Patient was given the following medications:  Medications - No data to display    Patient was evaluated due to feeling like his T-tube has been clogged over the last week associated with worsening shortness of breath. He denies any actual chest pain or neck pain. No fever. He had no neck tenderness and at this time I have low suspicion for bacterial tracheitis. ENT at [facility] recommended consulting ENT at [facility] to do a scope to determine the source of the issue. Dr. Maycol Rose saw the patient at bedside and states she was able to remove the mucous plug causing the sensation of trouble breathing. I reevaluated the patient following this and he states he was feeling much better and back to his normal self without any current shortness of breath and again denied any chest pain or neck pain. At this time, I do believe he is safe for discharge, especially after ENT felt this way as well. His pulse was always normal during his evaluation until it appeared she was 110 at discharge but again when I saw him he had no other complaints and was feeling much better and at this time I do believe this was either related to exertion or a typographical error.

## 2021-04-14 NOTE — PROGRESS NOTES
[facility] Progress Note    Usman Carney     : 1955    DATE OF VISIT:  2018    Subjective:     Usman Carney is a 65 y.o. male who has a pressure ulcer located on the back and right foot. Significant symptoms or pertinent wound history since last visit: stable pain but increased drainage from back; mild pain at right foot. Not able to use the silicone toe cap much, as it slips off (but I didn't intend for him to use it just yet, with a fair amount of drainage and slightly bulky dressing in place). Hydrocolloid didn't stay on either, however. No F/C/D. He clarified what I apparently misunderstood last week - his right foot does not drag when he wheels himself in his wheelchair, but (a) it does drag when he is ambulating a few steps in his home, which was the reason for the knee brace apparently, and (b) he doesn't use the footrests for his wheelchair because he uses both his arms and feet to help move around in the chair. Additional ulcer(s) noted? no.      His current medication list consists of ARIPiprazole, Albuterol, Fexofenadine-Pseudoephedrine, NONFORMULARY, OXYGEN, allopurinol, atenolol, buPROPion, dexmethylphenidate, docusate sodium, eplerenone, fluticasone-salmeterol, gabapentin, glipiZIDE, insulin glargine, insulin regular, moexipril, nitroGLYCERIN, omeprazole, polyethylene glycol, potassium chloride, pravastatin, pregabalin, therapeutic multivitamin-minerals, torsemide, zafirlukast, and zinc gluconate. Allergies: Aspartame and phenylalanine; Erythromycin; Arthrotec [diclofenac-misoprostol]; Betadine [povidone iodine]; Cefuroxime axetil; Claritin [loratadine]; Clindamycin/lincomycin; Feldene [piroxicam]; Indocin [indometacin sodium]; Iodine; Pcn [penicillins]; Pletal [cilostazol]; Robaxin [methocarbamol]; Tenex [guanfacine hcl];  Vasotec; and Vioxx    Objective:     Vitals:    18 1009   BP: 122/72   Pulse: 75   Resp: 17   Temp: 97.5 °F (36.4 °C)   Weight: Received [facility] paperwork to be completed

## 2022-04-01 ENCOUNTER — HOSPITAL ENCOUNTER (INPATIENT)
Age: 67
LOS: 9 days | Discharge: NURSING, REHAB OR LONG-TERM CARE | DRG: 683 | End: 2022-04-13
Attending: EMERGENCY MEDICINE | Admitting: INTERNAL MEDICINE
Payer: MEDICARE

## 2022-04-01 ENCOUNTER — APPOINTMENT (OUTPATIENT)
Dept: CT IMAGING | Age: 67
DRG: 683 | End: 2022-04-01
Payer: MEDICARE

## 2022-04-01 DIAGNOSIS — G89.4 PAIN SYNDROME, CHRONIC: Chronic | ICD-10-CM

## 2022-04-01 DIAGNOSIS — G47.419 PRIMARY NARCOLEPSY WITHOUT CATAPLEXY: ICD-10-CM

## 2022-04-01 DIAGNOSIS — N17.9 ACUTE RENAL FAILURE, UNSPECIFIED ACUTE RENAL FAILURE TYPE (HCC): ICD-10-CM

## 2022-04-01 DIAGNOSIS — R53.81 PHYSICAL DEBILITY: ICD-10-CM

## 2022-04-01 DIAGNOSIS — R29.6 RECURRENT FALLS: Primary | ICD-10-CM

## 2022-04-01 DIAGNOSIS — Z93.0 TRACHEOSTOMY DEPENDENT (HCC): ICD-10-CM

## 2022-04-01 PROBLEM — N18.9 ACUTE KIDNEY INJURY SUPERIMPOSED ON CKD (HCC): Status: ACTIVE | Noted: 2019-03-15

## 2022-04-01 LAB
A/G RATIO: 2.1 (ref 1.1–2.2)
ALBUMIN SERPL-MCNC: 4.5 G/DL (ref 3.4–5)
ALP BLD-CCNC: 141 U/L (ref 40–129)
ALT SERPL-CCNC: 37 U/L (ref 10–40)
ANION GAP SERPL CALCULATED.3IONS-SCNC: 16 MMOL/L (ref 3–16)
AST SERPL-CCNC: 31 U/L (ref 15–37)
BASOPHILS ABSOLUTE: 0 K/UL (ref 0–0.2)
BASOPHILS RELATIVE PERCENT: 0.4 %
BILIRUB SERPL-MCNC: 0.6 MG/DL (ref 0–1)
BILIRUBIN URINE: NEGATIVE
BLOOD, URINE: NEGATIVE
BUN BLDV-MCNC: 60 MG/DL (ref 7–20)
CALCIUM SERPL-MCNC: 9.8 MG/DL (ref 8.3–10.6)
CHLORIDE BLD-SCNC: 96 MMOL/L (ref 99–110)
CLARITY: CLEAR
CO2: 22 MMOL/L (ref 21–32)
COLOR: YELLOW
CREAT SERPL-MCNC: 2.2 MG/DL (ref 0.8–1.3)
EOSINOPHILS ABSOLUTE: 0.1 K/UL (ref 0–0.6)
EOSINOPHILS RELATIVE PERCENT: 1.6 %
GFR AFRICAN AMERICAN: 36
GFR NON-AFRICAN AMERICAN: 30
GLUCOSE BLD-MCNC: 106 MG/DL (ref 70–99)
GLUCOSE BLD-MCNC: 180 MG/DL (ref 70–99)
GLUCOSE BLD-MCNC: 184 MG/DL (ref 70–99)
GLUCOSE BLD-MCNC: 260 MG/DL (ref 70–99)
GLUCOSE BLD-MCNC: 65 MG/DL (ref 70–99)
GLUCOSE URINE: NEGATIVE MG/DL
HCT VFR BLD CALC: 43.3 % (ref 40.5–52.5)
HEMOGLOBIN: 14.6 G/DL (ref 13.5–17.5)
INFLUENZA A: NOT DETECTED
INFLUENZA B: NOT DETECTED
KETONES, URINE: NEGATIVE MG/DL
LEUKOCYTE ESTERASE, URINE: NEGATIVE
LYMPHOCYTES ABSOLUTE: 0.8 K/UL (ref 1–5.1)
LYMPHOCYTES RELATIVE PERCENT: 9.4 %
MCH RBC QN AUTO: 27.4 PG (ref 26–34)
MCHC RBC AUTO-ENTMCNC: 33.8 G/DL (ref 31–36)
MCV RBC AUTO: 80.9 FL (ref 80–100)
MICROSCOPIC EXAMINATION: NORMAL
MONOCYTES ABSOLUTE: 1.1 K/UL (ref 0–1.3)
MONOCYTES RELATIVE PERCENT: 12.8 %
NEUTROPHILS ABSOLUTE: 6.8 K/UL (ref 1.7–7.7)
NEUTROPHILS RELATIVE PERCENT: 75.8 %
NITRITE, URINE: NEGATIVE
PDW BLD-RTO: 14.7 % (ref 12.4–15.4)
PERFORMED ON: ABNORMAL
PH UA: 5.5 (ref 5–8)
PLATELET # BLD: 166 K/UL (ref 135–450)
PMV BLD AUTO: 9.1 FL (ref 5–10.5)
POTASSIUM REFLEX MAGNESIUM: 4.9 MMOL/L (ref 3.5–5.1)
PROTEIN UA: NEGATIVE MG/DL
RBC # BLD: 5.35 M/UL (ref 4.2–5.9)
SARS-COV-2 RNA, RT PCR: NOT DETECTED
SARS-COV-2, NAAT: NOT DETECTED
SODIUM BLD-SCNC: 134 MMOL/L (ref 136–145)
SPECIFIC GRAVITY UA: 1.02 (ref 1–1.03)
TOTAL PROTEIN: 6.6 G/DL (ref 6.4–8.2)
URINE REFLEX TO CULTURE: NORMAL
URINE TYPE: NORMAL
UROBILINOGEN, URINE: 0.2 E.U./DL
WBC # BLD: 8.9 K/UL (ref 4–11)

## 2022-04-01 PROCEDURE — 96360 HYDRATION IV INFUSION INIT: CPT

## 2022-04-01 PROCEDURE — 96372 THER/PROPH/DIAG INJ SC/IM: CPT

## 2022-04-01 PROCEDURE — 36415 COLL VENOUS BLD VENIPUNCTURE: CPT

## 2022-04-01 PROCEDURE — 70450 CT HEAD/BRAIN W/O DYE: CPT

## 2022-04-01 PROCEDURE — 87635 SARS-COV-2 COVID-19 AMP PRB: CPT

## 2022-04-01 PROCEDURE — G0378 HOSPITAL OBSERVATION PER HR: HCPCS

## 2022-04-01 PROCEDURE — 97166 OT EVAL MOD COMPLEX 45 MIN: CPT

## 2022-04-01 PROCEDURE — 99284 EMERGENCY DEPT VISIT MOD MDM: CPT

## 2022-04-01 PROCEDURE — 97162 PT EVAL MOD COMPLEX 30 MIN: CPT

## 2022-04-01 PROCEDURE — 81003 URINALYSIS AUTO W/O SCOPE: CPT

## 2022-04-01 PROCEDURE — 2580000003 HC RX 258: Performed by: NURSE PRACTITIONER

## 2022-04-01 PROCEDURE — 80053 COMPREHEN METABOLIC PANEL: CPT

## 2022-04-01 PROCEDURE — 6370000000 HC RX 637 (ALT 250 FOR IP): Performed by: INTERNAL MEDICINE

## 2022-04-01 PROCEDURE — 6360000002 HC RX W HCPCS: Performed by: INTERNAL MEDICINE

## 2022-04-01 PROCEDURE — 6370000000 HC RX 637 (ALT 250 FOR IP): Performed by: NURSE PRACTITIONER

## 2022-04-01 PROCEDURE — 2580000003 HC RX 258: Performed by: EMERGENCY MEDICINE

## 2022-04-01 PROCEDURE — 85025 COMPLETE CBC W/AUTO DIFF WBC: CPT

## 2022-04-01 PROCEDURE — 97535 SELF CARE MNGMENT TRAINING: CPT

## 2022-04-01 PROCEDURE — 99222 1ST HOSP IP/OBS MODERATE 55: CPT | Performed by: NURSE PRACTITIONER

## 2022-04-01 PROCEDURE — 96361 HYDRATE IV INFUSION ADD-ON: CPT

## 2022-04-01 PROCEDURE — 87636 SARSCOV2 & INF A&B AMP PRB: CPT

## 2022-04-01 PROCEDURE — 97530 THERAPEUTIC ACTIVITIES: CPT

## 2022-04-01 RX ORDER — M-VIT,TX,IRON,MINS/CALC/FOLIC 27MG-0.4MG
1 TABLET ORAL DAILY
Status: DISCONTINUED | OUTPATIENT
Start: 2022-04-01 | End: 2022-04-13 | Stop reason: HOSPADM

## 2022-04-01 RX ORDER — METHYLPHENIDATE HYDROCHLORIDE 10 MG/1
20 TABLET ORAL SEE ADMIN INSTRUCTIONS
Status: ON HOLD | COMMUNITY
End: 2022-04-13 | Stop reason: SDUPTHER

## 2022-04-01 RX ORDER — SODIUM CHLORIDE 0.9 % (FLUSH) 0.9 %
5-40 SYRINGE (ML) INJECTION EVERY 12 HOURS SCHEDULED
Status: DISCONTINUED | OUTPATIENT
Start: 2022-04-01 | End: 2022-04-13 | Stop reason: HOSPADM

## 2022-04-01 RX ORDER — METHYLPHENIDATE HYDROCHLORIDE 10 MG/1
20 TABLET ORAL 2 TIMES DAILY WITH MEALS
Status: DISCONTINUED | OUTPATIENT
Start: 2022-04-01 | End: 2022-04-13 | Stop reason: HOSPADM

## 2022-04-01 RX ORDER — TAMSULOSIN HYDROCHLORIDE 0.4 MG/1
0.4 CAPSULE ORAL DAILY
Status: DISCONTINUED | OUTPATIENT
Start: 2022-04-01 | End: 2022-04-13 | Stop reason: HOSPADM

## 2022-04-01 RX ORDER — EPLERENONE 25 MG/1
25 TABLET, FILM COATED ORAL DAILY
Status: DISCONTINUED | OUTPATIENT
Start: 2022-04-01 | End: 2022-04-01 | Stop reason: RX

## 2022-04-01 RX ORDER — PREGABALIN 100 MG/1
100 CAPSULE ORAL 2 TIMES DAILY
Status: DISCONTINUED | OUTPATIENT
Start: 2022-04-01 | End: 2022-04-13 | Stop reason: HOSPADM

## 2022-04-01 RX ORDER — SODIUM CHLORIDE 9 MG/ML
250 INJECTION, SOLUTION INTRAVENOUS PRN
Status: DISCONTINUED | OUTPATIENT
Start: 2022-04-01 | End: 2022-04-13 | Stop reason: HOSPADM

## 2022-04-01 RX ORDER — FERROUS SULFATE 325(65) MG
325 TABLET ORAL
Status: DISCONTINUED | OUTPATIENT
Start: 2022-04-01 | End: 2022-04-13 | Stop reason: HOSPADM

## 2022-04-01 RX ORDER — BUPROPION HYDROCHLORIDE 150 MG/1
150 TABLET, EXTENDED RELEASE ORAL 2 TIMES DAILY
Status: DISCONTINUED | OUTPATIENT
Start: 2022-04-01 | End: 2022-04-10

## 2022-04-01 RX ORDER — 0.9 % SODIUM CHLORIDE 0.9 %
1000 INTRAVENOUS SOLUTION INTRAVENOUS ONCE
Status: COMPLETED | OUTPATIENT
Start: 2022-04-01 | End: 2022-04-01

## 2022-04-01 RX ORDER — INSULIN GLARGINE 100 [IU]/ML
30 INJECTION, SOLUTION SUBCUTANEOUS NIGHTLY
Status: DISCONTINUED | OUTPATIENT
Start: 2022-04-01 | End: 2022-04-13 | Stop reason: HOSPADM

## 2022-04-01 RX ORDER — SODIUM CHLORIDE 9 MG/ML
INJECTION, SOLUTION INTRAVENOUS CONTINUOUS
Status: DISCONTINUED | OUTPATIENT
Start: 2022-04-01 | End: 2022-04-04

## 2022-04-01 RX ORDER — CETIRIZINE HYDROCHLORIDE 5 MG/1
5 TABLET ORAL DAILY
Status: DISCONTINUED | OUTPATIENT
Start: 2022-04-01 | End: 2022-04-01 | Stop reason: CLARIF

## 2022-04-01 RX ORDER — TORSEMIDE 20 MG/1
20 TABLET ORAL DAILY
Status: DISCONTINUED | OUTPATIENT
Start: 2022-04-01 | End: 2022-04-13 | Stop reason: HOSPADM

## 2022-04-01 RX ORDER — ARIPIPRAZOLE 10 MG/1
30 TABLET ORAL DAILY
Status: DISCONTINUED | OUTPATIENT
Start: 2022-04-01 | End: 2022-04-13 | Stop reason: HOSPADM

## 2022-04-01 RX ORDER — SODIUM CHLORIDE 0.9 % (FLUSH) 0.9 %
5-40 SYRINGE (ML) INJECTION PRN
Status: DISCONTINUED | OUTPATIENT
Start: 2022-04-01 | End: 2022-04-13 | Stop reason: HOSPADM

## 2022-04-01 RX ORDER — MONTELUKAST SODIUM 10 MG/1
10 TABLET ORAL NIGHTLY
Status: DISCONTINUED | OUTPATIENT
Start: 2022-04-01 | End: 2022-04-13 | Stop reason: HOSPADM

## 2022-04-01 RX ORDER — PRAVASTATIN SODIUM 40 MG
40 TABLET ORAL DAILY
Status: DISCONTINUED | OUTPATIENT
Start: 2022-04-01 | End: 2022-04-13 | Stop reason: HOSPADM

## 2022-04-01 RX ORDER — POTASSIUM CHLORIDE 20 MEQ/1
20 TABLET, EXTENDED RELEASE ORAL DAILY
Status: DISCONTINUED | OUTPATIENT
Start: 2022-04-01 | End: 2022-04-13 | Stop reason: HOSPADM

## 2022-04-01 RX ORDER — POLYETHYLENE GLYCOL 3350 17 G/17G
17 POWDER, FOR SOLUTION ORAL DAILY PRN
Status: DISCONTINUED | OUTPATIENT
Start: 2022-04-01 | End: 2022-04-09

## 2022-04-01 RX ORDER — GLIPIZIDE 5 MG/1
2.5 TABLET ORAL
Status: DISCONTINUED | OUTPATIENT
Start: 2022-04-02 | End: 2022-04-13 | Stop reason: HOSPADM

## 2022-04-01 RX ORDER — ATENOLOL 25 MG/1
25 TABLET ORAL DAILY
Status: DISCONTINUED | OUTPATIENT
Start: 2022-04-01 | End: 2022-04-13 | Stop reason: HOSPADM

## 2022-04-01 RX ORDER — BUDESONIDE AND FORMOTEROL FUMARATE DIHYDRATE 80; 4.5 UG/1; UG/1
2 AEROSOL RESPIRATORY (INHALATION) 2 TIMES DAILY
Status: DISCONTINUED | OUTPATIENT
Start: 2022-04-01 | End: 2022-04-13 | Stop reason: HOSPADM

## 2022-04-01 RX ORDER — DEXTROSE MONOHYDRATE 25 G/50ML
12.5 INJECTION, SOLUTION INTRAVENOUS PRN
Status: DISCONTINUED | OUTPATIENT
Start: 2022-04-01 | End: 2022-04-01 | Stop reason: CLARIF

## 2022-04-01 RX ORDER — ZAFIRLUKAST 20 MG/1
20 TABLET, FILM COATED ORAL 2 TIMES DAILY
Status: DISCONTINUED | OUTPATIENT
Start: 2022-04-01 | End: 2022-04-01 | Stop reason: CLARIF

## 2022-04-01 RX ORDER — LISINOPRIL 10 MG/1
10 TABLET ORAL DAILY
Status: DISCONTINUED | OUTPATIENT
Start: 2022-04-01 | End: 2022-04-08

## 2022-04-01 RX ORDER — ACETAMINOPHEN 325 MG/1
650 TABLET ORAL EVERY 6 HOURS PRN
Status: DISCONTINUED | OUTPATIENT
Start: 2022-04-01 | End: 2022-04-13 | Stop reason: HOSPADM

## 2022-04-01 RX ORDER — CETIRIZINE HYDROCHLORIDE 10 MG/1
5 TABLET ORAL DAILY
Status: DISCONTINUED | OUTPATIENT
Start: 2022-04-01 | End: 2022-04-13 | Stop reason: HOSPADM

## 2022-04-01 RX ORDER — NICOTINE POLACRILEX 4 MG
15 LOZENGE BUCCAL PRN
Status: DISCONTINUED | OUTPATIENT
Start: 2022-04-01 | End: 2022-04-08 | Stop reason: ALTCHOICE

## 2022-04-01 RX ORDER — DEXTROSE MONOHYDRATE 50 MG/ML
100 INJECTION, SOLUTION INTRAVENOUS PRN
Status: DISCONTINUED | OUTPATIENT
Start: 2022-04-01 | End: 2022-04-13 | Stop reason: HOSPADM

## 2022-04-01 RX ORDER — DEXMETHYLPHENIDATE HYDROCHLORIDE 5 MG/1
20 TABLET ORAL 2 TIMES DAILY
Status: DISCONTINUED | OUTPATIENT
Start: 2022-04-01 | End: 2022-04-01

## 2022-04-01 RX ORDER — ACETAMINOPHEN 650 MG/1
650 SUPPOSITORY RECTAL EVERY 6 HOURS PRN
Status: DISCONTINUED | OUTPATIENT
Start: 2022-04-01 | End: 2022-04-13 | Stop reason: HOSPADM

## 2022-04-01 RX ADMIN — INSULIN LISPRO 1 UNITS: 100 INJECTION, SOLUTION INTRAVENOUS; SUBCUTANEOUS at 13:45

## 2022-04-01 RX ADMIN — CETIRIZINE HYDROCHLORIDE 5 MG: 10 TABLET ORAL at 13:45

## 2022-04-01 RX ADMIN — ACETAMINOPHEN 650 MG: 325 TABLET ORAL at 22:04

## 2022-04-01 RX ADMIN — MONTELUKAST SODIUM 10 MG: 10 TABLET, COATED ORAL at 21:54

## 2022-04-01 RX ADMIN — SODIUM CHLORIDE 1000 ML: 9 INJECTION, SOLUTION INTRAVENOUS at 07:24

## 2022-04-01 RX ADMIN — TORSEMIDE 20 MG: 20 TABLET ORAL at 12:29

## 2022-04-01 RX ADMIN — BUPROPION HYDROCHLORIDE 150 MG: 150 TABLET, EXTENDED RELEASE ORAL at 21:54

## 2022-04-01 RX ADMIN — ENOXAPARIN SODIUM 40 MG: 40 INJECTION SUBCUTANEOUS at 12:50

## 2022-04-01 RX ADMIN — BUPROPION HYDROCHLORIDE 150 MG: 150 TABLET, EXTENDED RELEASE ORAL at 13:45

## 2022-04-01 RX ADMIN — FERROUS SULFATE TAB 325 MG (65 MG ELEMENTAL FE) 325 MG: 325 (65 FE) TAB at 12:29

## 2022-04-01 RX ADMIN — ARIPIPRAZOLE 30 MG: 10 TABLET ORAL at 12:28

## 2022-04-01 RX ADMIN — PREGABALIN 100 MG: 100 CAPSULE ORAL at 21:54

## 2022-04-01 RX ADMIN — PRAVASTATIN SODIUM 40 MG: 40 TABLET ORAL at 12:28

## 2022-04-01 RX ADMIN — METHYLPHENIDATE HYDROCHLORIDE 20 MG: 10 TABLET ORAL at 16:42

## 2022-04-01 RX ADMIN — POTASSIUM CHLORIDE 20 MEQ: 1500 TABLET, EXTENDED RELEASE ORAL at 12:28

## 2022-04-01 RX ADMIN — TAMSULOSIN HYDROCHLORIDE 0.4 MG: 0.4 CAPSULE ORAL at 12:27

## 2022-04-01 RX ADMIN — PREGABALIN 100 MG: 100 CAPSULE ORAL at 12:28

## 2022-04-01 RX ADMIN — LISINOPRIL 10 MG: 10 TABLET ORAL at 12:28

## 2022-04-01 RX ADMIN — INSULIN LISPRO 1 UNITS: 100 INJECTION, SOLUTION INTRAVENOUS; SUBCUTANEOUS at 16:43

## 2022-04-01 RX ADMIN — SODIUM CHLORIDE: 9 INJECTION, SOLUTION INTRAVENOUS at 14:08

## 2022-04-01 RX ADMIN — Medication 1 TABLET: at 12:29

## 2022-04-01 RX ADMIN — ATENOLOL 25 MG: 25 TABLET ORAL at 12:29

## 2022-04-01 ASSESSMENT — PAIN SCALES - GENERAL
PAINLEVEL_OUTOF10: 0
PAINLEVEL_OUTOF10: 7
PAINLEVEL_OUTOF10: 0
PAINLEVEL_OUTOF10: 9

## 2022-04-01 ASSESSMENT — PAIN DESCRIPTION - ORIENTATION: ORIENTATION: LEFT;RIGHT

## 2022-04-01 ASSESSMENT — PAIN DESCRIPTION - LOCATION
LOCATION: BUTTOCKS
LOCATION: SACRUM

## 2022-04-01 ASSESSMENT — PAIN DESCRIPTION - FREQUENCY: FREQUENCY: CONTINUOUS

## 2022-04-01 ASSESSMENT — PAIN DESCRIPTION - PAIN TYPE
TYPE: ACUTE PAIN
TYPE: CHRONIC PAIN

## 2022-04-01 ASSESSMENT — PAIN DESCRIPTION - DESCRIPTORS: DESCRIPTORS: DISCOMFORT

## 2022-04-01 NOTE — CARE COORDINATION
Case Management Assessment  Initial Evaluation      Patient Name: Zack Gallardo  YOB: 1955  Diagnosis: Physical debility [R53.81]  Date / Time: 4/1/2022  5:27 AM    Admission status/Date: OBS  Chart Reviewed: Yes      Patient Interviewed: Yes   Family Interviewed:  No      Hospitalization in the last 30 days:  No      Health Care Decision Maker :   Primary Decision Maker: Oralia Price - Brother/Sister - [phone]      Who do you trust or have selected to make healthcare decisions for you      Met with: Pt at bedside      Current PCP: Mariaa Brannon 541  Medicare with no SNF days available  Precert required for SNF : N          3 night stay required - N    ADLS  Support Systems/Care Needs:    Transportation: EMS transportation    Meal Preparation: self    Housing  Living Arrangements: Homeless living in [facility] Steps: [address] for return to present living arrangements: Yes  Identified Issues: Pt has tracheostomy and requires assist w/adl's. Unwilling to consider LTC in SNF or medicaid application.     Home Care Information  Active with Home Health Care : yes Agency: [facility]      Passport/Waiver : No  :                      Phone Number:    Passport/Waiver Services: n/a          Durable Medical Equiptment   DME Provider: n/a  Equipment: n/a  Walker___Cane___RTS___ BSC_x-_Shower Chair___Hospital Bed___W/C_x-custom electric through [employer]___Other__3\" slideboard______  02 at ____Liter(s)---wears(frequency)_______ HHN ___ CPAP___ BiPap___   N/A____      Home O2 Use :  No    If No for home O2---if presently on O2 during hospitalization:  No  if yes CM to follow for potential DC O2 need  Informed of need for care provider to bring portable home O2 tank on day of discharge for nursing to connect prior to leaving:   Not Indicated  Verbalized agreement/Understanding:   Not Indicated    Community Service Affiliation  Dialysis:  No    · Agency:  · Location:  · Dialysis Schedule:  · Phone:   · Fax: Other Community Services:n/a    DISCHARGE PLAN: Explained Case Management role/services. CM reviewed chart and met with pt at bedside to discuss d/c needs and plan. Pt was d/c'd from [facility] to [facility] on 03/27/2022. Pt would like to return to [facility] upon d/c.    CM contacted Shelby Hawley with [facility] who states pt has been with them 5x in past; She will review referral but will need therapy notes to make determination. Also states she is unsure if there will be a bed available for pt. Per Shelby Life, pt was d/c'd with drop-arm BSC, slide board, and his custom electric w/c through [employer]. Stated Alessio used  to get items to motel. CM discussed pending [facility] referral and potential bed availability issues with pt, as well as other SNF/LTC options. Pt states he is only agreeable to [address] [place] [address] and would like referral to [facility]. FAIZAN spoke to Radom with [facility] who stated pt does not have any MCR days available for SNF. CM discussed lack of MCR days, as well as medicaid application process with pt, who states he is not agreeable to applying for ginger. States he was previously at [facility] and [facility] and he would not complete ginger juliana with either of them. CM spoke to therapy dept; aware of orders and will see pt. CM to follow.     Terry Mcgraw RN

## 2022-04-01 NOTE — PROGRESS NOTES
Patient admitted to room 225 from ER. Patient oriented to room, call light, bed rails, phone, lights and bathroom. Patient instructed about the schedule of the day including: vital sign frequency, lab draws, possible tests, frequency of MD and staff rounds, daily weights, I &O's and prescribed diet. Bed alarm deferred patient low fall risk and refuses alarm. Telemetry box in place, patient aware of placement and reason. Bed locked, in lowest position, side rails up 2/4, call light within reach. Recliner Assessment:     Patient is able to demonstrate the ability to move from a reclining position to an upright position within the recliner. 4 Eyes Skin Assessment     The patient is being assess for   Admission    I agree that 2 RN's have performed a thorough Head to Toe Skin Assessment on the patient. ALL assessment sites listed below have been assessed. Areas assessed for pressure by both nurses:   [x]   Head, Face, and Ears   [x]   Shoulders, Back, and Chest, Abdomen  [x]   Arms, Elbows, and Hands   [x]   Coccyx, Sacrum, and Ischium  [x]   Legs, Feet, and Heels        Skin Assessed Under all Medical Devices by both nurses:  trach and soft trach collar                  **SHARE this note so that the co-signing nurse is able to place an eSignature**    Co-signer eSignature: Electronically signed by Bernardo Montanez RN on 4/1/22 at 3:00 PM EDT    Does the Patient have Skin Breakdown related to pressure? Yes [facility] WOUND CARE was Initiated documentation include the Dominique-wound, Wound Assessment, Measurements, Dressing Treatment, Drainage, and Color\",       Media Information           unstageable to coccyx. Scattered abrasions. Wound from infected tissue back in October of 2021. Pt states he is supposed to have a graft surgery on Tuesday 4/5. Blisters to right great to and right lateral foot. excoration to abd folds, groin, and scrotum.           Ramirez Prevention initiated:  Yes   Wound Care Orders initiated:  Yes      [address]  nurse consulted for Pressure Injury (Stage 3,4, Unstageable, DTI, NWPT, Complex wounds)and New or Established Ostomies:  Yes      Primary Nurse eSignature: Electronically signed by Jackie Russell RN on 4/1/22 at 2:43 PM EDT

## 2022-04-01 NOTE — PROGRESS NOTES
Inpatient Physical Therapy Evaluation and Treatment    Unit: ED  Date:  4/1/2022  Patient Name:    Brooke Garcia  Admitting diagnosis:  Tracheostomy dependent [facility]) [Z93.0]  Physical debility [R53.81]  Recurrent falls [R29.6]  Acute renal failure, unspecified acute renal failure type [facility]) [N17.9]  Admit Date:  4/1/2022  Precautions/Restrictions/WB Status/ Lines/ Wounds/ Oxygen: Fall risk, Bed/chair alarm and tracheostomy. Pressure sores on coccyx. Wounds on L plantar foot and R dorsal/lateral foot. (pt reports he is scheduled for skin graft on the L foot this Tuesday at [facility] and was told he will be NWB after that)    Treatment Time:  13:35-14:00 + 14:50-15:30  Treatment Number:  1   Timed Code Treatment Minutes: 55 minutes  Total Treatment Minutes:  65  minutes    Patient Goals for Therapy: return to skilled rehab         Discharge Recommendations: 24/7 assist with skilled therapy  DME needs for discharge: defer to facility; DME needs met if discharging home. Therapy recommendation for EMS Transport: requires transport by cot due to need for lift equipment to safely transfer    Therapy recommendations for staff:   Assist of 1 for bed mobility. History of Present Illness: Per Kareem Cox APRN 4/01: \"The patient is a 73 y.o. male with h/o CVA, hypertension, hyperlipidemia, h/o DVT/PE, DM, Gout, COPD, CHF, h/o tracheal stenosis s/p tracheostomy who presents to [facility] with c/o multiple falls. He was discharged from rehab and unable to walk. He is living in a hotel. He as fallen twice. He has not been taking his medications or been able to care for himself. \"  Discharged from [facility] 3/27    Home Health S4 Level Recommendation:  Level 1 Standard  AM-PAC Mobility Score       AM-PAC Inpatient Mobility without Stair Climbing Raw Score : 11    Preadmission Environment    Pt. Lives Alone. No family in this area. Has some friends who will do odd jobs PRN.    Home environment:  hotel , since d/c from N/A    Bed Mobility   Supine to Sit:    SBA  Sit to Supine:   SBA  Rolling:   Min A   Scooting in sitting: SBA  Scooting in supine: Total A with use of [place] bed lift. Bridging:  Partial (sufficient for pt to pull up shorts over bottom)    Transfer Training     Sit to stand:   N/A  Stand to sit:   N/A  Bed to Chair:   Min A  x 1 person + CGA x 1 person with use of slide board. Pt mainly using UEs (very little push-through with LEs 2/2 externally rotated posture with lateral heel weight bearing)     Comment: RN cleared pt for slide board attempt following mepilex application to coccygeal wound. Gait pt is non-ambulatory at baseline; pt ambulated 0 ft. Stair Training deferred, pt unsafe/ not appropriate to complete stairs at this time. Does not complete stairs at baseline. Activity Tolerance   Pt completed therapy session with No adverse symptoms noted w/activity    Positioning Needs   Pt in bed, alarm set, positioned in proper neutral alignment and pressure relief provided. Call light provided and all needs within reach    Exercises Initiated  Ford deferred secondary to treatment focus on functional mobility  NA    Other  None. Patient/Family Education   Pt educated on role of inpatient PT, POC, importance of continued activity, DC recommendations, transfer techniques and calling for assist with mobility. Assessment  Pt seen for Physical Therapy evaluation in acute care setting. Pt has been in hospital or rehab for last ~6 months, recently d/c from acute rehab to [facility] 5 days ago. He has had 2 falls since then, once during ADL task and one during a functional transfer. He has very limited social support. He reports that at the time of his rehab d/c he was non ambulatory; able to perform slide board transfers to/from his electric w/c but did not feel comfortable / ready to do so independently; also same with ADLs.  Upon evaluation today, pt required Min A for slide board transfers and some cuing for safe techniques. Pt limited by LE ROM deficits and functional weakness. Also of note, pt reports having an upcoming planned skin graft procedure next week which will make him non weight bearing. This will make slide board transfers additionally difficult. Recommending 24/7 assist and skilled PT upon discharge as patient functioning well below baseline, demonstrates good rehab potential and unable to return home due to limited or no family support, home environment not conducive to patient recovery and recent fall history. Goals : To be met in 3 visits:  1). Independent with LE Ex x 10 reps  2). Supine to/from sit: SBA  3). Bed to/from wheelchair: Min A with slide board    To be met in 6 visits:  1). Supine to/from sit: Independent  2). Bed to/from wheelchair: Independent with slide board  3). Tolerate B LE exercises 3 sets of 10-15 reps    Rehabilitation Potential: Good  Strengths for achieving goals include:   Pt motivated and Pt cooperative   Barriers to achieving goals include:    Complexity of condition    Plan    To be seen 3-5 x / week  while in acute care setting for therapeutic exercises, bed mobility, transfers, progressive gait training, balance training, and family/patient education. Signature: Mary Vieyra, PT, DPT    If patient discharges from this facility prior to next visit, this note will serve as the Discharge Summary.

## 2022-04-01 NOTE — PROGRESS NOTES
Physical Therapy / Occupational Therapy    PT/OT orders noted, pt has recently been transferred from ED onto 2W unit. Upon arrival pt was being attended to by new RN and PCA, per RN not ready for therapy eval yet. Will reattempt as able.     Zoraida Moe, PT, DPT  Tina Artis, OTR/L

## 2022-04-01 NOTE — PROGRESS NOTES
RN and [facility] NP looked at the patients wound on his left foot that he stated got infected in October of 2021. Follows at [facility] wound care, is supposed to have a graft of some kind on Tuesday 4/5/2022.

## 2022-04-01 NOTE — ED PROVIDER NOTES
[facility] Emergency Department      CHIEF COMPLAINT  Fall (Squad brought pt in after pt called for help to get off floor, per squad have been ther 2 times to help pt up after falls, Pt states he was Dcd from rehab Sunday and was living in [facility], pt unable to walk.)      [facility]  Glo Purdy is a 73 y.o. 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 He was discharged from a nursing facility this past Sunday and has been living in a [address] [place] [address]. He states he does not have anywhere else to live. He fell twice today. When EMS found him he needed assistance getting up and was covered in his own urine. He has not been taking his medications like he should and is having difficulty caring for himself. I asked him why he is living in a hotel and he states he has nowhere else to live and has no family. He denies any pain currently. He does not think he hit his head with the falls. He is not on any anticoagulants. He denies chest pain or shortness of breath. He states he has not eaten anything in 24 hours. .   No other complaints, modifying factors or associated symptoms. I have reviewed the following from the nursing documentation.     Past Medical History:   Diagnosis Date    Abscess or cellulitis of leg 4/8/2012    Acute renal failure ([facility] 4/10/2012    Acute tracheitis with airway obstruction     Angina pectoris ([facility])     ARF (acute renal failure) ([facility]) 9/28/2018    Arthritis     Asthma     Cellulitis of buttock     Cellulitis of sacral region     CHF (congestive heart failure) ([facility])     Claustrophobia     Colitis     COPD (chronic obstructive pulmonary disease) ([facility])     Decubitus ulcer     Decubitus ulcer of left buttock, stage 2 ([address] 10/17/2017    Diabetes mellitus ([address]     Diverticulitis     DVT (deep venous thrombosis) (HCC)     Gangrene (HCC)     on lower back to upper thigh    Gout     HCAP (healthcare-associated pneumonia)     Hx of blood clots     pt. states both PE/DVT    Hyperlipidemia     Hypertension     Low iron     MRSA (methicillin resistant staph aureus) culture positive     tracheobronchitis, hx per pt.     MRSA (methicillin resistant staph aureus) culture positive 03/15/2019    buttocks    Panic attack     Pressure ulcer of coccygeal region, stage 2 ([address] 9/24/2016    Pressure ulcer of left heel, stage 2 ([address] 10/10/2017    Tracheostomy infection ([address] 11/3/2016    Tracheostomy infection ([address]     9/18    Unspecified cerebral artery occlusion with cerebral infarction     UTI (urinary tract infection)      Past Surgical History:   Procedure Laterality Date    BACK SURGERY      ENDOSCOPY, COLON, DIAGNOSTIC      KNEE SURGERY      X 4    LUMBAR FUSION      LUMBAR LAMINECTOMY      OTHER SURGICAL HISTORY Right 05/2017    Pain Pump insertion    MD [address] W/BRNCL ALVEOLAR LAVAGE N/A 9/29/2018    BRONCHOSCOPY ALVEOLAR LAVAGE performed by Veronica Quintanilla MD at [address] [facility] [address]      x2    TONSILLECTOMY AND ADENOIDECTOMY      TOTAL KNEE ARTHROPLASTY      TRACHEOSTOMY      over 27 trach operations due to MRSA infections in the trach    UPPER GASTROINTESTINAL ENDOSCOPY  1/8/16    removal of foreign body    UVULOPALATOPHARYGOPLASTY      VENA CAVA FILTER PLACEMENT  2002     Family History   Problem Relation Age of Onset    High Blood Pressure Mother     High Blood Pressure Father     Diabetes Sister     Hypothyroidism Sister     Alzheimer's Disease Maternal Grandmother     Migraines Maternal Grandfather     Heart Attack Maternal Grandfather     Cancer Paternal Grandmother         lymph node    Diabetes Paternal Grandfather      Social History     Socioeconomic History    Marital status:      Spouse name: Not on file    Number of children: Not on file    Years of education: Not on file    Highest education level: Not on file   Occupational History    Occupation: disabled   Tobacco Use    Smoking status: Former Smoker     Packs/day: 0.25     Years: 10.00     Pack years: 2.50     Types: Cigars     Quit date: 2000     Years since quittin.2    Smokeless tobacco: Never Used   Vaping Use    Vaping Use: Never used   Substance and Sexual Activity    Alcohol use: No    Drug use: No    Sexual activity: Yes     Partners: Female   Other Topics Concern    Not on file   Social History Narrative    Not on file     Social Determinants of Health     Financial Resource Strain:     Difficulty of Paying Living Expenses: Not on file   Food Insecurity:     Worried About Running Out of Food in the Last Year: Not on file    Jeremy of Food in the Last Year: Not on file   Transportation Needs:     Lack of Transportation (Medical): Not on file    Lack of Transportation (Non-Medical):  Not on file   Physical Activity:     Days of Exercise per Week: Not on file    Minutes of Exercise per Session: Not on file   Stress:     Feeling of Stress : Not on file   Social Connections:     Frequency of Communication with Friends and Family: Not on file    Frequency of Social Gatherings with Friends and Family: Not on file    Attends Yazidism Services: Not on file    Active Member of [address] [place] [address] or Organizations: Not on file    Attends Club or Organization Meetings: Not on file    Marital Status: Not on file   Intimate Partner Violence:     Fear of Current or Ex-Partner: Not on file    Emotionally Abused: Not on file    Physically Abused: Not on file    Sexually Abused: Not on file   Housing Stability:     Unable to Pay for Housing in the Last Year: Not on file    Number of [place] in the Last Year: Not on file    Unstable Housing in the Last Year: Not on file No current facility-administered medications for this encounter. Current Outpatient Medications   Medication Sig Dispense Refill    atenolol (TENORMIN) 25 MG tablet Take 1 tablet by mouth daily 30 tablet 0    lisinopril (PRINIVIL;ZESTRIL) 10 MG tablet Take 10 mg by mouth daily      eplerenone (INSPRA) 25 MG tablet Take 25 mg by mouth daily      fexofenadine (ALLEGRA) 180 MG tablet Take 180 mg by mouth daily      pregabalin (LYRICA) 100 MG capsule Take 100 mg by mouth 2 times daily.       insulin glargine (LANTUS) 100 UNIT/ML injection vial Inject 30 Units into the skin nightly 1 vial 3    insulin lispro (HUMALOG) 100 UNIT/ML injection vial Inject 0-18 Units into the skin 3 times daily (with meals)      insulin lispro (HUMALOG) 100 UNIT/ML injection vial Inject 0-9 Units into the skin nightly      insulin lispro (HUMALOG) 100 UNIT/ML injection vial Inject 15 Units into the skin 3 times daily (with meals)      torsemide (DEMADEX) 20 MG tablet Take 1 tablet by mouth daily 30 tablet 3    potassium chloride (KLOR-CON M) 10 MEQ extended release tablet Take 20 mEq by mouth daily      buPROPion (WELLBUTRIN SR) 200 MG extended release tablet Take 400 mg by mouth daily      Tamsulosin HCl (FLOMAX PO) Take 0.4 mg by mouth daily      zafirlukast (ACCOLATE) 20 MG tablet Take 20 mg by mouth 2 times daily      sodium chloride 0.9 % SOLN 40 mL with SUFentanil 50 MCG/ML SOLN 5 mcg/mL continuous Pt unsure of delivered dose      ARIPiprazole (ABILIFY) 30 MG tablet Take 30 mg by mouth daily       Dexmethylphenidate HCl (FOCALIN PO) Take 20 mg by mouth daily as needed (takes 20 mg - 40 mg morning)      ferrous sulfate 325 (65 Fe) MG tablet Take 325 mg by mouth daily (with breakfast)      lidocaine (XYLOCAINE) 2 % jelly Apply topically as needed for Trach change 1 Tube 0    Multiple Vitamins-Minerals (THERAPEUTIC MULTIVITAMIN-MINERALS) tablet Take 1 tablet by mouth daily      zinc gluconate 50 MG tablet Take 100 mg by mouth 2 times daily       ADVAIR DISKUS 250-50 MCG/DOSE AEPB Inhale 1 puff into the lungs 2 times daily      glipiZIDE (GLUCOTROL XL) 10 MG extended release tablet Take 10 mg by mouth 2 times daily      pravastatin (PRAVACHOL) 40 MG tablet Take 40 mg by mouth daily. Allergies   Allergen Reactions    Aspartame And Phenylalanine Anaphylaxis    Cefuroxime Axetil Anaphylaxis    Clindamycin Hcl Anaphylaxis    Erythromycin Anaphylaxis and Rash    Feldene [Piroxicam] Anaphylaxis    Guanfacine Hcl Anaphylaxis    Iodine Anaphylaxis    Pcn [Penicillins] Anaphylaxis    Pletal [Cilostazol] Anaphylaxis    Robaxin [Methocarbamol] Anaphylaxis and Shortness Of Breath    Arthrotec [Diclofenac-Misoprostol]     Betadine [Povidone Iodine]     Claritin [Loratadine]     Clindamycin/Lincomycin     Indocin [Indometacin Sodium]     Tenex [Guanfacine Hcl]     Vasotec     Vioxx     Zyvox [Linezolid]        REVIEW OF SYSTEMS      General:  No fevers. Generalized weakness  Eyes:  No recent vison changes  ENT:  No sore throat, no nasal congestion  Cardiovascular:  no palpitations  Respiratory:   no cough, no wheezing  Gastrointestinal:  No abdominal pain, no vomiting, no diarrhea  Musculoskeletal:  No muscle pain, no joint pain  Skin:  No rash   Neurologic:  No speech problems, no headache, no extremity numbness, no extremity weakness  Genitourinary:  No dysuria  Extremities:  no edema, no pain      Unless otherwise stated in this report, this patient's positive and negative responses for review of systems (constitutional, eyes, ENT, cardiovascular, respiratory, gastrointestinal, neurological, genitourinary, musculoskeletal, integument systems and systems related to the presenting problem) are either stated in the preceding paragraph, were not pertinent or were negative for the symptoms and/or complaints related to the medical problem.     PHYSICAL EXAM  BP (!) 104/58   Pulse 90   Temp 98 °F (36.7 °C) (Oral)   Resp 18   Ht 5' 10\" (1.778 m)   Wt 228 lb (103.4 kg)   SpO2 97%   BMI 32.71 kg/m²   GENERAL APPEARANCE: Awake and alert. Cooperative. Chronically ill-appearing. Unkempt, malodorous. HEAD: Normocephalic. Atraumatic. EYES: EOM's grossly intact. ENT: Mucous membranes are moist.   NECK: Supple, trachea midline. Tracheostomy in place, site appears clean and dry. HEART: RRR. LUNGS: Respirations unlabored. CTAB. Good air exchange. No wheezes, rales, or rhonchi. Speaking comfortably in full sentences. ABDOMEN: Soft. Non-distended. Non-tender. No guarding or rebound. EXTREMITIES: Bilateral lower extremity edema noted. Reece KOLB. No acute deformities. SKIN: Warm, dry and intact. No acute rashes. NEUROLOGICAL: Alert and oriented X 3. CN II-XII grossly intact. Chronic lower extremity weakness. He is antigravity with his legs. PSYCHIATRIC: Normal mood and affect. LABS  I have reviewed all labs for this visit.    Results for orders placed or performed during the hospital encounter of 04/01/22   Comprehensive Metabolic Panel w/ Reflex to MG   Result Value Ref Range    Sodium 134 (L) 136 - 145 mmol/L    Potassium reflex Magnesium 4.9 3.5 - 5.1 mmol/L    Chloride 96 (L) 99 - 110 mmol/L    CO2 22 21 - 32 mmol/L    Anion Gap 16 3 - 16    Glucose 260 (H) 70 - 99 mg/dL    BUN 60 (H) 7 - 20 mg/dL    CREATININE 2.2 (H) 0.8 - 1.3 mg/dL    GFR Non-African American 30 (A) >60    GFR  36 (A) >60    Calcium 9.8 8.3 - 10.6 mg/dL    Total Protein 6.6 6.4 - 8.2 g/dL    Albumin 4.5 3.4 - 5.0 g/dL    Albumin/Globulin Ratio 2.1 1.1 - 2.2    Total Bilirubin 0.6 0.0 - 1.0 mg/dL    Alkaline Phosphatase 141 (H) 40 - 129 U/L    ALT 37 10 - 40 U/L    AST 31 15 - 37 U/L   CBC with Auto Differential   Result Value Ref Range    WBC 8.9 4.0 - 11.0 K/uL    RBC 5.35 4.20 - 5.90 M/uL    Hemoglobin 14.6 13.5 - 17.5 g/dL    Hematocrit 43.3 40.5 - 52.5 %    MCV 80.9 80.0 - 100.0 fL    MCH 27.4 26.0 - 34.0 pg    MCHC 33.8 31.0 - 36.0 g/dL    RDW 14.7 12.4 - 15.4 %    Platelets 108 062 - 108 K/uL    MPV 9.1 5.0 - 10.5 fL    Neutrophils % 75.8 %    Lymphocytes % 9.4 %    Monocytes % 12.8 %    Eosinophils % 1.6 %    Basophils % 0.4 %    Neutrophils Absolute 6.8 1.7 - 7.7 K/uL    Lymphocytes Absolute 0.8 (L) 1.0 - 5.1 K/uL    Monocytes Absolute 1.1 0.0 - 1.3 K/uL    Eosinophils Absolute 0.1 0.0 - 0.6 K/uL    Basophils Absolute 0.0 0.0 - 0.2 K/uL             RADIOLOGY  X-RAYS: ALL IMAGES INCLUDING PLAIN FILMS, CT, ULTRASOUND AND MRI HAVE BEEN READ BY THE RADIOLOGIST. I have personally reviewed plain film images and have reviewed the radiology reports. CT HEAD WO CONTRAST   Final Result   Stable CT scan of the head without acute intracranial abnormality. Rechecks: Physical assessment performed. Patient has been updated on his lab work. He is in agreement with plan for admission. ED COURSE/MDM  Patient seen and evaluated. Here the patient is afebrile with normal vitals signs. Old records reviewed, including records from his recent admission at [facility]. Here he is chronically ill-appearing. He is unkempt and malodorous. Head CT here shows no acute injury. Lab work does show mild TRICIA with a creatinine of 2.2. It looks like his baseline creatinine based on outside labs is 1.4-1.5. He is also slightly hyperglycemic here but no evidence of DKA. He agrees that his current living situation is not great. He is falling and having trouble caring for himself. I will discuss the patient with the hospitalist for admission for hydration and probable placement labs and imaging reviewed and results discussed with patient. Patient was reassessed as noted above . Plan of care discussed with patient. Patient in agreement with plan. CLINICAL IMPRESSION  1. Recurrent falls    2. Acute renal failure, unspecified acute renal failure type ([address]    3.  Tracheostomy dependent (HCC)        Blood pressure (!) 104/58, pulse 90, temperature 98 °F (36.7 °C), temperature source Oral, resp. rate 18, height 5' 10\" (1.778 m), weight 228 lb (103.4 kg), SpO2 97 %. DISPOSITION  Deborah Kiran was admitted in stable condition.     (Please note this note was completed with a voice recognition program.  Efforts were made to edit the dictations but occasionally words are mis-transcribed.)        Latonya Erazo MD  04/01/22 [id number]

## 2022-04-01 NOTE — ED NOTES
Writer gave report to [employer], who assumes care at this time.       Cindie Nissen, RN  04/01/22 7502

## 2022-04-01 NOTE — PROGRESS NOTES
PHARMACY NOTE  Jan Rowell was ordered Inspra. Per the Formulary Committee, this medication is non-formulary and not stocked by pharmacy. The medication can be reordered at discharge.    LEYLA PandeyPh.4/1/202210:31 AM

## 2022-04-01 NOTE — H&P
Hospital Medicine History & Physical      PCP: Ester Wagner    Date of Admission: 4/1/2022    Date of Service: Pt seen/examined on 4/1/2022    Chief Complaint:    Chief Complaint   Patient presents with   Sandra García brought pt in after pt called for help to get off floor, per azeem have been ther 2 times to help pt up after falls, Pt states he was Dcd from rehab Sunday and was living in [facility], pt unable to walk. History Of Present Illness: The patient is a 73 y.o. male with h/o CVA, hypertension, hyperlipidemia, h/o DVT/PE, DM, Gout, COPD, CHF, h/o tracheal stenosis s/p tracheostomy who presents to [facility] with c/o multiple falls. He was discharged from rehab and unable to walk. He is living in a hotel. He as fallen twice. He has not been taking his medications or been able to care for himself. Vitals stable. On RA. Labs with TRICIA. Baseline 1.5. Creatinine 2.2 on admission. CT head negative. Admitted to med-surg. Past Medical History:        Diagnosis Date    Abscess or cellulitis of leg 4/8/2012    Acute renal failure ([address] 4/10/2012    Acute tracheitis with airway obstruction     [facility]ina pectoris [facility]     ARF (acute renal failure) ([facility]) 9/28/2018    Arthritis     Asthma     Cellulitis of buttock     Cellulitis of sacral region     CHF (congestive heart failure) ([facility])     Claustrophobia     Colitis     COPD (chronic obstructive pulmonary disease) ([facility])     Decubitus ulcer     Decubitus ulcer of left buttock, stage 2 ([address] 10/17/2017    Diabetes mellitus ([address]     Diverticulitis     DVT (deep venous thrombosis) ([facility])     Gangrene ([facility])     on lower back to upper thigh    Gout     HCAP (healthcare-associated pneumonia)     Hx of blood clots     pt. states both PE/DVT    Hyperlipidemia     Hypertension     Low iron     MRSA (methicillin resistant staph aureus) culture positive     tracheobronchitis, hx per pt.     MRSA (methicillin resistant staph aureus) culture positive 03/15/2019    buttocks    Panic attack     Pressure ulcer of coccygeal region, stage 2 ([address] 9/24/2016    Pressure ulcer of left heel, stage 2 ([address] 10/10/2017    Tracheostomy infection ([address] 11/3/2016    Tracheostomy infection ([address]     9/18    Unspecified cerebral artery occlusion with cerebral infarction     UTI (urinary tract infection)        Past Surgical History:        Procedure Laterality Date    BACK SURGERY      ENDOSCOPY, COLON, DIAGNOSTIC      KNEE SURGERY      X 4    LUMBAR FUSION      LUMBAR LAMINECTOMY      OTHER SURGICAL HISTORY Right 05/2017    Pain Pump insertion    [facility] [address] W/BRNCL ALVEOLAR LAVAGE N/A 9/29/2018    BRONCHOSCOPY ALVEOLAR LAVAGE performed by Veronica Barrera MD at [address] [facility] [address]      x2    TONSILLECTOMY AND ADENOIDECTOMY      TOTAL KNEE ARTHROPLASTY      TRACHEOSTOMY      over 30 trach operations due to MRSA infections in the trach    UPPER GASTROINTESTINAL ENDOSCOPY  1/8/16    removal of foreign body    UVULOPALATOPHARYGOPLASTY      VENA CAVA FILTER PLACEMENT  2002       Medications Prior to Admission:    Prior to Admission medications    Medication Sig Start Date End Date Taking? Authorizing Provider   atenolol (TENORMIN) 25 MG tablet Take 1 tablet by mouth daily 9/20/19   Ayla Sosa MD   lisinopril (PRINIVIL;ZESTRIL) 10 MG tablet Take 10 mg by mouth daily    Historical Provider, MD   eplerenone (INSPRA) 25 MG tablet Take 25 mg by mouth daily    Historical Provider, MD   fexofenadine (ALLEGRA) 180 MG tablet Take 180 mg by mouth daily    Historical Provider, MD   pregabalin (LYRICA) 100 MG capsule Take 100 mg by mouth 2 times daily.     Historical Provider, MD   insulin glargine (LANTUS) 100 UNIT/ML injection vial Inject 30 Units into the skin nightly 3/19/19   Tomi Martinez MD   insulin lispro (HUMALOG) 100 UNIT/ML injection vial Inject 0-18 Units into the skin 3 times daily (with meals) 3/19/19   Rina Lopez Berhane Gordillo MD   insulin lispro (HUMALOG) 100 UNIT/ML injection vial Inject 0-9 Units into the skin nightly 3/19/19   Marixa Be MD   insulin lispro (HUMALOG) 100 UNIT/ML injection vial Inject 15 Units into the skin 3 times daily (with meals) 3/19/19   Marixa Be MD   torsemide (DEMADEX) 20 MG tablet Take 1 tablet by mouth daily 3/20/19   Marixa Be MD   potassium chloride (KLOR-CON M) 10 MEQ extended release tablet Take 20 mEq by mouth daily    Historical Provider, MD   buPROPion (WELLBUTRIN SR) 200 MG extended release tablet Take 400 mg by mouth daily    Historical Provider, MD   Tamsulosin HCl (FLOMAX PO) Take 0.4 mg by mouth daily    Historical Provider, MD   zafirlukast (ACCOLATE) 20 MG tablet Take 20 mg by mouth 2 times daily    Historical Provider, MD   sodium chloride 0.9 % SOLN 40 mL with SUFentanil 50 MCG/ML SOLN 5 mcg/mL continuous Pt unsure of delivered dose    Historical Provider, MD   ARIPiprazole (ABILIFY) 30 MG tablet Take 30 mg by mouth daily     Historical Provider, MD   Dexmethylphenidate HCl (FOCALIN PO) Take 20 mg by mouth daily as needed (takes 20 mg - 40 mg morning)    Historical Provider, MD   ferrous sulfate 325 (65 Fe) MG tablet Take 325 mg by mouth daily (with breakfast)    Historical Provider, MD   lidocaine (XYLOCAINE) 2 % jelly Apply topically as needed for Trach change 3/9/18   Oswaldo Wilson MD   Multiple Vitamins-Minerals (THERAPEUTIC MULTIVITAMIN-MINERALS) tablet Take 1 tablet by mouth daily    Historical Provider, MD   zinc gluconate 50 MG tablet Take 100 mg by mouth 2 times daily     Historical Provider, MD   ADVAIR DISKUS 250-50 MCG/DOSE AEPB Inhale 1 puff into the lungs 2 times daily 3/1/17   Historical Provider, MD   glipiZIDE (GLUCOTROL XL) 10 MG extended release tablet Take 10 mg by mouth 2 times daily 1/15/17   Historical Provider, MD   pravastatin (PRAVACHOL) 40 MG tablet Take 40 mg by mouth daily.       Historical Provider, MD       Allergies:  Aspartame and phenylalanine, Cefuroxime axetil, Clindamycin hcl, Erythromycin, Feldene [piroxicam], Guanfacine hcl, Iodine, Pcn [penicillins], Pletal [cilostazol], Robaxin [methocarbamol], Arthrotec [diclofenac-misoprostol], Betadine [povidone iodine], Claritin [loratadine], Clindamycin/lincomycin, Indocin [indometacin sodium], Tenex [guanfacine hcl], Vasotec, Vioxx, and Zyvox [linezolid]    Social History:  The patient currently living at a hotel. TOBACCO:   reports that he quit smoking about 22 years ago. His smoking use included cigars. He has a 2.50 pack-year smoking history. He has never used smokeless tobacco.  ETOH:   reports no history of alcohol use. Family History:   Positive as follows:        Problem Relation Age of Onset    High Blood Pressure Mother     High Blood Pressure Father     Diabetes Sister     Hypothyroidism Sister     Alzheimer's Disease Maternal Grandmother     Migraines Maternal Grandfather     Heart Attack Maternal Grandfather     Cancer Paternal Grandmother         lymph node    Diabetes Paternal Grandfather        REVIEW OF SYSTEMS:       Constitutional: Negative for fever   Respiratory: Negative  for dyspnea, + cough   Cardiovascular: Negative for chest pain   Gastrointestinal: Negative for vomiting, diarrhea   Genitourinary: Negative for hematuria   Musculoskeletal:  + weakness  Skin: Negative for rash   Neurological: Negative for syncope   Psychiatric/Behavorial: Negative for anxiety    PHYSICAL EXAM:    /63   Pulse 81   Temp 98 °F (36.7 °C) (Oral)   Resp 14   Ht 5' 10\" (1.778 m)   Wt 228 lb (103.4 kg)   SpO2 96%   BMI 32.71 kg/m²     Gen: No distress. Alert. Eyes: PERRL. No sclera icterus. No conjunctival injection. ENT: No discharge. Pharynx clear. Neck: Trachea midline. + tracheostomy  Resp: No accessory muscle use. No crackles. No wheezes. No rhonchi. CV: Regular rate. Regular rhythm. No murmur. No rub. + BLE edema. GI: Non-tender. Non-distended.  Normal bowel sounds. No hernia. Skin: Warm and dry. No nodule on exposed extremities. No rash on exposed extremities. M/S: No cyanosis. No joint deformity. No clubbing. Neuro: Awake. Grossly nonfocal  + chronic lower extremity weakness. Psych: Oriented x 3. No anxiety or agitation. CBC:   Recent Labs     04/01/22  0542   WBC 8.9   HGB 14.6   HCT 43.3   MCV 80.9        BMP:   Recent Labs     04/01/22 0542   *   K 4.9   CL 96*   CO2 22   BUN 60*   CREATININE 2.2*     LIVER PROFILE:   Recent Labs     04/01/22 0542   AST 31   ALT 37   BILITOT 0.6   ALKPHOS 141*       U/A:    Lab Results   Component Value Date    NITRITE neg 04/08/2012    COLORU Yellow 09/18/2019    WBCUA 0-2 08/29/2016    RBCUA None seen 08/29/2016    BACTERIA 1+ 03/05/2016    CLARITYU Clear 09/18/2019    SPECGRAV 1.010 09/18/2019    LEUKOCYTESUR Negative 09/18/2019    BLOODU Negative 09/18/2019    GLUCOSEU Negative 09/18/2019    GLUCOSEU 500 04/08/2012    AMORPHOUS 2+ 03/05/2016       ABG    Lab Results   Component Value Date    VKN3UHG 21.0 03/05/2016    BEART -1.9 03/05/2016    J3JJXVZK 96.3 03/05/2016    PHART 7.465 03/05/2016    LXR1NIW 29.9 03/05/2016    PO2ART 96.7 03/05/2016    QAU3PNR 21.9 03/05/2016       CULTURES  COVID/flu: negative    EKG:  I have reviewed the EKG with the following interpretation:   N/A    RADIOLOGY  CT HEAD WO CONTRAST   Final Result   Stable CT scan of the head without acute intracranial abnormality. Principal Problem:    Physical debility  Resolved Problems:    * No resolved hospital problems. *        ASSESSMENT/PLAN:  Multiple falls, weakness  - PT/OT  - SNF placement    TRICIA on CKD stage 3a  - Baseline ~ 1.5  - creatinine 2.2 on admission  - give IVF's. Hold Demadex, Lisinopril for now. Monitor labs    H/o CVA  - on Pravastatin    H/o tracheal stenosis  - s/p tracheostomy. Hypertension  - BP borderline.  Hold Lisinopril    Hyperlipidemia  - on Pravastatin    H/o PE/DVT  - not on AC    Depression  - continue home medication regimen. DM type 2  - monitor glucose. - Continue Lantus 30 units nightly, Glipizide  - Humalog 15 units TID with meals, SSI coverage. COPD  - stable. No AE  - continue Symbicort, Singulair    Chronic diastolic CHF  - stable. No s/s decompensation  - continue Demadex    BPH  - on Flomax. Iron deficiency anemia  - continue ferrous sulfate    DVT Prophylaxis: Lovenox  Diet: ADULT DIET;  Regular; 4 carb choices (60 gm/meal)  Code Status: Full Code    Junie Hemphill [facility]  4/1/2022 None

## 2022-04-01 NOTE — ED NOTES
Open APS case for self neglect.    contact: Luciano Esparza [phone]  4/1/22     Mahad Mccauley RN  04/01/22 0433

## 2022-04-01 NOTE — ED NOTES
[phone]- Perfect served Hospitalist per Dr. Cher Oh with her Phone number for him to reach her.   56- Dr. Vinicio Chavarria called back to speak with Dr. Tamika Mejias.       Aria Wasserman  04/01/22 [phone]

## 2022-04-01 NOTE — ED NOTES
Pt resting in bed at this time, no needs. Will continue to assess. Star magnet on door frame, pt wearing fall risk bracelet, bed alarm in place.        Nighat Staples RN  04/01/22 1603

## 2022-04-01 NOTE — PROGRESS NOTES
Inpatient Occupational Therapy  Evaluation and Treatment    Unit: 2 [place]  Date:  4/1/2022  Patient Name:    Siomara Beltran  Admitting diagnosis:  Tracheostomy dependent [facility]) [Z93.0]  Physical debility [R53.81]  Recurrent falls [R29.6]  Acute renal failure, unspecified acute renal failure type ([facility] [N17.9]  Admit Date:  4/1/2022  Precautions/Restrictions/WB Status/ Lines/ Wounds/ Oxygen: fall risk and bed/chair alarm Pressure sores on coccyx. Wounds on L and R feet. (pt reports he is scheduled for skin graft on the L foot this Tuesday at [facility] and was told he will be NWB after that)    Treatment Time:  8640-6523  Treatment Number: 1     Billable Treatment Time: 30 minutes   Total Treatment Time:   40   minutes    Patient Goals for Therapy:  Transfer to wheelchair      Discharge Recommendations: 24/7 Assist with skilled therapy  DME needs for discharge: Needs Met       Therapy recommendations for staff:   Assist of 1 for bed mobility, Assist x2 for sliding board transfer to wheelchair     History of Present Illness: Per Marnie Runner APRN 4/01: \"The patient is a 75 y. o. male with h/o CVA, hypertension, hyperlipidemia, h/o DVT/PE, DM, Gout, COPD, CHF, h/o tracheal stenosis s/p tracheostomy who presents to [facility] with c/o multiple falls. [facility] was discharged from rehab and unable to walk. [facility] is living in a hotel. [facility] as fallen twice. [facility] has not been taking his medications or been able to care for himself. \"  Discharged from [facility] 3/27    Home Health S4 Level Recommendation:  Level 3 Safety  AM-PAC Score: AM-PAC Inpatient Daily Activity Raw Score: 19    Preadmission Environment    Pt. Lives Alone. No family in this area. Has some friends who will do odd jobs PRN. Home environment:            hotel , since d/c from rehab. Per pt his home is being remodeled. Pt has been in hospital or rehab since Oct 2021.   Steps to enter first floor: No steps  Steps to second floor:         elevator   Bathroom: tub/shower unit and standard height commode. No grab bars around toilet. Per pt the hotel room is not handicap accessible. His w/c fits through the entry door and the bathroom door, but there is very limited space to position the w/c by the toilet. Equipment owned: wc (electric, custom), slideboard, drop arm BSC     Preadmission Status:  Pt. Able to drive: No. Senior services provided rides as needed. Pt Fully independent with ADLs: No. Was supposed to have aides arranged upon d/c but this has yet to be arranged. Since rehab d/c pt was dressing in his chair. Has not tried bathing. Pt. Non-ambulatory at baseline. Uses slide board for transfers to/from power chair. History of falls          Yes, 2 since returning from rehab. Slid out of chair while bridging to put pants on. Barbra Hodgkin bed and chair during transfer. Needed EMS support to get up both times.     Pain   Yes  Location: coccyx  Rating: moderate /10  Pain Medicine Status: No request made     Cognition    A&O x4   Able to follow 2 step commands     Subjective  Patient lying supine in bed with no family present. Pt agreeable to this OT eval & tx. Upper Extremity ROM:    WFL,  pt able to perform all bed mobility, transfers, and gait without ROM limitation.     Upper Extremity Strength:    BUE strength impaired but not formally assessed w/ MMT    Upper Extremity Sensation    Impaired right hand    Upper Extremity Proprioception:  WFL    Coordination and Tone  Impaired right hand    Balance  Functional Sitting Balance:  Impaired CGA at EOB initially, then SBA  Functional Standing Balance:NT    Bed mobility:    Supine to sit:   SBA  Sit to supine:   SBA  Rolling:    SBA  Scooting in sitting:  SBA  Scooting to head of bed:   SBA    Bridging:   SBA    Transfers:    Sit to stand:  Not Tested  Stand to sit:  Not Tested  Bed to/from wheelchair:   Min Ax1, CGA x1 to assist holding wheelchair  Standard toilet: Not Tested  Bed to [facility]:  Not Tested    Dressing:      UE:   Not Tested  LE:    Min A    Bathing:    UE:  Not Tested  LE:  Not Tested    Eating:   Not Tested    Toileting:  Not Tested    Grooming: Not Tested    Activity Tolerance   Pt completed therapy session with No adverse symptoms noted w/activity  SpO2:   HR:   BP:     Positioning Needs: In bed, call light and needs in reach. Alarm Set    Exercise / Activities Initiated:   N/A    Patient/Family Education:   Role of OT  Recommendations for DC    Assessment of Deficits: Pt seen for Occupational therapy evaluation in acute care setting. Pt demonstrated decreased Activity tolerance, ADLs, IADLs, Balance , Bed mobility, Safety Awareness, Strength and Transfers. Pt functioning below baseline and will likely benefit from skilled occupational therapy services to maximize safety and independence. Goal(s) : To be met in 3 Visits:  1). Bed to wheelchair/BSC: SBA    To be met in 5 Visits:  1). Supine to/from Sit:  Independent  2). Upper Body Bathing:   Supervision  3). Lower Body Bathing:   SBA  4). Upper Body Dressing:  Supervision  5). Lower Body Dressing:  SBA  6). Pt to demonstrate UE exs x 15 reps with minimal cues    Rehabilitation Potential:  Good for goals listed above. Strengths for achieving goals include: Pt motivated  Barriers to achieving goals include:  Weakness     Plan: To be seen 3-5 x/wk while in acute care setting for therapeutic exercises, bed mobility, transfers, dressing, bathing, family/patient education, ADL/IADL retraining, energy conservation training.      Tina Artis OTR/L [id number]      If patient discharges from this facility prior to next visit, this note will serve as the Discharge Summary

## 2022-04-01 NOTE — PROGRESS NOTES
Patient did not want symbicort, says he does not take any inhalers at home and does not want them here either.

## 2022-04-02 LAB
ANION GAP SERPL CALCULATED.3IONS-SCNC: 9 MMOL/L (ref 3–16)
BUN BLDV-MCNC: 54 MG/DL (ref 7–20)
CALCIUM SERPL-MCNC: 8.7 MG/DL (ref 8.3–10.6)
CHLORIDE BLD-SCNC: 102 MMOL/L (ref 99–110)
CO2: 25 MMOL/L (ref 21–32)
CREAT SERPL-MCNC: 1.8 MG/DL (ref 0.8–1.3)
GFR AFRICAN AMERICAN: 46
GFR NON-AFRICAN AMERICAN: 38
GLUCOSE BLD-MCNC: 122 MG/DL (ref 70–99)
GLUCOSE BLD-MCNC: 140 MG/DL (ref 70–99)
GLUCOSE BLD-MCNC: 160 MG/DL (ref 70–99)
GLUCOSE BLD-MCNC: 180 MG/DL (ref 70–99)
GLUCOSE BLD-MCNC: 227 MG/DL (ref 70–99)
GLUCOSE BLD-MCNC: 96 MG/DL (ref 70–99)
PERFORMED ON: ABNORMAL
PERFORMED ON: NORMAL
POTASSIUM REFLEX MAGNESIUM: 4.2 MMOL/L (ref 3.5–5.1)
SODIUM BLD-SCNC: 136 MMOL/L (ref 136–145)

## 2022-04-02 PROCEDURE — 97530 THERAPEUTIC ACTIVITIES: CPT

## 2022-04-02 PROCEDURE — G0378 HOSPITAL OBSERVATION PER HR: HCPCS

## 2022-04-02 PROCEDURE — 6370000000 HC RX 637 (ALT 250 FOR IP): Performed by: NURSE PRACTITIONER

## 2022-04-02 PROCEDURE — 36415 COLL VENOUS BLD VENIPUNCTURE: CPT

## 2022-04-02 PROCEDURE — 6370000000 HC RX 637 (ALT 250 FOR IP): Performed by: INTERNAL MEDICINE

## 2022-04-02 PROCEDURE — 99233 SBSQ HOSP IP/OBS HIGH 50: CPT | Performed by: INTERNAL MEDICINE

## 2022-04-02 PROCEDURE — 80048 BASIC METABOLIC PNL TOTAL CA: CPT

## 2022-04-02 PROCEDURE — 96372 THER/PROPH/DIAG INJ SC/IM: CPT

## 2022-04-02 PROCEDURE — 2580000003 HC RX 258: Performed by: NURSE PRACTITIONER

## 2022-04-02 PROCEDURE — 6360000002 HC RX W HCPCS: Performed by: INTERNAL MEDICINE

## 2022-04-02 RX ADMIN — METHYLPHENIDATE HYDROCHLORIDE 20 MG: 10 TABLET ORAL at 09:05

## 2022-04-02 RX ADMIN — SODIUM CHLORIDE: 9 INJECTION, SOLUTION INTRAVENOUS at 03:36

## 2022-04-02 RX ADMIN — INSULIN GLARGINE 30 UNITS: 100 INJECTION, SOLUTION SUBCUTANEOUS at 21:48

## 2022-04-02 RX ADMIN — ATENOLOL 25 MG: 25 TABLET ORAL at 09:06

## 2022-04-02 RX ADMIN — MONTELUKAST SODIUM 10 MG: 10 TABLET, COATED ORAL at 21:38

## 2022-04-02 RX ADMIN — PRAVASTATIN SODIUM 40 MG: 40 TABLET ORAL at 09:06

## 2022-04-02 RX ADMIN — GLIPIZIDE 2.5 MG: 5 TABLET ORAL at 09:05

## 2022-04-02 RX ADMIN — POTASSIUM CHLORIDE 20 MEQ: 1500 TABLET, EXTENDED RELEASE ORAL at 09:06

## 2022-04-02 RX ADMIN — TAMSULOSIN HYDROCHLORIDE 0.4 MG: 0.4 CAPSULE ORAL at 09:05

## 2022-04-02 RX ADMIN — BUPROPION HYDROCHLORIDE 150 MG: 150 TABLET, EXTENDED RELEASE ORAL at 09:06

## 2022-04-02 RX ADMIN — ARIPIPRAZOLE 30 MG: 10 TABLET ORAL at 09:06

## 2022-04-02 RX ADMIN — PREGABALIN 100 MG: 100 CAPSULE ORAL at 21:38

## 2022-04-02 RX ADMIN — CETIRIZINE HYDROCHLORIDE 5 MG: 10 TABLET ORAL at 09:05

## 2022-04-02 RX ADMIN — METHYLPHENIDATE HYDROCHLORIDE 20 MG: 10 TABLET ORAL at 13:04

## 2022-04-02 RX ADMIN — BUPROPION HYDROCHLORIDE 150 MG: 150 TABLET, EXTENDED RELEASE ORAL at 21:38

## 2022-04-02 RX ADMIN — FERROUS SULFATE TAB 325 MG (65 MG ELEMENTAL FE) 325 MG: 325 (65 FE) TAB at 09:05

## 2022-04-02 RX ADMIN — SODIUM CHLORIDE: 9 INJECTION, SOLUTION INTRAVENOUS at 17:15

## 2022-04-02 RX ADMIN — PREGABALIN 100 MG: 100 CAPSULE ORAL at 09:05

## 2022-04-02 RX ADMIN — ENOXAPARIN SODIUM 40 MG: 40 INJECTION SUBCUTANEOUS at 09:06

## 2022-04-02 RX ADMIN — INSULIN LISPRO 1 UNITS: 100 INJECTION, SOLUTION INTRAVENOUS; SUBCUTANEOUS at 09:12

## 2022-04-02 RX ADMIN — Medication 1 TABLET: at 09:05

## 2022-04-02 ASSESSMENT — PAIN SCALES - GENERAL: PAINLEVEL_OUTOF10: 0

## 2022-04-02 NOTE — PLAN OF CARE
Problem: Falls - Risk of:  Goal: Will remain free from falls  Description: Will remain free from falls  4/1/2022 2059 by Keshawn Sprague RN  Outcome: Ongoing  4/1/2022 1827 by Rainer Marti RN  Outcome: Ongoing  Goal: Absence of physical injury  Description: Absence of physical injury  4/1/2022 2059 by Keshawn Sprague RN  Outcome: Ongoing  4/1/2022 1827 by Rainer Marti RN  Outcome: Ongoing     Problem: Infection:  Goal: Will remain free from infection  Description: Will remain free from infection  Outcome: Ongoing     Problem: Safety:  Goal: Free from accidental physical injury  Description: Free from accidental physical injury  Outcome: Ongoing  Goal: Free from intentional harm  Description: Free from intentional harm  Outcome: Ongoing     Problem: Daily Care:  Goal: Daily care needs are met  Description: Daily care needs are met  Outcome: Ongoing     Problem: Pain:  Goal: Patient's pain/discomfort is manageable  Description: Patient's pain/discomfort is manageable  Outcome: Ongoing  Goal: Pain level will decrease  Description: Pain level will decrease  Outcome: Ongoing  Goal: Control of acute pain  Description: Control of acute pain  Outcome: Ongoing  Goal: Control of chronic pain  Description: Control of chronic pain  Outcome: Ongoing     Problem: Skin Integrity:  Goal: Skin integrity will stabilize  Description: Skin integrity will stabilize  Outcome: Ongoing     Problem: Discharge Planning:  Goal: Patients continuum of care needs are met  Description: Patients continuum of care needs are met  Outcome: Ongoing     Problem: Airway Clearance - Ineffective:  Goal: Ability to maintain a clear airway will improve  Description: Ability to maintain a clear airway will improve  Outcome: Ongoing     Problem: Skin Integrity:  Goal: Will show no infection signs and symptoms  Description: Will show no infection signs and symptoms  Outcome: Ongoing  Goal: Absence of new skin breakdown  Description: Absence of new skin breakdown  Outcome: Ongoing

## 2022-04-02 NOTE — PLAN OF CARE
Problem: Falls - Risk of:  Goal: Will remain free from falls  Description: Will remain free from falls  Outcome: Ongoing     Problem: Falls - Risk of:  Goal: Absence of physical injury  Description: Absence of physical injury  Outcome: Ongoing     Problem: Infection:  Goal: Will remain free from infection  Description: Will remain free from infection  Outcome: Ongoing     Problem: Safety:  Goal: Free from accidental physical injury  Description: Free from accidental physical injury  Outcome: Ongoing     Problem: Safety:  Goal: Free from intentional harm  Description: Free from intentional harm  Outcome: Ongoing     Problem: Daily Care:  Goal: Daily care needs are met  Description: Daily care needs are met  Outcome: Ongoing     Problem: Pain:  Goal: Patient's pain/discomfort is manageable  Description: Patient's pain/discomfort is manageable  Outcome: Ongoing     Problem: Pain:  Goal: Pain level will decrease  Description: Pain level will decrease  Outcome: Ongoing     Problem: Pain:  Goal: Control of acute pain  Description: Control of acute pain  Outcome: Ongoing     Problem: Pain:  Goal: Control of chronic pain  Description: Control of chronic pain  Outcome: Ongoing     Problem: Skin Integrity:  Goal: Skin integrity will stabilize  Description: Skin integrity will stabilize  Outcome: Ongoing     Problem: Discharge Planning:  Goal: Patients continuum of care needs are met  Description: Patients continuum of care needs are met  Outcome: Ongoing     Problem: Airway Clearance - Ineffective:  Goal: Ability to maintain a clear airway will improve  Description: Ability to maintain a clear airway will improve  Outcome: Ongoing     Problem: Skin Integrity:  Goal: Will show no infection signs and symptoms  Description: Will show no infection signs and symptoms  Outcome: Ongoing     Problem: Skin Integrity:  Goal: Absence of new skin breakdown  Description: Absence of new skin breakdown  Outcome: Ongoing

## 2022-04-02 NOTE — CARE COORDINATION
INTERDISCIPLINARY PLAN OF CARE CONFERENCE    Date/Time: 4/2/2022 3:56 PM  Completed by: Jeremías Fountain RN, Case Management      Patient Name:  Toy Melo  YOB: 1955  Admitting Diagnosis: Tracheostomy dependent ([address] [Z93.0]  Physical debility [R53.81]  Recurrent falls [R29.6]  Acute renal failure, unspecified acute renal failure type ([address] [N17.9]     Admit Date/Time:  4/1/2022  5:27 AM    Chart reviewed. Interdisciplinary team contacted or reviewed plan related to patient progress and discharge plans. Disciplines included Case Management, Nursing, and Dietitian. Current Status: OBS  PT/OT recommendation for discharge plan of care: ARU    Call received from admissions at [facility] regarding referral for transfer back to [address] [place] [address]. Pt states today that he has been scheduled to go to the ENT OP at [facility] on Monday for a procedure and is also scheduled for a skin graft at [facility] on Tuesday. [facility] cannot consider accepting pt to return to rehab until these procedures are completed.

## 2022-04-02 NOTE — PROGRESS NOTES
Inpatient Physical Therapy Daily Treatment Note    Unit: [address]  Date:  4/2/2022  Patient Name:    Anjelica Hill  Admitting diagnosis:  Tracheostomy dependent [facility]) [Z93.0]  Physical debility [R53.81]  Recurrent falls [R29.6]  Acute renal failure, unspecified acute renal failure type ([address] [N17.9]  Admit Date:  4/1/2022  Precautions/Restrictions:  Fall risk, Bed/chair alarm, Lines -IV and Supplemental O2 (as needed - previously on 4L all day but didn't have access to o2 in hot), WB Restrictions (planning for upcoming NWB L foot - grafting next week) and PMH: tracheostomy, CHF, OA, asthma, PE/DVT, HTN, CVA, cellulitis, claustraphobia, back surgeies (fusion), knee sx x4; sacral wound (waffle cushion needed), B foot wounds      Discharge Recommendations: ARU/IRF (inpatient rehab facility)    Pt is no longer independent with transfers/ADLs as he reports he was prior to DC from recent rehab stay - has had 2 falls since DC on 3/27/22 from [facility] as well as inability to access o2 or wc accessible room  DME needs for discharge: defer to facility; hospital bed if returning home       Therapy recommendation for EMS Transport: requires transport by cot due to healing sacral wound (limited time spent in sitting position)    Therapy recommendations for staff:   Assist of 1 with use of slide board and removable arm rest chair for all transfers to/from [facility]  to/from chair    History of Present Illness:   History of Present Illness: Per Miguel A Shea APRN 4/01: \"The patient is a 75 y. o. male with h/o CVA, hypertension, hyperlipidemia, h/o DVT/PE, DM, Gout, COPD, CHF, h/o tracheal stenosis s/p tracheostomy who presents to [facility] with c/o multiple falls. [facility] was discharged from rehab and unable to walk. [facility] is living in a hotel. [facility] as fallen twice. [facility] has not been taking his medications or been able to care for himself. \"  Discharged from [facility] 3/27    Home Health S4 Level Recommendation: Level 3 Safety  AM-PAC Mobility Score      AM-PAC Inpatient Mobility without Stair Climbing Raw Score : 11    Treatment Time:  3360-7191  Treatment number: 2  Timed Code Treatment Minutes: 50 minutes  Total Treatment Minutes:  50  minutes    Cognition    A&O x4   Able to follow 2 step commands    Subjective  Patient lying supine in bed and slid down in bed with limited ability to re-adjust with no family present   Pt agreeable to this PT tx. Pain   Yes  Location: bottom  Rating:    moderate  Pain Medicine Status: No request made     Bed Mobility   Supine to Sit:    CGA and with HOB elevated and use of BUE to handrail (guarding needed to avoid sliding); would require more assist if in flat bed  Sit to Supine:   Not Tested  Rolling:   SBA  Scooting: Max A     Transfer Training     Sit to stand:   partial stand on RLE for repositioninng of cushion from chair (CGA-min A)  Stand to sit:   CGA  Bed to Chair (recliner with removable armrest):   Min A  with use of gait belt and slide board; toward R side   Would benefit from L sided transfer training with altered heights and to/from commode in upcoming sessions    Gait Training pt is non-ambulatory at baseline; pt ambulated 0 ft. Stair Training nonambulatory at baseline    Balance  Static Sitting:  Good - ; SBA  Comments: BUE and BLE support sitting EOB x 10 minutes during transfer setup  Dynamic sitting: fair: min A for weightshifting side to side for slide board placement; some delayed righting responses noted with increased rigidity    Standing: Poor; Min A   Comments: partial stand from chair onto RLE only    Patient Education      Role of PT, POC, Discharge recommendations, DC recommendations, transfer techniques and calling for assist with mobility. Positioning Needs       Pt up in chair, alarm set, positioned in proper neutral alignment and pressure relief provided.        Activity Tolerance   Pt completed therapy session with No adverse symptoms noted w/activity  Spo2 = 96-98%  HR = 75 BPM.    Other  SBA (set up needed and clearance for bed linens) lower body dressing prior to transfer  Waffle cushion placed in chair to decompress sacral wound    Assessment :  Patient pleasant, cooperative and motivated throughout session. Good activity tolerance on RA without desaturation. Demonstrating fair dynamic sitting balance and poor standing balance requiring at least min A for transfers to/from bed. Due to recent falls, poor home setup, assist needed below baseline for transfers, it is recommended patient return to IRF for further training/home management/ADL training to mimic DC environment. May ultimately require LTC placement if home environment not safe to return to. Goals : To be met in 3 visits:  1). Independent with LE Ex x 10 reps  2). Supine to/from sit: SBA   3). Bed to/from wheelchair: Min A with slide board- MET 4/2/22     To be met in 6 visits:  1). Supine to/from sit: Independent  2). Bed to/from wheelchair: Independent with slide board    Plan   Continue with plan of care. Signature: Leopoldo Guest, PT, DPT, OMT-C #[id number]      If patient discharges from this facility prior to next visit, this note will serve as the Discharge Summary.

## 2022-04-02 NOTE — PROGRESS NOTES
Patient resting, eyes closed, respirations witnessed as e/e. No signs of distress. Bed alarm. Call light and bedside table is easy reach.

## 2022-04-02 NOTE — PROGRESS NOTES
Shift assessment complete. See doc flow. Nightly medications given see MAR. IVF infusing. A/O x4. Patient c/o pain to sacrum area PRN Tylenol given. FS 65 Insulin held, snacks and juice given. Dressings in place to left foot, elevated with pillow support. Dressing to sacrum in place. Bed alarm in place. Call light and bedside table within easy reach.

## 2022-04-03 LAB
ANION GAP SERPL CALCULATED.3IONS-SCNC: 9 MMOL/L (ref 3–16)
BASOPHILS ABSOLUTE: 0 K/UL (ref 0–0.2)
BASOPHILS RELATIVE PERCENT: 0.7 %
BUN BLDV-MCNC: 28 MG/DL (ref 7–20)
CALCIUM SERPL-MCNC: 9 MG/DL (ref 8.3–10.6)
CHLORIDE BLD-SCNC: 108 MMOL/L (ref 99–110)
CO2: 24 MMOL/L (ref 21–32)
CREAT SERPL-MCNC: 1.2 MG/DL (ref 0.8–1.3)
EOSINOPHILS ABSOLUTE: 0.3 K/UL (ref 0–0.6)
EOSINOPHILS RELATIVE PERCENT: 7.2 %
GFR AFRICAN AMERICAN: >60
GFR NON-AFRICAN AMERICAN: >60
GLUCOSE BLD-MCNC: 111 MG/DL (ref 70–99)
GLUCOSE BLD-MCNC: 122 MG/DL (ref 70–99)
GLUCOSE BLD-MCNC: 130 MG/DL (ref 70–99)
GLUCOSE BLD-MCNC: 224 MG/DL (ref 70–99)
GLUCOSE BLD-MCNC: 65 MG/DL (ref 70–99)
GLUCOSE BLD-MCNC: 91 MG/DL (ref 70–99)
HCT VFR BLD CALC: 36.8 % (ref 40.5–52.5)
HEMOGLOBIN: 12.3 G/DL (ref 13.5–17.5)
LYMPHOCYTES ABSOLUTE: 0.9 K/UL (ref 1–5.1)
LYMPHOCYTES RELATIVE PERCENT: 18.6 %
MCH RBC QN AUTO: 27.3 PG (ref 26–34)
MCHC RBC AUTO-ENTMCNC: 33.3 G/DL (ref 31–36)
MCV RBC AUTO: 81.9 FL (ref 80–100)
MONOCYTES ABSOLUTE: 0.6 K/UL (ref 0–1.3)
MONOCYTES RELATIVE PERCENT: 12.3 %
NEUTROPHILS ABSOLUTE: 2.9 K/UL (ref 1.7–7.7)
NEUTROPHILS RELATIVE PERCENT: 61.2 %
PDW BLD-RTO: 14.4 % (ref 12.4–15.4)
PERFORMED ON: ABNORMAL
PERFORMED ON: NORMAL
PLATELET # BLD: 121 K/UL (ref 135–450)
PMV BLD AUTO: 7.9 FL (ref 5–10.5)
POTASSIUM SERPL-SCNC: 4.2 MMOL/L (ref 3.5–5.1)
RBC # BLD: 4.5 M/UL (ref 4.2–5.9)
SODIUM BLD-SCNC: 141 MMOL/L (ref 136–145)
WBC # BLD: 4.7 K/UL (ref 4–11)

## 2022-04-03 PROCEDURE — 36415 COLL VENOUS BLD VENIPUNCTURE: CPT

## 2022-04-03 PROCEDURE — G0378 HOSPITAL OBSERVATION PER HR: HCPCS

## 2022-04-03 PROCEDURE — 2580000003 HC RX 258: Performed by: NURSE PRACTITIONER

## 2022-04-03 PROCEDURE — 6370000000 HC RX 637 (ALT 250 FOR IP): Performed by: NURSE PRACTITIONER

## 2022-04-03 PROCEDURE — 85025 COMPLETE CBC W/AUTO DIFF WBC: CPT

## 2022-04-03 PROCEDURE — 96372 THER/PROPH/DIAG INJ SC/IM: CPT

## 2022-04-03 PROCEDURE — 6360000002 HC RX W HCPCS: Performed by: INTERNAL MEDICINE

## 2022-04-03 PROCEDURE — 6370000000 HC RX 637 (ALT 250 FOR IP): Performed by: INTERNAL MEDICINE

## 2022-04-03 PROCEDURE — 99233 SBSQ HOSP IP/OBS HIGH 50: CPT | Performed by: INTERNAL MEDICINE

## 2022-04-03 PROCEDURE — 80048 BASIC METABOLIC PNL TOTAL CA: CPT

## 2022-04-03 RX ADMIN — PREGABALIN 100 MG: 100 CAPSULE ORAL at 21:22

## 2022-04-03 RX ADMIN — METHYLPHENIDATE HYDROCHLORIDE 20 MG: 10 TABLET ORAL at 13:21

## 2022-04-03 RX ADMIN — PREGABALIN 100 MG: 100 CAPSULE ORAL at 09:21

## 2022-04-03 RX ADMIN — INSULIN GLARGINE 30 UNITS: 100 INJECTION, SOLUTION SUBCUTANEOUS at 21:23

## 2022-04-03 RX ADMIN — GLIPIZIDE 2.5 MG: 5 TABLET ORAL at 06:20

## 2022-04-03 RX ADMIN — CETIRIZINE HYDROCHLORIDE 5 MG: 10 TABLET ORAL at 09:21

## 2022-04-03 RX ADMIN — ENOXAPARIN SODIUM 40 MG: 40 INJECTION SUBCUTANEOUS at 09:20

## 2022-04-03 RX ADMIN — ARIPIPRAZOLE 30 MG: 10 TABLET ORAL at 09:20

## 2022-04-03 RX ADMIN — Medication 1 TABLET: at 09:21

## 2022-04-03 RX ADMIN — ATENOLOL 25 MG: 25 TABLET ORAL at 09:22

## 2022-04-03 RX ADMIN — TAMSULOSIN HYDROCHLORIDE 0.4 MG: 0.4 CAPSULE ORAL at 09:21

## 2022-04-03 RX ADMIN — BUPROPION HYDROCHLORIDE 150 MG: 150 TABLET, EXTENDED RELEASE ORAL at 09:21

## 2022-04-03 RX ADMIN — FERROUS SULFATE TAB 325 MG (65 MG ELEMENTAL FE) 325 MG: 325 (65 FE) TAB at 09:21

## 2022-04-03 RX ADMIN — SODIUM CHLORIDE: 9 INJECTION, SOLUTION INTRAVENOUS at 06:19

## 2022-04-03 RX ADMIN — MONTELUKAST SODIUM 10 MG: 10 TABLET, COATED ORAL at 21:23

## 2022-04-03 RX ADMIN — BUPROPION HYDROCHLORIDE 150 MG: 150 TABLET, EXTENDED RELEASE ORAL at 21:22

## 2022-04-03 RX ADMIN — POTASSIUM CHLORIDE 20 MEQ: 1500 TABLET, EXTENDED RELEASE ORAL at 09:21

## 2022-04-03 RX ADMIN — METHYLPHENIDATE HYDROCHLORIDE 20 MG: 10 TABLET ORAL at 06:20

## 2022-04-03 RX ADMIN — SODIUM CHLORIDE: 9 INJECTION, SOLUTION INTRAVENOUS at 17:27

## 2022-04-03 RX ADMIN — PRAVASTATIN SODIUM 40 MG: 40 TABLET ORAL at 09:21

## 2022-04-03 ASSESSMENT — PAIN SCALES - GENERAL
PAINLEVEL_OUTOF10: 0
PAINLEVEL_OUTOF10: 0

## 2022-04-03 NOTE — PLAN OF CARE
Problem: Falls - Risk of:  Goal: Will remain free from falls  Description: Will remain free from falls  4/3/2022 1018 by Shasta Moon RN  Outcome: Ongoing     Problem: Falls - Risk of:  Goal: Absence of physical injury  Description: Absence of physical injury  4/3/2022 1018 by Shasta Moon RN  Outcome: Ongoing     Problem: Infection:  Goal: Will remain free from infection  Description: Will remain free from infection  4/3/2022 1018 by Shasta Moon RN  Outcome: Ongoing     Problem: Safety:  Goal: Free from accidental physical injury  Description: Free from accidental physical injury  4/3/2022 1018 by Shasta Moon RN  Outcome: Ongoing     Problem: Safety:  Goal: Free from intentional harm  Description: Free from intentional harm  4/3/2022 1018 by Shasta Moon RN  Outcome: Ongoing     Problem: Daily Care:  Goal: Daily care needs are met  Description: Daily care needs are met  4/3/2022 1018 by Shasta Moon RN  Outcome: Ongoing     Problem: Pain:  Goal: Patient's pain/discomfort is manageable  Description: Patient's pain/discomfort is manageable  4/3/2022 1018 by Shasta Moon RN  Outcome: Ongoing     Problem: Pain:  Goal: Pain level will decrease  Description: Pain level will decrease  4/3/2022 1018 by Shasta Moon RN  Outcome: Ongoing     Problem: Pain:  Goal: Control of acute pain  Description: Control of acute pain  4/3/2022 1018 by Shasta Moon RN  Outcome: Ongoing     Problem: Pain:  Goal: Control of chronic pain  Description: Control of chronic pain  4/3/2022 1018 by Shasta Moon RN  Outcome: Ongoing     Problem: Skin Integrity:  Goal: Skin integrity will stabilize  Description: Skin integrity will stabilize  4/3/2022 1018 by Shasta Moon RN  Outcome: Ongoing     Problem: Discharge Planning:  Goal: Patients continuum of care needs are met  Description: Patients continuum of care needs are met  4/3/2022 1018 by Shasta Moon RN  Outcome: Ongoing     Problem: Airway Clearance - Ineffective:  Goal: Ability to maintain a clear airway will improve  Description: Ability to maintain a clear airway will improve  4/3/2022 1018 by Debi Abdi RN  Outcome: Ongoing     Problem: Skin Integrity:  Goal: Will show no infection signs and symptoms  Description: Will show no infection signs and symptoms  4/3/2022 1018 by Debi Abdi RN  Outcome: Ongoing     Problem: Skin Integrity:  Goal: Absence of new skin breakdown  Description: Absence of new skin breakdown  4/3/2022 1018 by Debi Abdi RN  Outcome: Ongoing

## 2022-04-03 NOTE — PROGRESS NOTES
Hospitalist Progress Note      PCP: Sae Rock    Date of Admission: 4/1/2022    Subjective: still feeling weak    Medications:  Reviewed    Infusion Medications    dextrose      sodium chloride      sodium chloride 75 mL/hr at 04/02/22 1715     Scheduled Medications    pravastatin  40 mg Oral Daily    budesonide-formoterol  2 puff Inhalation BID    glipiZIDE  2.5 mg Oral QAM AC    therapeutic multivitamin-minerals  1 tablet Oral Daily    ferrous sulfate  325 mg Oral Daily with breakfast    ARIPiprazole  30 mg Oral Daily    tamsulosin  0.4 mg Oral Daily    buPROPion  150 mg Oral BID    potassium chloride  20 mEq Oral Daily    insulin glargine  30 Units SubCUTAneous Nightly    insulin lispro  15 Units SubCUTAneous TID WC    [Held by provider] torsemide  20 mg Oral Daily    [Held by provider] lisinopril  10 mg Oral Daily    pregabalin  100 mg Oral BID    atenolol  25 mg Oral Daily    sodium chloride flush  5-40 mL IntraVENous 2 times per day    enoxaparin  40 mg SubCUTAneous Daily    insulin lispro  0-6 Units SubCUTAneous TID WC    insulin lispro  0-3 Units SubCUTAneous Nightly    montelukast  10 mg Oral Nightly    cetirizine  5 mg Oral Daily    methylphenidate  20 mg Oral BID WC     PRN Meds: glucose, glucagon (rDNA), dextrose, sodium chloride flush, sodium chloride, polyethylene glycol, acetaminophen **OR** acetaminophen, dextrose bolus (hypoglycemia) **OR** dextrose bolus (hypoglycemia)      Intake/Output Summary (Last 24 hours) at 4/2/2022 2306  Last data filed at 4/2/2022 2259  Gross per 24 hour   Intake 120 ml   Output 2400 ml   Net -2280 ml       Physical Exam Performed:    /68   Pulse 68   Temp 96.2 °F (35.7 °C) (Axillary)   Resp 16   Ht 5' 10\" (1.778 m)   Wt 228 lb (103.4 kg)   SpO2 98%   BMI 32.71 kg/m²        Gen: No distress. Alert. Eyes: PERRL. No sclera icterus. No conjunctival injection. ENT: No discharge. Pharynx clear.    Neck: Trachea midline. + tracheostomy  Resp: No accessory muscle use. No crackles. No wheezes. No rhonchi. CV: Regular rate. Regular rhythm. No murmur. No rub. + BLE edema. GI: Non-tender. Non-distended. Normal bowel sounds. No hernia. Skin: Warm and dry. No nodule on exposed extremities. No rash on exposed extremities. M/S: No cyanosis. No joint deformity. No clubbing. Neuro: Awake. Grossly nonfocal  + chronic lower extremity weakness. Psych: Oriented x 3. No anxiety or agitation.        Labs:   Recent Labs     04/01/22  0542   WBC 8.9   HGB 14.6   HCT 43.3        Recent Labs     04/01/22  0542 04/02/22  0610   * 136   K 4.9 4.2   CL 96* 102   CO2 22 25   BUN 60* 54*   CREATININE 2.2* 1.8*   CALCIUM 9.8 8.7     Recent Labs     04/01/22  0542   AST 31   ALT 37   BILITOT 0.6   ALKPHOS 141*     No results for input(s): INR in the last 72 hours. No results for input(s): [place] Dec in the last 72 hours. Urinalysis:      Lab Results   Component Value Date    NITRU Negative 04/01/2022    WBCUA 0-2 08/29/2016    BACTERIA 1+ 03/05/2016    RBCUA None seen 08/29/2016    BLOODU Negative 04/01/2022    SPECGRAV 1.025 04/01/2022    GLUCOSEU Negative 04/01/2022    GLUCOSEU 500 04/08/2012       Radiology:  CT HEAD WO CONTRAST   Final Result   Stable CT scan of the head without acute intracranial abnormality. Assessment/Plan:    Active Hospital Problems    Diagnosis     Physical debility [R53.81]     Recurrent falls [R29.6]     Acute kidney injury superimposed on CKD ([facility] [address] [N17.9, N18.9]          ASSESSMENT/PLAN:  Multiple falls, weakness  - PT/OT eval  - SNF placement     TRICIA on CKD stage 3a  - Baseline ~ 1.5  - creatinine 2.2 on admission  - started IVF's. Hold Demadex, Lisinopril for now. Monitor labs  - creat improved     H/o CVA  - on Pravastatin     H/o tracheal stenosis  - s/p tracheostomy.      Hypertension  - BP borderline.  Hold Lisinopril     Hyperlipidemia  - on Pravastatin     H/o PE/DVT  - not on AC     Depression  - continue home medication regimen.     DM type 2  - monitor glucose. - Continue Lantus 30 units nightly, Glipizide  - Humalog 15 units TID with meals, SSI coverage. COPD  - stable. No AE  - continue Symbicort, Singulair     Chronic diastolic CHF  - stable. No s/s decompensation  - continue Demadex     BPH  - on Flomax.     Iron deficiency anemia  - continue ferrous sulfate       DVT Prophylaxis: Lovenox  Diet: ADULT DIET; Regular; 4 carb choices (60 gm/meal)  Code Status: Full Code    PT/OT Eval Status: ordered    Dispo - cont care.  Pt apparently has a skin graft surgery scheduled [facility] Emilia Tatum MD

## 2022-04-03 NOTE — FLOWSHEET NOTE
Pt takes Colace, 2 times daily. Pt unsure of dosage. Pt requesting Colace ordered. MD notified. Waiting for orders. 2220: No new orders.      04/02/22 2145   Treatment Team Notification   Reason for Communication Patient/Family request  (Requesting home medicaton Colace, BID. pt unsure of dosage.)   Team Member Name Raul Sun   Treatment Team Role Attending Provider   Method of Communication Secure Message   Response Waiting for response   Notification Time 2145

## 2022-04-03 NOTE — PROGRESS NOTES
Comprehensive Nutrition Assessment    Type and Reason for Visit:  Initial,Positive Nutrition Screen (wounds)    Nutrition Recommendations/Plan:   contine 4 CCC diet  Added ensure HP TID    Nutrition Assessment:    Pt. nutritionally compromised AEB pt was admitted scooter a hotel d/t falls; he was unable to care for himself and was found to be dehydrated . At risk for further nutrition compromise r/t stage open are on his coccyx . Will continue current diet and HP supps . Malnutrition Assessment:  Malnutrition Status: At risk for malnutrition (Comment)    Context:  Acute Illness     Findings of the 6 clinical characteristics of malnutrition:  Energy Intake:  1 - 75% or less of estimated energy requirements for 7 or more days  Weight Loss:  Unable to assess     Body Fat Loss:  Unable to assess     Muscle Mass Loss:  Unable to assess    Fluid Accumulation:  Unable to assess     Strength:  Not Performed    Estimated Daily Nutrient Needs:  Energy (kcal):  9216-2965  based ~ 15-8=18 kcal/kg cbw; Weight Used for Energy Requirements:  Current     Protein (g):  120-135 based ~ 1.6-1.8 gr/kgcbw; Weight Used for Protein Requirements:  Ideal        Fluid (ml/day):  6228-3407; Method Used for Fluid Requirements:  1 ml/kcal      Nutrition Related Findings:  73 y.o. 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 He was discharged from a nursing facility this past Sunday and has been living in a [address] [place] [address]. He states he does not have anywhere else to live. He fell twice today. When EMS found him he needed assistance getting up and was covered in his own urine. He has not been taking his medications like he should and is having difficulty caring for himself.   improving and inatkes are > 75%;  > 30# loss in 2.5 years      Wounds:  Multiple,Pressure Injury,Stage IV       Current Nutrition Therapies:    ADULT DIET; Regular; 4 carb choices (60 gm/meal)    Anthropometric Measures:  · Height: 5' 10\" (177.8 cm)  · Current Body Weight: 231 lb (104.8 kg)   · Admission Body Weight: 231 lb (104.8 kg)    · Usual Body Weight: 265 lb (120.2 kg) (reported in 2019 was 262)     · Ideal Body Weight: 166 lbs; % Ideal Body Weight 139.2 %   · BMI: 33.1  · Adjusted Body Weight:  ; No Adjustment   · Adjusted BMI:      · BMI Categories: Obese Class 1 (BMI 30.0-34. 9)       Nutrition Diagnosis:   · Inadequate protein-energy intake related to inadequate protein-energy intake,psychological cause or life stress,increase demand for energy/nutrients as evidenced by wounds,weight loss      Nutrition Interventions:   Food and/or Nutrient Delivery:  Continue Current Diet,Start Oral Nutrition Supplement  Nutrition Education/Counseling:  No recommendation at this time   Coordination of Nutrition Care:  Continue to monitor while inpatient    Goals:  pt will continue to consme > 75% of meals and will acept > 50% of ensure HP       Nutrition Monitoring and Evaluation:   Behavioral-Environmental Outcomes:  None Identified   Food/Nutrient Intake Outcomes:  Supplement Intake,Food and Nutrient Intake  Physical Signs/Symptoms Outcomes:  Biochemical Data,Skin,Weight,Nutrition Focused Physical Findings     Discharge Planning:     Too soon to determine     Electronically signed by Dalia Chen RD, LD on 4/3/22 at 3:53 PM EDT    Contact: [phone]

## 2022-04-03 NOTE — FLOWSHEET NOTE
04/03/22 0700   Handoff   Communication Given Shift Handoff   Handoff Given To ALMITA   Handoff Received From [facility] Communication Face to Face; At bedside   Time Handoff Given 0700   End of Shift Check Performed Yes

## 2022-04-03 NOTE — PLAN OF CARE
Problem: Falls - Risk of:  Goal: Will remain free from falls  Description: Will remain free from falls  4/3/2022 0256 by Cb Roberts RN  Outcome: Ongoing  4/2/2022 1300 by Lyudmila Martinez RN  Outcome: Ongoing  Goal: Absence of physical injury  Description: Absence of physical injury  4/3/2022 0256 by Cb Roberts RN  Outcome: Ongoing  4/2/2022 1300 by Lyudmila Martinez RN  Outcome: Ongoing     Problem: Infection:  Goal: Will remain free from infection  Description: Will remain free from infection  4/3/2022 0256 by Cb Roberts RN  Outcome: Ongoing  4/2/2022 1300 by Lyudmila Martinez RN  Outcome: Ongoing     Problem: Safety:  Goal: Free from accidental physical injury  Description: Free from accidental physical injury  4/3/2022 0256 by Cb Roberts RN  Outcome: Ongoing  4/2/2022 1300 by Lyudmila Martinez RN  Outcome: Ongoing  Goal: Free from intentional harm  Description: Free from intentional harm  4/3/2022 0256 by Cb Roberts RN  Outcome: Ongoing  4/2/2022 1300 by Lyudmila Martinez RN  Outcome: Ongoing     Problem: Daily Care:  Goal: Daily care needs are met  Description: Daily care needs are met  4/3/2022 0256 by Cb Roberts RN  Outcome: Ongoing  4/2/2022 1300 by Lyudmila Martinez RN  Outcome: Ongoing     Problem: Pain:  Goal: Patient's pain/discomfort is manageable  Description: Patient's pain/discomfort is manageable  4/3/2022 0256 by Cb Roberts RN  Outcome: Ongoing  4/2/2022 1300 by Lyudmila Martinez RN  Outcome: Ongoing  Goal: Pain level will decrease  Description: Pain level will decrease  4/3/2022 0256 by Cb Roberts RN  Outcome: Ongoing  4/2/2022 1300 by Lyudmila Martinez RN  Outcome: Ongoing  Goal: Control of acute pain  Description: Control of acute pain  4/3/2022 0256 by Cb Roberts RN  Outcome: Ongoing  4/2/2022 1300 by Lyudmila Martinez RN  Outcome: Ongoing  Goal: Control of chronic pain  Description: Control of chronic pain  4/3/2022 0256 by Cb Armando, RN  Outcome: Ongoing  4/2/2022 1300 by Ag Schwarz RN  Outcome: Ongoing     Problem: Skin Integrity:  Goal: Skin integrity will stabilize  Description: Skin integrity will stabilize  4/3/2022 0256 by Thien Driver RN  Outcome: Ongoing  4/2/2022 1300 by Ag Schwarz RN  Outcome: Ongoing     Problem: Discharge Planning:  Goal: Patients continuum of care needs are met  Description: Patients continuum of care needs are met  4/3/2022 0256 by Thien Driver RN  Outcome: Ongoing  4/2/2022 1300 by Ag Schwarz RN  Outcome: Ongoing     Problem: Airway Clearance - Ineffective:  Goal: Ability to maintain a clear airway will improve  Description: Ability to maintain a clear airway will improve  4/3/2022 0256 by Thien Driver RN  Outcome: Ongoing  4/2/2022 1300 by Ag Schwarz RN  Outcome: Ongoing     Problem: Skin Integrity:  Goal: Will show no infection signs and symptoms  Description: Will show no infection signs and symptoms  4/3/2022 0256 by Thien Driver RN  Outcome: Ongoing  4/2/2022 1300 by Ag Schwarz RN  Outcome: Ongoing  Goal: Absence of new skin breakdown  Description: Absence of new skin breakdown  4/3/2022 0256 by Thien Driver RN  Outcome: Ongoing  4/2/2022 1300 by Ag Schwarz RN  Outcome: Ongoing

## 2022-04-04 PROBLEM — N17.9 ARF (ACUTE RENAL FAILURE) (HCC): Status: ACTIVE | Noted: 2022-04-04

## 2022-04-04 LAB
GLUCOSE BLD-MCNC: 105 MG/DL (ref 70–99)
GLUCOSE BLD-MCNC: 116 MG/DL (ref 70–99)
GLUCOSE BLD-MCNC: 238 MG/DL (ref 70–99)
GLUCOSE BLD-MCNC: 88 MG/DL (ref 70–99)
GLUCOSE BLD-MCNC: 98 MG/DL (ref 70–99)
PERFORMED ON: ABNORMAL
PERFORMED ON: NORMAL
PERFORMED ON: NORMAL

## 2022-04-04 PROCEDURE — 6360000002 HC RX W HCPCS: Performed by: INTERNAL MEDICINE

## 2022-04-04 PROCEDURE — G0378 HOSPITAL OBSERVATION PER HR: HCPCS

## 2022-04-04 PROCEDURE — 2580000003 HC RX 258: Performed by: INTERNAL MEDICINE

## 2022-04-04 PROCEDURE — 1200000000 HC SEMI PRIVATE

## 2022-04-04 PROCEDURE — 96372 THER/PROPH/DIAG INJ SC/IM: CPT

## 2022-04-04 PROCEDURE — 6370000000 HC RX 637 (ALT 250 FOR IP): Performed by: INTERNAL MEDICINE

## 2022-04-04 PROCEDURE — 6370000000 HC RX 637 (ALT 250 FOR IP): Performed by: NURSE PRACTITIONER

## 2022-04-04 PROCEDURE — 99232 SBSQ HOSP IP/OBS MODERATE 35: CPT | Performed by: INTERNAL MEDICINE

## 2022-04-04 PROCEDURE — 92523 SPEECH SOUND LANG COMPREHEN: CPT

## 2022-04-04 RX ORDER — DOCUSATE SODIUM 100 MG/1
100 CAPSULE, LIQUID FILLED ORAL 2 TIMES DAILY
Status: DISCONTINUED | OUTPATIENT
Start: 2022-04-04 | End: 2022-04-13 | Stop reason: HOSPADM

## 2022-04-04 RX ORDER — ASPIRIN 81 MG/1
81 TABLET, CHEWABLE ORAL DAILY
Status: DISCONTINUED | OUTPATIENT
Start: 2022-04-04 | End: 2022-04-04

## 2022-04-04 RX ADMIN — ARIPIPRAZOLE 30 MG: 10 TABLET ORAL at 09:44

## 2022-04-04 RX ADMIN — MONTELUKAST SODIUM 10 MG: 10 TABLET, COATED ORAL at 21:57

## 2022-04-04 RX ADMIN — DOCUSATE SODIUM 100 MG: 100 CAPSULE, LIQUID FILLED ORAL at 21:56

## 2022-04-04 RX ADMIN — PRAVASTATIN SODIUM 40 MG: 40 TABLET ORAL at 09:45

## 2022-04-04 RX ADMIN — Medication 1 TABLET: at 09:45

## 2022-04-04 RX ADMIN — CETIRIZINE HYDROCHLORIDE 5 MG: 10 TABLET ORAL at 09:45

## 2022-04-04 RX ADMIN — PREGABALIN 100 MG: 100 CAPSULE ORAL at 09:45

## 2022-04-04 RX ADMIN — POTASSIUM CHLORIDE 20 MEQ: 1500 TABLET, EXTENDED RELEASE ORAL at 09:45

## 2022-04-04 RX ADMIN — SODIUM CHLORIDE, PRESERVATIVE FREE 10 ML: 5 INJECTION INTRAVENOUS at 21:59

## 2022-04-04 RX ADMIN — PREGABALIN 100 MG: 100 CAPSULE ORAL at 21:55

## 2022-04-04 RX ADMIN — INSULIN GLARGINE 30 UNITS: 100 INJECTION, SOLUTION SUBCUTANEOUS at 22:10

## 2022-04-04 RX ADMIN — FERROUS SULFATE TAB 325 MG (65 MG ELEMENTAL FE) 325 MG: 325 (65 FE) TAB at 09:45

## 2022-04-04 RX ADMIN — BUPROPION HYDROCHLORIDE 150 MG: 150 TABLET, EXTENDED RELEASE ORAL at 21:56

## 2022-04-04 RX ADMIN — ENOXAPARIN SODIUM 40 MG: 40 INJECTION SUBCUTANEOUS at 09:44

## 2022-04-04 RX ADMIN — BUPROPION HYDROCHLORIDE 150 MG: 150 TABLET, EXTENDED RELEASE ORAL at 09:45

## 2022-04-04 RX ADMIN — TAMSULOSIN HYDROCHLORIDE 0.4 MG: 0.4 CAPSULE ORAL at 09:44

## 2022-04-04 RX ADMIN — ATENOLOL 25 MG: 25 TABLET ORAL at 09:44

## 2022-04-04 RX ADMIN — METHYLPHENIDATE HYDROCHLORIDE 20 MG: 10 TABLET ORAL at 12:12

## 2022-04-04 RX ADMIN — METHYLPHENIDATE HYDROCHLORIDE 20 MG: 10 TABLET ORAL at 05:53

## 2022-04-04 RX ADMIN — GLIPIZIDE 2.5 MG: 5 TABLET ORAL at 05:53

## 2022-04-04 ASSESSMENT — PAIN SCALES - GENERAL
PAINLEVEL_OUTOF10: 0
PAINLEVEL_OUTOF10: 7

## 2022-04-04 ASSESSMENT — PAIN DESCRIPTION - FREQUENCY: FREQUENCY: CONTINUOUS

## 2022-04-04 ASSESSMENT — PAIN DESCRIPTION - ORIENTATION: ORIENTATION: RIGHT;LEFT

## 2022-04-04 ASSESSMENT — PAIN DESCRIPTION - DESCRIPTORS: DESCRIPTORS: DISCOMFORT

## 2022-04-04 ASSESSMENT — PAIN DESCRIPTION - PAIN TYPE: TYPE: CHRONIC PAIN

## 2022-04-04 ASSESSMENT — PAIN DESCRIPTION - LOCATION: LOCATION: BUTTOCKS

## 2022-04-04 NOTE — PROGRESS NOTES
Progress Note    Admit Date:  4/1/2022    Subjective:  Mr. Lilliam Colindres seen up in bed , feels ok today . No new issues  Wishes to get Ayaz Said for his tracheostomy today by SLP      Objective:   Patient Vitals for the past 4 hrs:   BP Temp Temp src Pulse Resp SpO2   04/04/22 1319 126/66 96.5 °F (35.8 °C) Oral 60 18 98 %            Intake/Output Summary (Last 24 hours) at 4/4/2022 1343  Last data filed at 4/4/2022 1038  Gross per 24 hour   Intake --   Output 2500 ml   Net -2500 ml       Physical Exam:        General: middle aged obese male  Awake, alert and oriented. Appears to be not in any distress  Mucous Membranes:  Pink , anicteric  Tracheostomy with capped trach noted  Neck: No JVD, no carotid bruit, no thyromegaly  Chest:  Clear to auscultation bilaterally, diminished in bases  Cardiovascular:  RRR S1S2 heard, no murmurs or gallops  Abdomen:  Soft, obese undistended, non tender, no organomegaly, BS present  Extremities: left heel almost healed linear ulcer with no infection noted   No edema or cyanosis.  Distal pulses well felt  Neurological : grossly normal            Medications:  insulin lispro, 10 Units, TID WC  pravastatin, 40 mg, Daily  budesonide-formoterol, 2 puff, BID  glipiZIDE, 2.5 mg, QAM AC  therapeutic multivitamin-minerals, 1 tablet, Daily  ferrous sulfate, 325 mg, Daily with breakfast  ARIPiprazole, 30 mg, Daily  tamsulosin, 0.4 mg, Daily  buPROPion, 150 mg, BID  potassium chloride, 20 mEq, Daily  insulin glargine, 30 Units, Nightly  torsemide, 20 mg, Daily  lisinopril, 10 mg, Daily  pregabalin, 100 mg, BID  atenolol, 25 mg, Daily  sodium chloride flush, 5-40 mL, 2 times per day  enoxaparin, 40 mg, Daily  insulin lispro, 0-6 Units, TID WC  insulin lispro, 0-3 Units, Nightly  montelukast, 10 mg, Nightly  cetirizine, 5 mg, Daily  methylphenidate, 20 mg, BID WC      PRN Medications:  glucose, 15 g, PRN  glucagon (rDNA), 1 mg, PRN  dextrose, 100 mL/hr, PRN  sodium chloride flush, 5-40 mL, PRN  sodium chloride, 250 mL, PRN  polyethylene glycol, 17 g, Daily PRN  acetaminophen, 650 mg, Q6H PRN   Or  acetaminophen, 650 mg, Q6H PRN  dextrose bolus (hypoglycemia), 125 mL, PRN   Or  dextrose bolus (hypoglycemia), 250 mL, PRN        Data:  CBC:   Recent Labs     04/03/22  1212   WBC 4.7   HGB 12.3*   HCT 36.8*   MCV 81.9   *     BMP:   Recent Labs     04/02/22  0610 04/03/22  1213    141   K 4.2 4.2    108   CO2 25 24   BUN 54* 28*   CREATININE 1.8* 1.2       CULTURES  COVID 19 Not detected        RADIOLOGY  CT HEAD WO CONTRAST   Final Result   Stable CT scan of the head without acute intracranial abnormality. Assessment/Plan: TRICIA on CKD stage 3a  - Baseline ~ 1.5  - creatinine 2.2 on admission  - Given IVF's. -demadex and ACEI was held   - creat improved to 1.2 today  - can resume meds in am     Multiple falls, Generalized  weakness  - PT/OT eval  - SNF placement recommended         H/o CVA  - on ASA and  Pravastatin     H/o tracheal stenosis  - s/p tracheostomy. And scheduled for [facility] at [facility] by SLP today  - consulted SLP today       Hypertension  -BP borderline  -lisinopril held and resumed now          H/o PE/DVT  - not on AC     Depression  - continue home medication regimen.     DM type 2  - monitor glucose. - Continue Lantus 30 units nightly, Glipizide  - Humalog 10 units TID with meals, SSI coverage. COPD  - stable. No AE  - continue Symbicort, Singulair     Chronic diastolic CHF  - stable. No s/s decompensation  - continue Demadex     BPH  - on Flomax.     Iron deficiency anemia  - continue ferrous sulfate    DVT Prophylaxis: lovenox   Diet: ADULT DIET; Regular; 4 carb choices (60 gm/meal)  ADULT ORAL NUTRITION SUPPLEMENT; Breakfast, Lunch, Dinner;  Low Calorie/High Protein Oral Supplement  Code Status: Full Code      Dc planning to Angelo Pierce MD, 4/4/2022 2:17 PM

## 2022-04-04 NOTE — PLAN OF CARE
Problem: Neurological  Intervention: Speech Evaluation/treatment  Note: SLP completed evaluation. Please refer to notes in EMR.      Rogelio Abdi M.A., Reshma Clark #[id number]  Speech-Language Pathologist

## 2022-04-04 NOTE — FLOWSHEET NOTE
04/04/22 0303   Handoff   Communication Given Shift Handoff   Handoff Given To KEELY   Handoff Received From [facility] Communication Face to Face; At bedside   Time Handoff Given 0303   End of Shift Check Performed Yes

## 2022-04-04 NOTE — PROGRESS NOTES
Consult has been called to Dr. Jeffery Huston on 4/4/22. Spoke with perfect served dr Jeffery Huston.  4:51 PM    [facility]  4/4/2022

## 2022-04-04 NOTE — PLAN OF CARE
Problem: Falls - Risk of:  Goal: Will remain free from falls  Description: Will remain free from falls  Outcome: Ongoing  Goal: Absence of physical injury  Description: Absence of physical injury  Outcome: Ongoing     Problem: Infection:  Goal: Will remain free from infection  Description: Will remain free from infection  Outcome: Ongoing     Problem: Safety:  Goal: Free from accidental physical injury  Description: Free from accidental physical injury  Outcome: Ongoing  Goal: Free from intentional harm  Description: Free from intentional harm  Outcome: Ongoing     Problem: Daily Care:  Goal: Daily care needs are met  Description: Daily care needs are met  Outcome: Ongoing     Problem: Pain:  Goal: Patient's pain/discomfort is manageable  Description: Patient's pain/discomfort is manageable  Outcome: Ongoing  Goal: Pain level will decrease  Description: Pain level will decrease  Outcome: Ongoing  Goal: Control of acute pain  Description: Control of acute pain  Outcome: Ongoing  Goal: Control of chronic pain  Description: Control of chronic pain  Outcome: Ongoing     Problem: Skin Integrity:  Goal: Skin integrity will stabilize  Description: Skin integrity will stabilize  Outcome: Ongoing     Problem: Discharge Planning:  Goal: Patients continuum of care needs are met  Description: Patients continuum of care needs are met  Outcome: Ongoing     Problem: Airway Clearance - Ineffective:  Goal: Ability to maintain a clear airway will improve  Description: Ability to maintain a clear airway will improve  Outcome: Ongoing     Problem: Skin Integrity:  Goal: Will show no infection signs and symptoms  Description: Will show no infection signs and symptoms  Outcome: Ongoing  Goal: Absence of new skin breakdown  Description: Absence of new skin breakdown  Outcome: Ongoing     Problem: Nutrition  Goal: Optimal nutrition therapy  4/4/2022 0038 by Todd Levy RN  Outcome: Ongoing  4/3/2022 1556 by Jim Ferrara RD, LD  Outcome: Ongoing

## 2022-04-04 NOTE — FLOWSHEET NOTE
04/03/22 2118   Vital Signs   Temp 98.1 °F (36.7 °C)   Temp Source Oral   Pulse 77   Heart Rate Source Monitor   Resp 16   BP (!) 156/75   BP Location Left upper arm   Patient Position Semi fowlers   Level of Consciousness Alert (0)   MEWS Score 1   Patient Currently in Pain Denies   Pain Assessment   Pain Assessment 0-10   Pain Level 0   Oxygen Therapy   SpO2 94 %   O2 Device None (Room air)     Pt A/Ox4. VSS. Denies pain. Emergency trach at bedside. Pt unlabored; respirations even, regular, effortless. RA. No distress noted. Shift assessment complete. See flowsheet. PM meds given. See MAR. NS infusing as prescribed. Blood glucose 224, 1 unit of Humalog given. Changed drsg to sacrum as ordered. Denies needs at this time. Side rails 2/4. Bed alarm on. Bed in low position. Call light within reach.

## 2022-04-04 NOTE — PROGRESS NOTES
Speech Language Pathology  Facility/Department: [facility]  Initial Speech/Language/Cognitive Assessment    NAME: Zack Gallardo  : 1955   MRN: [id number]  ADMISSION DATE: 2022  ADMITTING DIAGNOSIS: has Chronic thrombocytopenia; Chronic respiratory failure with hypoxia on 6 L home O2 tent over trach; Type II diabetes mellitus, well controlled ([address] HTN (hypertension); Non morbid obesity due to excess calories; Anxiety and depression; Tracheostomy dependent ([address] Pain syndrome, chronic; Asthma/COPD; Chronic ischemic heart disease; Bilateral lower extremity edema; Hiatal hernia with GERD; Low back pain; TESS; Calcification of bronchus or trachea; Atypical schizophrenia ([address] 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; Chronic microcytic Iron-deficiency anemia; Chronic dCHF (grade 1 LVDD); Agoraphobia; Arthritis; Claustrophobia; Congenital stenosis of trachea; Pressure ulcer of coccygeal region, stage 4 ([address] Decubitus ulcer of left buttock, stage 2 ([address] Debility; Arthritis of right knee; Submandibular sialolithiasis; Lung nodule; Pressure ulcer of sacral region, stage 3 ([address] Degenerative arthritis of lumbar spine; Ambulatory dysfunction; Pressure ulcer of buttock, stage 2; Cellulitis of buttock; Acute kidney injury superimposed on CKD ([address] Hyperlipidemia; Normocytic anemia; Recurrent falls; Physical debility; H/O: CVA (cerebrovascular accident); and ARF (acute renal failure) ([address] on their problem list.  DATE ONSET: Pt admitted to [facility] on 2022    Date of Eval: 2022   Evaluating Therapist: IRASEMA Pineda  RECENT RESULTS  CT OF HEAD/MRI:2022  Impression   Stable CT scan of the head without acute intracranial abnormality. Primary Complaint: Per admitting MD H&P: \"The patient is a 73 y.o. male with h/o CVA, hypertension, hyperlipidemia, h/o DVT/PE, DM, Gout, COPD, CHF, h/o tracheal stenosis s/p tracheostomy who presents to [facility] with c/o multiple falls.   He was discharged from rehab and unable to walk. He is living in a hotel. He as fallen twice. He has not been taking his medications or been able to care for himself.       Vitals stable. On RA. Labs with TRICIA. Baseline 1.5. Creatinine 2.2 on admission. CT head negative. Admitted to med-surg. Pain:  Pain Assessment  Pain Assessment: 0-10  Pain Level: 0  Pain Type: Acute pain  Pain Location: Sacrum  Pain Orientation: Left,Right  Pain Descriptors: Discomfort  Pain Frequency: Continuous  Response to Pain Intervention: Patient Satisfied    Assessment:  I was consulted for what I initially thought was a speaking valve eval, however, the patient requested izzy button placement. The patient is a chronic trach patient with reported placement since 1999 s/p tracheal stenosis. Pt has hx of sleep apnea, COPD, and CVA. The patient currently has a Shiley #4 uncuffed trach that is capped. The patient's stoma is much larger for trach size, therefore, when patient speaks, a significant amount of air leak is noted around the trach from the stoma. The patient reports that last year, at [facility], it was suggested he use a izzy button with valve in place of trach. The patient does not have a izzy tube. He initially stated that, at [facility], they removed the trach and placed the izzy button with hemostats. Given that pt does not remain decannulated, I do not feel comfortable trialing this without ENT consult. The patient was asking SLP to decannulate him, place the izzy button in the open stoma, and leave trach out. Patient is not appropriate for this type of trial without ENT on site. I have requested ENT consult and am happy to attempt with ENT go-ahead and/or supervision. Assessed speech intelligibility with capped trach. Pt is 100% intelligible with air leak noted. He reports not having ENT follow-up in last year. He lives at home and is wheelchair bound and reports lack of transportation.  Pt would benefit from ENT assessment regardless of Charolotte Delay Button placement. Bhargav Crooks MD aware. I do note in [place] that pt has seen SLP from otolaryngology at          Recommendations:  Cont with trach Shiley #4 capped pending ENT consult. Pt now reports his equipment (izzy button, speaking valve, filters were ordered by him and he was only given same from ?)    Also reached out to [facility] SLP that patient has seen in otolaryngology department. Plan: Will continue to see pt once ENT consult complete. Goals: TBD      Patient/family involved in developing goals and treatment plan: Yes    Subjective:   Previous level of function and limitations: Pt lived at home alone. Concern for ability to care for self. Wheelchair bound and reliant for transportation     Objective: See above    Cognition: DNT        Additional Assessments: N/A     Prognosis: TBD       Education: Education completed re: Role of SLP and POC.  Pt v/u          Therapy Time:   Individual   Time In  [facility] [address]   Minutes  [place] [address]   Rogelio Abdi M.A., Reshma Clark #[id number]  Speech-Language Pathologist  Phone: [phone]    4/4/2022 4:34 PM

## 2022-04-04 NOTE — PROGRESS NOTES
Hospitalist Progress Note      PCP: Debbie Llanos    Date of Admission: 4/1/2022    Subjective: still very weak, not feeling better    Medications:  Reviewed    Infusion Medications    dextrose      sodium chloride      sodium chloride 75 mL/hr at 04/03/22 1727     Scheduled Medications    pravastatin  40 mg Oral Daily    budesonide-formoterol  2 puff Inhalation BID    glipiZIDE  2.5 mg Oral QAM AC    therapeutic multivitamin-minerals  1 tablet Oral Daily    ferrous sulfate  325 mg Oral Daily with breakfast    ARIPiprazole  30 mg Oral Daily    tamsulosin  0.4 mg Oral Daily    buPROPion  150 mg Oral BID    potassium chloride  20 mEq Oral Daily    insulin glargine  30 Units SubCUTAneous Nightly    insulin lispro  15 Units SubCUTAneous TID WC    [Held by provider] torsemide  20 mg Oral Daily    [Held by provider] lisinopril  10 mg Oral Daily    pregabalin  100 mg Oral BID    atenolol  25 mg Oral Daily    sodium chloride flush  5-40 mL IntraVENous 2 times per day    enoxaparin  40 mg SubCUTAneous Daily    insulin lispro  0-6 Units SubCUTAneous TID WC    insulin lispro  0-3 Units SubCUTAneous Nightly    montelukast  10 mg Oral Nightly    cetirizine  5 mg Oral Daily    methylphenidate  20 mg Oral BID WC     PRN Meds: glucose, glucagon (rDNA), dextrose, sodium chloride flush, sodium chloride, polyethylene glycol, acetaminophen **OR** acetaminophen, dextrose bolus (hypoglycemia) **OR** dextrose bolus (hypoglycemia)      Intake/Output Summary (Last 24 hours) at 4/4/2022 0029  Last data filed at 4/3/2022 2015  Gross per 24 hour   Intake --   Output 1550 ml   Net -1550 ml       Physical Exam Performed:    BP (!) 156/75   Pulse 77   Temp 98.1 °F (36.7 °C) (Oral)   Resp 16   Ht 5' 10\" (1.778 m)   Wt 231 lb 8 oz (105 kg)   SpO2 94%   BMI 33.22 kg/m²     Gen: No distress. Alert. Eyes: PERRL. No sclera icterus. No conjunctival injection. ENT: No discharge. Pharynx clear.    Milady Simons midline. + tracheostomy  Resp: No accessory muscle use. No crackles. No wheezes. No rhonchi. CV: Regular rate. Regular rhythm. No murmur.  No rub. + BLE edema. GI: Non-tender. Non-distended. Normal bowel sounds. No hernia. Skin: Warm and dry. No nodule on exposed extremities. No rash on exposed extremities. M/S: No cyanosis. No joint deformity. No clubbing. Neuro: Awake. Grossly nonfocal  + chronic lower extremity weakness. Psych: Oriented x 3. No anxiety or agitation.        Labs:   Recent Labs     04/01/22  0542 04/03/22  1212   WBC 8.9 4.7   HGB 14.6 12.3*   HCT 43.3 36.8*    121*     Recent Labs     04/01/22  0542 04/02/22  0610 04/03/22  1213   * 136 141   K 4.9 4.2 4.2   CL 96* 102 108   CO2 22 25 24   BUN 60* 54* 28*   CREATININE 2.2* 1.8* 1.2   CALCIUM 9.8 8.7 9.0     Recent Labs     04/01/22  0542   AST 31   ALT 37   BILITOT 0.6   ALKPHOS 141*     No results for input(s): INR in the last 72 hours. No results for input(s): [place] Comment in the last 72 hours. Urinalysis:      Lab Results   Component Value Date    NITRU Negative 04/01/2022    WBCUA 0-2 08/29/2016    BACTERIA 1+ 03/05/2016    RBCUA None seen 08/29/2016    BLOODU Negative 04/01/2022    SPECGRAV 1.025 04/01/2022    GLUCOSEU Negative 04/01/2022    GLUCOSEU 500 04/08/2012       Radiology:  CT HEAD WO CONTRAST   Final Result   Stable CT scan of the head without acute intracranial abnormality. Assessment/Plan:    Active Hospital Problems    Diagnosis     Physical debility [R53.81]     Recurrent falls [R29.6]     Acute kidney injury superimposed on CKD [address] [address] [N17.9, N18.9]          Multiple falls, weakness  - PT/OT eval  - SNF placement     TRICIA on CKD stage 3a  - Baseline ~ 1.5  - creatinine 2.2 on admission  - started IVF's.  Hold Demadex, Lisinopril for now.  Monitor labs  - creat improved     H/o CVA  - on Pravastatin     H/o tracheal stenosis  - s/p tracheostomy.      Hypertension  - BP borderline. Hold Lisinopril     Hyperlipidemia  - on Pravastatin     H/o PE/DVT  - not on AC     Depression  - continue home medication regimen.     DM type 2  - monitor glucose. - Continue Lantus 30 units nightly, Glipizide  - Humalog 15 units TID with meals, SSI coverage. COPD  - stable. No AE  - continue Symbicort, Singulair     Chronic diastolic CHF  - stable. No s/s decompensation  - continue Demadex     BPH  - on Flomax.     Iron deficiency anemia  - continue ferrous sulfate        DVT Prophylaxis: Lovenox  Diet: ADULT DIET; Regular; 4 carb choices (60 gm/meal)  ADULT ORAL NUTRITION SUPPLEMENT; Breakfast, Lunch, Dinner; Low Calorie/High Protein Oral Supplement  Code Status: Full Code    PT/OT Eval Status: ordered, needs acute rehab    Dispo - plan dc to ARU, but pt apparently has a skin graft planned at [address] on Tuesday and ARU cannot transport patient back and both and want procedures done prior to accepting pt.  Might need to transfer to Antelmo Rodríguez MD

## 2022-04-04 NOTE — CARE COORDINATION
INTERDISCIPLINARY PLAN OF CARE CONFERENCE    Date/Time: 4/4/2022 3:25 PM  Completed by: Vilma Arevalo RN, Case Management      Patient Name:  Nishant Palacios  YOB: 1955  Admitting Diagnosis: Tracheostomy dependent ([facility] [address] [Z93.0]  Physical debility [R53.81]  Recurrent falls [R29.6]  Acute renal failure, unspecified acute renal failure type ([facility] [address] [facility] [N17.9]  ARF (acute renal failure) ([facility] [address] [facility] [N17.9]     Admit Date/Time:  4/1/2022  5:27 AM    Chart reviewed. Interdisciplinary team contacted or reviewed plan related to patient progress and discharge plans. Disciplines included Case Management, Nursing, and Dietitian. Current Status: Stable  PT/OT recommendation for discharge plan of care: ARU    Expected D/C Disposition:  TBD  Confirmed plan with patient and/or family Yes confirmed with: (name)  patient  Met with: patient  Discharge Plan Comments:  Reviewed chart and met with pt who is having trach plugged. Spoke with Mehdi Meraz at [facility] who states will not be able to accept pt at dc as he signed out AMA last time and was unsafe dc. Spoke with [facility] ARU who after review states unable to Accept as does not have safe dcp.     Home O2 in place on admit: No  Pt informed of need to bring portable home O2 tank on day of discharge for nursing to connect prior to leaving:  Not Indicated  Verbalized agreement/Understanding:  Not Indicated

## 2022-04-04 NOTE — PROGRESS NOTES
Patient not able to complete tasks OR requires use of assistive device. Patient is MOBILITY LEVEL 3.        Mobility Level- 2

## 2022-04-04 NOTE — CONSULTS
[address] Continence Nurse  Consult Note       NAME:  Damir Noyola RECORD NUMBER:  [id number]  AGE: 73 y.o. GENDER: male  : 1955  TODAY'S DATE:  2022    Subjective Pt is alert and oriented, able to turn self in bed for assessment. Reason for WOCN Evaluation and Assessment: sacrum, left thigh, left foot      Sergio Bello is a 73 y.o. male referred by:   [x] Physician  [x] Nursing  [] Other:     Wound Identification:  Wound Type: diabetic, pressure and traumatic  Contributing Factors: diabetes, poor glucose control, chronic pressure, decreased mobility and shear force    Pt seen for wound care. States he lives in a hotel currently. He is followed by Dr Ross Johnson at [facility] and Dr Ross Johnson from [facility]. Seen here for left foot wound per Podiatry. Patient Goal of Care:  [x] Wound Healing  [] Odor Control  [] Palliative Care  [] Pain Control   [] Other:         PAST MEDICAL HISTORY        Diagnosis Date    Abscess or cellulitis of leg 2012    Acute renal failure ([address] 4/10/2012    Acute tracheitis with airway obstruction     Angina pectoris ([facility])     ARF (acute renal failure) ([facility]) 2018    Arthritis     Asthma     Cellulitis of buttock     Cellulitis of sacral region     CHF (congestive heart failure) ([facility])     Claustrophobia     Colitis     COPD (chronic obstructive pulmonary disease) ([facility])     Decubitus ulcer     Decubitus ulcer of left buttock, stage 2 ([address] 10/17/2017    [facility] mellitus [facility]     Diverticulitis     DVT (deep venous thrombosis) ([facility])     Gangrene ([facility])     on lower back to upper thigh    Gout     HCAP (healthcare-associated pneumonia)     Hx of blood clots     pt. states both PE/DVT    Hyperlipidemia     Hypertension     Low iron     MRSA (methicillin resistant staph aureus) culture positive     tracheobronchitis, hx per pt.     MRSA (methicillin resistant staph aureus) culture positive 03/15/2019    buttocks    Panic attack     Pressure ulcer of coccygeal region, stage 2 ([address] 2016    Pressure ulcer of left heel, stage 2 ([address] 10/10/2017    [facility]acheostomy infection [facility] 11/3/2016    Tracheostomy infection [facility]         Unspecified cerebral artery occlusion with cerebral infarction     UTI (urinary tract infection)        PAST SURGICAL HISTORY    Past Surgical History:   Procedure Laterality Date    BACK SURGERY      ENDOSCOPY, COLON, DIAGNOSTIC      KNEE SURGERY      X 4    LUMBAR FUSION      LUMBAR LAMINECTOMY      OTHER SURGICAL HISTORY Right 2017    Pain Pump insertion    ID [id number] W/BRNCL ALVEOLAR LAVAGE N/A 2018    BRONCHOSCOPY ALVEOLAR LAVAGE performed by Arron Palacio MD at [address] [address]      x2    TONSILLECTOMY AND ADENOIDECTOMY      TOTAL KNEE ARTHROPLASTY      TRACHEOSTOMY      over 27 trach operations due to MRSA infections in the trach    UPPER GASTROINTESTINAL ENDOSCOPY  16    removal of foreign body    UVULOPALATOPHARYGOPLASTY      VENA CAVA FILTER PLACEMENT         FAMILY HISTORY    Family History   Problem Relation Age of Onset    High Blood Pressure Mother     High Blood Pressure Father     Diabetes Sister     Hypothyroidism Sister     Alzheimer's Disease Maternal Grandmother     Migraines Maternal Grandfather     Heart Attack Maternal Grandfather     Cancer Paternal Grandmother         lymph node    Diabetes Paternal Grandfather        SOCIAL HISTORY    Social History     Tobacco Use    Smoking status: Former Smoker     Packs/day: 0.25     Years: 10.00     Pack years: 2.50     Types: Cigars     Quit date: 2000     Years since quittin.2    Smokeless tobacco: Never Used   Vaping Use    Vaping Use: Never used   Substance Use Topics    Alcohol use: No    Drug use: No       ALLERGIES    Allergies   Allergen Reactions    Aspartame And Phenylalanine Anaphylaxis    Cefuroxime Axetil Anaphylaxis    Clindamycin Hcl tablet Take 400 mg by mouth daily      Tamsulosin HCl (FLOMAX PO) Take 0.4 mg by mouth daily      zafirlukast (ACCOLATE) 20 MG tablet Take 20 mg by mouth 2 times daily      sodium chloride 0.9 % SOLN 40 mL with SUFentanil 50 MCG/ML SOLN 5 mcg/mL continuous Pt unsure of delivered dose      ARIPiprazole (ABILIFY) 30 MG tablet Take 30 mg by mouth daily       ferrous sulfate 325 (65 Fe) MG tablet Take 325 mg by mouth daily (with breakfast)      lidocaine (XYLOCAINE) 2 % jelly Apply topically as needed for Trach change 1 Tube 0    Multiple Vitamins-Minerals (THERAPEUTIC MULTIVITAMIN-MINERALS) tablet Take 1 tablet by mouth daily      zinc gluconate 50 MG tablet Take 100 mg by mouth 2 times daily       ADVAIR DISKUS 250-50 MCG/DOSE AEPB Inhale 1 puff into the lungs 2 times daily      glipiZIDE (GLUCOTROL XL) 10 MG extended release tablet Take 10 mg by mouth 2 times daily      pravastatin (PRAVACHOL) 40 MG tablet Take 40 mg by mouth daily.            Objective    /66   Pulse 60   Temp 96.5 °F (35.8 °C) (Oral)   Resp 18   Ht 5' 10\" (1.778 m)   Wt 231 lb 8 oz (105 kg)   SpO2 98%   BMI 33.22 kg/m²     LABS:  WBC:    Lab Results   Component Value Date    WBC 4.7 04/03/2022     H/H:    Lab Results   Component Value Date    HGB 12.3 04/03/2022    HCT 36.8 04/03/2022     PTT:    Lab Results   Component Value Date    APTT 28.5 04/09/2012   [APTT}  PT/INR:    Lab Results   Component Value Date    PROTIME 12.5 03/16/2017    INR 1.11 03/16/2017     HgBA1c:    Lab Results   Component Value Date    LABA1C 7.2 03/15/2019       Assessment   Ramirez Risk Score: Ramirez Scale Score: 14    Patient Active Problem List   Diagnosis Code    Chronic thrombocytopenia D69.6    Chronic respiratory failure with hypoxia on 6 L home O2 tent over trach J96.11    Type II diabetes mellitus, well controlled ([facility] E11.9    HTN (hypertension) I10    Non morbid obesity due to excess calories E66.09    Anxiety and depression F41.9, F32.A    Tracheostomy dependent ([facility] Z93.0    Pain syndrome, chronic G89.4    Asthma/COPD J44.9    Chronic ischemic heart disease I25.9    Bilateral lower extremity edema R60.0    Hiatal hernia with GERD K21.9, K44.9    Low back pain M54.50    TESS G47.33    Calcification of bronchus or trachea J39.8, J98.09    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    CKD2/3 N18.30    Chronic microcytic Iron-deficiency anemia D50.9    Chronic dCHF (grade 1 LVDD) I50.32    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    Arthritis M19.90    Claustrophobia F40.240    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    Pressure ulcer of coccygeal region, stage 4 ([facility]) L89.154    Decubitus ulcer of left buttock, stage 2 (HCC) L89.322    Debility R53.81    Arthritis of right knee M17.11    Submandibular sialolithiasis K11.5    Lung nodule R91.1    Pressure ulcer of sacral region, stage 3 ([facility]) L89.153    Degenerative arthritis of lumbar spine M47.816    Ambulatory dysfunction R26.2    Pressure ulcer of buttock, stage 2 L89.302    Cellulitis of buttock L03.317    Acute kidney injury superimposed on CKD (HCC) N17.9, N18.9    Hyperlipidemia E78.5    Normocytic anemia D64.9    Recurrent falls R29.6    Physical debility R53.81    H/O: CVA (cerebrovascular accident) Z80.78    ARF (acute renal failure) ([facility]) N17.9       Measurements:  Wound 01/30/19 Coccyx Mid #23, Coccyx, Pressure, Stage 4, Onset 5/2017, pressure (Active)   Number of days: 1160       Wound 03/06/19 Sacrum # 25, Sacrum, Pressure, Stage 3, onset 2/28/19, pressure (Active)   Number of days: 1125       Wound 03/06/19 Buttocks Left # 26, Left Buttocks, Pressure, Stage 2, Onset 2/28/19, pressure (Active)   Number of days: 1125       Wound 03/06/19 Buttocks Right #27, Right Buttocks, pressure, stage 2, onset 2/27/19, pressure (Active)   Number of days: 1125       Wound 04/01/22 Sacrum Right unstageable (Active)   Wound Image   04/02/22 2119   Wound Etiology Pressure Unstageable 04/04/22 5814   Dressing Status Intact; Old drainage noted 04/04/22 0954   Wound Cleansed Cleansed with saline 04/03/22 2142   Dressing/Treatment Foam 04/04/22 0954   Wound Assessment [facility] 04/04/22 0954   Drainage Amount Small 04/04/22 0954   Drainage Description Purulent 04/03/22 2142   Odor Moderate 04/04/22 0954   Margins Defined edges 04/03/22 2142   Number of days: 3      Left plantar foot:  0.5x1.5x0.1cm, 100% pink moist tissue, shallow. Partial thickness. Left lateral foot:  0.5x0.7cm, 100% dark purple nonblanchable discoloration. Surrounding skin pink but blanchable. Present on admission, deep tissue injury     Left upper thigh:  1x4.5x0.1cm, 20% yellow adherent tissue, 80% red moist bleeding tissue. Maturation tissue at edges. Traumatic wound per pt. Pt states he fell out of his w/c and got caught between furniture and his w/c.      sacrum:  4x3x0.7cm, Unstageable pressure injury, POA. 100% yellow and brown nonviable tissue. No soi. surrounding skin slightly red but blanchable. Response to treatment:  Well tolerated by patient. Pain Assessment:  Severity:  0 / 10  Quality of pain: N/A  Wound Pain Timing/Severity: none  Premedicated: N/A    Plan  Sacrum and left upper thigh:  Cleanse wounds with NS, pat dry. Apply Alginate Ag and foam, change every 3 days and prn. Left plantar foot:  Cleanse wounds with NS, pat dry. Apply Alginate Ag  and cover with dry gauze. Change every 3 days and prn. Secure  with roll gauze and ace per pt preference. Encouraged pt to turn every 2 hours and prn. ZARIA pump to end of bed   Plan of Care: Wound 04/01/22 Sacrum Right unstageable-Dressing/Treatment: Foam    Specialty Bed Required : Yes   [x] Low Air Loss  Add pump to bed  [] Pressure Redistribution  [] Fluid Immersion  [] Bariatric  [] Total Pressure Relief  [] Other:     Current Diet: ADULT DIET; Regular; 4 carb choices (60 gm/meal)  ADULT ORAL NUTRITION SUPPLEMENT; Breakfast, Lunch, Dinner;  Low Calorie/High Protein Oral Supplement  Dietician consult:  Yes    Discharge Plan:  Placement for patient upon discharge: skilled nursing    Patient appropriate for Outpatient [address] [facility] [address]: Yes-pt at [facility]    Referrals:  [x]   [] [facility]  [] Supplies  [] Other    Patient/Caregiver Teaching:  Level of patient/caregiver understanding able to:   [] Indicates understanding       [] Needs reinforcement  [] Unsuccessful      [x] Verbal Understanding  [] Demonstrated understanding       [] No evidence of learning  [] Refused teaching         [] N/A       Electronically signed by Tr Silverman RN, CWOCN on 4/4/2022 at 6:45 PM

## 2022-04-04 NOTE — PROGRESS NOTES
Occupational Therapy  Attempted treatment this morning. Patient having pain at wound site. Requesting to wait to transfer out of bed later this date. OT will follow up as schedule allows.        SHARAD GuerreroR/ENRIQUETA #[id number]

## 2022-04-04 NOTE — PLAN OF CARE
Problem: Falls - Risk of:  Goal: Will remain free from falls  Description: Will remain free from falls  4/4/2022 0038 by Mitesh Tam RN  Outcome: Ongoing  Goal: Absence of physical injury  Description: Absence of physical injury  4/4/2022 1055 by Navin Shah RN  Outcome: Ongoing  4/4/2022 0038 by Mitesh Tam RN  Outcome: Ongoing     Problem: Infection:  Goal: Will remain free from infection  Description: Will remain free from infection  4/4/2022 0038 by Mitesh Tam RN  Outcome: Ongoing     Problem: Safety:  Goal: Free from accidental physical injury  Description: Free from accidental physical injury  4/4/2022 0038 by Mitesh Tam RN  Outcome: Ongoing  Goal: Free from intentional harm  Description: Free from intentional harm  4/4/2022 0038 by Mitesh Tam RN  Outcome: Ongoing     Problem: Daily Care:  Goal: Daily care needs are met  Description: Daily care needs are met  4/4/2022 0038 by Mitesh Tam RN  Outcome: Ongoing     Problem: Pain:  Goal: Patient's pain/discomfort is manageable  Description: Patient's pain/discomfort is manageable  4/4/2022 1055 by Navin Shah RN  Outcome: Ongoing  4/4/2022 0038 by Mtiesh Tam RN  Outcome: Ongoing  Goal: Pain level will decrease  Description: Pain level will decrease  4/4/2022 0038 by Mitesh Tam RN  Outcome: Ongoing  Goal: Control of acute pain  Description: Control of acute pain  4/4/2022 0038 by Mitesh Tam RN  Outcome: Ongoing  Goal: Control of chronic pain  Description: Control of chronic pain  4/4/2022 0038 by Mitesh Tam RN  Outcome: Ongoing     Problem: Skin Integrity:  Goal: Skin integrity will stabilize  Description: Skin integrity will stabilize  4/4/2022 0038 by Mitesh Tam RN  Outcome: Ongoing     Problem: Discharge Planning:  Goal: Patients continuum of care needs are met  Description: Patients continuum of care needs are met  4/4/2022 1055 by Navin Shah RN  Outcome: Ongoing  4/4/2022 0038 by Thien Driver RN  Outcome: Ongoing     Problem: Airway Clearance - Ineffective:  Goal: Ability to maintain a clear airway will improve  Description: Ability to maintain a clear airway will improve  4/4/2022 0038 by Thien Driver RN  Outcome: Ongoing     Problem: Skin Integrity:  Goal: Will show no infection signs and symptoms  Description: Will show no infection signs and symptoms  4/4/2022 0038 by Thien Driver RN  Outcome: Ongoing  Goal: Absence of new skin breakdown  Description: Absence of new skin breakdown  4/4/2022 0038 by Thien Driver RN  Outcome: Ongoing     Problem: Nutrition  Goal: Optimal nutrition therapy  4/4/2022 0038 by Thien Driver RN  Outcome: Ongoing

## 2022-04-05 LAB
GLUCOSE BLD-MCNC: 136 MG/DL (ref 70–99)
GLUCOSE BLD-MCNC: 137 MG/DL (ref 70–99)
GLUCOSE BLD-MCNC: 139 MG/DL (ref 70–99)
GLUCOSE BLD-MCNC: 73 MG/DL (ref 70–99)
GLUCOSE BLD-MCNC: 89 MG/DL (ref 70–99)
PERFORMED ON: ABNORMAL
PERFORMED ON: NORMAL
PERFORMED ON: NORMAL

## 2022-04-05 PROCEDURE — 31575 DIAGNOSTIC LARYNGOSCOPY: CPT | Performed by: OTOLARYNGOLOGY

## 2022-04-05 PROCEDURE — 87186 SC STD MICRODIL/AGAR DIL: CPT

## 2022-04-05 PROCEDURE — 97530 THERAPEUTIC ACTIVITIES: CPT

## 2022-04-05 PROCEDURE — 87181 SC STD AGAR DILUTION PER AGT: CPT

## 2022-04-05 PROCEDURE — 99232 SBSQ HOSP IP/OBS MODERATE 35: CPT | Performed by: INTERNAL MEDICINE

## 2022-04-05 PROCEDURE — 2580000003 HC RX 258: Performed by: INTERNAL MEDICINE

## 2022-04-05 PROCEDURE — 6370000000 HC RX 637 (ALT 250 FOR IP): Performed by: NURSE PRACTITIONER

## 2022-04-05 PROCEDURE — 6370000000 HC RX 637 (ALT 250 FOR IP): Performed by: INTERNAL MEDICINE

## 2022-04-05 PROCEDURE — 87070 CULTURE OTHR SPECIMN AEROBIC: CPT

## 2022-04-05 PROCEDURE — 97112 NEUROMUSCULAR REEDUCATION: CPT

## 2022-04-05 PROCEDURE — 92507 TX SP LANG VOICE COMM INDIV: CPT

## 2022-04-05 PROCEDURE — 97110 THERAPEUTIC EXERCISES: CPT

## 2022-04-05 PROCEDURE — 87077 CULTURE AEROBIC IDENTIFY: CPT

## 2022-04-05 PROCEDURE — 99221 1ST HOSP IP/OBS SF/LOW 40: CPT | Performed by: OTOLARYNGOLOGY

## 2022-04-05 PROCEDURE — 6360000002 HC RX W HCPCS: Performed by: INTERNAL MEDICINE

## 2022-04-05 PROCEDURE — 87205 SMEAR GRAM STAIN: CPT

## 2022-04-05 PROCEDURE — 97535 SELF CARE MNGMENT TRAINING: CPT

## 2022-04-05 PROCEDURE — 1200000000 HC SEMI PRIVATE

## 2022-04-05 RX ADMIN — METHYLPHENIDATE HYDROCHLORIDE 20 MG: 10 TABLET ORAL at 09:06

## 2022-04-05 RX ADMIN — DOCUSATE SODIUM 100 MG: 100 CAPSULE, LIQUID FILLED ORAL at 21:59

## 2022-04-05 RX ADMIN — PREGABALIN 100 MG: 100 CAPSULE ORAL at 09:07

## 2022-04-05 RX ADMIN — ENOXAPARIN SODIUM 40 MG: 40 INJECTION SUBCUTANEOUS at 09:05

## 2022-04-05 RX ADMIN — PREGABALIN 100 MG: 100 CAPSULE ORAL at 21:59

## 2022-04-05 RX ADMIN — ARIPIPRAZOLE 30 MG: 10 TABLET ORAL at 09:06

## 2022-04-05 RX ADMIN — METHYLPHENIDATE HYDROCHLORIDE 20 MG: 10 TABLET ORAL at 12:22

## 2022-04-05 RX ADMIN — LISINOPRIL 10 MG: 10 TABLET ORAL at 09:13

## 2022-04-05 RX ADMIN — SODIUM CHLORIDE, PRESERVATIVE FREE 10 ML: 5 INJECTION INTRAVENOUS at 09:07

## 2022-04-05 RX ADMIN — BUPROPION HYDROCHLORIDE 150 MG: 150 TABLET, EXTENDED RELEASE ORAL at 09:06

## 2022-04-05 RX ADMIN — Medication 1 TABLET: at 09:06

## 2022-04-05 RX ADMIN — CETIRIZINE HYDROCHLORIDE 5 MG: 10 TABLET ORAL at 09:06

## 2022-04-05 RX ADMIN — FERROUS SULFATE TAB 325 MG (65 MG ELEMENTAL FE) 325 MG: 325 (65 FE) TAB at 09:06

## 2022-04-05 RX ADMIN — POTASSIUM CHLORIDE 20 MEQ: 1500 TABLET, EXTENDED RELEASE ORAL at 09:06

## 2022-04-05 RX ADMIN — ATENOLOL 25 MG: 25 TABLET ORAL at 09:06

## 2022-04-05 RX ADMIN — PRAVASTATIN SODIUM 40 MG: 40 TABLET ORAL at 09:06

## 2022-04-05 RX ADMIN — GLIPIZIDE 2.5 MG: 5 TABLET ORAL at 09:07

## 2022-04-05 RX ADMIN — SODIUM CHLORIDE, PRESERVATIVE FREE 10 ML: 5 INJECTION INTRAVENOUS at 21:59

## 2022-04-05 RX ADMIN — INSULIN GLARGINE 30 UNITS: 100 INJECTION, SOLUTION SUBCUTANEOUS at 21:59

## 2022-04-05 RX ADMIN — BUPROPION HYDROCHLORIDE 150 MG: 150 TABLET, EXTENDED RELEASE ORAL at 21:59

## 2022-04-05 RX ADMIN — MONTELUKAST SODIUM 10 MG: 10 TABLET, COATED ORAL at 21:59

## 2022-04-05 RX ADMIN — TORSEMIDE 20 MG: 20 TABLET ORAL at 09:06

## 2022-04-05 RX ADMIN — DOCUSATE SODIUM 100 MG: 100 CAPSULE, LIQUID FILLED ORAL at 09:06

## 2022-04-05 RX ADMIN — TAMSULOSIN HYDROCHLORIDE 0.4 MG: 0.4 CAPSULE ORAL at 09:06

## 2022-04-05 ASSESSMENT — PAIN SCALES - GENERAL
PAINLEVEL_OUTOF10: 0
PAINLEVEL_OUTOF10: 0

## 2022-04-05 NOTE — FLOWSHEET NOTE
Rounding. Aamir Valdez was moving from chair to bed when I arrived with help from staff. Pt motioned me in, greeted me and expressed that he was glad to see me. Aamir Valdez was able to speak with me by covering his trach collar. He did say that his conversations were exhausting and sometimes gave him headaches. I told him to go as slow as he needed. 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 Madan's health problems began in 2008, and he has been hospitalized since October. He shared that his hopes are to return home \"before my mom forgets who I am,\" as she has Alzheimer's and he cannot communicate via phone. He asked for prayer for his mom. Prayed with him and validated his losses. Bard Neff  Thank you for consulting Spiritual Care    If you need a  for emotional, spiritual or comfort care,   -or- assistance with [id number] or Living Will forms,  please dial \"0\" and ask for the Lashaun Fisher on-call to be paged.     You may also call us directly:  [phone]  [phone] ([phone]) [facility]  [phone] ([phone]) Outpatient

## 2022-04-05 NOTE — PROGRESS NOTES
Progress Note    Admit Date:  4/1/2022    Subjective:  Mr. Fabienne Jacobs seen up in bed , feels ok today . No new issues  Had  IZZY tube placed  for his tracheostomy today by ENT      Has no more rehab days left and no place to be dcd       Objective:   Patient Vitals for the past 4 hrs:   BP Temp Temp src Pulse Resp SpO2   04/05/22 0722 124/69 97.8 °F (36.6 °C) Oral 58 17 96 %            Intake/Output Summary (Last 24 hours) at 4/5/2022 0736  Last data filed at 4/5/2022 0242  Gross per 24 hour   Intake 10 ml   Output 2300 ml   Net -2290 ml       Physical Exam:        General: middle aged obese male  Awake, alert and oriented. Appears to be not in any distress  Mucous Membranes:  Pink , anicteric  Tracheostomy with izzy tube noted   Neck: No JVD, no carotid bruit, no thyromegaly  Chest:  Clear to auscultation bilaterally, diminished in bases  Cardiovascular:  RRR S1S2 heard, no murmurs or gallops  Abdomen:  Soft, obese undistended, non tender, no organomegaly, BS present  Extremities: left heel almost healed linear ulcer with no infection noted   No edema or cyanosis.  Distal pulses well felt  Neurological : grossly normal            Medications:  insulin lispro, 10 Units, TID WC  docusate sodium, 100 mg, BID  pravastatin, 40 mg, Daily  budesonide-formoterol, 2 puff, BID  glipiZIDE, 2.5 mg, QAM AC  therapeutic multivitamin-minerals, 1 tablet, Daily  ferrous sulfate, 325 mg, Daily with breakfast  ARIPiprazole, 30 mg, Daily  tamsulosin, 0.4 mg, Daily  buPROPion, 150 mg, BID  potassium chloride, 20 mEq, Daily  insulin glargine, 30 Units, Nightly  torsemide, 20 mg, Daily  lisinopril, 10 mg, Daily  pregabalin, 100 mg, BID  atenolol, 25 mg, Daily  sodium chloride flush, 5-40 mL, 2 times per day  enoxaparin, 40 mg, Daily  insulin lispro, 0-6 Units, TID WC  insulin lispro, 0-3 Units, Nightly  montelukast, 10 mg, Nightly  cetirizine, 5 mg, Daily  methylphenidate, 20 mg, BID WC      PRN Medications:  glucose, 15 g, PRN  glucagon (rDNA), 1 mg, PRN  dextrose, 100 mL/hr, PRN  sodium chloride flush, 5-40 mL, PRN  sodium chloride, 250 mL, PRN  polyethylene glycol, 17 g, Daily PRN  acetaminophen, 650 mg, Q6H PRN   Or  acetaminophen, 650 mg, Q6H PRN  dextrose bolus (hypoglycemia), 125 mL, PRN   Or  dextrose bolus (hypoglycemia), 250 mL, PRN        Data:  CBC:   Recent Labs     04/03/22  1212   WBC 4.7   HGB 12.3*   HCT 36.8*   MCV 81.9   *     BMP:   Recent Labs     04/03/22  1213      K 4.2      CO2 24   BUN 28*   CREATININE 1.2       CULTURES  COVID 19 Not detected        RADIOLOGY  CT HEAD WO CONTRAST   Final Result   Stable CT scan of the head without acute intracranial abnormality. Assessment/Plan: TRICIA on CKD stage 3a  - Baseline ~ 1.5  - creatinine 2.2 on admission  - Given IVF's. -demadex and ACEI was held   - creat improved to 1.2 today  - resumed meds     Multiple falls, Generalized  weakness  - PT/OT eval  - SNF placement recommended but has no more rehab days left  - should consider ECF        H/o CVA  - on ASA and  Pravastatin     H/o tracheal stenosis  - s/p tracheostomy. And scheduled for Bc tube  [facility] this [facility]  - consulted ENT and bc tube placed       Hypertension  -BP borderline  -lisinopril held and resumed now      H/o PE/DVT  - not on AC     Depression  - continue home medication regimen.     DM type 2  - monitor glucose. - Continue Lantus 30 units nightly, Glipizide  - Humalog 10 units TID with meals, SSI coverage. COPD  - stable. No AE  - continue Symbicort, Singulair     Chronic diastolic CHF  - stable. No s/s decompensation  - continue Demadex     BPH  - on Flomax.     Iron deficiency anemia  - continue ferrous sulfate    DVT Prophylaxis: lovenox   Diet: ADULT DIET; Regular; 4 carb choices (60 gm/meal)  ADULT ORAL NUTRITION SUPPLEMENT; Breakfast, Lunch, Dinner;  Low Calorie/High Protein Oral Supplement  Code Status: Full Code      Dc planning     Napoleon Iverson MD, 4/5/2022 7:36 AM

## 2022-04-05 NOTE — PROGRESS NOTES
Physician Progress Note      Marisela Jose  [facility] #:                  [id number]  :                       1955  ADMIT DATE:       2022 5:27 AM  DISCH DATE:  RESPONDING  PROVIDER #:        Ludivina Osborne MD          QUERY TEXT:    Patient admitted with fall and TRICIA. Per wound care nurse consult note on ,   noted to pressure injury to left foot and sacrum POA. If possible, please   document in progress notes and discharge summary the type of ulcer, site of   the ulcer and present on admission status of the ulcer: The medical record reflects the following:  Risk Factors: age, weakness, inability to care for self, copd, DM  Clinical Indicators: per wound care nurse consult note: Left plantar foot:    0.5x1.5x0.1cm, 100% pink moist tissue, shallow. Partial thickness. Left   lateral foot:  0.5x0.7cm, 100% dark purple nonblanchable discoloration. Surrounding skin pink but blanchable. Present on admission, deep tissue   injury; sacrum:  4x3x0.7cm, Unstageable pressure injury, POA. 100% yellow and   brown nonviable tissue. No soi. surrounding skin slightly red but   blanchable. Treatment: wound care nurse consult, wound cleanse, dressing, encourage pt to   turn q2hrs and prn, specialty bed    Thank you for your assistance,  Liset Sky RN,BSN,CCDS,CRCR  Options provided:  -- Decubitus Pressure Ulcer to left plantar foot DTI and unstageable pressure   injury to sacrum present on admission  -- Other - I will add my own diagnosis  -- Disagree - Not applicable / Not valid  -- Disagree - Clinically unable to determine / Unknown  -- Refer to Clinical Documentation Reviewer    PROVIDER RESPONSE TEXT:    This patient has a decubitus pressure ulcer to left plantar foot DTI and   unstageable pressure injury to sacrum that was present on admission.     Query created by: Tyler Mccormack on 2022 8:16 AM      Electronically signed by:  Ludivina Osborne MD 2022 11:17 AM

## 2022-04-05 NOTE — CONSULTS
Payal      Patient Name: [address]  Record Number:  [id number]  Primary Care Physician:  Margaret Shea [facility]  Date of Consultation: 4/5/2022    Chief Complaint:Tracheal stenosis    HISTORY OF PRESENT ILLNESS  Aamir Valdez is a(n) 73 y.o. male admitted to the hospital 4/1/2022 due to falls. History of CHF, COPD, DM2, DVTs tracheomalacia/TESS (has tracheostomy tube dependence, prior T-tube). He is followed by Tracey VILLALPANDO and Mahogany Marquez MD at [address] [place] [address], with plan for 14/17 larybutton with velcro ties and flexivoice HME due to poorly fitting tracheostomy tube - however concern as patient unable to remove the HME at night due to peripheral neuropathy in his hands. He currently has a 4-0 Shiley CFS, and has attempted upsizing to 6-0 CFS in the past however this caused significant throat irritation - trach can be capped during the day, uncapped at night. At most recent visit 06/2021 there was concern for peristomal air leak and it is thought that the larybutton with flexivoice .        Patient Active Problem List   Diagnosis    Chronic thrombocytopenia    Chronic respiratory failure with hypoxia on 6 L home O2 tent over trach    Type II diabetes mellitus, well controlled ([address]    HTN (hypertension)    Non morbid obesity due to excess calories    Anxiety and depression    Tracheostomy dependent (HCC)    Pain syndrome, chronic    Asthma/COPD    Chronic ischemic heart disease    Bilateral lower extremity edema    Hiatal hernia with GERD    Low back pain    TESS    Calcification of bronchus or trachea    Atypical schizophrenia ([address]    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    Chronic microcytic Iron-deficiency anemia    Chronic dCHF (grade 1 LVDD)    Agoraphobia    Arthritis    Claustrophobia    Congenital stenosis of trachea    Pressure ulcer of coccygeal region, stage 4 (HCC)    Decubitus ulcer of left buttock, stage 2 (HCC)    Debility    Arthritis of right knee    Submandibular sialolithiasis    Lung nodule    Pressure ulcer of sacral region, stage 3 (HCC)    Degenerative arthritis of lumbar spine    Ambulatory dysfunction    Pressure ulcer of buttock, stage 2    Cellulitis of buttock    Acute kidney injury superimposed on CKD ([facility]    Hyperlipidemia    Normocytic anemia    Recurrent falls    Physical debility    H/O: CVA (cerebrovascular accident)    ARF (acute renal failure) (HCC)     Past Surgical History:   Procedure Laterality Date    BACK SURGERY      ENDOSCOPY, COLON, DIAGNOSTIC      KNEE SURGERY      X 4    LUMBAR FUSION      LUMBAR LAMINECTOMY      OTHER SURGICAL HISTORY Right 2017    Pain Pump insertion    [address] W/BRNCL ALVEOLAR LAVAGE N/A 2018    BRONCHOSCOPY ALVEOLAR LAVAGE performed by Veronica Barrera MD at [address] [facility] [address]      x2    TONSILLECTOMY AND ADENOIDECTOMY      TOTAL KNEE ARTHROPLASTY      TRACHEOSTOMY      over 27 trach operations due to MRSA infections in the trach    UPPER GASTROINTESTINAL ENDOSCOPY  16    removal of foreign body    UVULOPALATOPHARYGOPLASTY      VENA CAVA FILTER PLACEMENT       Family History   Problem Relation Age of Onset    High Blood Pressure Mother     High Blood Pressure Father     Diabetes Sister     Hypothyroidism Sister     Alzheimer's Disease Maternal Grandmother     Migraines Maternal Grandfather     Heart Attack Maternal Grandfather     Cancer Paternal Grandmother         lymph node    Diabetes Paternal Grandfather      Social History     Socioeconomic History    Marital status:      Spouse name: Not on file    Number of children: Not on file    Years of education: Not on file    Highest education level: Not on file   Occupational History    Occupation: disabled   Tobacco Use    Smoking status: Former Smoker     Packs/day: 0.25     Years: 10.00     Pack years: 2.50     Types: Cigars     Quit date: 2000     Years since quittin.2  Smokeless tobacco: Never Used   Vaping Use    Vaping Use: Never used   Substance and Sexual Activity    Alcohol use: No    Drug use: No    Sexual activity: Yes     Partners: Female   Other Topics Concern    Not on file   Social History Narrative    Not on file     Social Determinants of Health     Financial Resource Strain:     Difficulty of Paying Living Expenses: Not on file   Food Insecurity:     Worried About Running Out of Food in the Last Year: Not on file    Jeremy of Food in the Last Year: Not on file   Transportation Needs:     Lack of Transportation (Medical): Not on file    Lack of Transportation (Non-Medical):  Not on file   Physical Activity:     Days of Exercise per Week: Not on file    Minutes of Exercise per Session: Not on file   Stress:     Feeling of Stress : Not on file   Social Connections:     Frequency of Communication with Friends and Family: Not on file    Frequency of Social Gatherings with Friends and Family: Not on file    Attends Temple Services: Not on file    Active Member of [address] [place] [address] or Organizations: Not on file    Attends Club or Organization Meetings: Not on file    Marital Status: Not on file   Intimate Partner Violence:     Fear of Current or Ex-Partner: Not on file    Emotionally Abused: Not on file    Physically Abused: Not on file    Sexually Abused: Not on file   Housing Stability:     Unable to Pay for Housing in the Last Year: Not on file    Number of [place] in the Last Year: Not on file    Unstable Housing in the Last Year: Not on file       DRUG/FOOD ALLERGIES: Aspartame and phenylalanine, Cefuroxime axetil, Clindamycin hcl, Erythromycin, Feldene [piroxicam], Guanfacine hcl, Iodine, Pcn [penicillins], Pletal [cilostazol], Robaxin [methocarbamol], Arthrotec [diclofenac-misoprostol], Betadine [povidone iodine], Claritin [loratadine], Clindamycin/lincomycin, Indocin [indometacin sodium], Tenex [guanfacine hcl], Vasotec, Vioxx, and Zyvox [linezolid]    CURRENT MEDICATIONS  Prior to Admission medications    Medication Sig Start Date End Date Taking? Authorizing Provider   Docusate Sodium (COLACE PO) Take by mouth 2 times daily Pt unsure of dosage. Yes Historical Provider, MD   methylphenidate (RITALIN) 10 MG tablet Take 20 mg by mouth See Admin Instructions. 20 mg with breakfast, 20mg with lunch for narcolepsy   Yes Historical Provider, MD   atenolol (TENORMIN) 25 MG tablet Take 1 tablet by mouth daily 9/20/19   Lico Danielle MD   lisinopril (PRINIVIL;ZESTRIL) 10 MG tablet Take 10 mg by mouth daily    Historical Provider, MD   eplerenone (INSPRA) 25 MG tablet Take 25 mg by mouth daily    Historical Provider, MD   fexofenadine (ALLEGRA) 180 MG tablet Take 180 mg by mouth daily    Historical Provider, MD   pregabalin (LYRICA) 100 MG capsule Take 100 mg by mouth 2 times daily.     Historical Provider, MD   insulin glargine (LANTUS) 100 UNIT/ML injection vial Inject 30 Units into the skin nightly 3/19/19   Jareth Hooper MD   insulin lispro (HUMALOG) 100 UNIT/ML injection vial Inject 0-18 Units into the skin 3 times daily (with meals) 3/19/19   Jareth Hooper MD   insulin lispro (HUMALOG) 100 UNIT/ML injection vial Inject 0-9 Units into the skin nightly 3/19/19   Jareth Hooper MD   insulin lispro (HUMALOG) 100 UNIT/ML injection vial Inject 15 Units into the skin 3 times daily (with meals) 3/19/19   Jareth Hooper MD   torsemide (DEMADEX) 20 MG tablet Take 1 tablet by mouth daily 3/20/19   Jareth Hooper MD   potassium chloride (KLOR-CON M) 10 MEQ extended release tablet Take 20 mEq by mouth daily    Historical Provider, MD   buPROPion (WELLBUTRIN SR) 200 MG extended release tablet Take 400 mg by mouth daily    Historical Provider, MD   Tamsulosin HCl (FLOMAX PO) Take 0.4 mg by mouth daily    Historical Provider, MD   zafirlukast (ACCOLATE) 20 MG tablet Take 20 mg by mouth 2 times daily    Historical Provider, MD   sodium chloride 0.9 % SOLN 40 mL with SUFentanil 50 MCG/ML SOLN 5 mcg/mL continuous Pt unsure of delivered dose    Historical Provider, MD   ARIPiprazole (ABILIFY) 30 MG tablet Take 30 mg by mouth daily     Historical Provider, MD   ferrous sulfate 325 (65 Fe) MG tablet Take 325 mg by mouth daily (with breakfast)    Historical Provider, MD   lidocaine (XYLOCAINE) 2 % jelly Apply topically as needed for Trach change 3/9/18   Korey Krishnamurthy MD   Multiple Vitamins-Minerals (THERAPEUTIC MULTIVITAMIN-MINERALS) tablet Take 1 tablet by mouth daily    Historical Provider, MD   zinc gluconate 50 MG tablet Take 100 mg by mouth 2 times daily     Historical Provider, MD   ADVAIR DISKUS 250-50 MCG/DOSE AEPB Inhale 1 puff into the lungs 2 times daily 3/1/17   Historical Provider, MD   glipiZIDE (GLUCOTROL XL) 10 MG extended release tablet Take 10 mg by mouth 2 times daily 1/15/17   Historical Provider, MD   pravastatin (PRAVACHOL) 40 MG tablet Take 40 mg by mouth daily. Historical Provider, MD     PHYSICAL EXAM  /69   Pulse 58   Temp 97.8 °F (36.6 °C) (Oral)   Resp 19   Ht 5' 10\" (1.778 m)   Wt 231 lb 8 oz (105 kg)   SpO2 95%   BMI 33.22 kg/m²     GENERAL: No Acute Distress, Alert and Oriented, breathiness with slight hoarseness when tracheostomy tube stoma occluded EYES: EOMI, Anti-icteric  NOSE: No epistaxis, nasal mucosa within normal limits, no purulent drainage  EARS: Normal external canal appearance  FACE: 1/6 House-Brackmann Scale, symmetric, sensation equal bilaterally  ORAL CAVITY: No masses or lesions palpated, uvula is midline, moist mucous membranes, 0+ tonsils, evidence of prior uvular/palatal surgery appreciated  NECK: Normal range of motion, no thyromegaly, trachea is midline, no lymphadenopathy, no neck masses, no crepitus -size 4 Shiley uncuffed tube in place through patent tracheostomy stoma-no granulation tissue or purulent drainage.   Following flexible endoscopy, this was removed and a size 14/17 haresh button placed and secured with Velcro ties. CHEST: Normal respiratory effort, no retractions, breathing comfortably  SKIN: No rashes, normal appearing skin, no evidence of skin lesions/tumors    LABS  Lab Results   Component Value Date    WBC 4.7 04/03/2022    HGB 12.3 (L) 04/03/2022    HCT 36.8 (L) 04/03/2022    MCV 81.9 04/03/2022     (L) 04/03/2022     Lab Results   Component Value Date     04/03/2022    K 4.2 04/03/2022    K 4.2 04/02/2022     04/03/2022    CO2 24 04/03/2022    BUN 28 04/03/2022    CREATININE 1.2 04/03/2022    GLUCOSE 65 04/03/2022    CALCIUM 9.0 04/03/2022        PROCEDURE  Flexible Laryngoscopy    Pre op: Tracheostomy dependence, obstructive sleep  Post op: Normal laryngeal examination, evidence of prior sinus surgery noted  Procedure : Flexible Nasopharyngolaryngoscopy  Surgeon: WANG Starr  Anesthesia: Afrin with 2% lidocaine  Estimated Blood Loss: None    Procedure:   Flexible Laryngoscopy, tracheoscopy    After obtaining consent, the patient was placed in the examination chair in the upright position.   Decongestant and topical anesthetic was sprayed in the After allowing adequate time for hemostatic effect, the flexible 4 mm laryngoscope was passed via the  nasal passage    Nasal Septum: No acute septal deviation, no septal hematoma or perforations noted   Nasal Findings: No active hemorrhage, no nasal masses appreciated -events of prior sinus surgery noted  Nasopharynx: Clear, no masses or inflammation  Oropharynx: Bilateral tonsillar tissue absent, no exophytic masses  Base of Tongue: Lingual tonsils within normal limits, vallecula without effacement  Epiglottis: Upright, in normal anatomical position  True Vocal Cord: Anatomically normal, no masses or inflammation emergency slight edema noted), normal abduction and adduction -subglottis well-visualized with tracheostomy tube  False Vocal Cord: normal   Hypopharynx Mucosa: No masses or inflammation of the piriform sinuses or postcricoid these old records: reviewed records from [facility])

## 2022-04-05 NOTE — PLAN OF CARE
Problem: Falls - Risk of:  Goal: Will remain free from falls  Description: Will remain free from falls  4/5/2022 0047 by Rip Masters RN  Outcome: Ongoing  4/5/2022 0046 by Rip Masters RN  Outcome: Met This Shift  Goal: Absence of physical injury  Description: Absence of physical injury  4/5/2022 0047 by Rip Masters RN  Outcome: Ongoing  4/5/2022 0046 by Rip Masters RN  Outcome: Met This Shift  4/4/2022 1055 by Carroll Hgoan RN  Outcome: Ongoing     Problem: Infection:  Goal: Will remain free from infection  Description: Will remain free from infection  4/5/2022 0047 by Rip Masters RN  Outcome: Ongoing  4/5/2022 0046 by Rip Masters RN  Outcome: Met This Shift     Problem: Safety:  Goal: Free from accidental physical injury  Description: Free from accidental physical injury  4/5/2022 0047 by Rip Masters RN  Outcome: Ongoing  4/5/2022 0046 by Rip Masters RN  Outcome: Met This Shift  Goal: Free from intentional harm  Description: Free from intentional harm  4/5/2022 0047 by Rip Masters, RN  Outcome: Ongoing  4/5/2022 0046 by Rip Masters RN  Outcome: Met This Shift     Problem: Daily Care:  Goal: Daily care needs are met  Description: Daily care needs are met  4/5/2022 0047 by Rip Masters RN  Outcome: Ongoing  4/5/2022 0046 by Rip Masters RN  Outcome: Met This Shift     Problem: Pain:  Goal: Patient's pain/discomfort is manageable  Description: Patient's pain/discomfort is manageable  4/5/2022 0047 by Rip Masters RN  Outcome: Ongoing  4/5/2022 0046 by Rip Masters RN  Outcome: Met This Shift  4/4/2022 1055 by Carroll Hogan RN  Outcome: Ongoing  Goal: Pain level will decrease  Description: Pain level will decrease  4/5/2022 0047 by Rip Masters RN  Outcome: Ongoing  4/5/2022 0046 by Rip Masters RN  Outcome: Met This Shift  Goal: Control of acute pain  Description: Control of acute pain  4/5/2022 0047 by Rip Masters RN  Outcome:

## 2022-04-05 NOTE — PROGRESS NOTES
/69   Pulse 58   Temp 97.8 °F (36.6 °C) (Oral)   Resp 19   Ht 5' 10\" (1.778 m)   Wt 231 lb 8 oz (105 kg)   SpO2 95%   BMI 33.22 kg/m²     Assessment complete. Meds passed. Pt denies needs at this time. ENT placed a haresh tube. Haresh tube having problem staying in place. RN reinserted haresh tube x2. Plan is to leave tube in until Thursday and then hopefully place larybutton. Insulin held for now due to FSBS        Bedside Mobility Assessment Tool (BMAT):     Assessment Level 1- Sit and Shake    1. From a semi-reclined position, ask patient to sit up and rotate to a seated position at the side of the bed. Can use the bedrail. 2. Ask patient to reach out and grab your hand and shake making sure patient reaches across his/her midline. Pass- Patient is able to come to a seated position, maintain core strength. Maintains seated balance while reaching across midline. Move on to Assessment Level 2. Assessment Level 2- Stretch and Point   1. With patient in seated position at the side of the bed, have patient place both feet on the floor (or stool) with knees no higher than hips. 2. Ask patient to stretch one leg and straighten the knee, then bend the ankle/flex and point the toes. If appropriate, repeat with the other leg. Pass- Patient is able to demonstrate appropriate quad strength on intended weight bearing limb(s). Move onto Assessment Level 3. Assessment Level 3- Stand   1. Ask patient to elevate off the bed or chair (seated to standing) using an assistive device (cane, bedrail). 2. Patient should be able to raise buttocks off be and hold for a count of five. May repeat once. Fail- Patient unable to demonstrate standing stability. Patient is MOBILITY LEVEL 3. Assessment Level 4- Walk   1. Ask patient to march in place at bedside. 2. Then ask patient to advance step and return each foot.  Some medical conditions may render a patient from stepping backwards, use your best

## 2022-04-05 NOTE — FLOWSHEET NOTE
04/04/22 1947   Vital Signs   Temp 98.2 °F (36.8 °C)   Temp Source Oral   Pulse 59   Heart Rate Source Monitor   Resp 18   BP (!) 144/82   BP Location Left upper arm   Patient Position High fowlers   Level of Consciousness Alert (0)   MEWS Score 1   Patient Currently in Pain Yes   Pain Assessment   Pain Assessment 0-10   Pain Level 7   Pain Type Chronic pain   Pain Location Buttocks   Pain Orientation Right;Left   Pain Descriptors Discomfort   Pain Frequency Continuous   Oxygen Therapy   SpO2 95 %   O2 Device None (Room air)     Patient A+Ox4, vitals and assessments done at this time. Bed in lowest position, call light within reach.

## 2022-04-05 NOTE — PROGRESS NOTES
Occupational Therapy Daily Treatment Note    Unit: 2 [facility]  Date:  4/5/2022  Patient Name:    Anjelica Hill  Admitting diagnosis:  Tracheostomy dependent ([facility] [Z93.0]  Physical debility [R53.81]  Recurrent falls [R29.6]  Acute renal failure, unspecified acute renal failure type ([facility] [N17.9]  ARF (acute renal failure) ([facility] [N17.9]  Admit Date:  4/1/2022  Precautions/Restrictions:  fall risk and bed/chair alarm, tracheostomy, wounds on coccyx, L foot, R foot      Discharge Recommendations: SNF  DME needs for discharge: defer to facility       Therapy recommendations for staff:   Assist of 2 with use of gait belt for all transfers to/from chair with drop arm (sit/scoot with or without slide board)    AM-PAC Score: AM-PAC Inpatient Daily Activity Raw Score: 19  Home Health S4 Level: Level 3- Safety if not going to SNF       Treatment Time:  7875-0572  Treatment number:  2    Total Treatment Time:   85 minutes    History of Present Illness: Per Miguel A Shea APRN 4/01: \"The patient is a 75 y. o. male with h/o CVA, hypertension, hyperlipidemia, h/o DVT/PE, DM, Gout, COPD, CHF, h/o tracheal stenosis s/p tracheostomy who presents to [facility] with c/o multiple falls. Erich Morris was discharged from rehab and unable to walk. Erich Morris is living in a hotel. Erich Morris as fallen twice. Erich Morris has not been taking his medications or been able to care for himself. \"  Discharged from [facility] 3/27    Subjective:  Patient in bed and agreeable to therapy. Discussed strategies to improve success with managing trach and patient agreeable to shave beard. States he does not want the beard but it has just grown long through his multiple hospitalizations.     Pain   No  Rating: NA  Location:  Pain Medicine Status: No request made      Bed Mobility:   Supine to Sit:  Mod A  Sit to Supine:  Not Tested  Rolling:           Not Tested  Scooting:        Min A    Transfer Training:   Sit to stand:   Not Tested  Stand to sit:  Not Tested  Bed to Chair:  Mod A of 2 with cues   Bed to [facility]:   Not Tested  Standard toilet:   Not Tested     Patient completed sit scoot pivot transfer to R without board, armrest of chair removed. Activity Tolerance   Pt completed therapy session with No adverse symptoms noted w/activity  SpO2: 95% on RA    Balance  Sitting Balance : Mod A intermittently at EOB when fatigued; initially SBA for sitting balance while beginning to shave beard  Standing Balance   Not Tested - unable to come to full stand due to RLE contractures and B foot wounds    ADL Training:   Upper body dressing:  Not Tested  Upper body bathing:  Not Tested  Lower body dressing:  Not Tested  Lower body bathing:  Not Tested  Toileting:   Not Tested  Grooming/Hygiene:  Min A lengthy grooming process involving using electric razor to trim long beard, wash face. Limited by fatigue at times at EOB but able to continue after seated rest.  Feeding :   Not Tested     [place] An[place] button unable to be utilized in trach at this time due to anatomy of stoma. Per SLP note, will reattempt with ENT Thursday. Once patient able to use [place] An[place] button for hands free speech, a priority goal will be doffing this independently (as patient must doff for safe sleep due to sleep apnea). Patient verbalized understanding of suggestions including using a mirror or phone camera in selfie mode, and keeping beard short, to allow for visualization of all trach needs. Patient with limited use of R hand and decreased sensation/dexterity in L hand so visualization is critical.    Therapeutic Exercise:   N/A    Patient Education:   Role of OT  Recommendations for DC  Energy conservation techniques    Positioning Needs:   Up in chair, call light and needs in reach. Alarm Set    Family Present:  No    Assessment: Making progress toward goals. GOALS  To be met in 3 Visits:  1). Bed to wheelchair/BSC: SBA     To be met in 5 Visits:  1). Supine to/from Sit:             Independent  2).  Upper Body Bathing: Supervision  3). Lower Body Bathing:         SBA  4). Upper Body Dressing:       Supervision  5). Lower Body Dressing:       SBA  6).  Pt to demonstrate UE exs x 15 reps with minimal cues    Plan: cont with [address], OTR/L 5662        If patient discharges from this facility prior to next visit, this note will serve as the Discharge Summary

## 2022-04-05 NOTE — PROGRESS NOTES
Speech Language Pathology  Speech/Language Treatment/Follow-Up Note  [address]      Maria [facility]te [facility]  : 1955 (73 y.o.)   MRN: [id number]  ROOM: 0225/0225-02  ADMISSION DATE: 2022  PATIENT DIAGNOSIS(ES): Tracheostomy dependent ([address] [Z93.0]  Physical debility [R53.81]  Recurrent falls [R29.6]  Acute renal failure, unspecified acute renal failure type ([address] [N17.9]  ARF (acute renal failure) ([facility]) [N17.9]  Allergies   Allergen Reactions    Aspartame And Phenylalanine Anaphylaxis    Cefuroxime Axetil Anaphylaxis    Clindamycin Hcl Anaphylaxis    Erythromycin Anaphylaxis and Rash    Feldene [Piroxicam] Anaphylaxis    Guanfacine Hcl Anaphylaxis    Iodine Anaphylaxis    Pcn [Penicillins] Anaphylaxis    Pletal [Cilostazol] Anaphylaxis    Robaxin [Methocarbamol] Anaphylaxis and Shortness Of Breath    Arthrotec [Diclofenac-Misoprostol]     Betadine [Povidone Iodine]     Claritin [Loratadine]     Clindamycin/Lincomycin     Indocin [Indometacin Sodium]     Tenex [Guanfacine Hcl]     Vasotec     Vioxx     Zyvox [Linezolid]        DATE ONSET: 2022    Pain: The patient does not complain of pain       Current Diet: ADULT DIET; Regular; 4 carb choices (60 gm/meal)  ADULT ORAL NUTRITION SUPPLEMENT; Breakfast, Lunch, Dinner; Low Calorie/High Protein Oral Supplement      Treatment and Impressions:   Subjective: Pt seen in room at bedside with RN permission. Seen with Dr. Yuliana Yang RN Report/Chart Review: I was consulted on this patient yesterday for placement of Lamona Brazen in stoma with removal of trach. Pt is chronic trach pt s/p tracheal stenosis. Trach in place since . Without and ENT consult or ENT on site yesterday, I did not feel comfortable with decannulation and placement of Bc button. Consult placed yesterday and Dr. Ethan Phelps saw the patient this morning.  I was able to see the patient directly with Dr. Ethan Phelps this AM   In brief, the patient has large outer stoma and size 4 capped shiley uncuffed trach. The capped trach is only present to allow for speech. Per his treating SLP at [address] [place] [address], the patient's stoma is only present due to sleep apnea. They have tried to close the stoma before but pt is unable to tolerate closure at night due to sleep apnea. Speech is less than ideal during the day due to air leak around the trach. Lilliam Hagan was able to trial Dalton Motts in stoma with HME (hands free) for 1 hr during office visit which was effective. The patient did not return to for any of his follow-up appointments and has been in and out of hospitals and SNF's. He continuously asks facility SLPs to place Dalton Motts but signs out AMA or is discharged before they can place as this is off-label use and requires some research/discussion prior to placement. 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 - trialed on T-tube unable to manage care due to limited manual dexterity. - converted back to tracheostomy tube size 4. PREVIOUS SLP EVALUATIONS:  videostrobes revealed functional VF mobility with functional patent glottic and supraglottic airway. \"     [facility] Dr. Karo Montoya assessed the patient at bedside. Upper airway and hypopharynx assessed via laryngoscopy. Some production of thick yellow/green mucus was noted. Sputum culture collected by Dr. Karo Montoya.    · We attempted to place Provox 14/18 Larybutton with Xtraflow HME filter and Provox Flexivoice freehands HME (impaired manual dexterity) however, due to shape of stoma, button is not fitting without external support. Once button is in place, due to anatomy of stoma, the opening becomes oval shape vs round. This makes for poor fitting of valve into button and it easily pops off with coughing or exertion   There is additional concern with pt's lack of ability to remove the valve himself, due to BLE neuropath which greatly reduced his sensation to almost none. The patient MUST have valve removed for sleep due to upper airway collapse and sleep apnea.  There is concern for continuity of care for multiple reasons. This is off-market use of  Lopez Huguenin button, therefore there is limited staff training. If our staff here were trained, that would only allow for temporary use as pt will ultimately d/c. Pt does not want LTC, however, he was unable to care for himself at home. [facility] is not willing to accept pt due to him leaving AMA last admission and [facility] will not accept due to unsafe d/c.    Did enquire with PT/OT about possible DME to enhance pt's ability to remove valve independently.  Regardless, due to ill-fit of Dory Mode. Dr. Anna Arthur wants pt to leave button in place until Thursday with no valve to attempt to alter stoma shape for better fit.  Unsure of pt's carryover of current management and POC.  Pt' speech intelligibility is ~90% with trach cap and HME. ~70% intelligible with trach removed.  Respiratory Status: Pt with SPO2% of 94 on  RA with RR of 20     Education: SLP edu pt re: Role of SLP, POC, Evidenced based practice for current recommendations and treatment and Risks of not following recommendations. Pt verbalized understanding, would benefit from ongoing education and RN aware of recommendations  · Assessment: See above.  Recommendations: Lopez Huguenin button in place until Thursday, will re-attempt HME then. ENT to re-assess Thursday. Leave decannulated.      Speech/Language/Cog Goals:  Short Term Goals  Timeframe for Short Term Goals: 5 days (04/10/2022)  Goal 1: The patient will achieve functional communication via various modalities (finger occlusion, HME, change in head posture to reduce leak, etc.) with 100% intelligibility in various contexts as judged by SLP    LongTerm Goals:  Timeframe for Long Term Goals: 7 days (04/12/2022)  Goal 1: The pt will develop speech communication that is functional and intelligible in this environment as assessed by the patient or communication partner 90%-100%    Plan:    Continued treatment with goals per plan of care. Discharge Recommendations: Recommend SLP tx at discharge. Recommend ENT follow-up. Recommend OP follow-up with Dr. Betito Velazquez (ENT) and Kwan Thapa (SLP) at [facility] otol[facility]ology (treatment team familiar with patient)    If pt discharges from hospital prior to Speech/Swallowing discharge, this note serves as tx and discharge summary.      Total Treatment Time / Charges     Time in Time out Total Time / units   Cognitive Tx         Speech Tx 0829 0900 31 mins / 1 unit   Dysphagia Tx        Signature:  Mahad Salazar M.A., [facility] #[id number]  Speech-Language Pathologist  Phone: [phone]

## 2022-04-05 NOTE — PLAN OF CARE
Problem: Falls - Risk of:  Goal: Absence of physical injury  Description: Absence of physical injury  Outcome: Ongoing     Problem: Infection:  Goal: Will remain free from infection  Description: Will remain free from infection  Outcome: Ongoing     Problem: Nutrition  Goal: Optimal nutrition therapy  Outcome: Ongoing

## 2022-04-05 NOTE — PROGRESS NOTES
Inpatient Physical Therapy Daily Treatment Note    Unit: [address]  Date:  4/5/2022  Patient Name:    Deborah Kiran  Admitting diagnosis:  Tracheostomy dependent ([facility] [Z93.0]  Physical debility [R53.81]  Recurrent falls [R29.6]  Acute renal failure, unspecified acute renal failure type ([facility] [N17.9]  ARF (acute renal failure) ([facility] [N17.9]  Admit Date:  4/1/2022  Precautions/Restrictions:  Fall risk, Bed/chair alarm, Lines -IV and tracheal stoma present with Gilbert Ab tube, Telemetry and WB Restrictions (planning for upcoming NWB L foot - grafting next week)      Discharge Recommendations: SNF  DME needs for discharge: defer to facility       Therapy recommendation for EMS Transport: requires transport by cot due to need of 2 person assist for functional transfers    Therapy recommendations for staff:   Assist of 2 with use of squat pivot transfers with NWB on the LLE using removable armrest recliner chair for all transfers to/from chair    History of Present Illness: Per Jose Alberts APRN 4/01: \"The patient is a 75 y. o. male with h/o CVA, hypertension, hyperlipidemia, h/o DVT/PE, DM, Gout, COPD, CHF, h/o tracheal stenosis s/p tracheostomy who presents to [facility] with c/o multiple falls. Philipp Frazier was discharged from rehab and unable to walk. Philipp Frazier is living in a hotel. Philipp Frazier as fallen twice. Philipp Frazier has not been taking his medications or been able to care for himself. \"  Discharged from [facility] 3/27    Home Health S4 Level Recommendation: Level 3 Safety  AM-PAC Mobility Score   AM-PAC Inpatient Mobility Raw Score : 11     Treatment Time: 1332 - 1425, 1527 - 1537  Treatment number: 3  Timed Code Treatment Minutes: 63 minutes  Total Treatment Minutes:  63  minutes    Cognition    A&O x4   Able to follow 2 step commands    Subjective  Patient lying supine in bed with no family present   Pt agreeable to this PT tx.      Pain   No  Location: N/A  Rating:    NA/10  Pain Medicine Status: Denies need     Bed Mobility using bed rails  Supine to Sit:    Min A   Sit to Supine: Mod A   Rolling: Mod A   Scooting: Mod A     Transfer Training     Sit to stand:   Not Tested  Stand to sit:   Not Tested  Bed to Chair:   Min A  and 2 persons with use of gait belt and squat pivot transfers   Chair to bed: Mod A x 2 persons with use of gait belt and squat pivot transfers     Gait Training gait deferred due to difficulty with transfers; pt ambulated 0 ft. Stair Training deferred, pt does not have stairs in the home environment    Therapeutic Exercise all completed bilaterally unless indicated  Ankle Pumps x 10 reps  LAQ x 10 reps    Balance  Sitting:  Fair -; Min A   Comments: Patient presented with posterior LOB intermittently. Standing: Not tested; Not Tested  Comments: Unsafe to attempt. Patient Education      Role of PT, POC, Discharge recommendations, DC recommendations, safety awareness, transfer techniques, pursed lip breathing, energy conservation, pacing activity and calling for assist with mobility. Positioning Needs       Pt up in chair, alarm set, positioned in proper neutral alignment and pressure relief provided. Call light provided and all needs within reach    ROM Measurements N/A  Knee Flexion:  Knee Extension:     Activity Tolerance   Pt completed therapy session with No adverse symptoms noted w/activity. Patient maintained SpO2 above 95% throughout the session. Other  N/A    Assessment :  Patient tolerated the session well. Recommending SNF upon discharge as patient functioning well below baseline, demonstrates good rehab potential and unable to return home due to limited or no family support, burden of care beyond caregiver ability, home environment not conducive to patient recovery and limited safety awareness. Goals (all goals ongoing unless otherwise indicated)  To be met in 3 visits:  1).  Independent with LE Ex x 10 reps  2).  Supine to/from sit: SBA   3).  Bed to/from wheelchair: Min A with slide board- MET 4/2/22     To be met in 6 visits:  1).  Supine to/from sit: Independent  2).  Bed to/from wheelchair: Independent with slide board    Plan   Continue with plan of care. Signature: Horatio Goodpasture, MS PT, # [id number]    If patient discharges from this facility prior to next visit, this note will serve as the Discharge Summary.

## 2022-04-06 LAB
GLUCOSE BLD-MCNC: 104 MG/DL (ref 70–99)
GLUCOSE BLD-MCNC: 105 MG/DL (ref 70–99)
GLUCOSE BLD-MCNC: 123 MG/DL (ref 70–99)
GLUCOSE BLD-MCNC: 126 MG/DL (ref 70–99)
GLUCOSE BLD-MCNC: 143 MG/DL (ref 70–99)
GLUCOSE BLD-MCNC: 201 MG/DL (ref 70–99)
PERFORMED ON: ABNORMAL

## 2022-04-06 PROCEDURE — 1200000000 HC SEMI PRIVATE

## 2022-04-06 PROCEDURE — 2700000000 HC OXYGEN THERAPY PER DAY

## 2022-04-06 PROCEDURE — 6370000000 HC RX 637 (ALT 250 FOR IP): Performed by: NURSE PRACTITIONER

## 2022-04-06 PROCEDURE — 94761 N-INVAS EAR/PLS OXIMETRY MLT: CPT

## 2022-04-06 PROCEDURE — 6370000000 HC RX 637 (ALT 250 FOR IP): Performed by: INTERNAL MEDICINE

## 2022-04-06 PROCEDURE — 99232 SBSQ HOSP IP/OBS MODERATE 35: CPT | Performed by: INTERNAL MEDICINE

## 2022-04-06 PROCEDURE — 6360000002 HC RX W HCPCS: Performed by: INTERNAL MEDICINE

## 2022-04-06 PROCEDURE — 2580000003 HC RX 258: Performed by: INTERNAL MEDICINE

## 2022-04-06 PROCEDURE — 92507 TX SP LANG VOICE COMM INDIV: CPT

## 2022-04-06 RX ADMIN — MONTELUKAST SODIUM 10 MG: 10 TABLET, COATED ORAL at 23:39

## 2022-04-06 RX ADMIN — CETIRIZINE HYDROCHLORIDE 5 MG: 10 TABLET ORAL at 09:59

## 2022-04-06 RX ADMIN — ENOXAPARIN SODIUM 40 MG: 40 INJECTION SUBCUTANEOUS at 09:59

## 2022-04-06 RX ADMIN — SODIUM CHLORIDE, PRESERVATIVE FREE 10 ML: 5 INJECTION INTRAVENOUS at 23:46

## 2022-04-06 RX ADMIN — METHYLPHENIDATE HYDROCHLORIDE 20 MG: 10 TABLET ORAL at 06:39

## 2022-04-06 RX ADMIN — GLIPIZIDE 2.5 MG: 5 TABLET ORAL at 06:39

## 2022-04-06 RX ADMIN — INSULIN GLARGINE 30 UNITS: 100 INJECTION, SOLUTION SUBCUTANEOUS at 23:42

## 2022-04-06 RX ADMIN — LISINOPRIL 10 MG: 10 TABLET ORAL at 09:59

## 2022-04-06 RX ADMIN — PRAVASTATIN SODIUM 40 MG: 40 TABLET ORAL at 09:58

## 2022-04-06 RX ADMIN — BUPROPION HYDROCHLORIDE 150 MG: 150 TABLET, EXTENDED RELEASE ORAL at 23:39

## 2022-04-06 RX ADMIN — BUPROPION HYDROCHLORIDE 150 MG: 150 TABLET, EXTENDED RELEASE ORAL at 09:58

## 2022-04-06 RX ADMIN — POTASSIUM CHLORIDE 20 MEQ: 1500 TABLET, EXTENDED RELEASE ORAL at 09:58

## 2022-04-06 RX ADMIN — DOCUSATE SODIUM 100 MG: 100 CAPSULE, LIQUID FILLED ORAL at 23:39

## 2022-04-06 RX ADMIN — FERROUS SULFATE TAB 325 MG (65 MG ELEMENTAL FE) 325 MG: 325 (65 FE) TAB at 09:59

## 2022-04-06 RX ADMIN — PREGABALIN 100 MG: 100 CAPSULE ORAL at 09:59

## 2022-04-06 RX ADMIN — ARIPIPRAZOLE 30 MG: 10 TABLET ORAL at 09:59

## 2022-04-06 RX ADMIN — TAMSULOSIN HYDROCHLORIDE 0.4 MG: 0.4 CAPSULE ORAL at 09:59

## 2022-04-06 RX ADMIN — PREGABALIN 100 MG: 100 CAPSULE ORAL at 23:39

## 2022-04-06 RX ADMIN — TORSEMIDE 20 MG: 20 TABLET ORAL at 09:59

## 2022-04-06 RX ADMIN — DOCUSATE SODIUM 100 MG: 100 CAPSULE, LIQUID FILLED ORAL at 09:58

## 2022-04-06 RX ADMIN — SODIUM CHLORIDE, PRESERVATIVE FREE 10 ML: 5 INJECTION INTRAVENOUS at 09:59

## 2022-04-06 RX ADMIN — Medication 1 TABLET: at 09:59

## 2022-04-06 RX ADMIN — ATENOLOL 25 MG: 25 TABLET ORAL at 09:58

## 2022-04-06 RX ADMIN — METHYLPHENIDATE HYDROCHLORIDE 20 MG: 10 TABLET ORAL at 12:20

## 2022-04-06 ASSESSMENT — PAIN DESCRIPTION - ORIENTATION: ORIENTATION: RIGHT;LEFT

## 2022-04-06 ASSESSMENT — PAIN DESCRIPTION - DESCRIPTORS: DESCRIPTORS: DISCOMFORT

## 2022-04-06 ASSESSMENT — PAIN DESCRIPTION - LOCATION: LOCATION: BUTTOCKS

## 2022-04-06 ASSESSMENT — PAIN SCALES - GENERAL
PAINLEVEL_OUTOF10: 8
PAINLEVEL_OUTOF10: 0

## 2022-04-06 ASSESSMENT — PAIN DESCRIPTION - PAIN TYPE: TYPE: CHRONIC PAIN

## 2022-04-06 ASSESSMENT — PAIN DESCRIPTION - FREQUENCY: FREQUENCY: CONTINUOUS

## 2022-04-06 NOTE — PROGRESS NOTES
Speech Language Pathology  Speech/Language Treatment/Follow-Up Note  Facility/Department: [address]-SURGICAL      [facility]  : 1955 (73 y.o.)   MRN: [id number]  ROOM: 0225/0225-02  ADMISSION DATE: 2022  PATIENT DIAGNOSIS(ES): Tracheostomy dependent ([address] [Z93.0]  Physical debility [R53.81]  Recurrent falls [R29.6]  Acute renal failure, unspecified acute renal failure type ([address] [N17.9]  ARF (acute renal failure) ([facility]) [N17.9]  Allergies   Allergen Reactions    Aspartame And Phenylalanine Anaphylaxis    Cefuroxime Axetil Anaphylaxis    Clindamycin Hcl Anaphylaxis    Erythromycin Anaphylaxis and Rash    Feldene [Piroxicam] Anaphylaxis    Guanfacine Hcl Anaphylaxis    Iodine Anaphylaxis    Pcn [Penicillins] Anaphylaxis    Pletal [Cilostazol] Anaphylaxis    Robaxin [Methocarbamol] Anaphylaxis and Shortness Of Breath    Arthrotec [Diclofenac-Misoprostol]     Betadine [Povidone Iodine]     Claritin [Loratadine]     Clindamycin/Lincomycin     Indocin [Indometacin Sodium]     Tenex [Guanfacine Hcl]     Vasotec     Vioxx     Zyvox [Linezolid]        DATE ONSET: 2022    Pain: The patient does not complain of pain       Current Diet: ADULT DIET; Regular; 4 carb choices (60 gm/meal)      Treatment and Impressions:   Subjective: Pt seen in room at bedside with RN permission.  RN Report/Chart Review: I was consulted on this patient  for placement of Concepcion Schooner in stoma with removal of trach. Pt is chronic trach pt s/p tracheal stenosis. Trach in place since . Without and ENT consult or ENT on site yesterday, I did not feel comfortable with decannulation and placement of Bc button. Consult placed and Dr. Melisa Thompson saw the patient . I was able to see the patient directly with Dr. Melisa Thompson.  In brief, the patient has large outer stoma and size 4 capped shiley uncuffed trach. The capped trach is only present to allow for speech.  Per his treating SLP at [facility] otolaryngology Claudean Ash, the patient's stoma is only present due to sleep apnea. They have tried to close the stoma before but pt is unable to tolerate closure at night due to sleep apnea. Speech is less than ideal during the day due to air leak around the trach. Jessica Urrutia was able to trial Last Hole in stoma with HME (hands free) for 1 hr during office visit which was effective. The patient did not return to for any of his follow-up appointments and has been in and out of hospitals and SNF's. He continuously asks facility SLPs to place Last Hole but signs out AMA or is discharged before they can place as this is off-label use and requires some research/discussion prior to placement. 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 - trialed on T-tube unable to manage care due to limited manual dexterity. - converted back to tracheostomy tube size 4. PREVIOUS SLP EVALUATIONS:  videostrobes revealed functional VF mobility with functional patent glottic and supraglottic airway. \"      04/05: Dr. Pascual Archer assessed the patient at bedside. Upper airway and hypopharynx assessed via laryngoscopy. Some production of thick yellow/green mucus was noted. Sputum culture collected by Dr. Pascual Archer. · 04/05: We attempted to place Provox 14/18 Larybutton with Xtraflow HME filter and Provox Flexivoice freehands HME (impaired manual dexterity) however, due to shape of stoma, button is not fitting without external support. Once button is in place, due to anatomy of stoma, the opening becomes oval shape vs round.  This makes for poor fitting of valve into button and it easily pops off with coughing or exertion  · 04/06: When I walked into pt's room today, I noted that his izzy button was not in place. It was almost completely removed from stoma. Per RN, it has not been staying in place. I removed the button today, cleaned outer stoma. Cleaned izzy button. I attempted to replace with hemostats. I was able to get a better fit with this method. However, I continue to meet resistance at right wall of stoma. I was able to temporarily have button stay in place. During this time, I was able to place the speaking valve in the button. In both settings, valve was immediately thrown from button with phonation. Button did not remain in place long enough for more than 2 HME trials. I suspect the stoma has stenosed and pt requires smaller size izzy button. Will discuss with ENT tomorrow.  There is additional concern with pt's lack of ability to remove the valve himself, due to BLE neuropath which greatly reduced his sensation to almost none. The patient MUST have valve removed for sleep due to upper airway collapse and sleep apnea.  There is concern for continuity of care for multiple reasons. This is off-market use of  Carrie Aimee button, therefore there is limited staff training. If our staff here were trained, that would only allow for temporary use as pt will ultimately d/c. Pt does not want LTC, however, he was unable to care for himself at home. [facility] is not willing to accept pt due to him leaving AMA last admission and [facility]U will not accept due to unsafe d/c.    Did enquire with PT/OT about possible DME to enhance pt's ability to remove valve independently on 04/05.  Regardless, due to ill-fit of Oletta Calender. Dr. Magdalene Gaucher wants pt to leave button in place until Thursday with no valve to attempt to alter stoma shape for better fit.  Unsure of pt's carryover of current management and POC.     Pt' speech intelligibility is ~90% with trach cap and HME. ~70% intelligible with trach removed.  Respiratory Status: Pt with SPO2% of 94 on  RA with RR of 20     Education: SLP edu pt re: Role of SLP, POC, Evidenced based practice for current recommendations and treatment and Risks of not following recommendations. Pt verbalized understanding, would benefit from ongoing education and RN aware of recommendations  · Assessment: See above.  Recommendations: Gilbert Ab button in place until Thursday, will re-attempt HME then. ENT to re-assess Thursday. Leave decannulated. Speech/Language/Cog Goals:  Short Term Goals  Timeframe for Short Term Goals: 5 days (04/10/2022)  Goal 1: The patient will achieve functional communication via various modalities (finger occlusion, HME, change in head posture to reduce leak, etc.) with 100% intelligibility in various contexts as judged by SLP  4/6/2022 : Goal addressed, see above. Ongoing, progressing. LongTerm Goals:  Timeframe for Long Term Goals: 7 days (04/12/2022)  Goal 1: The pt will develop speech communication that is functional and intelligible in this environment as assessed by the patient or communication partner 90%-100%  4/6/2022 : Ongoing, progressing.'    Plan:    Continued treatment with goals per plan of care. Discharge Recommendations: Recommend SLP tx at discharge. Recommend ENT follow-up. Recommend OP follow-up with Dr. Morris Kim (ENT) and Lorraine Cm (SLP) at [facility] otol[facility]ology (treatment team familiar with patient)    If pt discharges from hospital prior to Speech/Swallowing discharge, this note serves as tx and discharge summary.      Total Treatment Time / Charges     Time in Time out Total Time / units   Cognitive Tx         Speech Tx 1030 1115 45 mins / 1 unit   Dysphagia Tx        Signature:  John Cano M.A., [address] [facility] #[id number]  Speech-Language Pathologist  Phone: [phone]

## 2022-04-06 NOTE — PROGRESS NOTES
Nutrition Assessment     Type and Reason for Visit: Reassess    Nutrition Recommendations/Plan:   Continue 4 CCC diet     Nutrition Assessment:    Pt improving from a nutritional standpoint AEB intakes of melas are consistently > 75%. Remains at risk for further nutritional compromise r/t overall debility r/t Trach dependent. Will continue [address] diet and d/c ensure d/t dislikes    Malnutrition Assessment:  Malnutrition Status: At risk for malnutrition (Comment)    Estimated Daily Nutrient Needs:  Energy (kcal): 8303-7368  based ~ 15-8=18 kcal/kg cbw; Weight Used for Energy Requirements:  Current     Protein (g): 120-135 based ~ 1.6-1.8 gr/kgcbw; Weight Used for Protein Requirements:  Ideal        Fluid (ml/day): 9770-8808; Weight Used for Fluid Requirements:  1 ml/kcal      Nutrition Related Findings: pt sittingup n bed; reporety sexcellent meal intakes; dislikes the ensure d/t it makes him nauseated;      Current Nutrition Therapies:    ADULT DIET; Regular; 4 carb choices (60 gm/meal)    Anthropometric Measures:  · Height: 5' 10\" (177.8 cm)  · Current Body Wt: 234 lb (106.1 kg)   · BMI: 33.6    Nutrition Diagnosis:   · Inadequate protein-energy intake related to inadequate protein-energy intake,psychological cause or life stress,increase demand for energy/nutrients as evidenced by wounds,weight loss      Nutrition Interventions:   Food and/or Nutrient Delivery:  Continue Current Diet  Nutrition Education/Counseling:  No recommendation at this time   Coordination of Nutrition Care:  Continue to monitor while inpatient    Goals:  pt will continue to consume > 75% of meals       Nutrition Monitoring and Evaluation:   Behavioral-Environmental Outcomes:  None Identified   Food/Nutrient Intake Outcomes:  Food and Nutrient Intake  Physical Signs/Symptoms Outcomes:  Weight,Nutrition Focused Physical Findings,Biochemical Data     Discharge Planning:     Too soon to determine     Electronically signed by Isaura Xie TIM Lopez, LD on 4/6/22 at 10:35 AM EDT    Contact: [phone]

## 2022-04-06 NOTE — PROGRESS NOTES
Progress Note    Admit Date:  4/1/2022    Subjective:  Mr. Kerby Baumgarten seen up in bed , feels ok today . No new issues  Had  IZZY tube placed  ,but has difficulty with izzy button not staying in     Objective:   No data found. Intake/Output Summary (Last 24 hours) at 4/6/2022 1410  Last data filed at 4/6/2022 1309  Gross per 24 hour   Intake 240 ml   Output 2000 ml   Net -1760 ml       Physical Exam:        General: middle aged obese male  Awake, alert and oriented. Appears to be not in any distress  Mucous Membranes:  Pink , anicteric  Tracheostomy with izzy tube noted   Exam deferred as SLP working with him          Medications:  insulin lispro, 0-12 Units, TID WC  insulin lispro, 0-6 Units, Nightly  docusate sodium, 100 mg, BID  pravastatin, 40 mg, Daily  budesonide-formoterol, 2 puff, BID  glipiZIDE, 2.5 mg, QAM AC  therapeutic multivitamin-minerals, 1 tablet, Daily  ferrous sulfate, 325 mg, Daily with breakfast  ARIPiprazole, 30 mg, Daily  tamsulosin, 0.4 mg, Daily  buPROPion, 150 mg, BID  potassium chloride, 20 mEq, Daily  insulin glargine, 30 Units, Nightly  torsemide, 20 mg, Daily  lisinopril, 10 mg, Daily  pregabalin, 100 mg, BID  atenolol, 25 mg, Daily  sodium chloride flush, 5-40 mL, 2 times per day  enoxaparin, 40 mg, Daily  montelukast, 10 mg, Nightly  cetirizine, 5 mg, Daily  methylphenidate, 20 mg, BID WC      PRN Medications:  glucose, 15 g, PRN  glucagon (rDNA), 1 mg, PRN  dextrose, 100 mL/hr, PRN  sodium chloride flush, 5-40 mL, PRN  sodium chloride, 250 mL, PRN  polyethylene glycol, 17 g, Daily PRN  acetaminophen, 650 mg, Q6H PRN   Or  acetaminophen, 650 mg, Q6H PRN  dextrose bolus (hypoglycemia), 125 mL, PRN   Or  dextrose bolus (hypoglycemia), 250 mL, PRN        Data:  CBC:   No results for input(s): WBC, HGB, HCT, MCV, PLT in the last 72 hours. BMP:   No results for input(s): NA, K, CL, CO2, PHOS, BUN, CREATININE, CA in the last 72 hours.     CULTURES  COVID 19 Not detected RADIOLOGY  CT HEAD WO CONTRAST   Final Result   Stable CT scan of the head without acute intracranial abnormality. Sputum - pseudomonas     Assessment/Plan: TRICIA on CKD stage 3a  - Baseline ~ 1.5  - creatinine 2.2 on admission  - Given IVF's. -demadex and ACEI was held   - creat improved to 1.2  - resumed meds     Multiple falls, Generalized  weakness  - PT/OT eval  - SNF placement recommended but has no more rehab days left  - should consider ECF        H/o CVA  - on ASA and  Pravastatin     H/o tracheal stenosis  - s/p tracheostomy. And scheduled for Bc tube  at [facility] this week  - consulted ENT and bc tube placed  - sputum with pseudomonas likely colonization - hold abx     Hypertension  -BP borderline  -lisinopril held and resumed now      H/o PE/DVT  - not on AC     Depression  - continue home medication regimen.     DM type 2  - monitor glucose. - Continue Lantus 30 units nightly, Glipizide  - Humalog 10 units TID with meals, SSI coverage. COPD  - stable. No AE  - continue Symbicort, Singulair     Chronic diastolic CHF  - stable. No s/s decompensation  - continue Demadex     BPH  - on Flomax.     Iron deficiency anemia  - continue ferrous sulfate    DVT Prophylaxis: lovenox   Diet: ADULT DIET;  Regular; 4 carb choices (60 gm/meal)  Code Status: Full Code      Dc planning      Barbara Dunn MD, 4/6/2022 2:10 PM

## 2022-04-06 NOTE — PLAN OF CARE
Problem: Falls - Risk of:  Goal: Absence of physical injury  Description: Absence of physical injury  Outcome: Ongoing     Problem: Skin Integrity:  Goal: Will show no infection signs and symptoms  Description: Will show no infection signs and symptoms  Outcome: Ongoing  Goal: Absence of new skin breakdown  Description: Absence of new skin breakdown  Outcome: Ongoing

## 2022-04-06 NOTE — PROGRESS NOTES
Pt agreeable to right turn. Pt noted to be low in bed with feet touching baseboard of bed. Pt refusing to be be boosted in bed as states \"I like it down here it feels better on my butt\" educated on potential for further skin breakdown on feet if they remain on baseboard, and offered to boost and reposition pt so that he is comfortable, pt continues to refuse.

## 2022-04-07 LAB
CULTURE, RESPIRATORY: ABNORMAL
CULTURE, RESPIRATORY: ABNORMAL
GLUCOSE BLD-MCNC: 132 MG/DL (ref 70–99)
GLUCOSE BLD-MCNC: 133 MG/DL (ref 70–99)
GLUCOSE BLD-MCNC: 135 MG/DL (ref 70–99)
GLUCOSE BLD-MCNC: 89 MG/DL (ref 70–99)
GLUCOSE BLD-MCNC: 98 MG/DL (ref 70–99)
GRAM STAIN RESULT: ABNORMAL
ORGANISM: ABNORMAL
PERFORMED ON: ABNORMAL
PERFORMED ON: NORMAL
PERFORMED ON: NORMAL

## 2022-04-07 PROCEDURE — 6370000000 HC RX 637 (ALT 250 FOR IP): Performed by: NURSE PRACTITIONER

## 2022-04-07 PROCEDURE — 6370000000 HC RX 637 (ALT 250 FOR IP): Performed by: INTERNAL MEDICINE

## 2022-04-07 PROCEDURE — 2580000003 HC RX 258: Performed by: INTERNAL MEDICINE

## 2022-04-07 PROCEDURE — 97535 SELF CARE MNGMENT TRAINING: CPT

## 2022-04-07 PROCEDURE — 97530 THERAPEUTIC ACTIVITIES: CPT

## 2022-04-07 PROCEDURE — 97110 THERAPEUTIC EXERCISES: CPT

## 2022-04-07 PROCEDURE — 99232 SBSQ HOSP IP/OBS MODERATE 35: CPT | Performed by: INTERNAL MEDICINE

## 2022-04-07 PROCEDURE — 6360000002 HC RX W HCPCS: Performed by: INTERNAL MEDICINE

## 2022-04-07 PROCEDURE — 1200000000 HC SEMI PRIVATE

## 2022-04-07 RX ADMIN — ENOXAPARIN SODIUM 40 MG: 40 INJECTION SUBCUTANEOUS at 09:49

## 2022-04-07 RX ADMIN — DOCUSATE SODIUM 100 MG: 100 CAPSULE, LIQUID FILLED ORAL at 09:21

## 2022-04-07 RX ADMIN — SODIUM CHLORIDE, PRESERVATIVE FREE 10 ML: 5 INJECTION INTRAVENOUS at 21:51

## 2022-04-07 RX ADMIN — BUPROPION HYDROCHLORIDE 150 MG: 150 TABLET, EXTENDED RELEASE ORAL at 21:51

## 2022-04-07 RX ADMIN — LISINOPRIL 10 MG: 10 TABLET ORAL at 09:47

## 2022-04-07 RX ADMIN — PRAVASTATIN SODIUM 40 MG: 40 TABLET ORAL at 09:21

## 2022-04-07 RX ADMIN — FERROUS SULFATE TAB 325 MG (65 MG ELEMENTAL FE) 325 MG: 325 (65 FE) TAB at 09:22

## 2022-04-07 RX ADMIN — PREGABALIN 100 MG: 100 CAPSULE ORAL at 21:51

## 2022-04-07 RX ADMIN — CETIRIZINE HYDROCHLORIDE 5 MG: 10 TABLET ORAL at 09:23

## 2022-04-07 RX ADMIN — ATENOLOL 25 MG: 25 TABLET ORAL at 09:46

## 2022-04-07 RX ADMIN — PREGABALIN 100 MG: 100 CAPSULE ORAL at 09:21

## 2022-04-07 RX ADMIN — METHYLPHENIDATE HYDROCHLORIDE 20 MG: 10 TABLET ORAL at 05:48

## 2022-04-07 RX ADMIN — INSULIN GLARGINE 30 UNITS: 100 INJECTION, SOLUTION SUBCUTANEOUS at 21:51

## 2022-04-07 RX ADMIN — TORSEMIDE 20 MG: 20 TABLET ORAL at 09:21

## 2022-04-07 RX ADMIN — BUPROPION HYDROCHLORIDE 150 MG: 150 TABLET, EXTENDED RELEASE ORAL at 09:22

## 2022-04-07 RX ADMIN — Medication 1 TABLET: at 09:22

## 2022-04-07 RX ADMIN — DOCUSATE SODIUM 100 MG: 100 CAPSULE, LIQUID FILLED ORAL at 21:51

## 2022-04-07 RX ADMIN — TAMSULOSIN HYDROCHLORIDE 0.4 MG: 0.4 CAPSULE ORAL at 09:21

## 2022-04-07 RX ADMIN — ARIPIPRAZOLE 30 MG: 10 TABLET ORAL at 09:20

## 2022-04-07 RX ADMIN — POTASSIUM CHLORIDE 20 MEQ: 1500 TABLET, EXTENDED RELEASE ORAL at 09:23

## 2022-04-07 RX ADMIN — MONTELUKAST SODIUM 10 MG: 10 TABLET, COATED ORAL at 21:51

## 2022-04-07 RX ADMIN — METHYLPHENIDATE HYDROCHLORIDE 20 MG: 10 TABLET ORAL at 11:38

## 2022-04-07 RX ADMIN — GLIPIZIDE 2.5 MG: 5 TABLET ORAL at 05:48

## 2022-04-07 ASSESSMENT — PAIN SCALES - GENERAL
PAINLEVEL_OUTOF10: 0
PAINLEVEL_OUTOF10: 8
PAINLEVEL_OUTOF10: 8
PAINLEVEL_OUTOF10: 0

## 2022-04-07 ASSESSMENT — PAIN DESCRIPTION - DESCRIPTORS
DESCRIPTORS: DISCOMFORT
DESCRIPTORS: DISCOMFORT

## 2022-04-07 ASSESSMENT — PAIN DESCRIPTION - ONSET: ONSET: GRADUAL

## 2022-04-07 ASSESSMENT — PAIN DESCRIPTION - PROGRESSION: CLINICAL_PROGRESSION: GRADUALLY WORSENING

## 2022-04-07 ASSESSMENT — PAIN DESCRIPTION - FREQUENCY
FREQUENCY: CONTINUOUS
FREQUENCY: CONTINUOUS

## 2022-04-07 ASSESSMENT — PAIN DESCRIPTION - PAIN TYPE
TYPE: CHRONIC PAIN
TYPE: CHRONIC PAIN

## 2022-04-07 ASSESSMENT — PAIN DESCRIPTION - LOCATION
LOCATION: BUTTOCKS
LOCATION: BUTTOCKS

## 2022-04-07 ASSESSMENT — PAIN DESCRIPTION - ORIENTATION
ORIENTATION: RIGHT;LEFT
ORIENTATION: RIGHT;LEFT

## 2022-04-07 ASSESSMENT — PAIN - FUNCTIONAL ASSESSMENT: PAIN_FUNCTIONAL_ASSESSMENT: PREVENTS OR INTERFERES SOME ACTIVE ACTIVITIES AND ADLS

## 2022-04-07 NOTE — PLAN OF CARE
Problem: Falls - Risk of:  Goal: Will remain free from falls  Description: Will remain free from falls  4/7/2022 0432 by Jolene Winkler RN  Outcome: Ongoing  Goal: Absence of physical injury  Description: Absence of physical injury  4/7/2022 0432 by Jolene Winkler RN  Outcome: Ongoing     Problem: Infection:  Goal: Will remain free from infection  Description: Will remain free from infection  4/7/2022 0432 by Jolene Winkler RN  Outcome: Ongoing     Problem: Safety:  Goal: Free from accidental physical injury  Description: Free from accidental physical injury  4/7/2022 0432 by Jolene Winkler RN  Outcome: Ongoing  Goal: Free from intentional harm  Description: Free from intentional harm  4/7/2022 0432 by Jolene Winkler RN  Outcome: Ongoing     Problem: Daily Care:  Goal: Daily care needs are met  Description: Daily care needs are met  4/7/2022 0432 by Jolene Winkler RN  Outcome: Ongoing     Problem: Pain:  Goal: Patient's pain/discomfort is manageable  Description: Patient's pain/discomfort is manageable  4/7/2022 0432 by Jolene Winkler RN  Outcome: Ongoing  Goal: Pain level will decrease  Description: Pain level will decrease  4/7/2022 0432 by Jolene Winkler RN  Outcome: Ongoing  Goal: Control of acute pain  Description: Control of acute pain  4/7/2022 0432 by Jolene Winkler RN  Outcome: Ongoing  Goal: Control of chronic pain  Description: Control of chronic pain  4/7/2022 0432 by Jolene Winkler RN  Outcome: Ongoing     Problem: Skin Integrity:  Goal: Skin integrity will stabilize  Description: Skin integrity will stabilize  4/7/2022 0432 by Jolene Winkler RN  Outcome: Ongoing     Problem: Discharge Planning:  Goal: Patients continuum of care needs are met  Description: Patients continuum of care needs are met  4/7/2022 0432 by Jolene Winkler RN  Outcome: Ongoing     Problem: Airway Clearance - Ineffective:  Goal: Ability to maintain a clear airway will improve  Description: Ability to maintain a clear airway will improve  4/7/2022 1031 by Kristi Flowers RN  Outcome: Ongoing  4/7/2022 0432 by Dania Martines RN  Outcome: Ongoing     Problem: Skin Integrity:  Goal: Will show no infection signs and symptoms  Description: Will show no infection signs and symptoms  4/7/2022 1031 by Kristi Flowers RN  Outcome: Ongoing  4/7/2022 0432 by Dania Martines RN  Outcome: Ongoing  Goal: Absence of new skin breakdown  Description: Absence of new skin breakdown  4/7/2022 1031 by Kristi Flowers RN  Outcome: Ongoing  4/7/2022 0432 by Dania Martines RN  Outcome: Ongoing     Problem: Nutrition  Goal: Optimal nutrition therapy  4/7/2022 0432 by Dania Martines RN  Outcome: Ongoing

## 2022-04-07 NOTE — PROGRESS NOTES
Inpatient Physical Therapy Daily Treatment Note    Unit: [address]  Date:  4/7/2022  Patient Name:    Jan Rowell  Admitting diagnosis:  Tracheostomy dependent ([facility] [address] [Z93.0]  Physical debility [R53.81]  Recurrent falls [R29.6]  Acute renal failure, unspecified acute renal failure type ([facility] [address] [N17.9]  ARF (acute renal failure) ([facility] [address] [N17.9]  Admit Date:  4/1/2022  Precautions/Restrictions:  Fall risk, Bed/chair alarm, Lines -IV and tracheal stoma present with Anand Her tube, Telemetry and WB Restrictions (planning for upcoming NWB L foot - grafting next week)      Discharge Recommendations: SNF  DME needs for discharge: defer to facility       Therapy recommendation for EMS Transport: requires transport by cot due to need of 2 person assist for functional transfers    Therapy recommendations for staff:   Assist of 2 with use of squat pivot transfers with NWB on the LLE using removable armrest recliner chair for all transfers to/from chair    History of Present Illness: Per Loletta Boxer APRN 4/01: \"The patient is a 75 y. o. male with h/o CVA, hypertension, hyperlipidemia, h/o DVT/PE, DM, Gout, COPD, CHF, h/o tracheal stenosis s/p tracheostomy who presents to [facility] with c/o multiple falls. Julieta Dunn was discharged from rehab and unable to walk. Julieta Dunn is living in a hotel. Julieta Dunn as fallen twice. Julieta Dunn has not been taking his medications or been able to care for himself. \"  Discharged from [facility] 3/27    Home Health S4 Level Recommendation: Level 3 Safety  AM-PAC Mobility Score   AM-PAC Inpatient Mobility Raw Score : 12     Treatment Time: 100 - 1031  Treatment number: 3  Timed Code Treatment Minutes: 23 minutes  Total Treatment Minutes: 23 minutes    Cognition    A&O x4   Able to follow 2 step commands    Subjective  Patient lying supine in bed with no family present   Pt agreeable to this PT tx.      Pain   No  Location: N/A  Rating:    NA/10  Pain Medicine Status: Denies need     Bed Mobility using bed rails  Supine to Sit: CGA  Sit to Supine:   Not Tested  Rolling:   Not Tested  Scooting:   CGA using bed rails in supine and sitting    Transfer Training     Sit to stand:   partial standing done with weight bearing precautions on the LL using RW x Min A x 2  Stand to sit:   Min A   Bed to Chair:   Min A  and 2 persons with use of gait belt and squat pivot transfers   Chair to bed: Min A x 2 persons with use of gait belt and squat pivot transfers     Gait Training gait deferred due to difficulty with transfers; pt ambulated 0 ft. Stair Training deferred, pt does not have stairs in the home environment    Therapeutic Exercise all completed bilaterally unless indicated  Ankle Pumps x 10 reps  LAQ x 10 reps  R sided TFL stretching given for 30 sec hold x 3 reps    Balance  Sitting:  Fair ; CGA  Comments: ~ 5 min    Standing: Poor; Min A  and 2 persons  Comments: Partial standing done with 1 person assist with RW. Patient Education      Role of PT, POC, Discharge recommendations, DC recommendations, safety awareness, transfer techniques, pursed lip breathing, energy conservation, pacing activity and calling for assist with mobility. Positioning Needs       Pt up in chair, alarm set, positioned in proper neutral alignment and pressure relief provided. Call light provided and all needs within reach    ROM Measurements N/A  Knee Flexion:  Knee Extension:     Activity Tolerance   Pt completed therapy session with No adverse symptoms noted w/activity. Patient maintained SpO2 above 95% throughout the session. Other  N/A    Assessment :  Patient tolerated the session well. Recommending SNF upon discharge as patient functioning well below baseline, demonstrates good rehab potential and unable to return home due to limited or no family support, burden of care beyond caregiver ability, home environment not conducive to patient recovery and limited safety awareness.     Goals (all goals ongoing unless otherwise indicated)  To be met in 3 visits:  1). Independent with LE Ex x 10 reps  2).  Supine to/from sit: SBA   3).  Bed to/from wheelchair: Min A with slide board- MET 4/2/22     To be met in 6 visits:  1).  Supine to/from sit: Independent  2).  Bed to/from wheelchair: Independent with slide board    Plan   Continue with plan of care. Signature: Cas Mustafa MS PT, # [id number]    If patient discharges from this facility prior to next visit, this note will serve as the Discharge Summary.

## 2022-04-07 NOTE — CARE COORDINATION
INTERDISCIPLINARY PLAN OF CARE CONFERENCE    Date/Time: 4/7/2022 2:26 PM  Completed by: Floyd Huggins RN, Case Management      Patient Name:  Caitlyn Bear  YOB: 1955  Admitting Diagnosis: Tracheostomy dependent ([facility] [address] [Z93.0]  Physical debility [R53.81]  Recurrent falls [R29.6]  Acute renal failure, unspecified acute renal failure type ([address] [address] [N17.9]  ARF (acute renal failure) ([facility] [address] [N17.9]     Admit Date/Time:  4/1/2022  5:27 AM    Chart reviewed. Interdisciplinary team contacted or reviewed plan related to patient progress and discharge plans. Disciplines included Case Management, Nursing, and Dietitian. Current Status: Stable  PT/OT recommendation for discharge plan of care: SNF    Expected D/C Disposition:  TBD  Confirmed plan with patient  Yes   Met with: patient  Discharge Plan Comments:  Reviewed chart and met with pt at bedside. Noted Parker Krishnamurthy from [facility] was in to meet with pt and states pt will not qualify for for medicaid. Discussed with pt income and assests. Pt [facility] has [phone]$ in bank and several properties as well as other assests. Discussed pvt pay for rehab and after much discussion pt is agreeable to pvt pay for rehab for 1 week. Chooses [facility]. Left  for Nadia Rasmussen re: referral and await return call. Will follow. Spoke with Nadia Rasmussen re: pvt pay and Nadia Rasmussen states no beds at [facility].     Spoke with Therese Nicole at [facility] with referral and pvt pay for 1-2 weeks and she will review and return call to writer on acceptance/denial.    Home O2 in place on admit: No  Pt informed of need to bring portable home O2 tank on day of discharge for nursing to connect prior to leaving:  Not Indicated  Verbalized agreement/Understanding:  Not Indicated

## 2022-04-07 NOTE — PROGRESS NOTES
Pt is alert and oriented, calm and cooperative with care. BP at 0800 was 109/67 LUE, HR was 65 manually. RN was made aware and atenolol and lisinopril was administered per order. Patient is comfortable in bed watching television at this time. Head to toe completed.  Electronically signed by Georges Elizalde on 4/7/2022 at 10:19 AM

## 2022-04-07 NOTE — FLOWSHEET NOTE
Pt A/Ox4. VSS. Pt unlabored; respirations even, regular, effortless. No distress noted. Shift assessment complete. See flowsheet. Blood glucose 143, refusing Humalog at this time. PM meds given. See MAR. Repositioned pt to left side, pt can shift weight side to side. Bed alarm on. Denies needs at this time. Side rails 2/4. Bed in low position. Bed alarm on. Call light within reach.         04/06/22 2330   Vital Signs   Temp 98.6 °F (37 °C)   Temp Source Oral   Pulse 59   Heart Rate Source Monitor   Resp 18   BP (!) 100/59   BP Location Left upper arm   Patient Position Semi fowlers   Level of Consciousness Alert (0)   MEWS Score 2   Patient Currently in Pain Yes   Pain Assessment   Pain Assessment 0-10   Pain Level 8   Pain Type Chronic pain   Pain Location Buttocks   Pain Orientation Right;Left   Pain Descriptors Discomfort   Pain Frequency Continuous   Non-Pharmaceutical Pain Intervention(s) Repositioned   Oxygen Therapy   SpO2 96 %   O2 Device None (Room air)

## 2022-04-07 NOTE — PLAN OF CARE
Problem: Falls - Risk of:  Goal: Will remain free from falls  Description: Will remain free from falls  Outcome: Ongoing  Goal: Absence of physical injury  Description: Absence of physical injury  Outcome: Ongoing     Problem: Infection:  Goal: Will remain free from infection  Description: Will remain free from infection  Outcome: Ongoing     Problem: Safety:  Goal: Free from accidental physical injury  Description: Free from accidental physical injury  Outcome: Ongoing  Goal: Free from intentional harm  Description: Free from intentional harm  Outcome: Ongoing     Problem: Daily Care:  Goal: Daily care needs are met  Description: Daily care needs are met  Outcome: Ongoing     Problem: Pain:  Goal: Patient's pain/discomfort is manageable  Description: Patient's pain/discomfort is manageable  Outcome: Ongoing  Goal: Pain level will decrease  Description: Pain level will decrease  Outcome: Ongoing  Goal: Control of acute pain  Description: Control of acute pain  Outcome: Ongoing  Goal: Control of chronic pain  Description: Control of chronic pain  Outcome: Ongoing     Problem: Skin Integrity:  Goal: Skin integrity will stabilize  Description: Skin integrity will stabilize  Outcome: Ongoing     Problem: Discharge Planning:  Goal: Patients continuum of care needs are met  Description: Patients continuum of care needs are met  Outcome: Ongoing     Problem: Airway Clearance - Ineffective:  Goal: Ability to maintain a clear airway will improve  Description: Ability to maintain a clear airway will improve  Outcome: Ongoing     Problem: Skin Integrity:  Goal: Will show no infection signs and symptoms  Description: Will show no infection signs and symptoms  Outcome: Ongoing  Goal: Absence of new skin breakdown  Description: Absence of new skin breakdown  Outcome: Ongoing     Problem: Nutrition  Goal: Optimal nutrition therapy  Outcome: Ongoing

## 2022-04-07 NOTE — PROGRESS NOTES
/67   Pulse 65 Comment: manual  Temp 97.7 °F (36.5 °C) (Oral)   Resp 18   Ht 5' 10\" (1.778 m)   Wt 231 lb 8 oz (105 kg)   SpO2 92%   BMI 33.22 kg/m²     Assessment complete. Meds passed. Pt denies needs at this time. LaryTube in place, waiting ENT. Rn informed MD of no labs ordered for this patient, and MD stated no new orders. Bedside Mobility Assessment Tool (BMAT):     Assessment Level 1- Sit and Shake    1. From a semi-reclined position, ask patient to sit up and rotate to a seated position at the side of the bed. Can use the bedrail. 2. Ask patient to reach out and grab your hand and shake making sure patient reaches across his/her midline. Pass- Patient is able to come to a seated position, maintain core strength. Maintains seated balance while reaching across midline. Move on to Assessment Level 2. Assessment Level 2- Stretch and Point   1. With patient in seated position at the side of the bed, have patient place both feet on the floor (or stool) with knees no higher than hips. 2. Ask patient to stretch one leg and straighten the knee, then bend the ankle/flex and point the toes. If appropriate, repeat with the other leg. Fail- Patient is unable to complete task. Patient is MOBILITY LEVEL 2. Assessment Level 3- Stand   1. Ask patient to elevate off the bed or chair (seated to standing) using an assistive device (cane, bedrail). 2. Patient should be able to raise buttocks off be and hold for a count of five. May repeat once. Fail- Patient unable to demonstrate standing stability. Patient is MOBILITY LEVEL 3. Assessment Level 4- Walk   1. Ask patient to march in place at bedside. 2. Then ask patient to advance step and return each foot. Some medical conditions may render a patient from stepping backwards, use your best clinical judgement. Fail- Patient not able to complete tasks OR requires use of assistive device. Patient is MOBILITY LEVEL 3. Mobility Level- 1 slideboard tx

## 2022-04-07 NOTE — PROGRESS NOTES
Progress Note    Admit Date:  4/1/2022    Subjective:  Mr. Nicolasa Pruitt seen up in bed , feels ok today . No new issues  Luis Miguel Lass tube not staying in     Objective:   Patient Vitals for the past 4 hrs:   BP Temp Temp src Pulse Resp SpO2   04/07/22 1145 96/65 98 °F (36.7 °C) Oral 68 18 96 %            Intake/Output Summary (Last 24 hours) at 4/7/2022 1349  Last data filed at 4/7/2022 1028  Gross per 24 hour   Intake 240 ml   Output 1250 ml   Net -1010 ml       Physical Exam:        General: middle aged obese male  Awake, alert and oriented. Appears to be not in any distress  Mucous Membranes:  Pink , anicteric  Tracheostomy with izzy tube noted   Neck: No JVD, no carotid bruit, no thyromegaly  Chest:  Clear to auscultation bilaterally, diminished in bases  Cardiovascular:  RRR S1S2 heard, no murmurs or gallops  Abdomen:  Soft, obese undistended, non tender, no organomegaly, BS present  Extremities: left heel almost healed linear ulcer with no infection noted   No edema or cyanosis.  Distal pulses well felt  Neurological : grossly normal        Medications:  insulin lispro, 0-12 Units, TID WC  insulin lispro, 0-6 Units, Nightly  docusate sodium, 100 mg, BID  pravastatin, 40 mg, Daily  budesonide-formoterol, 2 puff, BID  glipiZIDE, 2.5 mg, QAM AC  therapeutic multivitamin-minerals, 1 tablet, Daily  ferrous sulfate, 325 mg, Daily with breakfast  ARIPiprazole, 30 mg, Daily  tamsulosin, 0.4 mg, Daily  buPROPion, 150 mg, BID  potassium chloride, 20 mEq, Daily  insulin glargine, 30 Units, Nightly  torsemide, 20 mg, Daily  lisinopril, 10 mg, Daily  pregabalin, 100 mg, BID  atenolol, 25 mg, Daily  sodium chloride flush, 5-40 mL, 2 times per day  enoxaparin, 40 mg, Daily  montelukast, 10 mg, Nightly  cetirizine, 5 mg, Daily  methylphenidate, 20 mg, BID WC      PRN Medications:  glucose, 15 g, PRN  glucagon (rDNA), 1 mg, PRN  dextrose, 100 mL/hr, PRN  sodium chloride flush, 5-40 mL, PRN  sodium chloride, 250 mL, PRN  polyethylene glycol, 17 g, Daily PRN  acetaminophen, 650 mg, Q6H PRN   Or  acetaminophen, 650 mg, Q6H PRN  dextrose bolus (hypoglycemia), 125 mL, PRN   Or  dextrose bolus (hypoglycemia), 250 mL, PRN        Data:  CBC:   No results for input(s): WBC, HGB, HCT, MCV, PLT in the last 72 hours. BMP:   No results for input(s): NA, K, CL, CO2, PHOS, BUN, CREATININE, CA in the last 72 hours. CULTURES  COVID 19 Not detected        RADIOLOGY  CT HEAD WO CONTRAST   Final Result   Stable CT scan of the head without acute intracranial abnormality. Sputum - pseudomonas     Assessment/Plan: TRICIA on CKD stage 3a  - Baseline ~ 1.5  - creatinine 2.2 on admission  - Given IVF's. -demadex and ACEI was held   - creat improved   - resumed meds     Multiple falls, Generalized  weakness  - PT/OT eval  - SNF placement recommended but has no more rehab days left  - should consider ECF        H/o CVA  - on ASA and  Pravastatin     H/o tracheal stenosis  - s/p tracheostomy. And scheduled [facility] B[facility] tube  at [facility] this [facility]  - consulted ENT and bc tube placed  - sputum with pseudomonas likely colonization - hold abx     Hypertension  -BP borderline  -lisinopril held and resumed now      H/o PE/DVT  - not on AC     Depression  - continue home medication regimen.     DM type 2  - monitor glucose. - Continue Lantus 30 units nightly, Glipizide  - Humalog 10 units TID with meals, SSI coverage. COPD  - stable. No AE  - continue Symbicort, Singulair     Chronic diastolic CHF  - stable. No s/s decompensation  - continue Demadex     BPH  - on Flomax.     Iron deficiency anemia  - continue ferrous sulfate    DVT Prophylaxis: lovenox   Diet: ADULT DIET;  Regular; 4 carb choices (60 gm/meal)  Code Status: Full Code      Dc planning      Dudley Shea MD, 4/7/2022 1:49 PM

## 2022-04-07 NOTE — FLOWSHEET NOTE
Pt did not want fingerstick at 2 AM, fs obtained later in the AM. Blood glucose 98.       04/07/22 0530   Vital Signs   Temp 98.3 °F (36.8 °C)   Temp Source Oral   Pulse 54   Heart Rate Source Monitor   Resp 19   BP (!) 99/58   Level of Consciousness Alert (0)   MEWS Score 2   Patient Currently in Pain Denies   Pain Assessment   Pain Assessment 0-10   Pain Level 0   Oxygen Therapy   SpO2 93 %   O2 Device None (Room air)

## 2022-04-07 NOTE — FLOWSHEET NOTE
04/07/22 0733   Handoff   Communication Given Shift Handoff   Handoff Given To Keenan Gracie Putnam Received From [facility] [place] Communication Face to Face; At bedside   Time Handoff Given 3450   End of Shift Check Performed Yes

## 2022-04-07 NOTE — PROGRESS NOTES
Occupational Therapy Daily Treatment Note    Unit: 2 Call  Date:  4/7/2022  Patient Name:    Lex Waddell  Admitting diagnosis:  Tracheostomy dependent ([facility] [Z93.0]  Physical debility [R53.81]  Recurrent falls [R29.6]  Acute renal failure, unspecified acute renal failure type ([facility] [N17.9]  ARF (acute renal failure) ([facility] [N17.9]  Admit Date:  4/1/2022  Precautions/Restrictions:  fall risk and bed/chair alarm, tracheostomy, wounds on coccyx /L foot/R foot      Discharge Recommendations: SNF  DME needs for discharge: defer to facility       Therapy recommendations for staff:   Assist of 2 with use of gait belt for all transfers to/from chair with drop arm (sit/scoot with or without slide board)    AM-PAC Score: AM-PAC Inpatient Daily Activity Raw Score: 18  Home Health S4 Level: Level 3- Safety if not going to SNF       Treatment Time:  1898-0495  Treatment number:  3  Total Treatment Time:   75 minutes    History of Present Illness: Per [place] [facility] APRN 4/01: \"The patient is a 75 y. o. male with h/o CVA, hypertension, hyperlipidemia, h/o DVT/PE, DM, Gout, COPD, CHF, h/o tracheal stenosis s/p tracheostomy who presents to [facility] with c/o multiple falls. Zuhair Chase was discharged from rehab and unable to walk. Zuhair Chase is living in a hotel. Zuhair Chase as fallen twice. Zuhair Chase has not been taking his medications or been able to care for himself. \"  Discharged from [facility] 3/27    Subjective:  Patient in bed and agreeable to therapy.       Pain   No  Rating: NA  Location:  Pain Medicine Status: No request made      RN alerted patient struggling with trach izzy tube slipping out during shaving, RN in to assist.    Bed Mobility:   Supine to Sit:  Min A with HOB elevated  Sit to Supine:  Not Tested  Rolling:           Not Tested  Scooting:        Min A    Transfer Training:   Sit to stand:   Not Tested  Stand to sit:  Not Tested  Bed to Chair:  Mod A or 1 plus CGA of another with cues   Bed to [facility]:   Not Tested  Standard toilet:   Not Tested Patient completed sit scoot pivot transfer to R without board, armrest of chair removed. Activity Tolerance   Pt completed therapy session with No adverse symptoms noted w/activity    Balance  Sitting Balance :     SBA at EOB  Standing Balance   Not Tested - unable to come to full stand due to RLE contractures and B foot wounds; did complete repeated partial stands to RW (5 reps x 2 sets) with weightbearing on RLE only (MIN A of 2 - one person assisting with sit to stand and another to assist with aligning/managing RLE)    ADL Training:   Upper body dressing:  Not Tested  Upper body bathing:  Not Tested  Lower body dressing:  Not Tested  Lower body bathing:  Not Tested  Toileting:   Not Tested  Grooming/Hygiene: Mod A lengthy grooming process involving using shaving cream/safety razor to shave, wash face. More independent with L side of face than R side due to greater dexterity in L hand. MOD A wash hair with shampoo cap, SBA dry hair and comb hair with LUE. Used mouthwash with MIN A.  RN notified of thick secretions that came out with mouthwash when spitting. Feeding :   Not Tested     Therapeutic Exercise:   N/A-see above for repeated sit to stand information    Patient Education:   Role of OT  Recommendations for DC  Energy conservation techniques    Positioning Needs:   Up in chair, call light and needs in reach. Alarm Set    Family Present:  No    Assessment: Making progress toward goals. GOALS  To be met in 3 Visits:  1). Bed to wheelchair/BSC: SBA     To be met in 5 Visits:  1). Supine to/from Sit:             Independent  2). Upper Body Bathing:         Supervision  3). Lower Body Bathing:         SBA  4). Upper Body Dressing:       Supervision  5). Lower Body Dressing:       SBA  6).  Pt to demonstrate UE exs x 15 reps with minimal cues    Plan: cont with [address], OTR/L [id number]        If patient discharges from this facility prior to next visit, this note will serve as the Discharge Summary

## 2022-04-07 NOTE — PROGRESS NOTES
Pt upright in bed, LaryTube in place properly. RN called Chema from [address] and left a message saying no LaryButton was placed on the patient's LaryTube today. Bed locked in low position. Pt repositioned in bed. 625ml of yellow urine noted. Call light in reach. Denies needs.

## 2022-04-08 LAB
GLUCOSE BLD-MCNC: 113 MG/DL (ref 70–99)
GLUCOSE BLD-MCNC: 117 MG/DL (ref 70–99)
GLUCOSE BLD-MCNC: 123 MG/DL (ref 70–99)
GLUCOSE BLD-MCNC: 186 MG/DL (ref 70–99)
GLUCOSE BLD-MCNC: 84 MG/DL (ref 70–99)
PERFORMED ON: ABNORMAL
PERFORMED ON: NORMAL

## 2022-04-08 PROCEDURE — 6360000002 HC RX W HCPCS: Performed by: INTERNAL MEDICINE

## 2022-04-08 PROCEDURE — 1200000000 HC SEMI PRIVATE

## 2022-04-08 PROCEDURE — 2580000003 HC RX 258: Performed by: INTERNAL MEDICINE

## 2022-04-08 PROCEDURE — 97530 THERAPEUTIC ACTIVITIES: CPT

## 2022-04-08 PROCEDURE — 6370000000 HC RX 637 (ALT 250 FOR IP): Performed by: NURSE PRACTITIONER

## 2022-04-08 PROCEDURE — 92526 ORAL FUNCTION THERAPY: CPT

## 2022-04-08 PROCEDURE — 6370000000 HC RX 637 (ALT 250 FOR IP): Performed by: INTERNAL MEDICINE

## 2022-04-08 PROCEDURE — 99231 SBSQ HOSP IP/OBS SF/LOW 25: CPT | Performed by: INTERNAL MEDICINE

## 2022-04-08 PROCEDURE — 94761 N-INVAS EAR/PLS OXIMETRY MLT: CPT

## 2022-04-08 PROCEDURE — 92507 TX SP LANG VOICE COMM INDIV: CPT

## 2022-04-08 RX ORDER — LISINOPRIL 5 MG/1
2.5 TABLET ORAL DAILY
Status: DISCONTINUED | OUTPATIENT
Start: 2022-04-09 | End: 2022-04-09

## 2022-04-08 RX ADMIN — METHYLPHENIDATE HYDROCHLORIDE 20 MG: 10 TABLET ORAL at 06:48

## 2022-04-08 RX ADMIN — PREGABALIN 100 MG: 100 CAPSULE ORAL at 09:15

## 2022-04-08 RX ADMIN — SODIUM CHLORIDE, PRESERVATIVE FREE 10 ML: 5 INJECTION INTRAVENOUS at 22:03

## 2022-04-08 RX ADMIN — MONTELUKAST SODIUM 10 MG: 10 TABLET, COATED ORAL at 22:02

## 2022-04-08 RX ADMIN — LISINOPRIL 10 MG: 10 TABLET ORAL at 09:16

## 2022-04-08 RX ADMIN — PRAVASTATIN SODIUM 40 MG: 40 TABLET ORAL at 09:16

## 2022-04-08 RX ADMIN — ENOXAPARIN SODIUM 40 MG: 40 INJECTION SUBCUTANEOUS at 09:16

## 2022-04-08 RX ADMIN — PREGABALIN 100 MG: 100 CAPSULE ORAL at 22:02

## 2022-04-08 RX ADMIN — GLIPIZIDE 2.5 MG: 5 TABLET ORAL at 06:47

## 2022-04-08 RX ADMIN — FERROUS SULFATE TAB 325 MG (65 MG ELEMENTAL FE) 325 MG: 325 (65 FE) TAB at 07:59

## 2022-04-08 RX ADMIN — TORSEMIDE 20 MG: 20 TABLET ORAL at 09:15

## 2022-04-08 RX ADMIN — DOCUSATE SODIUM 100 MG: 100 CAPSULE, LIQUID FILLED ORAL at 09:16

## 2022-04-08 RX ADMIN — BUPROPION HYDROCHLORIDE 150 MG: 150 TABLET, EXTENDED RELEASE ORAL at 22:02

## 2022-04-08 RX ADMIN — INSULIN GLARGINE 30 UNITS: 100 INJECTION, SOLUTION SUBCUTANEOUS at 22:09

## 2022-04-08 RX ADMIN — ATENOLOL 25 MG: 25 TABLET ORAL at 09:16

## 2022-04-08 RX ADMIN — POTASSIUM CHLORIDE 20 MEQ: 1500 TABLET, EXTENDED RELEASE ORAL at 09:15

## 2022-04-08 RX ADMIN — METHYLPHENIDATE HYDROCHLORIDE 20 MG: 10 TABLET ORAL at 12:05

## 2022-04-08 RX ADMIN — DOCUSATE SODIUM 100 MG: 100 CAPSULE, LIQUID FILLED ORAL at 22:03

## 2022-04-08 RX ADMIN — INSULIN LISPRO 1 UNITS: 100 INJECTION, SOLUTION INTRAVENOUS; SUBCUTANEOUS at 22:08

## 2022-04-08 RX ADMIN — Medication 1 TABLET: at 09:15

## 2022-04-08 RX ADMIN — BUPROPION HYDROCHLORIDE 150 MG: 150 TABLET, EXTENDED RELEASE ORAL at 09:16

## 2022-04-08 RX ADMIN — TAMSULOSIN HYDROCHLORIDE 0.4 MG: 0.4 CAPSULE ORAL at 09:15

## 2022-04-08 RX ADMIN — CETIRIZINE HYDROCHLORIDE 5 MG: 10 TABLET ORAL at 09:15

## 2022-04-08 RX ADMIN — ARIPIPRAZOLE 30 MG: 10 TABLET ORAL at 09:16

## 2022-04-08 RX ADMIN — SODIUM CHLORIDE, PRESERVATIVE FREE 10 ML: 5 INJECTION INTRAVENOUS at 09:15

## 2022-04-08 ASSESSMENT — PAIN SCALES - GENERAL
PAINLEVEL_OUTOF10: 0
PAINLEVEL_OUTOF10: 0

## 2022-04-08 NOTE — PROGRESS NOTES
Occupational Therapy Daily Treatment Note    Unit: 2 [place]  Date:  4/8/2022  Patient Name:    Sergio Bello  Admitting diagnosis:  Tracheostomy dependent ([facility] [address] [Z93.0]  Physical debility [R53.81]  Recurrent falls [R29.6]  Acute renal failure, unspecified acute renal failure type ([facility] [address] [N17.9]  ARF (acute renal failure) ([facility] [address] [facility] [N17.9]  Admit Date:  4/1/2022  Precautions/Restrictions:  fall risk and bed/chair alarm, tracheostomy, wounds on coccyx /L foot/R foot      Discharge Recommendations: SNF  DME needs for discharge: defer to facility       Therapy recommendations for staff:   Assist of 2 with use of gait belt for all transfers to/from chair with drop arm (sit/scoot with or without slide board)    AM-PAC Score: AM-PAC Inpatient Daily Activity Raw Score: 18  Home Health S4 Level: Level 3- Safety if not going to SNF       Treatment Time:  3849-9289  Treatment number:  5  Total Treatment Time:  48 minutes    History of Present Illness: Per Rasheed Leong APRN 4/01: \"The patient is a 75 y. o. male with h/o CVA, hypertension, hyperlipidemia, h/o DVT/PE, DM, Gout, COPD, CHF, h/o tracheal stenosis s/p tracheostomy who presents to [facility] with c/o multiple falls. Jonathan Rodriges was discharged from rehab and unable to walk. Jonathan Rodriges is living in a hotel. Jonathan Rodriges as fallen twice. Jonathan Rodriges has not been taking his medications or been able to care for himself. \"  Discharged from [facility] 3/27    Subjective:  Patient in bed and agreeable to therapy. Pain   Yes  Rating: moderate  Location: buttocks   Pain Medicine Status: No request made      Bed Mobility:   Supine to Sit:  SBA with bed  Sit to Supine:  SBA  Rolling:           Not Tested  Scooting:        SBA at EOB and towards [facility]    Transfer Training:   Sit to stand:   CGA for partial stand (patient using bed rails on both sides for partial stand with therapist blocking L knee).  Completed ~3 sets/5  Stand to sit:  Not Tested  Bed to Chair:  Not Tested (declined due to buttock pain)  Bed to [facility]:   Not Tested  Standard toilet:   Not Tested       Activity Tolerance   Pt completed therapy session with No adverse symptoms noted w/activity    Balance  Sitting Balance :     SBA at EOB  Standing Balance   CGA In partial stand with B UE support on bed rails. ADL Training:   Upper body dressing:  Min a with gown  Upper body bathing:  setup  Lower body dressing: Max A-assist with B socks, pulling up pants. Pt thread B feet with SBA, assist with tying pants. Lower body bathing:  Setup/SBA with cleaning lexi-area,   Toileting: Max A with wiping buttocks clean. Grooming/Hygiene:  Not Tested   Feeding :   Not Tested     Therapeutic Exercise:    Completed through participation in sit to stands at [facility]    Patient Education:   Role of OT  Recommendations for DC  Energy conservation techniques   ADL retraining    Positioning Needs: In bed, call light and needs in reach. Alarm Set    Family Present:  No    Assessment: Tolerated session well today but declined transfer out of bed due to wounds on buttocks due to pain. Pt should continue to benefit from skilled OT tx to maximize independence so that he may eventually return home with the least amount of assistance. Recommending SNF upon discharge as patient functioning well below baseline, demonstrates good rehab potential and unable to return home due to limited or no family support, inability to negotiate stairs to enter home/bedroom/bathroom and home environment not conducive to patient recovery. GOALS  To be met in 3 Visits:  1). Bed to wheelchair/BSC: SBA     To be met in 5 Visits:  1). Supine to/from Sit:             Independent  2). Upper Body Bathing:         Supervision  3). Lower Body Bathing:         SBA  4). Upper Body Dressing:       Supervision  5). Lower Body Dressing:       SBA  6).  Pt to demonstrate UE exs x 15 reps with minimal cues    Plan: cont with [address] MS, OTR/L  #[id number]      If patient discharges from this facility prior to next visit, this note will serve as the Discharge Summary

## 2022-04-08 NOTE — PROGRESS NOTES
Speech Language Pathology  SLP Brief    Name: Lex Waddell  : 1955  Medical Diagnosis: Tracheostomy dependent ([facility] [address] [Z93.0]  Physical debility [R53.81]  Recurrent falls [R29.6]  Acute renal failure, unspecified acute renal failure type ([facility] [address] [N17.9]  ARF (acute renal failure) ([facility] [address] [N17.9]      Noting message from [address] [place] [address] about  Pecola Plate button placed in Gala Gong tube today\". This SLP was off with alternate SLP covering. Pt does not have a izzy tube. Pt has a izzy button only. The izzy button remained secure in place per RN notes. No HME to be placed until ENT visit today. Will f/u following treatment session.  Thank you,    Ember Betancur M.A., [address] [facility]  Speech-Language Pathologist  Phone: [phone]

## 2022-04-08 NOTE — PLAN OF CARE
Problem: Daily Care:  Goal: Daily care needs are met  4/8/2022 1121 by [place] Adams RN  Outcome: Ongoing  4/8/2022 0255 by Madelin Alcantara RN  Outcome: Ongoing     Problem: Pain:  Goal: Patient's pain/discomfort is manageable  4/8/2022 0255 by Madelin Alcantara RN  Outcome: Ongoing   All needs met at this time per patient request, and as needed. Patient denies pain or discomfort at this time. Call light in reach.

## 2022-04-08 NOTE — PROGRESS NOTES
Speech Language Pathology  Speech/Language Treatment/Follow-Up Note  Facility/Department: [address] MEDICAL-SURGICAL    Will re-attempt to place FlexiVoice with ENT today if able. Velna Cough  : 1955 (73 y.o.)   MRN: [id number]  ROOM: 0227/0227-02  ADMISSION DATE: 2022  PATIENT DIAGNOSIS(ES): Tracheostomy dependent ([facility]) [Z93.0]  Physical debility [R53.81]  Recurrent falls [R29.6]  Acute renal failure, unspecified acute renal failure type (HCC) [N17.9]  ARF (acute renal failure) (HCC) [N17.9]  Allergies   Allergen Reactions    Aspartame And Phenylalanine Anaphylaxis    Cefuroxime Axetil Anaphylaxis    Clindamycin Hcl Anaphylaxis    Erythromycin Anaphylaxis and Rash    Feldene [Piroxicam] Anaphylaxis    Guanfacine Hcl Anaphylaxis    Iodine Anaphylaxis    Pcn [Penicillins] Anaphylaxis    Pletal [Cilostazol] Anaphylaxis    Robaxin [Methocarbamol] Anaphylaxis and Shortness Of Breath    Arthrotec [Diclofenac-Misoprostol]     Betadine [Povidone Iodine]     Claritin [Loratadine]     Clindamycin/Lincomycin     Indocin [Indometacin Sodium]     Tenex [Guanfacine Hcl]     Vasotec     Vioxx     Zyvox [Linezolid]        DATE ONSET: 2022    Pain: The patient does not complain of pain       Current Diet: ADULT DIET; Regular; 4 carb choices (60 gm/meal)      Treatment and Impressions:   Subjective: Pt seen in room at bedside with RN permission.  RN Report/Chart Review: I was consulted on this patient  for placement of Lawley Alstrom in stoma with removal of trach. Pt is chronic trach pt s/p tracheal stenosis. Trach in place since . Without and ENT consult or ENT on site yesterday, I did not feel comfortable with decannulation and placement of Bc button. Consult placed and Dr. Junito Lan saw the patient . I was able to see the patient directly with Dr. Junito Lan.  In brief, the patient has large outer stoma and size 4 capped shiley uncuffed trach.  The capped trach is only present to allow for speech. Per his treating SLP at [address] [place] [address], the patient's stoma is only present due to sleep apnea. They have tried to close the stoma before but pt is unable to tolerate closure at night due to sleep apnea. Speech is less than ideal during the day due to air leak around the trach. Serafin Fulton was able to trial Lamona Brazen in stoma with HME (hands free) for 1 hr during office visit which was effective. The patient did not return to for any of his follow-up appointments and has been in and out of hospitals and SNF's. He continuously asks facility SLPs to place Lamona Brazen but signs out AMA or is discharged before they can place as this is off-label use and requires some research/discussion prior to placement.  Per assessment note from Lilli Cabrera (09/24/2022)  · \"Background History:   Dx:-Chronic hypoxemic and hypercapnic respiratory failure with complicated trach management by ENT due to severe Tracheal Stenosis and suspected TESS and possible Asthma as well as Obesity Hypoventilation Syndrome may also be contributing and also possible Tracheobronchomalacia  -Claustrophobia    -Chronic proximal tracheal stenosis and tracheostomy dependence due to COPD and TESS. - trialed on T-tube unable to manage care due to limited manual dexterity. - converted back to tracheostomy tube size 4. PREVIOUS SLP EVALUATIONS:  videostrobes revealed functional VF mobility with functional patent glottic and supraglottic airway. \"      04/05: Dr. Ethan Phelps assessed the patient at bedside. Upper airway and hypopharynx assessed via laryngoscopy. Some production of thick yellow/green mucus was noted. Sputum culture collected by Dr. Ethan Phelps. · 04/05: We attempted to place Provox 14/18 Larybutton with Xtraflow HME filter and Provox Flexivoice freehands HME (impaired manual dexterity) however, due to shape of stoma, button is not fitting without external support.  Once button is in place, due to anatomy of stoma, the opening becomes oval shape vs round. This makes for poor fitting of valve into button and it easily pops off with coughing or exertion  · 04/06: When I walked into pt's room today, I noted that his izzy button was not in place. It was almost completely removed from stoma. Per RN, it has not been staying in place. I removed the button today, cleaned outer stoma. Cleaned izzy button. I attempted to replace with hemostats. I was able to get a better fit with this method. However, I continue to meet resistance at right wall of stoma. I was able to temporarily have button stay in place. During this time, I was able to place the speaking valve in the button. In both settings, valve was immediately thrown from button with phonation. Button did not remain in place long enough for more than 2 HME trials. I suspect the stoma has stenosed and pt requires smaller size izzy button. Will discuss with ENT tomorrow. · 04/08: Upon SLP entry today, pt noted with izzy button completely out of stoma. Again suspect that bilateral ties are causing excess tension and pulling button from stoma. I have attempted to loosen the ties on multiple occasions but they are re-tightened by staff. The izzy button was caked in thick secretions. I removed it and cleaned it. Cleaned outer/anterior inner stoma with removal of thick secretions. I was able to place izzy button with patent fit to bilateral stoma walls today. Button remained in place for over 40 mins without ties. Unable to place FlexiVoice valve due to altered shape of outer izzy button rim.  There is additional concern with pt's lack of ability to remove the valve himself, due to BLE neuropath which greatly reduced his sensation to almost none. The patient MUST have valve removed for sleep due to upper airway collapse and sleep apnea.  There is concern for continuity of care for multiple reasons.  This is off-market use of  Charolotte Delay button, therefore there is limited staff training. If our staff here were trained, that would only allow for temporary use as pt will ultimately d/c. Pt does not want LTC, however, he was unable to care for himself at home. [facility] is not willing to accept pt due to him leaving AMA last admission and [facility] will not accept due to unsafe d/c.    Did enquire with PT/OT about possible DME to enhance pt's ability to remove valve independently on 04/05.  Regardless, due to ill-fit of Yani Polanco. Dr. Ivette Su wants pt to leave button in place until Thursday- today, with no valve to attempt to alter stoma shape for better fit.  Unsure of pt's carryover of current management and POC.  Pt' speech intelligibility is ~90% with trach cap and HME. ~70% intelligible with trach removed.  Respiratory Status: Pt with SPO2% of 94 on  RA with RR of 20     Education: SLP edu pt re: Role of SLP, POC, Evidenced based practice for current recommendations and treatment and Risks of not following recommendations. Pt verbalized understanding, would benefit from ongoing education and RN aware of recommendations  · Assessment: See above.  Recommendations: Willistine Penning button in place until Thursday, will re-attempt HME then. ENT to re-assess Thursday. Leave decannulated. Speech/Language/Cog Goals:  Short Term Goals  Timeframe for Short Term Goals: 5 days (04/10/2022)  Goal 1: The patient will achieve functional communication via various modalities (finger occlusion, HME, change in head posture to reduce leak, etc.) with 100% intelligibility in various contexts as judged by SLP  4/8/2022 : Goal addressed, see above. Ongoing, progressing.       LongTerm Goals:  Timeframe for Long Term Goals: 7 days (04/12/2022)  Goal 1: The pt will develop speech communication that is functional and intelligible in this environment as assessed by the patient or communication partner 90%-100%  4/8/2022 : Ongoing, progressing.'    Plan:    Continued treatment with goals per plan of care. Discharge Recommendations: Recommend SLP tx at discharge. Recommend ENT follow-up. Recommend OP follow-up with Dr. Cherri Sherman (ENT) and Patito Dueñas (SLP) at [facility] otolaryngology (treatment team familiar with patient)    If pt discharges from hospital prior to Speech/Swallowing discharge, this note serves as tx and discharge summary.      Total Treatment Time / Charges     Time in Time out Total Time / units   Cognitive Tx         Speech Tx 0820 0900 40 mins / 1 unit   Dysphagia Tx        Signature:  Tushar Malcolm M.A. [facility] #[id number]  Speech-Language Pathologist  Phone: [phone]

## 2022-04-08 NOTE — PLAN OF CARE
Problem: Falls - Risk of:  Goal: Will remain free from falls  Description: Will remain free from falls  Outcome: Ongoing  Goal: Absence of physical injury  Description: Absence of physical injury  Outcome: Ongoing     Problem: Infection:  Goal: Will remain free from infection  Description: Will remain free from infection  Outcome: Ongoing     Problem: Safety:  Goal: Free from accidental physical injury  Description: Free from accidental physical injury  Outcome: Ongoing  Goal: Free from intentional harm  Description: Free from intentional harm  Outcome: Ongoing     Problem: Daily Care:  Goal: Daily care needs are met  Description: Daily care needs are met  Outcome: Ongoing     Problem: Pain:  Goal: Patient's pain/discomfort is manageable  Description: Patient's pain/discomfort is manageable  Outcome: Ongoing  Goal: Pain level will decrease  Description: Pain level will decrease  Outcome: Ongoing  Goal: Control of chronic pain  Description: Control of chronic pain  Outcome: Ongoing     Problem: Skin Integrity:  Goal: Skin integrity will stabilize  Description: Skin integrity will stabilize  Outcome: Ongoing     Problem: Discharge Planning:  Goal: Patients continuum of care needs are met  Description: Patients continuum of care needs are met  Outcome: Ongoing     Problem: Airway Clearance - Ineffective:  Goal: Ability to maintain a clear airway will improve  Description: Ability to maintain a clear airway will improve  Outcome: Ongoing     Problem: Skin Integrity:  Goal: Will show no infection signs and symptoms  Description: Will show no infection signs and symptoms  Outcome: Ongoing  Goal: Absence of new skin breakdown  Description: Absence of new skin breakdown  Outcome: Ongoing     Problem: Nutrition  Goal: Optimal nutrition therapy  Outcome: Ongoing

## 2022-04-08 NOTE — FLOWSHEET NOTE
04/08/22 0912   Vital Signs   Temp 97.6 °F (36.4 °C)   Temp Source Oral   Pulse 58   Heart Rate Source Brachial   Resp 18   /66   BP Location Left upper arm   Patient Position High fowlers   Level of Consciousness Alert (0)   MEWS Score 2   Patient Currently in Pain Denies   Pain Assessment   Pain Assessment 0-10   Pain Level 0   Oxygen Therapy   SpO2 97 %   O2 Device None (Room air)   Pt a/o. Am assessment completed see flow sheet. Pt denies any pain/ needs at this time. Call light within reach. Adrienne tube pushed back in place x4 per patient request.  Patient with no acute distress noted.

## 2022-04-08 NOTE — PROGRESS NOTES
placement of Bc button. Consult placed and Dr. Lizbet Liriano saw the patient 04/05. I was able to see the patient directly with Dr. Lizbet Liriano.  In brief, the patient has large outer stoma and size 4 capped shiley uncuffed trach. The capped trach is only present to allow for speech. Per his treating SLP at [address] [place] [address], the patient's stoma is only present due to sleep apnea. They have tried to close the stoma before but pt is unable to tolerate closure at night due to sleep apnea. Speech is less than ideal during the day due to air leak around the trach. Trinity Sheppard was able to trial Kary Lulas in stoma with HME (hands free) for 1 hr during office visit which was effective. The patient did not return to for any of his follow-up appointments and has been in and out of hospitals and SNF's. He continuously asks facility SLPs to place Kary Lulas but signs out AMA or is discharged before they can place as this is off-label use and requires some research/discussion prior to placement. 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 - trialed on T-tube unable to manage care due to limited manual dexterity. - converted back to tracheostomy tube size 4. PREVIOUS SLP EVALUATIONS:  videostrobes revealed functional VF mobility with functional patent glottic and supraglottic airway. \"      04/05: Dr. Lizbet Liriano assessed the patient at bedside. Upper airway and hypopharynx assessed via laryngoscopy. Some production of thick yellow/green mucus was noted. Sputum culture collected by Dr. Lizbet Liriano. · 04/05:  We attempted to place Provox 14/18 Larybutton with Xtraflow HME filter and Provox Flexivoice freehands HME (impaired manual dexterity) however, due to shape of stoma, button is not fitting without external support. Once button is in place, due to anatomy of stoma, the opening becomes oval shape vs round. This makes for poor fitting of valve into button and it easily pops off with coughing or exertion  · 04/06: When I walked into pt's room today, I noted that his izzy button was not in place. It was almost completely removed from stoma. Per RN, it has not been staying in place. I removed the button today, cleaned outer stoma. Cleaned izzy button. I attempted to replace with hemostats. I was able to get a better fit with this method. However, I continue to meet resistance at right wall of stoma. I was able to temporarily have button stay in place. During this time, I was able to place the speaking valve in the button. In both settings, valve was immediately thrown from button with phonation. Button did not remain in place long enough for more than 2 HME trials. I suspect the stoma has stenosed and pt requires smaller size izzy button. Will discuss with ENT tomorrow. · 04/08: Upon SLP entry today, pt noted with izzy button completely out of stoma. Again suspect that bilateral ties are causing excess tension and pulling button from stoma. I have attempted to loosen the ties on multiple occasions but they are re-tightened by staff. The izzy button was caked in thick secretions. I removed it and cleaned it. Cleaned outer/anterior inner stoma with removal of thick secretions. I was able to place izzy button with patent fit to bilateral stoma walls today. Button remained in place for over 40 mins without ties. Unable to place FlexiVoice valve due to altered shape of outer izzy button rim.   · 04/08 Session 2: The following messafe was sent to Dr. Mary Anne Bowen or Facility: [facility] From: Abby Steele RE: Keegan Carr Rani Bernard 1955 RM: [address] Cammie Jain. Reaching out via [employer] vs text in hopes to not bother you if you are off today. There has been no ENT follow-up with Mr. Rani Bernard that I can see. I got a better fit with his Linell Vernon today but I cannot get that flexivoice to stay no matter what I do. I'm thinking he might need a downsize in Linell Vernon? Wondering if you knew if there would be any follow-up? I know the original plan was for Thursday. Thank you! -Chema Need Callback: NO CALLBACK RE SPEECH THERAPY        Read 1:18 PM           MD Response: If he can't tolerate the button, you can put the size 4 trache back in          I revisited the patient this afternoon to re-assess placement of Bc button. Again, bilateral ties were           tightly adjusted and bc button was not in place with notable bilateral tension. I removed the button            and cleaned it. Discussed with patient who wishes to replace size 4 shiley. New trach and trach collar        obtained and placed. Pt with audible voicing with considerable air leak. I am in contact with            otolaryngology department at [facility] as they provided original recommendation.  There is additional concern with pt's lack of ability to remove the valve himself, due to BLE neuropath which greatly reduced his sensation to almost none. The patient MUST have valve removed for sleep due to upper airway collapse and sleep apnea.  There is concern for continuity of care for multiple reasons. This is off-market use of  Ramos Bernie button, therefore there is limited staff training. If our staff here were trained, that would only allow for temporary use as pt will ultimately d/c. Pt does not want LTC, however, he was unable to care for himself at home.  [facility] is not willing to accept pt due to him leaving AMA last admission and [facility] will not accept due to unsafe d/c.    Did enquire with PT/OT about possible DME to enhance pt's ability to remove valve independently on 04/05.  Regardless, due to ill-fit of Viktoria Yi. Dr. Magda Montero wants pt to leave button in place until Thursday- today, with no valve to attempt to alter stoma shape for better fit.  Unsure of pt's carryover of current management and POC.  Pt' speech intelligibility is ~90% with trach cap and HME. ~70% intelligible with trach removed.  Respiratory Status: Pt with SPO2% of 94 on  RA with RR of 20     Education: SLP edu pt re: Role of SLP, POC, Evidenced based practice for current recommendations and treatment and Risks of not following recommendations. Pt verbalized understanding, would benefit from ongoing education and RN aware of recommendations  · Assessment: See above.  Recommendations: Anyimilan Royalan button in place until Thursday, will re-attempt HME then. ENT to re-assess Thursday. Leave decannulated. Speech/Language/Cog Goals:  Short Term Goals  Timeframe for Short Term Goals: 5 days (04/10/2022)  Goal 1: The patient will achieve functional communication via various modalities (finger occlusion, HME, change in head posture to reduce leak, etc.) with 100% intelligibility in various contexts as judged by SLP  4/8/2022 : Goal addressed, see above. Ongoing, progressing. LongTerm Goals:  Timeframe for Long Term Goals: 7 days (04/12/2022)  Goal 1: The pt will develop speech communication that is functional and intelligible in this environment as assessed by the patient or communication partner 90%-100%  4/8/2022 : Ongoing, progressing.'    Plan:    Continued treatment with goals per plan of care. Discharge Recommendations: Recommend SLP tx at discharge. Recommend ENT follow-up. Recommend OP follow-up with Dr. Shree Diane (ENT) and Jalyn Moreno (SLP) at [facility] otolaryngology (treatment team familiar with patient)    If pt discharges from hospital prior to Speech/Swallowing discharge, this note serves as tx and discharge summary.      Total Treatment Time / Charges     Time in Time out Total Time / units   Cognitive Tx         Speech Tx 0820  1326 0900  1346 40 mins / 1 unit  20 mins / 1 unit   Dysphagia Tx        Signature:  Ines Reilly M.A., [facility] #[id number]  Speech-Language Pathologist  Phone: [phone], [phone]

## 2022-04-08 NOTE — FLOWSHEET NOTE
Pt A/Ox4. VSS. Pt unlabored; respirations even, regular, effortless. No distress noted. Shift assessment complete. See flowsheet. Blood glucose 133, Humalog not give d/t not meeting parameters. PM meds given. See MAR. Repositioned pt to left side, pt can shift weight side to side. Changed wound drsg to sacrum & left foot. Bed alarm on. Denies needs at this time. Side rails 2/4. Bed in low position. Bed alarm on. Call light within reach.         04/07/22 2118 04/07/22 2148   Vital Signs   Temp 97.2 °F (36.2 °C)  --    Temp Source Oral  --    Pulse 68  --    Heart Rate Source Monitor  --    Resp 18  --    BP 95/64  --    BP Location Left upper arm  --    Patient Position Semi fowlers  --    Level of Consciousness Alert (0)  --    MEWS Score 2  --    Patient Currently in Pain  --  Yes   Pain Assessment   Pain Assessment  --  0-10   Pain Level  --  8   Pain Type  --  Chronic pain   Pain Location  --  Buttocks   Oxygen Therapy   SpO2 98 %  --    O2 Device None (Room air)  --

## 2022-04-08 NOTE — PROGRESS NOTES
Progress Note    Admit Date:  4/1/2022    Subjective:  Mr. Douglas Hatchet seen up in bed , feels ok today . No new issues  Midge Many tube not staying in and awaiting for ENT review today    Objective:   No data found. Intake/Output Summary (Last 24 hours) at 4/8/2022 0750  Last data filed at 4/7/2022 1802  Gross per 24 hour   Intake --   Output 1275 ml   Net -1275 ml       Physical Exam:        General: middle aged obese male  Awake, alert and oriented. Appears to be not in any distress  Mucous Membranes:  Pink , anicteric  Tracheostomy with izzy tube noted   Neck: No JVD, no carotid bruit, no thyromegaly  Chest:  Clear to auscultation bilaterally, diminished in bases  Cardiovascular:  RRR S1S2 heard, no murmurs or gallops  Abdomen:  Soft, obese undistended, non tender, no organomegaly, BS present  Extremities: left heel almost healed linear ulcer with no infection noted   No edema or cyanosis.  Distal pulses well felt  Neurological : grossly normal        Medications:  insulin lispro, 0-12 Units, TID WC  insulin lispro, 0-6 Units, Nightly  docusate sodium, 100 mg, BID  pravastatin, 40 mg, Daily  budesonide-formoterol, 2 puff, BID  glipiZIDE, 2.5 mg, QAM AC  therapeutic multivitamin-minerals, 1 tablet, Daily  ferrous sulfate, 325 mg, Daily with breakfast  ARIPiprazole, 30 mg, Daily  tamsulosin, 0.4 mg, Daily  buPROPion, 150 mg, BID  potassium chloride, 20 mEq, Daily  insulin glargine, 30 Units, Nightly  torsemide, 20 mg, Daily  lisinopril, 10 mg, Daily  pregabalin, 100 mg, BID  atenolol, 25 mg, Daily  sodium chloride flush, 5-40 mL, 2 times per day  enoxaparin, 40 mg, Daily  montelukast, 10 mg, Nightly  cetirizine, 5 mg, Daily  methylphenidate, 20 mg, BID WC      PRN Medications:  glucose, 15 g, PRN  glucagon (rDNA), 1 mg, PRN  dextrose, 100 mL/hr, PRN  sodium chloride flush, 5-40 mL, PRN  sodium chloride, 250 mL, PRN  polyethylene glycol, 17 g, Daily PRN  acetaminophen, 650 mg, Q6H PRN   Or  acetaminophen, 650 mg, Q6H PRN  dextrose bolus (hypoglycemia), 125 mL, PRN   Or  dextrose bolus (hypoglycemia), 250 mL, PRN        Data:  CBC:   No results for input(s): WBC, HGB, HCT, MCV, PLT in the last 72 hours. BMP:   No results for input(s): NA, K, CL, CO2, PHOS, BUN, CREATININE, CA in the last 72 hours. CULTURES  COVID 19 Not detected        RADIOLOGY  CT HEAD WO CONTRAST   Final Result   Stable CT scan of the head without acute intracranial abnormality. Sputum - pseudomonas     Assessment/Plan: TRICIA on CKD stage 3a  - Baseline ~ 1.5  - creatinine 2.2 on admission  - Given IVF's. -demadex and ACEI was held   - creat improved   - resumed meds     Multiple falls, Generalized  weakness  - PT/OT eval  - SNF placement recommended but has no more rehab days left  - should consider ECF        H/o CVA  - on ASA and  Pravastatin     H/o tracheal stenosis  - s/p tracheostomy. And scheduled for Bc tube  at [facility] this week  - consulted ENT and bc tube placed  - sputum with pseudomonas likely colonization - hold abx     Hypertension  -BP borderline  -lisinopril held and resumed now      H/o PE/DVT  - not on AC     Depression  - continue home medication regimen.     DM type 2  - monitor glucose. - Continue Lantus 30 units nightly, Glipizide  - Humalog with meals, SSI coverage. COPD  - stable. No AE  - continue Symbicort, Singulair     Chronic diastolic CHF  - stable. No s/s decompensation  - continue Demadex     BPH  - on Flomax.     Iron deficiency anemia  - continue ferrous sulfate    DVT Prophylaxis: lovenox   Diet: ADULT DIET;  Regular; 4 carb choices (60 gm/meal)  Code Status: Full Code      Dc planning  Once arrangements made for ECF    Belgica Yarbrough MD, 4/8/2022 7:50 AM

## 2022-04-08 NOTE — FLOWSHEET NOTE
04/08/22 0727   Handoff   Communication Given Shift Handoff   Handoff Given To Kia Duque Received From [facility] Communication Face to Face; At bedside   Time Handoff Given 0031   End of Shift Check Performed Yes

## 2022-04-09 LAB
ANION GAP SERPL CALCULATED.3IONS-SCNC: 11 MMOL/L (ref 3–16)
BUN BLDV-MCNC: 35 MG/DL (ref 7–20)
CALCIUM SERPL-MCNC: 8.9 MG/DL (ref 8.3–10.6)
CHLORIDE BLD-SCNC: 102 MMOL/L (ref 99–110)
CO2: 28 MMOL/L (ref 21–32)
CREAT SERPL-MCNC: 1.5 MG/DL (ref 0.8–1.3)
GFR AFRICAN AMERICAN: 57
GFR NON-AFRICAN AMERICAN: 47
GLUCOSE BLD-MCNC: 101 MG/DL (ref 70–99)
GLUCOSE BLD-MCNC: 120 MG/DL (ref 70–99)
GLUCOSE BLD-MCNC: 124 MG/DL (ref 70–99)
GLUCOSE BLD-MCNC: 90 MG/DL (ref 70–99)
GLUCOSE BLD-MCNC: 93 MG/DL (ref 70–99)
GLUCOSE BLD-MCNC: 98 MG/DL (ref 70–99)
PERFORMED ON: ABNORMAL
PERFORMED ON: NORMAL
PERFORMED ON: NORMAL
POTASSIUM SERPL-SCNC: 4.1 MMOL/L (ref 3.5–5.1)
SODIUM BLD-SCNC: 141 MMOL/L (ref 136–145)

## 2022-04-09 PROCEDURE — 36415 COLL VENOUS BLD VENIPUNCTURE: CPT

## 2022-04-09 PROCEDURE — 6360000002 HC RX W HCPCS: Performed by: INTERNAL MEDICINE

## 2022-04-09 PROCEDURE — 6370000000 HC RX 637 (ALT 250 FOR IP): Performed by: NURSE PRACTITIONER

## 2022-04-09 PROCEDURE — 1200000000 HC SEMI PRIVATE

## 2022-04-09 PROCEDURE — 97535 SELF CARE MNGMENT TRAINING: CPT

## 2022-04-09 PROCEDURE — 80048 BASIC METABOLIC PNL TOTAL CA: CPT

## 2022-04-09 PROCEDURE — 97110 THERAPEUTIC EXERCISES: CPT

## 2022-04-09 PROCEDURE — 99231 SBSQ HOSP IP/OBS SF/LOW 25: CPT | Performed by: INTERNAL MEDICINE

## 2022-04-09 PROCEDURE — 2580000003 HC RX 258: Performed by: INTERNAL MEDICINE

## 2022-04-09 PROCEDURE — 97530 THERAPEUTIC ACTIVITIES: CPT

## 2022-04-09 PROCEDURE — 6370000000 HC RX 637 (ALT 250 FOR IP): Performed by: INTERNAL MEDICINE

## 2022-04-09 RX ORDER — HYDROCODONE BITARTRATE AND ACETAMINOPHEN 5; 325 MG/1; MG/1
1 TABLET ORAL ONCE
Status: COMPLETED | OUTPATIENT
Start: 2022-04-09 | End: 2022-04-09

## 2022-04-09 RX ORDER — POLYETHYLENE GLYCOL 3350 17 G/17G
17 POWDER, FOR SOLUTION ORAL DAILY
Status: DISCONTINUED | OUTPATIENT
Start: 2022-04-09 | End: 2022-04-13 | Stop reason: HOSPADM

## 2022-04-09 RX ADMIN — HYDROCODONE BITARTRATE AND ACETAMINOPHEN 1 TABLET: 5; 325 TABLET ORAL at 22:31

## 2022-04-09 RX ADMIN — PREGABALIN 100 MG: 100 CAPSULE ORAL at 08:38

## 2022-04-09 RX ADMIN — TORSEMIDE 20 MG: 20 TABLET ORAL at 08:38

## 2022-04-09 RX ADMIN — ATENOLOL 25 MG: 25 TABLET ORAL at 08:38

## 2022-04-09 RX ADMIN — LISINOPRIL 2.5 MG: 5 TABLET ORAL at 08:36

## 2022-04-09 RX ADMIN — PRAVASTATIN SODIUM 40 MG: 40 TABLET ORAL at 08:36

## 2022-04-09 RX ADMIN — ENOXAPARIN SODIUM 40 MG: 40 INJECTION SUBCUTANEOUS at 08:37

## 2022-04-09 RX ADMIN — INSULIN GLARGINE 30 UNITS: 100 INJECTION, SOLUTION SUBCUTANEOUS at 22:32

## 2022-04-09 RX ADMIN — DOCUSATE SODIUM 100 MG: 100 CAPSULE, LIQUID FILLED ORAL at 22:29

## 2022-04-09 RX ADMIN — SODIUM CHLORIDE, PRESERVATIVE FREE 10 ML: 5 INJECTION INTRAVENOUS at 08:43

## 2022-04-09 RX ADMIN — TAMSULOSIN HYDROCHLORIDE 0.4 MG: 0.4 CAPSULE ORAL at 08:38

## 2022-04-09 RX ADMIN — BUPROPION HYDROCHLORIDE 150 MG: 150 TABLET, EXTENDED RELEASE ORAL at 08:36

## 2022-04-09 RX ADMIN — POTASSIUM CHLORIDE 20 MEQ: 1500 TABLET, EXTENDED RELEASE ORAL at 08:38

## 2022-04-09 RX ADMIN — SODIUM CHLORIDE, PRESERVATIVE FREE 10 ML: 5 INJECTION INTRAVENOUS at 22:31

## 2022-04-09 RX ADMIN — FERROUS SULFATE TAB 325 MG (65 MG ELEMENTAL FE) 325 MG: 325 (65 FE) TAB at 08:38

## 2022-04-09 RX ADMIN — METHYLPHENIDATE HYDROCHLORIDE 20 MG: 10 TABLET ORAL at 08:38

## 2022-04-09 RX ADMIN — ARIPIPRAZOLE 30 MG: 10 TABLET ORAL at 08:37

## 2022-04-09 RX ADMIN — ACETAMINOPHEN 650 MG: 325 TABLET ORAL at 17:53

## 2022-04-09 RX ADMIN — PREGABALIN 100 MG: 100 CAPSULE ORAL at 22:29

## 2022-04-09 RX ADMIN — CETIRIZINE HYDROCHLORIDE 5 MG: 10 TABLET ORAL at 08:38

## 2022-04-09 RX ADMIN — Medication 1 TABLET: at 08:38

## 2022-04-09 RX ADMIN — MONTELUKAST SODIUM 10 MG: 10 TABLET, COATED ORAL at 22:29

## 2022-04-09 RX ADMIN — DOCUSATE SODIUM 100 MG: 100 CAPSULE, LIQUID FILLED ORAL at 08:38

## 2022-04-09 RX ADMIN — BUPROPION HYDROCHLORIDE 150 MG: 150 TABLET, EXTENDED RELEASE ORAL at 22:29

## 2022-04-09 RX ADMIN — GLIPIZIDE 2.5 MG: 5 TABLET ORAL at 08:37

## 2022-04-09 RX ADMIN — POLYETHYLENE GLYCOL (3350) 17 G: 17 POWDER, FOR SOLUTION ORAL at 15:18

## 2022-04-09 RX ADMIN — METHYLPHENIDATE HYDROCHLORIDE 20 MG: 10 TABLET ORAL at 12:05

## 2022-04-09 ASSESSMENT — PAIN DESCRIPTION - LOCATION: LOCATION: COCCYX

## 2022-04-09 ASSESSMENT — PAIN SCALES - GENERAL
PAINLEVEL_OUTOF10: 8
PAINLEVEL_OUTOF10: 7
PAINLEVEL_OUTOF10: 9
PAINLEVEL_OUTOF10: 9
PAINLEVEL_OUTOF10: 0

## 2022-04-09 NOTE — PROGRESS NOTES
Inpatient Physical Therapy Daily Treatment Note    Unit: [facility]  Date:  4/9/2022  Patient Name:    Sergio Bello  Admitting diagnosis:  Tracheostomy dependent ([facility] [Z93.0]  Physical debility [R53.81]  Recurrent falls [R29.6]  Acute renal failure, unspecified acute renal failure type ([facility] [N17.9]  ARF (acute renal failure) ([facility] [N17.9]  Admit Date:  4/1/2022  Precautions/Restrictions:  Fall risk, Bed/chair alarm, Lines -IV and Plans are for NWB LLE post procedure      Discharge Recommendations: SNF  DME needs for discharge: defer to facility       Therapy recommendation for EMS Transport: requires transport by cot due to requires  Ax2 to complete transfer    Therapy recommendations for staff:   Ax2 bed to chair    History of Present Illness: Rafael Leong APRN 4/01: \"The patient is a 75 y. o. male with h/o CVA, hypertension, hyperlipidemia, h/o DVT/PE, DM, Gout, COPD, CHF, h/o tracheal stenosis s/p tracheostomy who presents to [facility] with c/o multiple falls. Jonathan Rodriges was discharged from rehab and unable to walk. Jonathan Rodriges is living in a hotel. Jonathan Rodriges as fallen twice. Jonathan Rodriges has not been taking his medications or been able to care for himself. \"  Discharged from [facility] 3/27    Home Health S4 Level Recommendation: NA  AM-PAC Mobility Score   AM-PAC Inpatient Mobility Raw Score : 12  AM-PAC Inpatient Mobility without Stair Climbing Raw Score : 13    Treatment Time:  14:20-15:20  Treatment number: 4  Timed Code Treatment Minutes: 60 minutes  Total Treatment Minutes:  60 minutes    Cognition    A&O orientation not directly assessed. Able to follow 2 step commands    Subjective  Patient lying supine in bed with visitor present , but left at beginning of session  Pt agreeable to this PT tx.      Pain   Yes  Location: L LB-sciatic in nature  Rating:    moderate/10  Pain Medicine Status: No request made, appeared relieved with position changes and independent stretching     Bed Mobility   Supine to Sit:    SBA from flat bed,used handrail  Sit to Supine:   SBA  Rolling:   SBA  Scooting:   SBA    Transfer Training     Sit to stand:   CGA, practiced x 20 using bilateral bed rails  Stand to sit:   CGA  Bed to Chair:   declined  Gait Training pt is non-ambulatory at baseline; pt ambulated 0 ft. Therapeutic Exercise  Practiced sit to stands to facilitate WB through RLE,NWB L    Balance  Sitting:  Good ; Supervision  Comments: steady, no LOB    Standing: Fair +; CGA  Comments: partial stand, NWB LLE; prolonged standing to complete pericare, sheet change, clothing management    Patient Education      Role of PT, POC, Discharge recommendations, safety awareness, transfer techniques and calling for assist with mobility. Positioning Needs       Pt in bed, alarm set, positioned in proper neutral alignment and pressure relief provided. Call light provided and all needs within reach    Activity Tolerance   Pt completed therapy session with No adverse symptoms noted w/activity. Other      Assessment :  Patient demonstrated good progress this date. May benefit form practicing some more functional activities, such as transferring to [facility]. Recommending SNF upon discharge as patient functioning well below baseline, demonstrates good rehab potential and unable to return home due to limited or no family support, burden of care beyond caregiver ability and home environment not conducive to patient recovery. Goals (all goals ongoing unless otherwise indicated)  To be met in 3 visits:  1). Independent with LE Ex x 10 reps  2).  Supine to/from sit: SBA   3).  Bed to/from wheelchair: Min A with slide board- MET 4/2/22   4.) Sit to stand using nils stedy with Min A  To be met in 6 visits:  1).  Supine to/from sit: Independent  2).  Bed to/from wheelchair: Independent with slide board    Plan   Continue with plan of care.   Practice transfers using nils stedy    Signature: Robbin Hicks PT #[id number]    If patient discharges from this facility prior to next visit, this note will serve as the Discharge Summary.

## 2022-04-09 NOTE — PLAN OF CARE
Problem: Falls - Risk of:  Goal: Will remain free from falls  Description: Will remain free from falls    Problem: Safety:  Goal: Free from accidental physical injury  Description: Free from accidental physical injury  4/9/2022 0947 by Taye Solomon  Outcome: Ongoing  Goal: Absence of physical injury  Description: Absence of physical injury  Outcome: Ongoing      Problem: Airway Clearance - Ineffective:  Goal: Ability to maintain a clear airway will improve  Description: Ability to maintain a clear airway will improve  4/9/2022 0947 by Taye Solomon  Outcome: Ongoing     Problem: Pain:  Goal: Patient's pain/discomfort is manageable  Description: Patient's pain/discomfort is manageable  Outcome: Ongoing

## 2022-04-09 NOTE — FLOWSHEET NOTE
04/08/22 1939   Vital Signs   Temp 97.2 °F (36.2 °C)   Temp Source Oral   Pulse 62   Heart Rate Source Monitor   Resp 14   BP 98/60   BP Location Left upper arm   Patient Position High fowlers   Level of Consciousness Alert (0)   MEWS Score 1   Patient Currently in Pain Denies   Oxygen Therapy   SpO2 95 %   O2 Device None (Room air)     Patient A=Ox4, vitals and assessments done at this time. Bed in lowest position.

## 2022-04-09 NOTE — PROGRESS NOTES
Progress Note    Admit Date:  4/1/2022    Subjective:  Mr. Jyothi Osman seen up in bed , feels ok today . ENT did not visit yesterday and now back to tracheostomy tube with PMV   Able to speak better  Reports consitpation     Objective:   No data found. Intake/Output Summary (Last 24 hours) at 4/9/2022 1340  Last data filed at 4/9/2022 1127  Gross per 24 hour   Intake --   Output 1550 ml   Net -1550 ml       Physical Exam:        General: middle aged obese male  Awake, alert and oriented. Appears to be not in any distress  Mucous Membranes:  Pink , anicteric  Tracheostomy with izzy tube noted   Neck: No JVD, no carotid bruit, no thyromegaly  Chest:  Clear to auscultation bilaterally, diminished in bases  Cardiovascular:  RRR S1S2 heard, no murmurs or gallops  Abdomen:  Soft, obese undistended, non tender, no organomegaly, BS present  Pain pump in left side of abd  Extremities: left heel almost healed linear ulcer with no infection noted   No edema or cyanosis.  Distal pulses well felt  Neurological : grossly normal        Medications:  insulin lispro, 0-12 Units, TID WC  insulin lispro, 0-6 Units, Nightly  docusate sodium, 100 mg, BID  pravastatin, 40 mg, Daily  budesonide-formoterol, 2 puff, BID  glipiZIDE, 2.5 mg, QAM AC  therapeutic multivitamin-minerals, 1 tablet, Daily  ferrous sulfate, 325 mg, Daily with breakfast  ARIPiprazole, 30 mg, Daily  tamsulosin, 0.4 mg, Daily  buPROPion, 150 mg, BID  potassium chloride, 20 mEq, Daily  insulin glargine, 30 Units, Nightly  [Held by provider] torsemide, 20 mg, Daily  pregabalin, 100 mg, BID  atenolol, 25 mg, Daily  sodium chloride flush, 5-40 mL, 2 times per day  enoxaparin, 40 mg, Daily  montelukast, 10 mg, Nightly  cetirizine, 5 mg, Daily  methylphenidate, 20 mg, BID WC      PRN Medications:  glucose, 4 each, PRN  glucagon (rDNA), 1 mg, PRN  dextrose, 100 mL/hr, PRN  sodium chloride flush, 5-40 mL, PRN  sodium chloride, 250 mL, PRN  polyethylene glycol, 17 g, Daily PRN  acetaminophen, 650 mg, Q6H PRN   Or  acetaminophen, 650 mg, Q6H PRN  dextrose bolus (hypoglycemia), 125 mL, PRN   Or  dextrose bolus (hypoglycemia), 250 mL, PRN        Data:  CBC:   No results for input(s): WBC, HGB, HCT, MCV, PLT in the last 72 hours. BMP:   Recent Labs     04/09/22  0559      K 4.1      CO2 28   BUN 35*   CREATININE 1.5*       CULTURES  COVID 19 Not detected        RADIOLOGY  CT HEAD WO CONTRAST   Final Result   Stable CT scan of the head without acute intracranial abnormality. Sputum - pseudomonas     Assessment/Plan: TRICIA on CKD stage 3a  - Baseline ~ 1.5  - creatinine 2.2 on admission  - Given IVF's. -demadex and ACEI was held   - creat improved   - resumed meds     Multiple falls, Generalized  weakness  - PT/OT eval  - SNF placement recommended but has no more rehab days left  - should consider ECF        H/o CVA  - on ASA and  Pravastatin     H/o tracheal stenosis  - s/p tracheostomy. And scheduled for Bc tube  at [facility] this week  - consulted ENT and bc tube placed  - sputum with pseudomonas likely colonization - hold abx     Hypertension  -BP borderline  -lisinopril held and resumed now      H/o PE/DVT  - not on AC     Depression  - continue home medication regimen.     DM type 2  - monitor glucose. - Continue Lantus 30 units nightly, Glipizide  - Humalog with meals, SSI coverage. COPD  - stable. No AE  - continue Symbicort, Singulair     Chronic diastolic CHF  - stable. No s/s decompensation  - continue Demadex     BPH  - on Flomax.     Iron deficiency anemia  - continue ferrous sulfate    DVT Prophylaxis: lovenox   Diet: ADULT DIET;  Regular; 4 carb choices (60 gm/meal)  Code Status: Full Code      Dc planning  Once arrangements made for ECF    Nils Sykes MD, 4/9/2022 1:40 PM

## 2022-04-10 LAB
GLUCOSE BLD-MCNC: 106 MG/DL (ref 70–99)
GLUCOSE BLD-MCNC: 108 MG/DL (ref 70–99)
GLUCOSE BLD-MCNC: 113 MG/DL (ref 70–99)
GLUCOSE BLD-MCNC: 159 MG/DL (ref 70–99)
GLUCOSE BLD-MCNC: 162 MG/DL (ref 70–99)
PERFORMED ON: ABNORMAL

## 2022-04-10 PROCEDURE — 6370000000 HC RX 637 (ALT 250 FOR IP): Performed by: INTERNAL MEDICINE

## 2022-04-10 PROCEDURE — 6370000000 HC RX 637 (ALT 250 FOR IP): Performed by: NURSE PRACTITIONER

## 2022-04-10 PROCEDURE — 2580000003 HC RX 258: Performed by: INTERNAL MEDICINE

## 2022-04-10 PROCEDURE — 99231 SBSQ HOSP IP/OBS SF/LOW 25: CPT | Performed by: INTERNAL MEDICINE

## 2022-04-10 PROCEDURE — 6360000002 HC RX W HCPCS: Performed by: INTERNAL MEDICINE

## 2022-04-10 PROCEDURE — 1200000000 HC SEMI PRIVATE

## 2022-04-10 RX ORDER — BUPROPION HYDROCHLORIDE 100 MG/1
400 TABLET ORAL DAILY
Status: DISCONTINUED | OUTPATIENT
Start: 2022-04-10 | End: 2022-04-10

## 2022-04-10 RX ORDER — LISINOPRIL 5 MG/1
2.5 TABLET ORAL DAILY
Status: DISCONTINUED | OUTPATIENT
Start: 2022-04-10 | End: 2022-04-13 | Stop reason: HOSPADM

## 2022-04-10 RX ADMIN — INSULIN LISPRO 1 UNITS: 100 INJECTION, SOLUTION INTRAVENOUS; SUBCUTANEOUS at 20:42

## 2022-04-10 RX ADMIN — LISINOPRIL 2.5 MG: 5 TABLET ORAL at 09:49

## 2022-04-10 RX ADMIN — GLIPIZIDE 2.5 MG: 5 TABLET ORAL at 05:45

## 2022-04-10 RX ADMIN — METHYLPHENIDATE HYDROCHLORIDE 20 MG: 10 TABLET ORAL at 12:28

## 2022-04-10 RX ADMIN — PRAVASTATIN SODIUM 40 MG: 40 TABLET ORAL at 08:36

## 2022-04-10 RX ADMIN — INSULIN GLARGINE 30 UNITS: 100 INJECTION, SOLUTION SUBCUTANEOUS at 20:43

## 2022-04-10 RX ADMIN — SODIUM CHLORIDE, PRESERVATIVE FREE 10 ML: 5 INJECTION INTRAVENOUS at 20:41

## 2022-04-10 RX ADMIN — FERROUS SULFATE TAB 325 MG (65 MG ELEMENTAL FE) 325 MG: 325 (65 FE) TAB at 08:41

## 2022-04-10 RX ADMIN — SODIUM CHLORIDE, PRESERVATIVE FREE 10 ML: 5 INJECTION INTRAVENOUS at 08:35

## 2022-04-10 RX ADMIN — TAMSULOSIN HYDROCHLORIDE 0.4 MG: 0.4 CAPSULE ORAL at 08:36

## 2022-04-10 RX ADMIN — DOCUSATE SODIUM 100 MG: 100 CAPSULE, LIQUID FILLED ORAL at 20:41

## 2022-04-10 RX ADMIN — Medication 1 TABLET: at 08:36

## 2022-04-10 RX ADMIN — PREGABALIN 100 MG: 100 CAPSULE ORAL at 20:41

## 2022-04-10 RX ADMIN — METHYLPHENIDATE HYDROCHLORIDE 20 MG: 10 TABLET ORAL at 05:45

## 2022-04-10 RX ADMIN — POTASSIUM CHLORIDE 20 MEQ: 1500 TABLET, EXTENDED RELEASE ORAL at 08:39

## 2022-04-10 RX ADMIN — BUPROPION HYDROCHLORIDE 400 MG: 100 TABLET, FILM COATED, EXTENDED RELEASE ORAL at 09:49

## 2022-04-10 RX ADMIN — POLYETHYLENE GLYCOL (3350) 17 G: 17 POWDER, FOR SOLUTION ORAL at 08:35

## 2022-04-10 RX ADMIN — MONTELUKAST SODIUM 10 MG: 10 TABLET, COATED ORAL at 20:41

## 2022-04-10 RX ADMIN — ATENOLOL 25 MG: 25 TABLET ORAL at 08:39

## 2022-04-10 RX ADMIN — ENOXAPARIN SODIUM 40 MG: 40 INJECTION SUBCUTANEOUS at 08:36

## 2022-04-10 RX ADMIN — CETIRIZINE HYDROCHLORIDE 5 MG: 10 TABLET ORAL at 08:40

## 2022-04-10 RX ADMIN — ARIPIPRAZOLE 30 MG: 10 TABLET ORAL at 08:36

## 2022-04-10 RX ADMIN — ACETAMINOPHEN 650 MG: 325 TABLET ORAL at 13:47

## 2022-04-10 RX ADMIN — DOCUSATE SODIUM 100 MG: 100 CAPSULE, LIQUID FILLED ORAL at 08:36

## 2022-04-10 RX ADMIN — PREGABALIN 100 MG: 100 CAPSULE ORAL at 08:39

## 2022-04-10 ASSESSMENT — PAIN SCALES - GENERAL
PAINLEVEL_OUTOF10: 6
PAINLEVEL_OUTOF10: 0
PAINLEVEL_OUTOF10: 6
PAINLEVEL_OUTOF10: 9

## 2022-04-10 ASSESSMENT — PAIN DESCRIPTION - LOCATION: LOCATION: SACRUM

## 2022-04-10 NOTE — FLOWSHEET NOTE
04/10/22 0815   Vital Signs   Temp 97.1 °F (36.2 °C)   Temp Source Oral   Pulse 52   Heart Rate Source Monitor   Resp 16   /63   BP Location Left upper arm   Patient Position Semi fowlers   Level of Consciousness Alert (0)   MEWS Score 1   Pain Assessment   Pain Assessment 0-10   Pain Level 6   Pain Location Sacrum   Oxygen Therapy   SpO2 97 %   O2 Device None (Room air)   Pt in bed, VSS, A/O x4, pain 6/10 to coccyx declines medication at this time. Bed alarm on, call light within reach, denies needs at this time.

## 2022-04-10 NOTE — PROGRESS NOTES
PRN  dextrose, 100 mL/hr, PRN  sodium chloride flush, 5-40 mL, PRN  sodium chloride, 250 mL, PRN  acetaminophen, 650 mg, Q6H PRN   Or  acetaminophen, 650 mg, Q6H PRN  dextrose bolus (hypoglycemia), 125 mL, PRN   Or  dextrose bolus (hypoglycemia), 250 mL, PRN        Data:  CBC:   No results for input(s): WBC, HGB, HCT, MCV, PLT in the last 72 hours. BMP:   Recent Labs     04/09/22  0559      K 4.1      CO2 28   BUN 35*   CREATININE 1.5*       CULTURES  COVID 19 Not detected        RADIOLOGY  CT HEAD WO CONTRAST   Final Result   Stable CT scan of the head without acute intracranial abnormality. Sputum - pseudomonas     Assessment/Plan: TRICIA on CKD stage 3a  - Baseline ~ 1.5  - creatinine 2.2 on admission  - Given IVF's. -demadex and ACEI was held   - creat improved   - resumed meds as tolerated     Multiple falls, Generalized  weakness  - PT/OT eval  - SNF placement recommended but has no more rehab days left  - should consider ECF        H/o CVA  - on ASA and  Pravastatin     H/o tracheal stenosis  - s/p tracheostomy. And scheduled for B[facility] tube  at [facility] this week  - consulted ENT and bc tube placed  - sputum with pseudomonas likely colonization - hold abx     Hypertension  -BP borderline  -lisinopril held and resumed now      H/o PE/DVT  - not on AC     Depression  - continue home medication regimen.     DM type 2  - monitor glucose. - Continue Lantus 30 units nightly, Glipizide  - Humalog with meals, SSI coverage. COPD  - stable. No AE  - continue Symbicort, Singulair     Chronic diastolic CHF  - stable. No s/s decompensation  - continue Demadex     BPH  - on Flomax.     Iron deficiency anemia  - continue ferrous sulfate    DVT Prophylaxis: lovenox   Diet: ADULT DIET;  Regular; 4 carb choices (60 gm/meal)  Code Status: Full Code      Dc planning  Once arrangements made for ECF    Celso Perry MD, 4/10/2022 7:26 AM

## 2022-04-10 NOTE — PROGRESS NOTES
Dressings to sacrum, left thigh and bilateral feet changed per order.  Patient tolerated without complaint

## 2022-04-10 NOTE — PLAN OF CARE
Problem: Falls - Risk of:  Goal: Will remain free from falls  Description: Will remain free from falls  Outcome: Ongoing    Problem: Falls - Risk of:  Goal: Absence of physical injury  Description: Absence of physical injury    Problem: Infection:  Goal: Will remain free from infection  Description: Will remain free from infection    Problem: Pain:  Goal: Patient's pain/discomfort is manageable  Description: Patient's pain/discomfort is manageable  Outcome: Ongoing    Problem: Airway Clearance - Ineffective:  Goal: Ability to maintain a clear airway will improve  Description: Ability to maintain a clear airway will improve  Outcome: Ongoing

## 2022-04-11 LAB
ANION GAP SERPL CALCULATED.3IONS-SCNC: 9 MMOL/L (ref 3–16)
BUN BLDV-MCNC: 26 MG/DL (ref 7–20)
CALCIUM SERPL-MCNC: 9 MG/DL (ref 8.3–10.6)
CHLORIDE BLD-SCNC: 105 MMOL/L (ref 99–110)
CO2: 27 MMOL/L (ref 21–32)
CREAT SERPL-MCNC: 1.4 MG/DL (ref 0.8–1.3)
GFR AFRICAN AMERICAN: >60
GFR NON-AFRICAN AMERICAN: 51
GLUCOSE BLD-MCNC: 104 MG/DL (ref 70–99)
GLUCOSE BLD-MCNC: 114 MG/DL (ref 70–99)
GLUCOSE BLD-MCNC: 124 MG/DL (ref 70–99)
GLUCOSE BLD-MCNC: 125 MG/DL (ref 70–99)
GLUCOSE BLD-MCNC: 164 MG/DL (ref 70–99)
GLUCOSE BLD-MCNC: 98 MG/DL (ref 70–99)
PERFORMED ON: ABNORMAL
PERFORMED ON: NORMAL
POTASSIUM SERPL-SCNC: 4.4 MMOL/L (ref 3.5–5.1)
SODIUM BLD-SCNC: 141 MMOL/L (ref 136–145)

## 2022-04-11 PROCEDURE — 2580000003 HC RX 258: Performed by: INTERNAL MEDICINE

## 2022-04-11 PROCEDURE — 6370000000 HC RX 637 (ALT 250 FOR IP): Performed by: NURSE PRACTITIONER

## 2022-04-11 PROCEDURE — 1200000000 HC SEMI PRIVATE

## 2022-04-11 PROCEDURE — 36415 COLL VENOUS BLD VENIPUNCTURE: CPT

## 2022-04-11 PROCEDURE — 6370000000 HC RX 637 (ALT 250 FOR IP): Performed by: INTERNAL MEDICINE

## 2022-04-11 PROCEDURE — 80048 BASIC METABOLIC PNL TOTAL CA: CPT

## 2022-04-11 PROCEDURE — 97112 NEUROMUSCULAR REEDUCATION: CPT

## 2022-04-11 PROCEDURE — 97530 THERAPEUTIC ACTIVITIES: CPT

## 2022-04-11 PROCEDURE — 99231 SBSQ HOSP IP/OBS SF/LOW 25: CPT | Performed by: INTERNAL MEDICINE

## 2022-04-11 PROCEDURE — 6360000002 HC RX W HCPCS: Performed by: INTERNAL MEDICINE

## 2022-04-11 PROCEDURE — 97110 THERAPEUTIC EXERCISES: CPT

## 2022-04-11 PROCEDURE — 92526 ORAL FUNCTION THERAPY: CPT

## 2022-04-11 RX ADMIN — MONTELUKAST SODIUM 10 MG: 10 TABLET, COATED ORAL at 21:24

## 2022-04-11 RX ADMIN — PREGABALIN 100 MG: 100 CAPSULE ORAL at 21:24

## 2022-04-11 RX ADMIN — ENOXAPARIN SODIUM 40 MG: 40 INJECTION SUBCUTANEOUS at 08:13

## 2022-04-11 RX ADMIN — FERROUS SULFATE TAB 325 MG (65 MG ELEMENTAL FE) 325 MG: 325 (65 FE) TAB at 08:11

## 2022-04-11 RX ADMIN — ARIPIPRAZOLE 30 MG: 10 TABLET ORAL at 08:09

## 2022-04-11 RX ADMIN — LISINOPRIL 2.5 MG: 5 TABLET ORAL at 08:13

## 2022-04-11 RX ADMIN — TORSEMIDE 20 MG: 20 TABLET ORAL at 08:10

## 2022-04-11 RX ADMIN — INSULIN GLARGINE 30 UNITS: 100 INJECTION, SOLUTION SUBCUTANEOUS at 21:26

## 2022-04-11 RX ADMIN — METHYLPHENIDATE HYDROCHLORIDE 20 MG: 10 TABLET ORAL at 06:34

## 2022-04-11 RX ADMIN — TAMSULOSIN HYDROCHLORIDE 0.4 MG: 0.4 CAPSULE ORAL at 08:13

## 2022-04-11 RX ADMIN — GLIPIZIDE 2.5 MG: 5 TABLET ORAL at 08:10

## 2022-04-11 RX ADMIN — Medication 1 TABLET: at 08:09

## 2022-04-11 RX ADMIN — POLYETHYLENE GLYCOL (3350) 17 G: 17 POWDER, FOR SOLUTION ORAL at 08:14

## 2022-04-11 RX ADMIN — SODIUM CHLORIDE, PRESERVATIVE FREE 10 ML: 5 INJECTION INTRAVENOUS at 08:14

## 2022-04-11 RX ADMIN — BUPROPION HYDROCHLORIDE 400 MG: 100 TABLET, FILM COATED, EXTENDED RELEASE ORAL at 08:09

## 2022-04-11 RX ADMIN — SODIUM CHLORIDE, PRESERVATIVE FREE 10 ML: 5 INJECTION INTRAVENOUS at 22:16

## 2022-04-11 RX ADMIN — PRAVASTATIN SODIUM 40 MG: 40 TABLET ORAL at 08:11

## 2022-04-11 RX ADMIN — DOCUSATE SODIUM 100 MG: 100 CAPSULE, LIQUID FILLED ORAL at 08:13

## 2022-04-11 RX ADMIN — PREGABALIN 100 MG: 100 CAPSULE ORAL at 08:10

## 2022-04-11 RX ADMIN — CETIRIZINE HYDROCHLORIDE 5 MG: 10 TABLET ORAL at 08:10

## 2022-04-11 RX ADMIN — METHYLPHENIDATE HYDROCHLORIDE 20 MG: 10 TABLET ORAL at 12:18

## 2022-04-11 RX ADMIN — INSULIN LISPRO 1 UNITS: 100 INJECTION, SOLUTION INTRAVENOUS; SUBCUTANEOUS at 21:26

## 2022-04-11 RX ADMIN — DOCUSATE SODIUM 100 MG: 100 CAPSULE, LIQUID FILLED ORAL at 21:24

## 2022-04-11 RX ADMIN — POTASSIUM CHLORIDE 20 MEQ: 1500 TABLET, EXTENDED RELEASE ORAL at 08:10

## 2022-04-11 ASSESSMENT — PAIN SCALES - GENERAL
PAINLEVEL_OUTOF10: 0

## 2022-04-11 NOTE — PROGRESS NOTES
Progress Note    Admit Date:  4/1/2022  Admitted for placement    ENT did not visit  and now back to tracheostomy tube with PMV   Off izzy tube as it did not stay    Subjective:  Mr. Nicolasa Pruitt is awaiting PT and OT to see him today. He slept well last night. Objective:   Patient Vitals for the past 4 hrs:   BP Temp Temp src Pulse Resp SpO2   04/11/22 0800 119/68 97.3 °F (36.3 °C) Oral 54 16 95 %            Intake/Output Summary (Last 24 hours) at 4/11/2022 0901  Last data filed at 4/11/2022 0458  Gross per 24 hour   Intake --   Output 750 ml   Net -750 ml       Physical Exam:        General: middle aged obese male  Awake, alert and oriented. Appears to be not in any distress  Mucous Membranes:  Pink , anicteric  Tracheostomy with izzy tube noted   Neck: No JVD, no carotid bruit, no thyromegaly  Chest:  Clear to auscultation bilaterally, diminished in bases  Cardiovascular:  RRR S1S2 heard, no murmurs or gallops  Abdomen:  Soft, obese undistended, non tender, no organomegaly, BS present  Pain pump in left side of abd  Extremities: left heel almost healed linear ulcer with no infection noted   No edema or cyanosis.  Distal pulses well felt  Neurological : grossly normal        Medications:  buPROPion, 400 mg, Daily  lisinopril, 2.5 mg, Daily  polyethylene glycol, 17 g, Daily  insulin lispro, 0-12 Units, TID WC  insulin lispro, 0-6 Units, Nightly  docusate sodium, 100 mg, BID  pravastatin, 40 mg, Daily  budesonide-formoterol, 2 puff, BID  glipiZIDE, 2.5 mg, QAM AC  therapeutic multivitamin-minerals, 1 tablet, Daily  ferrous sulfate, 325 mg, Daily with breakfast  ARIPiprazole, 30 mg, Daily  tamsulosin, 0.4 mg, Daily  potassium chloride, 20 mEq, Daily  insulin glargine, 30 Units, Nightly  torsemide, 20 mg, Daily  pregabalin, 100 mg, BID  atenolol, 25 mg, Daily  sodium chloride flush, 5-40 mL, 2 times per day  enoxaparin, 40 mg, Daily  montelukast, 10 mg, Nightly  cetirizine, 5 mg, Daily  methylphenidate, 20 mg, BID WC      PRN Medications:  glucose, 4 each, PRN  glucagon (rDNA), 1 mg, PRN  dextrose, 100 mL/hr, PRN  sodium chloride flush, 5-40 mL, PRN  sodium chloride, 250 mL, PRN  acetaminophen, 650 mg, Q6H PRN   Or  acetaminophen, 650 mg, Q6H PRN  dextrose bolus (hypoglycemia), 125 mL, PRN   Or  dextrose bolus (hypoglycemia), 250 mL, PRN        Data:  CBC:   No results for input(s): WBC, HGB, HCT, MCV, PLT in the last 72 hours. BMP:   Recent Labs     04/09/22  0559 04/11/22  0513    141   K 4.1 4.4    105   CO2 28 27   BUN 35* 26*   CREATININE 1.5* 1.4*       CULTURES  COVID 19 Not detected        RADIOLOGY  CT HEAD WO CONTRAST   Final Result   Stable CT scan of the head without acute intracranial abnormality. Sputum - pseudomonas     Assessment/Plan: TRICIA on CKD stage 3a  - Baseline ~ 1.5  - creatinine 2.2 on admission  - Given IVF's. -demadex and ACEI was held   - creat improved   - resumed meds as tolerated     Multiple falls, Generalized  weakness  - PT/OT eval  - SNF placement recommended but has no more rehab days left  - PT and OT to see today.        H/o CVA  - on ASA and  Pravastatin     H/o tracheal stenosis  - s/p tracheostomy. And scheduled for Bc tube  at [facility] this week  - consulted ENT and bc tube placed  - sputum with pseudomonas likely colonization - hold abx     Hypertension  -BP borderline  -lisinopril held and resumed now      H/o PE/DVT  - not on AC     Depression  - continue home medication regimen.     DM type 2  - monitor glucose. - Continue Lantus 30 units nightly, Glipizide  - Humalog with meals, SSI coverage. COPD  - stable. No AE  - continue Symbicort, Singulair     Chronic diastolic CHF  - stable. No s/s decompensation  - continue Demadex     BPH  - on Flomax.     Iron deficiency anemia  - continue ferrous sulfate    DVT Prophylaxis: lovenox   Diet: ADULT DIET; Regular; 4 carb choices (60 gm/meal)  Code Status: Full Code      Discharge planning.      Wyvonnia Cockayne Ani Andrew MD, 4/11/2022 9:01 AM

## 2022-04-11 NOTE — PROGRESS NOTES
Occupational Therapy  Attempted to see patient this pm.  Patient working with another provider at this time, unavailable. Will continue to follow.   Caitlin Chaudhry, OTR/L [id number]

## 2022-04-11 NOTE — PROGRESS NOTES
Inpatient Physical Therapy Daily Treatment Note    Unit: [address]  Date:  4/11/2022  Patient Name:    Levon Melendez  Admitting diagnosis:  Tracheostomy dependent ([facility] [Z93.0]  Physical debility [R53.81]  Recurrent falls [R29.6]  Acute renal failure, unspecified acute renal failure type ([facility] [N17.9]  ARF (acute renal failure) ([facility] [N17.9]  Admit Date:  4/1/2022  Precautions/Restrictions:  Fall risk, Bed/chair alarm, Lines -IV and tracheal stoma present with Vernette Clas tube, Telemetry and WB Restrictions (NWB L foot - grafting was recommended to patient but patient could not follow up due to current hospitalization)      Discharge Recommendations: SNF  DME needs for discharge: defer to facility       Therapy recommendation for EMS Transport: requires transport by cot due to need of 2 person assist for functional transfers    Therapy recommendations for staff:   Assist of 2 with use of squat pivot transfers with NWB on the LLE using removable armrest recliner chair for all transfers from chair   Assist of 1 with use of squat pivot transfers with NWB on the LLE using removable armrest recliner chair for all transfers to chair  Note: Bariatric bedside commode can used with drop arm    History of Present Illness: Per Ender Chavarria APRN 4/01: \"The patient is a 75 y. o. male with h/o CVA, hypertension, hyperlipidemia, h/o DVT/PE, DM, Gout, COPD, CHF, h/o tracheal stenosis s/p tracheostomy who presents to [facility] with c/o multiple falls. [facility] was discharged from rehab and unable to walk. [facility] is living in a hotel. [facility] as fallen twice. [facility] has not been taking his medications or been able to care for himself. \"  Discharged from [facility] 3/27    Home Health S4 Level Recommendation: Level 3 Safety  AM-PAC Mobility Score   AM-PAC Inpatient Mobility Raw Score : 12     Treatment Time: 1121 - 1206  Treatment number: 5  Timed Code Treatment Minutes: 45 minutes  Total Treatment Minutes: 45 minutes    Cognition    A&O x4   Able to follow 2 step commands    Subjective  Patient lying supine in bed with no family present   Pt agreeable to this PT tx. Pain   No  Location: N/A  Rating:    NA/10  Pain Medicine Status: Denies need     Bed Mobility using bed rails  Supine to Sit:    CGA  Sit to Supine:   CGA  Rolling:   CGA  Scooting:   CGA using bed rails in supine and sitting    Transfer Training     Sit to stand:   partial standing done with weight bearing precautions on the LL using RW with Min A  Stand to sit:   Min A   Bed to Chair:   Mod A  with use of gait belt and squat pivot transfers   Chair to bed: Min A x 2 persons with use of gait belt and squat pivot transfers     Gait Training gait deferred due to difficulty with transfers; pt ambulated 0 ft. Stair Training deferred, pt does not have stairs in the home environment    Therapeutic Exercise all completed bilaterally unless indicated  Ankle Pumps x 20 reps  LAQ x 20 reps  modified bridging x10 reps   Chair push ups x 20 reps  Clamshells x 10 reps   R sided TFL stretching given for 30 sec hold x 3 reps    Balance  Sitting:  Fair ; CGA  Comments: ~ 15 min    Standing: Poor; Mod A   Comments: Unable to attain standing using RW or EZE STEDY. Patient can perform partial standing briefly for 10 sec. Patient Education      Role of PT, POC, Discharge recommendations, DC recommendations, safety awareness, transfer techniques, pursed lip breathing, energy conservation, pacing activity and calling for assist with mobility. Positioning Needs       Pt up in chair, alarm set, positioned in proper neutral alignment and pressure relief provided. Call light provided and all needs within reach    ROM Measurements N/A  Knee Flexion:  Knee Extension:     Activity Tolerance   Pt completed therapy session with No adverse symptoms noted w/activity. Patient maintained SpO2 above 95% throughout the session. Other  N/A    Assessment :  Patient tolerated the session well.    Recommending SNF upon discharge as patient functioning well below baseline, demonstrates good rehab potential and unable to return home due to limited or no family support, burden of care beyond caregiver ability, home environment not conducive to patient recovery and limited safety awareness. Goals (all goals ongoing unless otherwise indicated)  To be met in 3 visits:  1). Independent with LE Ex x 10 reps  2).  Supine to/from sit: SBA   3).  Bed to/from wheelchair: Min A with slide board- MET 4/2/22     To be met in 6 visits:  1).  Supine to/from sit: Independent  2).  Bed to/from wheelchair: Independent with slide board    Plan   Continue with plan of care. Signature: Manjit Portillo, MS PT, # [id number]    If patient discharges from this facility prior to next visit, this note will serve as the Discharge Summary.

## 2022-04-11 NOTE — PLAN OF CARE
Problem: Falls - Risk of:  Goal: Will remain free from falls  Description: Will remain free from falls  4/11/2022 1014 by Clary Lang RN  Outcome: Ongoing  4/10/2022 2157 by Jc Orr RN  Outcome: Ongoing  Goal: Absence of physical injury  Description: Absence of physical injury  4/10/2022 2157 by Jc Orr RN  Outcome: Ongoing     Problem: Infection:  Goal: Will remain free from infection  Description: Will remain free from infection  4/10/2022 2157 by Jc Orr RN  Outcome: Ongoing     Problem: Safety:  Goal: Free from accidental physical injury  Description: Free from accidental physical injury  4/10/2022 2157 by Jc Orr RN  Outcome: Ongoing  Goal: Free from intentional harm  Description: Free from intentional harm  4/11/2022 1014 by Clary Lang RN  Outcome: Ongoing  4/10/2022 2157 by Jc Orr RN  Outcome: Ongoing     Problem: Daily Care:  Goal: Daily care needs are met  Description: Daily care needs are met  4/10/2022 2157 by Jc Orr RN  Outcome: Ongoing     Problem: Pain:  Goal: Patient's pain/discomfort is manageable  Description: Patient's pain/discomfort is manageable  4/10/2022 2157 by Jc Orr RN  Outcome: Ongoing  Goal: Pain level will decrease  Description: Pain level will decrease  4/10/2022 2157 by Jc Orr RN  Outcome: Ongoing  Goal: Control of acute pain  Description: Control of acute pain  4/10/2022 2157 by Jc Orr RN  Outcome: Ongoing  Goal: Control of chronic pain  Description: Control of chronic pain  4/10/2022 2157 by Jc Orr RN  Outcome: Ongoing     Problem: Skin Integrity:  Goal: Skin integrity will stabilize  Description: Skin integrity will stabilize  4/10/2022 2157 by Jc Orr RN  Outcome: Ongoing     Problem: Discharge Planning:  Goal: Patients continuum of care needs are met  Description: Patients continuum of care needs are met  4/10/2022 2157 by Jc Orr RN  Outcome: Ongoing     Problem: Airway Clearance - Ineffective:  Goal: Ability to maintain a clear airway will improve  Description: Ability to maintain a clear airway will improve  4/10/2022 2157 by Smiley Temple RN  Outcome: Ongoing     Problem: Skin Integrity:  Goal: Will show no infection signs and symptoms  Description: Will show no infection signs and symptoms  4/10/2022 2157 by Smiley Temple RN  Outcome: Ongoing  Goal: Absence of new skin breakdown  Description: Absence of new skin breakdown  4/10/2022 2157 by Smiley Temple RN  Outcome: Ongoing     Problem: Nutrition  Goal: Optimal nutrition therapy  4/11/2022 1014 by Tara Dill RN  Outcome: Ongoing  4/10/2022 2157 by Smiley Temple RN  Outcome: Ongoing

## 2022-04-11 NOTE — PROGRESS NOTES
Speech Language Pathology  Speech/Language Treatment/Follow-Up Note  Facility/Department: [address] MEDICALSURGICAL    Jet Martinez  : 1955 (73 y.o.)   MRN: [id number]  ROOM: 0227/0227-02  ADMISSION DATE: 2022  PATIENT DIAGNOSIS(ES): Tracheostomy dependent ([address] [Z93.0]  Physical debility [R53.81]  Recurrent falls [R29.6]  Acute renal failure, unspecified acute renal failure type ([address] [N17.9]  ARF (acute renal failure) ([facility]) [N17.9]  Allergies   Allergen Reactions    Aspartame And Phenylalanine Anaphylaxis    Cefuroxime Axetil Anaphylaxis    Clindamycin Hcl Anaphylaxis    Erythromycin Anaphylaxis and Rash    Feldene [Piroxicam] Anaphylaxis    Guanfacine Hcl Anaphylaxis    Iodine Anaphylaxis    Pcn [Penicillins] Anaphylaxis    Pletal [Cilostazol] Anaphylaxis    Robaxin [Methocarbamol] Anaphylaxis and Shortness Of Breath    Arthrotec [Diclofenac-Misoprostol]     Betadine [Povidone Iodine]     Claritin [Loratadine]     Clindamycin/Lincomycin     Indocin [Indometacin Sodium]     Tenex [Guanfacine Hcl]     Vasotec     Vioxx     Zyvox [Linezolid]        DATE ONSET: 2022    Pain: The patient does not complain of pain       Current Diet: ADULT DIET; Regular; 4 carb choices (60 gm/meal)      Treatment and Impressions:   Subjective: Pt seen in room at bedside with RN permission.  RN Report/Chart Review: I was consulted on this patient  for placement of Nico Pore in stoma with removal of trach. Pt is chronic trach pt s/p tracheal stenosis. Trach in place since . Without and ENT consult or ENT on site yesterday, I did not feel comfortable with decannulation and placement of Bc button. Consult placed and Dr. Arcelia Hernandez saw the patient . I was able to see the patient directly with Dr. Arcelia Hernandez.  In brief, the patient has large outer stoma and size 4 capped shiley uncuffed trach. The capped trach is only present to allow for speech.  Per his treating SLP at [facility] otolaryngology Tina Good, the patient's stoma is only present due to sleep apnea. They have tried to close the stoma before but pt is unable to tolerate closure at night due to sleep apnea. Speech is less than ideal during the day due to air leak around the trach. Alessia Ordonez was able to trial [place] in stoma with HME (hands free) for 1 hr during office visit which was effective. The patient did not return to for any of his follow-up appointments and has been in and out of hospitals and SNF's. He continuously asks facility SLPs to place [place] but signs out AMA or is discharged before they can place as this is off-label use and requires some research/discussion prior to placement. 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 - trialed on T-tube unable to manage care due to limited manual dexterity. - converted back to tracheostomy tube size 4. PREVIOUS SLP EVALUATIONS:  videostrobes revealed functional VF mobility with functional patent glottic and supraglottic airway. \"      04/05: Dr. Ramila Casillas assessed the patient at bedside. Upper airway and hypopharynx assessed via laryngoscopy. Some production of thick yellow/green mucus was noted. Sputum culture collected by Dr. Ramila Casillas. · 04/05: We attempted to place Provox 14/18 Larybutton with Xtraflow HME filter and Provox Flexivoice freehands HME (impaired manual dexterity) however, due to shape of stoma, button is not fitting without external support. Once button is in place, due to anatomy of stoma, the opening becomes oval shape vs round.  This makes for poor fitting of valve into button and it easily pops off with coughing or exertion  · 04/06: When I walked into pt's room today, I noted that his izzy button was not in place. It was almost completely removed from stoma. Per RN, it has not been staying in place. I removed the button today, cleaned outer stoma. Cleaned izzy button. I attempted to replace with hemostats. I was able to get a better fit with this method. However, I continue to meet resistance at right wall of stoma. I was able to temporarily have button stay in place. During this time, I was able to place the speaking valve in the button. In both settings, valve was immediately thrown from button with phonation. Button did not remain in place long enough for more than 2 HME trials. I suspect the stoma has stenosed and pt requires smaller size izzy button. Will discuss with ENT tomorrow. · 04/08: Upon SLP entry today, pt noted with izzy button completely out of stoma. Again suspect that bilateral ties are causing excess tension and pulling button from stoma. I have attempted to loosen the ties on multiple occasions but they are re-tightened by staff. The izzy button was caked in thick secretions. I removed it and cleaned it. Cleaned outer/anterior inner stoma with removal of thick secretions. I was able to place izzy button with patent fit to bilateral stoma walls today. Button remained in place for over 40 mins without ties. Unable to place FlexiVoice valve due to altered shape of outer izzy button rim. · 04/08 Session 2: The following messafe was sent to Dr. Al Sommer or Facility: [facility] From: Tram Ta RE: Melina Null 1955 RM: [id number]. Reaching out via Perfect Serve vs text in hopes to not bother you if you are off today. There has been no ENT follow-up with Mr. Roshan Mayes that I can see. I got a better fit with his Chelita Mealy today but I cannot get that flexivoice to stay no matter what I do.  I'm thinking he might need a downsize in [employer]? Wondering if you knew if there would be any follow-up? I know the original plan was for Thursday. Thank you! -Chema Need Callback: NO CALLBACK REQ SPEECH THERAPY        Read 1:18 PM           MD Response: If he can't tolerate the button, you can put the size 4 trache back in          I revisited the patient this afternoon to re-assess placement of Bc button. Again, bilateral ties were           tightly adjusted and bc button was not in place with notable bilateral tension. I removed the button            and cleaned it. Discussed with patient who wishes to replace size 4 shiley. New trach and trach collar        obtained and placed. Pt with audible voicing with considerable air leak. I am in contact with                 otolaryngology department at [facility] as they provided original recommendation. · 04/11: Pt's current D 14mm 18L Larrybutton is ill-fitting, as above. Enquired about ordering smaller size, however, we are trying to find placement for the patient at this and he will likely be discharged once equipment would arive. Pt is 90% intelligible with Shiley #4 today (capped). Better with chin down posture for some added occlusion. Pt has concerns regarding need to call bank and being understood on the phone. Discussed breath support, chin down posture, and volume techniques to enhance intelligibility. Good carryover noted. I recommend the patient f/o OP with Tina Good and Dr. Betty Hickman at [address] [place] [address] is likely best course of action, however, pt continues to report he is moving to [place] once home is renovated, thus follow-up will be a concern. Pt states he has looked into ENT office in [place] and been in contact. Unable to verify. This. Will continue to follow as able to offer support/techniques with speech intelligibility. Will consider ordering small diameter bc button if d/c planning is halted. Pt agreeable to POC at this time.     There is additional concern with pt's lack of ability to remove the valve himself, due to BLE neuropath which greatly reduced his sensation to almost none. The patient MUST have valve removed for sleep due to upper airway collapse and sleep apnea.  There is concern for continuity of care for multiple reasons. This is off-market use of  Maxwell Mariposa button, therefore there is limited staff training. If our staff here were trained, that would only allow for temporary use as pt will ultimately d/c. Pt does not want LTC, however, he was unable to care for himself at home. [facility] is not willing to accept pt due to him leaving AMA last admission and [facility] will not accept due to unsafe d/c.    Did enquire with PT/OT about possible DME to enhance pt's ability to remove valve independently on 04/05.  Regardless, due to ill-fit of Kacy Carmichael. Dr. Last Davis wants pt to leave button in place until Thursday- today, with no valve to attempt to alter stoma shape for better fit.  Unsure of pt's carryover of current management and POC.  Pt' speech intelligibility is ~90% with trach cap and HME. ~70% intelligible with trach removed.  Respiratory Status: Pt with SPO2% of 94 on  RA with RR of 20     Education: SLP edu pt re: Role of SLP, POC, Evidenced based practice for current recommendations and treatment and Risks of not following recommendations. Pt verbalized understanding, would benefit from ongoing education and RN aware of recommendations  · Assessment: See above.  Recommendations: [place]iposa button in place until Thursday, will re-attempt HME then. ENT to re-assess Thursday. Leave decannulated.      Speech/Language/Cog Goals:  Short Term Goals  Timeframe for Short Term Goals: 5 days (04/10/2022)  Goal 1: The patient will achieve functional communication via various modalities (finger occlusion, HME, change in head posture to reduce leak, etc.) with 100% intelligibility in various contexts as judged by SLP  4/11/2022 : Goal addressed, see above. Ongoing, progressing. LongTerm Goals:  Timeframe for Long Term Goals: 7 days (04/12/2022)  Goal 1: The pt will develop speech communication that is functional and intelligible in this environment as assessed by the patient or communication partner 90%-100%  4/11/2022 : Ongoing, progressing.'    Plan:    Continued treatment with goals per plan of care. Discharge Recommendations: Recommend SLP tx at discharge. Recommend ENT follow-up. Recommend OP follow-up with Dr. Misty Velez (ENT) and Sergio Aguirre (SLP) at [facility] otol[facility]ology (treatment team familiar with patient)    If pt discharges from hospital prior to Speech/Swallowing discharge, this note serves as tx and discharge summary.      Total Treatment Time / Charges     Time in Time out Total Time / units   Cognitive Tx         Speech Tx 1320 1336 16 mins / 1 unit     Dysphagia Tx        Signature:  Norma Sapp M.A., [id number] [facility] #[id number]  Speech-Language Pathologist  Phone: [phone], [phone]

## 2022-04-11 NOTE — PROGRESS NOTES
Pt resting in the chair quietly at this time. Denies any needs. All needs and call light within reach.

## 2022-04-11 NOTE — CARE COORDINATION
INTERDISCIPLINARY PLAN OF CARE CONFERENCE    Date/Time: 4/11/2022 4:02 PM  Completed by: Ligia Mcconnell RN, Case Management      Patient Name:  Payton John  YOB: 1955  Admitting Diagnosis: Tracheostomy dependent ([facility] [Z93.0]  Physical debility [R53.81]  Recurrent falls [R29.6]  Acute renal failure, unspecified acute renal failure type ([facility] [N17.9]  ARF (acute renal failure) ([facility] [N17.9]     Admit Date/Time:  4/1/2022  5:27 AM    Chart reviewed. Interdisciplinary team contacted or reviewed plan related to patient progress and discharge plans. Disciplines included Case Management, Nursing, and Dietitian. Current Status: Stable  PT/OT recommendation for discharge plan of care: SNF    Expected D/C Disposition:  Rehab  Confirmed plan with patient  Yes   Met with:patient at [facility]  Discharge Plan Comments:  Reviewed chart and spoke with pt at bedside. Discussed dcp and pt aware of difficulty finding rehab willing to accept as pvt pay  Request writer check with [facility]. Spoke with Suha villa at [facility] who states unable to accept as pt has outstanding bill for 8,000.00$. .    Referral called to Magdy Wallace at [facility] who will review and return call to writer on acceptance/deial.      Home O2 in place on admit: No  Pt informed of need to bring portable home O2 tank on day of discharge for nursing to connect prior to leaving:  Not Indicated  Verbalized agreement/Understanding:  Not Indicated

## 2022-04-11 NOTE — FLOWSHEET NOTE
04/11/22 0800   Vital Signs   Temp 97.3 °F (36.3 °C)   Temp Source Oral   Pulse 54   Heart Rate Source Monitor   Resp 16   /68   BP Location Left upper arm   Patient Position Semi fowlers   Level of Consciousness Alert (0)   MEWS Score 1   Patient Currently in Pain No   Pain Assessment   Pain Assessment 0-10   Pain Level 0   Oxygen Therapy   SpO2 95 %   O2 Device None (Room air)   AM assessment completed, see flow sheet. Pt is alert and oriented. Vital signs are WNL. Respirations are even & easy. No complaints voiced. Pt. Is awaiting possible discharge to an LTAC. Pt denies needs at this time. SR up x 2, and bed in low position. Call light is within reach. Bedside Mobility Assessment Tool (BMAT):     Assessment Level 1- Sit and Shake    1. From a semi-reclined position, ask patient to sit up and rotate to a seated position at the side of the bed. Can use the bedrail. 2. Ask patient to reach out and grab your hand and shake making sure patient reaches across his/her midline. Pass- Patient is able to come to a seated position, maintain core strength. Maintains seated balance while reaching across midline. Move on to Assessment Level 2. Assessment Level 2- Stretch and Point   1. With patient in seated position at the side of the bed, have patient place both feet on the floor (or stool) with knees no higher than hips. 2. Ask patient to stretch one leg and straighten the knee, then bend the ankle/flex and point the toes. If appropriate, repeat with the other leg. Pass- Patient is able to demonstrate appropriate quad strength on intended weight bearing limb(s). Move onto Assessment Level 3. Assessment Level 3- Stand   1. Ask patient to elevate off the bed or chair (seated to standing) using an assistive device (cane, bedrail). 2. Patient should be able to raise buttocks off be and hold for a count of five. May repeat once. Fail- Patient unable to demonstrate standing stability.  Patient is MOBILITY LEVEL 3. Assessment Level 4- Walk   1. Ask patient to march in place at bedside. 2. Then ask patient to advance step and return each foot. Some medical conditions may render a patient from stepping backwards, use your best clinical judgement. Fail- Patient not able to complete tasks OR requires use of assistive device. Patient is MOBILITY LEVEL 3. Mobility Level- 3    Patient is not able to demonstrated the ability to move from a reclining position to an upright position within the recliner.  however patient is alert, oriented and able to provide informed consent

## 2022-04-11 NOTE — CONSULTS
[facility] Wound Ostomy Continence Nurse  Follow-up Progress Note       NAME:  Tenzin Lopez  MEDICAL RECORD NUMBER:  [id number]  AGE:  73 y.o. GENDER:  male  :  1955  TODAY'S DATE:  2022    Subjective:   Wound Identification:  Wound Type: diabetic, pressure and traumatic  Contributing Factors: diabetes, chronic pressure, decreased mobility and shear force        Patient Goal of Care:  [x] Wound Healing  [] Odor Control  [] Palliative Care  [] Pain Control   [] Other:     Objective:    /68   Pulse 54   Temp 97.3 °F (36.3 °C) (Oral)   Resp 16   Ht 5' 10\" (1.778 m)   Wt 231 lb 8 oz (105 kg)   SpO2 95%   BMI 33.22 kg/m²   Ramirez Risk Score: Ramirez Scale Score: 15  Assessment:   Measurements:  Wound 19 Coccyx Mid #23, Coccyx, Pressure, Stage 4, Onset 2017, pressure (Active)   Number of days: 1167       Wound 19 Sacrum # 25, Sacrum, Pressure, Stage 3, onset 19, pressure (Active)   Number of days: 1132       Wound 19 Buttocks Left # 26, Left Buttocks, Pressure, Stage 2, Onset 19, pressure (Active)   Number of days: 1132       Wound 19 Buttocks Right #27, Right Buttocks, pressure, stage 2, onset 19, pressure (Active)   Number of days: 1132       Wound 22 Sacrum Right unstageable (Active)   Wound Image   22   Wound Etiology Pressure Unstageable 22 0804   Dressing Status Clean;Dry; Intact 22   Wound Cleansed Cleansed with saline 22   Dressing/Treatment Alginate with Ag; Foam [address]; Exposed structure muscle 22   Drainage Amount Small 22   Drainage Description Purulent 22   Odor Moderate 22   Dominique-wound Assessment Blanchable erythema 22   Margins Defined edges 22   Number of days: 10     Left plantar foot:  100% red, moist open area surrounded by scarring and dry flaky skin.   Left lateral foot: 0. 5x0.7c, 100% black, dry stable eschar. Left hip:  Traumatic injury prior to admission, resolved, 100% pink, fragile maturation tissue. Sacrum:  1.5x3. 8x1.3cm, stage 4 pressure injury, present on admission. Surrounding skin red and blanchable with dry flaky skin. Response to treatment:  Well tolerated by patient. Pain Assessment:  Severity:  0 / 10  Quality of pain: N/A  Wound Pain Timing/Severity: none  Premedicated: N/A  Plan:  Continue current plan. Remove alginate from left hip, keep foam in place to protect fragile skin. Plan of Care: Wound 04/01/22 Sacrum Right unstageable-Dressing/Treatment: Alginate with Ag,Foam    Specialty Bed Required : No -pt turns well in bed with minimal assist  [] Low Air Loss   [] Pressure Redistribution  [] Fluid Immersion  [] Bariatric  [] Total Pressure Relief  [] Other:     Current Diet: ADULT DIET;  Regular; 4 carb choices (60 gm/meal)  Dietician consult:  Yes    Discharge Plan:  Placement for patient upon discharge: skilled nursing -recommended  Patient appropriate for Outpatient [address]: Yes    Referrals:  [x]   [] [address] [facility]  [] Supplies  [] Other    Patient/Caregiver Teaching:  Level of patient/caregiver understanding able to:   [] Indicates understanding       [] Needs reinforcement  [] Unsuccessful      [x] Verbal Understanding  [] Demonstrated understanding       [] No evidence of learning  [] Refused teaching         [] N/A       Electronically signed by Sheela Argueta RN, CWOCN on 4/11/2022 at 1:23 PM

## 2022-04-11 NOTE — FLOWSHEET NOTE
04/11/22 1102   Encounter Summary   Services provided to: Patient   Referral/Consult From: Pa   Complexity of Encounter Moderate   Length of Encounter 30 minutes   Spiritual/Moravian   Type Spiritual support   Assessment Approachable;Coping   Intervention Active listening;Prayer;Sustaining presence/ Ministry of presence   Outcome Expressed gratitude; Hopeful     visited on rounds. Pt a bit frustrated that he is being discharged to rehab and then hotel before going home (home being renovated to be accessible). 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 He has a trach tube so he gets winded easily talking and is concerned how that might affect his ministry pursuits. Delightful man. Very talkative. Assume he is somewhat lonely.

## 2022-04-11 NOTE — PLAN OF CARE
Problem: Falls - Risk of:  Goal: Will remain free from falls  Description: Will remain free from falls  4/10/2022 2157 by Adele Michel RN  Outcome: Ongoing  4/10/2022 1005 by Susan Sos  Outcome: Ongoing  Goal: Absence of physical injury  Description: Absence of physical injury  4/10/2022 2157 by Adele Michel RN  Outcome: Ongoing  4/10/2022 1005 by Susan Sos  Outcome: Ongoing     Problem: Infection:  Goal: Will remain free from infection  Description: Will remain free from infection  4/10/2022 2157 by Adele Michel RN  Outcome: Ongoing  4/10/2022 1005 by Susan Sos  Outcome: Ongoing     Problem: Safety:  Goal: Free from accidental physical injury  Description: Free from accidental physical injury  Outcome: Ongoing  Goal: Free from intentional harm  Description: Free from intentional harm  Outcome: Ongoing     Problem: Daily Care:  Goal: Daily care needs are met  Description: Daily care needs are met  Outcome: Ongoing     Problem: Pain:  Goal: Patient's pain/discomfort is manageable  Description: Patient's pain/discomfort is manageable  4/10/2022 2157 by Adele Michel RN  Outcome: Ongoing  4/10/2022 1005 by Susan Kaylene  Outcome: Ongoing  Goal: Pain level will decrease  Description: Pain level will decrease  Outcome: Ongoing  Goal: Control of acute pain  Description: Control of acute pain  Outcome: Ongoing  Goal: Control of chronic pain  Description: Control of chronic pain  Outcome: Ongoing     Problem: Skin Integrity:  Goal: Skin integrity will stabilize  Description: Skin integrity will stabilize  Outcome: Ongoing     Problem: Discharge Planning:  Goal: Patients continuum of care needs are met  Description: Patients continuum of care needs are met  Outcome: Ongoing     Problem: Airway Clearance - Ineffective:  Goal: Ability to maintain a clear airway will improve  Description: Ability to maintain a clear airway will improve  4/10/2022 2157 by Adele Michel RN  Outcome: Ongoing  4/10/2022 1005 by Jonathon Flor  Outcome: Ongoing     Problem: Skin Integrity:  Goal: Will show no infection signs and symptoms  Description: Will show no infection signs and symptoms  Outcome: Ongoing  Goal: Absence of new skin breakdown  Description: Absence of new skin breakdown  Outcome: Ongoing     Problem: Nutrition  Goal: Optimal nutrition therapy  Outcome: Ongoing

## 2022-04-12 LAB
ANION GAP SERPL CALCULATED.3IONS-SCNC: 8 MMOL/L (ref 3–16)
BUN BLDV-MCNC: 28 MG/DL (ref 7–20)
CALCIUM SERPL-MCNC: 9.4 MG/DL (ref 8.3–10.6)
CHLORIDE BLD-SCNC: 103 MMOL/L (ref 99–110)
CO2: 29 MMOL/L (ref 21–32)
CREAT SERPL-MCNC: 1.5 MG/DL (ref 0.8–1.3)
GFR AFRICAN AMERICAN: 57
GFR NON-AFRICAN AMERICAN: 47
GLUCOSE BLD-MCNC: 111 MG/DL (ref 70–99)
GLUCOSE BLD-MCNC: 119 MG/DL (ref 70–99)
GLUCOSE BLD-MCNC: 122 MG/DL (ref 70–99)
GLUCOSE BLD-MCNC: 123 MG/DL (ref 70–99)
GLUCOSE BLD-MCNC: 147 MG/DL (ref 70–99)
GLUCOSE BLD-MCNC: 98 MG/DL (ref 70–99)
PERFORMED ON: ABNORMAL
PERFORMED ON: NORMAL
POTASSIUM SERPL-SCNC: 4 MMOL/L (ref 3.5–5.1)
SODIUM BLD-SCNC: 140 MMOL/L (ref 136–145)

## 2022-04-12 PROCEDURE — 80048 BASIC METABOLIC PNL TOTAL CA: CPT

## 2022-04-12 PROCEDURE — 97530 THERAPEUTIC ACTIVITIES: CPT

## 2022-04-12 PROCEDURE — 6370000000 HC RX 637 (ALT 250 FOR IP): Performed by: INTERNAL MEDICINE

## 2022-04-12 PROCEDURE — 36415 COLL VENOUS BLD VENIPUNCTURE: CPT

## 2022-04-12 PROCEDURE — 2580000003 HC RX 258: Performed by: INTERNAL MEDICINE

## 2022-04-12 PROCEDURE — 99231 SBSQ HOSP IP/OBS SF/LOW 25: CPT | Performed by: INTERNAL MEDICINE

## 2022-04-12 PROCEDURE — 6370000000 HC RX 637 (ALT 250 FOR IP): Performed by: NURSE PRACTITIONER

## 2022-04-12 PROCEDURE — 1200000000 HC SEMI PRIVATE

## 2022-04-12 PROCEDURE — 97535 SELF CARE MNGMENT TRAINING: CPT

## 2022-04-12 PROCEDURE — 6360000002 HC RX W HCPCS: Performed by: INTERNAL MEDICINE

## 2022-04-12 PROCEDURE — 92526 ORAL FUNCTION THERAPY: CPT

## 2022-04-12 RX ADMIN — PREGABALIN 100 MG: 100 CAPSULE ORAL at 20:50

## 2022-04-12 RX ADMIN — DOCUSATE SODIUM 100 MG: 100 CAPSULE, LIQUID FILLED ORAL at 09:20

## 2022-04-12 RX ADMIN — ATENOLOL 25 MG: 25 TABLET ORAL at 09:20

## 2022-04-12 RX ADMIN — INSULIN GLARGINE 30 UNITS: 100 INJECTION, SOLUTION SUBCUTANEOUS at 20:52

## 2022-04-12 RX ADMIN — SODIUM CHLORIDE, PRESERVATIVE FREE 10 ML: 5 INJECTION INTRAVENOUS at 20:52

## 2022-04-12 RX ADMIN — ARIPIPRAZOLE 30 MG: 10 TABLET ORAL at 09:19

## 2022-04-12 RX ADMIN — BUPROPION HYDROCHLORIDE 400 MG: 100 TABLET, FILM COATED, EXTENDED RELEASE ORAL at 09:19

## 2022-04-12 RX ADMIN — FERROUS SULFATE TAB 325 MG (65 MG ELEMENTAL FE) 325 MG: 325 (65 FE) TAB at 08:23

## 2022-04-12 RX ADMIN — MONTELUKAST SODIUM 10 MG: 10 TABLET, COATED ORAL at 20:50

## 2022-04-12 RX ADMIN — POTASSIUM CHLORIDE 20 MEQ: 1500 TABLET, EXTENDED RELEASE ORAL at 09:20

## 2022-04-12 RX ADMIN — TAMSULOSIN HYDROCHLORIDE 0.4 MG: 0.4 CAPSULE ORAL at 09:20

## 2022-04-12 RX ADMIN — POLYETHYLENE GLYCOL (3350) 17 G: 17 POWDER, FOR SOLUTION ORAL at 09:19

## 2022-04-12 RX ADMIN — METHYLPHENIDATE HYDROCHLORIDE 20 MG: 10 TABLET ORAL at 12:19

## 2022-04-12 RX ADMIN — PREGABALIN 100 MG: 100 CAPSULE ORAL at 09:20

## 2022-04-12 RX ADMIN — LISINOPRIL 2.5 MG: 5 TABLET ORAL at 09:20

## 2022-04-12 RX ADMIN — GLIPIZIDE 2.5 MG: 5 TABLET ORAL at 08:23

## 2022-04-12 RX ADMIN — Medication 1 TABLET: at 09:20

## 2022-04-12 RX ADMIN — PRAVASTATIN SODIUM 40 MG: 40 TABLET ORAL at 09:20

## 2022-04-12 RX ADMIN — ENOXAPARIN SODIUM 40 MG: 40 INJECTION SUBCUTANEOUS at 09:21

## 2022-04-12 RX ADMIN — DOCUSATE SODIUM 100 MG: 100 CAPSULE, LIQUID FILLED ORAL at 20:51

## 2022-04-12 RX ADMIN — CETIRIZINE HYDROCHLORIDE 5 MG: 10 TABLET ORAL at 09:20

## 2022-04-12 RX ADMIN — METHYLPHENIDATE HYDROCHLORIDE 20 MG: 10 TABLET ORAL at 06:44

## 2022-04-12 RX ADMIN — SODIUM CHLORIDE, PRESERVATIVE FREE 10 ML: 5 INJECTION INTRAVENOUS at 09:22

## 2022-04-12 ASSESSMENT — PAIN SCALES - GENERAL
PAINLEVEL_OUTOF10: 0

## 2022-04-12 NOTE — PROGRESS NOTES
Patient alert and oriented. Needs are met. No complaints noted. In bed, lowest position, call light within reach.

## 2022-04-12 NOTE — PROGRESS NOTES
Speech Language Pathology  Speech/Language Treatment/Follow-Up Note  Facility/Department: [address] MEDICALSURGICAL    Stewart King  : 1955 (73 y.o.)   MRN: [id number]  ROOM: 0227/0227-02  ADMISSION DATE: 2022  PATIENT DIAGNOSIS(ES): Tracheostomy dependent ([address] [Z93.0]  Physical debility [R53.81]  Recurrent falls [R29.6]  Acute renal failure, unspecified acute renal failure type ([address] [N17.9]  ARF (acute renal failure) ([facility]) [N17.9]  Allergies   Allergen Reactions    Aspartame And Phenylalanine Anaphylaxis    Cefuroxime Axetil Anaphylaxis    Clindamycin Hcl Anaphylaxis    Erythromycin Anaphylaxis and Rash    Feldene [Piroxicam] Anaphylaxis    Guanfacine Hcl Anaphylaxis    Iodine Anaphylaxis    Pcn [Penicillins] Anaphylaxis    Pletal [Cilostazol] Anaphylaxis    Robaxin [Methocarbamol] Anaphylaxis and Shortness Of Breath    Arthrotec [Diclofenac-Misoprostol]     Betadine [Povidone Iodine]     Claritin [Loratadine]     Clindamycin/Lincomycin     Indocin [Indometacin Sodium]     Tenex [Guanfacine Hcl]     Vasotec     Vioxx     Zyvox [Linezolid]        DATE ONSET: 2022    Pain: The patient does not complain of pain       Current Diet: ADULT DIET; Regular; 4 carb choices (60 gm/meal)      Treatment and Impressions:   Subjective: Pt seen in room at bedside with RN permission.  RN Report/Chart Review: I was consulted on this patient  for placement of Hedda Barrack in stoma with removal of trach. Pt is chronic trach pt s/p tracheal stenosis. Trach in place since . Without and ENT consult or ENT on site yesterday, I did not feel comfortable with decannulation and placement of Bc button. Consult placed and Dr. Brianda Dixon saw the patient . I was able to see the patient directly with Dr. Brianda Dixon.  In brief, the patient has large outer stoma and size 4 capped shiley uncuffed trach. The capped trach is only present to allow for speech.  Per his treating SLP at [facility] otolaryngology Yuki Ly, the patient's stoma is only present due to sleep apnea. They have tried to close the stoma before but pt is unable to tolerate closure at night due to sleep apnea. Speech is less than ideal during the day due to air leak around the trach. Pennie Briceño was able to trial Chelita Mealy in stoma with HME (hands free) for 1 hr during office visit which was effective. The patient did not return to for any of his follow-up appointments and has been in and out of hospitals and SNF's. He continuously asks facility SLPs to place Chelita Mealy but signs out AMA or is discharged before they can place as this is off-label use and requires some research/discussion prior to placement. 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 - trialed on T-tube unable to manage care due to limited manual dexterity. - converted back to tracheostomy tube size 4. PREVIOUS SLP EVALUATIONS:  videostrobes revealed functional VF mobility with functional patent glottic and supraglottic airway. \"      04/05: Dr. Donovan Thomas assessed the patient at bedside. Upper airway and hypopharynx assessed via laryngoscopy. Some production of thick yellow/green mucus was noted. Sputum culture collected by Dr. Donovan Thomas. · 04/05: We attempted to place Provox 14/18 Larybutton with Xtraflow HME filter and Provox Flexivoice freehands HME (impaired manual dexterity) however, due to shape of stoma, button is not fitting without external support. Once button is in place, due to anatomy of stoma, the opening becomes oval shape vs round.  This makes for poor fitting of valve into button and it easily pops off with coughing or exertion  · 04/06: When I walked into pt's room today, I noted that his izzy button was not in place. It was almost completely removed from stoma. Per RN, it has not been staying in place. I removed the button today, cleaned outer stoma. Cleaned izzy button. I attempted to replace with hemostats. I was able to get a better fit with this method. However, I continue to meet resistance at right wall of stoma. I was able to temporarily have button stay in place. During this time, I was able to place the speaking valve in the button. In both settings, valve was immediately thrown from button with phonation. Button did not remain in place long enough for more than 2 HME trials. I suspect the stoma has stenosed and pt requires smaller size izzy button. Will discuss with ENT tomorrow. · 04/08: Upon SLP entry today, pt noted with izzy button completely out of stoma. Again suspect that bilateral ties are causing excess tension and pulling button from stoma. I have attempted to loosen the ties on multiple occasions but they are re-tightened by staff. The izzy button was caked in thick secretions. I removed it and cleaned it. Cleaned outer/anterior inner stoma with removal of thick secretions. I was able to place izzy button with patent fit to bilateral stoma walls today. Button remained in place for over 40 mins without ties. Unable to place FlexiVoice valve due to altered shape of outer izzy button rim. · 04/08 Session 2: The following messafe was sent to Dr. Iliana Ortega or Facility: [facility] From: Juwan Zheng RE: Evelyn Must 1955 RM: [id number]. Reaching out via Perfect [employer] vs text in hopes to not bother you if you are off today. There has been no ENT follow-up with Mr. Thalia Mcgill that I can see. I got a better fit with his Violet Beans today but I cannot get that flexivoice to stay no matter what I do.  I'm · 04/12: Pt seen at bedside. No new changes. Remains with Shiley #4 capped trach in place. Pt continues to voice concerns talking on phone. Discussed breath support, chin down posture, and volume techniques to enhance intelligibility. Good carryover noted. I continue to recommend the patient f/o OP with Dyllan Gaston and Dr. Jack Weston at [facility]. Stomaplasty is likely best course of action, however, pt continues to report he is moving to [place] once home is renovated, thus follow-up will be a concern. Pt states he has looked into ENT office in [place] and been in contact. Unable to verify. This. Will continue to follow as able to offer support/techniques with speech intelligibility. Will consider ordering small diameter izzy button if d/c planning is halted. Pt agreeable to POC at this time.  There is additional concern with pt's lack of ability to remove the valve himself, due to BLE neuropath which greatly reduced his sensation to almost none. The patient MUST have valve removed for sleep due to upper airway collapse and sleep apnea.  There is concern for continuity of care for multiple reasons. This is off-market use of  Elvis Sever button, therefore there is limited staff training. If our staff here were trained, that would only allow for temporary use as pt will ultimately d/c. Pt does not want LTC, however, he was unable to care for himself at home. [facility] is not willing to accept pt due to him leaving AMA last admission and [facility] will not accept due to unsafe d/c.    Did enquire with PT/OT about possible DME to enhance pt's ability to remove valve independently on 04/05.  Regardless, due to ill-fit of Joselin Ha. Dr. Ronda Landers wants pt to leave button in place until Thursday- today, with no valve to attempt to alter stoma shape for better fit.  Unsure of pt's carryover of current management and POC.     Pt' speech intelligibility is ~90% with trach cap and HME. ~70% intelligible with trach removed.  Respiratory Status: Pt with SPO2% of 94 on  RA with RR of 20     Education: SLP edu pt re: Role of SLP, POC, Evidenced based practice for current recommendations and treatment and Risks of not following recommendations. Pt verbalized understanding, would benefit from ongoing education and RN aware of recommendations  · Assessment: See above.  Recommendations: Geovanna Camejoi button in place until Thursday, will re-attempt HME then. ENT to re-assess Thursday. Leave decannulated. Speech/Language/Cog Goals:  Short Term Goals  Timeframe for Short Term Goals: 5 days (04/10/2022)- extend to 04/15/2022  Goal 1: The patient will achieve functional communication via various modalities (finger occlusion, HME, change in head posture to reduce leak, etc.) with 100% intelligibility in various contexts as judged by SLP  4/12/2022 : Goal addressed, see above. Ongoing, progressing. LongTerm Goals:  Timeframe for Long Term Goals: 7 days (04/12/2022)- extend to 04/17/2022  Goal 1: The pt will develop speech communication that is functional and intelligible in this environment as assessed by the patient or communication partner 90%-100%  4/12/2022 : Ongoing, progressing.'    Plan:    Continued treatment with goals per plan of care. Discharge Recommendations: Recommend SLP tx at discharge. Recommend ENT follow-up. Recommend OP follow-up with Dr. Misty Velez (ENT) and Sergio Aguirre (SLP) at [facility] otolaryngology (treatment team familiar with patient)    If pt discharges from hospital prior to Speech/Swallowing discharge, this note serves as tx and discharge summary.      Total Treatment Time / Charges     Time in Time out Total Time / units   Cognitive Tx         Speech Tx 1410 1425 15 mins / 1 unit     Dysphagia Tx        Signature:  Norma Sapp M.A., [address] [facility] #[id number]  Speech-Language Pathologist  Phone: [phone], [phone]

## 2022-04-12 NOTE — PROGRESS NOTES
Progress Note    Admit Date:  4/1/2022  Admitted for placement    ENT did not visit  and now back to tracheostomy tube with PMV   Off izzy tube as it did not stay    Subjective:  Mr. Walker Cortez is awaiting PT and OT have seen him. Up in chair. Objective:   BP (!) 112/58   Pulse 60   Temp 97 °F (36.1 °C) (Oral)   Resp 18   Ht 5' 10\" (1.778 m)   Wt 231 lb 8 oz (105 kg)   SpO2 96%   BMI 33.22 kg/m²           Intake/Output Summary (Last 24 hours) at 4/12/2022 2944  Last data filed at 4/12/2022 0353  Gross per 24 hour   Intake --   Output 2300 ml   Net -2300 ml       Physical Exam:        General: middle aged obese male  Awake, alert and oriented. Appears to be not in any distress  Mucous Membranes:  Pink , anicteric  Tracheostomy with izzy tube noted   Neck: No JVD, no carotid bruit, no thyromegaly  Chest:  Clear to auscultation bilaterally, diminished in bases  Cardiovascular:  RRR S1S2 heard, no murmurs or gallops  Abdomen:  Soft, obese undistended, non tender, no organomegaly, BS present  Pain pump in left side of abd  Extremities: left heel almost healed linear ulcer with no infection noted   No edema or cyanosis.  Distal pulses well felt  Neurological : grossly normal        Medications:  buPROPion, 400 mg, Daily  lisinopril, 2.5 mg, Daily  polyethylene glycol, 17 g, Daily  insulin lispro, 0-12 Units, TID WC  insulin lispro, 0-6 Units, Nightly  docusate sodium, 100 mg, BID  pravastatin, 40 mg, Daily  budesonide-formoterol, 2 puff, BID  glipiZIDE, 2.5 mg, QAM AC  therapeutic multivitamin-minerals, 1 tablet, Daily  ferrous sulfate, 325 mg, Daily with breakfast  ARIPiprazole, 30 mg, Daily  tamsulosin, 0.4 mg, Daily  potassium chloride, 20 mEq, Daily  insulin glargine, 30 Units, Nightly  torsemide, 20 mg, Daily  pregabalin, 100 mg, BID  atenolol, 25 mg, Daily  sodium chloride flush, 5-40 mL, 2 times per day  enoxaparin, 40 mg, Daily  montelukast, 10 mg, Nightly  cetirizine, 5 mg, Daily  methylphenidate, 20 mg, BID WC      PRN Medications:  glucose, 4 each, PRN  glucagon (rDNA), 1 mg, PRN  dextrose, 100 mL/hr, PRN  sodium chloride flush, 5-40 mL, PRN  sodium chloride, 250 mL, PRN  acetaminophen, 650 mg, Q6H PRN   Or  acetaminophen, 650 mg, Q6H PRN  dextrose bolus (hypoglycemia), 125 mL, PRN   Or  dextrose bolus (hypoglycemia), 250 mL, PRN        Data:  CBC:   No results for input(s): WBC, HGB, HCT, MCV, PLT in the last 72 hours. BMP:   Recent Labs     04/11/22  0513 04/12/22  0528    140   K 4.4 4.0    103   CO2 27 29   BUN 26* 28*   CREATININE 1.4* 1.5*       CULTURES  COVID 19 Not detected        RADIOLOGY  CT HEAD WO CONTRAST   Final Result   Stable CT scan of the head without acute intracranial abnormality. Sputum - pseudomonas     Assessment/Plan: TRICIA on CKD stage 3a  - Baseline ~ 1.5  - creatinine 2.2 on admission  - Given IVF's. -demadex and ACEI was held   - creat improved   - resumed meds as tolerated     Multiple falls, Generalized  weakness  - PT/OT eval  - SNF placement recommended but has no more rehab days left  - PT and OT to see today.        H/o CVA  - on ASA and  Pravastatin     H/o tracheal stenosis  - s/p tracheostomy. And scheduled for Bc tube  at [facility] this week  - consulted ENT and bc tube placed  - sputum with pseudomonas likely colonization - hold abx     Hypertension  -BP borderline  -lisinopril held and resumed now      H/o PE/DVT  - not on AC     Depression  - continue home medication regimen.     DM type 2  - monitor glucose. - Continue Lantus 30 units nightly, Glipizide  - Humalog with meals, SSI coverage. COPD  - stable. No AE  - continue Symbicort, Singulair     Chronic diastolic CHF  - stable. No s/s decompensation  - continue Demadex     BPH  - on Flomax.     Iron deficiency anemia  - continue ferrous sulfate    DVT Prophylaxis: lovenox   Diet: ADULT DIET; Regular; 4 carb choices (60 gm/meal)  Code Status: Full Code      Discharge planning.  Ready for discharge.      Erlin Engle MD, 4/12/2022 9:39 AM

## 2022-04-12 NOTE — PROGRESS NOTES
Inpatient Physical Therapy Daily Treatment Note/Progress Note    Unit: [address]  Date:  4/12/2022  Patient Name:    Estephanie Castillo  Admitting diagnosis:  Tracheostomy dependent ([facility] [Z93.0]  Physical debility [R53.81]  Recurrent falls [R29.6]  Acute renal failure, unspecified acute renal failure type ([facility] [N17.9]  ARF (acute renal failure) ([facility] [N17.9]  Admit Date:  4/1/2022  Precautions/Restrictions:  Fall risk, Bed/chair alarm, Lines -IV and tracheal stoma present with [place] tube, Telemetry and WB Restrictions (NWB L foot - grafting was recommended to patient but patient could not follow up due to current hospitalization)      Discharge Recommendations: SNF  DME needs for discharge: defer to facility       Therapy recommendation for EMS Transport: requires transport by cot due to need of 2 person assist for functional transfers    Therapy recommendations for staff:   Assist of 2 with use of squat pivot transfers with NWB on the LLE using removable armrest recliner chair for all transfers from chair   Assist of 1 with use of squat pivot transfers with NWB on the LLE using removable armrest recliner chair for all transfers to chair/ [facility]    History of Present Illness: Per Dipesh Chew APRN 4/01: \"The patient is a 75 y. o. male with h/o CVA, hypertension, hyperlipidemia, h/o DVT/PE, DM, Gout, COPD, CHF, h/o tracheal stenosis s/p tracheostomy who presents to [facility] with c/o multiple falls. Pam Austin was discharged from rehab and unable to walk. Pam Austin is living in a hotel. Pam Austin as fallen twice. Pam Austin has not been taking his medications or been able to care for himself. \"  Discharged from [facility] 3/27    Home Health S4 Level Recommendation: Level 3 Safety  AM-PAC Mobility Score   AM-PAC Inpatient Mobility Raw Score : 13     Treatment Time: 7816 - 6562  Treatment number: 6 (progress note)  Timed Code Treatment Minutes: 15 minutes  Total Treatment Minutes: 15 minutes    Cognition    A&O x4   Able to follow 2 step commands    Subjective  Patient sitting up in chair with no family present   Pt agreeable to this PT tx. Pain   No  Location: N/A  Rating:    NA/10  Pain Medicine Status: Denies need     Bed Mobility using bed rails  Supine to Sit:    Not Tested  Sit to Supine:   Not Tested  Rolling:   Not Tested  Scooting:   Not Tested   Comment: Patient was already sitting in the chair and wanted to continue to sitting in the chair hence declined to get back in the bed. Transfer Training     Sit to stand:   partial standing done with weight bearing precautions on the LL using RW with CGA  Stand to sit:   CGA  Chair to [facility]:   Min A  with use of gait belt and squat pivot transfers   BSC to Chair: Min A persons with use of gait belt and squat pivot transfers     Gait Training gait deferred due to difficulty with transfers; pt ambulated 0 ft. Stair Training deferred, pt does not have stairs in the home environment    Therapeutic Exercise [place] deferred secondary to pt declined  chair push ups x 10 reps       Balance  Sitting:  Fair ; CGA  Comments: ~ 5 min    Standing: Poor; Min A   Comments: Unable to attain standing using RW today. Patient can perform partial standing briefly for 20 sec. Patient Education      Role of PT, POC, Discharge recommendations, DC recommendations, safety awareness, transfer techniques, pursed lip breathing, energy conservation, pacing activity and calling for assist with mobility. Positioning Needs       Pt up in chair, alarm set, positioned in proper neutral alignment and pressure relief provided. Call light provided and all needs within reach    ROM Measurements N/A  Knee Flexion:  Knee Extension:     Activity Tolerance   Pt completed therapy session with No adverse symptoms noted w/activity. Other  N/A    Assessment :  Patient tolerated the session well.  Patient showed improvement in squat pivot transfers today and progressed from 2 persons to 1 person assist.   Recommending SNF upon discharge as patient functioning well below baseline, demonstrates good rehab potential and unable to return home due to limited or no family support, burden of care beyond caregiver ability, home environment not conducive to patient recovery and limited safety awareness. Goals (all goals ongoing unless otherwise indicated)  To be met in 3 visits:  1). Independent with LE Ex x 10 reps (Goal met 4/12/2022)  2).  Supine to/from sit: SBA (Goal not met)  4). Patient will be able to perform bed to chair and chair to bed transfers with CGA with LRAD. (Goal not met)     To be met in 6 visits:  1).  Supine to/from sit: Independent (Goal not met)  2).  Bed to/from wheelchair: supervision with squat pivot method (Goal not met)    Plan   Continue with plan of care. Signature: Laurie Knutson, MS PT, # [id number]    If patient discharges from this facility prior to next visit, this note will serve as the Discharge Summary.

## 2022-04-12 NOTE — PROGRESS NOTES
Pt a/o. Am assessment completed see flow sheet. Pt denies any pain/ needs at this time. Call light within reach. Will continue to monitor.

## 2022-04-12 NOTE — PROGRESS NOTES
Occupational Therapy Daily Treatment Note    Unit: [address]  Date:  4/12/2022  Patient Name:    Courtney Oliver  Admitting diagnosis:  Tracheostomy dependent ([facility] [Z93.0]  Physical debility [R53.81]  Recurrent falls [R29.6]  Acute renal failure, unspecified acute renal failure type ([facility] [N17.9]  ARF (acute renal failure) ([facility] [N17.9]  Admit Date:  4/1/2022  Precautions/Restrictions:  fall risk and bed/chair alarm, tracheostomy, wounds on coccyx /L foot/R foot      Discharge Recommendations: SNF  DME needs for discharge: defer to facility       Therapy recommendations for staff:   Assist of 2 with use of gait belt for all transfers to/from chair with drop arm (sit/scoot with or without slide board)    AM-PAC Score: AM-PAC Inpatient Daily Activity Raw Score: [address] S4 Level: Level 3- Safety if not going to SNF       Treatment Time:  0841-3454  Treatment number:  6  Total Treatment Time:  48 minutes    History of Present Illness: Rafael Mir APRN 4/01: \"The patient is [place] y. o. male with h/o CVA, hypertension, hyperlipidemia, h/o DVT/PE, DM, Gout, COPD, CHF, h/o tracheal stenosis s/p tracheostomy who presents to [facility] with c/o multiple falls. Matthew Vora was discharged from rehab and unable to walk. Matthew Vora is living in a hotel. Matthew Vora as fallen twice. Matthew Vora has not been taking his medications or been able to care for himself. \"  Discharged from [facility] 3/27    Subjective:  Patient in chair and agreeable to therapy. Pain   Yes  Rating: moderate  Location: buttocks   Pain Medicine Status: No request made  - states he needs to stay up in chair a bit longer to pay bills/make calls. Seated on pressure relieving cushion. Bed Mobility:   Supine to Sit:  Not Tested  Sit to Supine:  Not Tested  Rolling:           Not Tested  Scooting:        SBA to MIN A    Seated in chair at beginning and end of session.     Transfer Training:   Sit to stand:   CGA for partial stand with PT blocking L knee, unable to transition hands from chair to RW  Stand to sit:  Not Tested  Bed to Chair:  Not Tested   Chair to [facility]:   Min A of 2 scoot pivot with cues for safety/technique  Standard toilet:   Not Tested     Activity Tolerance   Pt completed therapy session with No adverse symptoms noted w/activity    Balance  Sitting Balance :     SBA at edge of chair  Standing Balance   CGA In partial stand with B UE support on chair arms    ADL Training:   Upper body dressing:  Not Tested  Upper body bathing:  Not Tested  Lower body dressing: Mod A adjust pants down/up in simulated toileting on BSC, states he will wear loose gym shorts at d/c but still struggles with pulling pants up  Lower body bathing:  Not Tested  Toileting:   Not Tested-MOD A for simulated toileting on [facility]  Grooming/Hygiene:  Not Tested   Feeding :   Not Tested     Therapeutic Exercise:    Chair pushups x 4, recommended continue as HEP    Patient Education:   Role of OT  Recommendations for DC  Energy conservation techniques  Safe RW use/hand placement  HEP   ADL retraining    Positioning Needs:   Up in chair, call light and needs in reach. Family Present:  No    Assessment: Tolerated session well today, receptive to teaching and safety cues. Continue as tolerated. Will benefit from increased work on clothing management for toileting/use of BSC. GOALS-continue all, goals updated 4/12/22  To be met in 3 Visits:  1). Bed to wheelchair/BSC: Supervision     To be met in 5 Visits:  1). Supine to/from Sit:             Independent  2). Upper Body Bathing:         Supervision  3). Lower Body Bathing:         SBA  4). Upper Body Dressing:       Supervision  5). Lower Body Dressing:       SBA  6).  Pt to demonstrate UE exs x 15 reps with minimal cues    Plan: cont with [facility], OTR/L 8644        If patient discharges from this facility prior to next visit, this note will serve as the Discharge Summary

## 2022-04-12 NOTE — PLAN OF CARE
Problem: Falls - Risk of:  Goal: Will remain free from falls  Description: Will remain free from falls  4/11/2022 2234 by Janelle Lai RN  Outcome: Ongoing  4/11/2022 1014 by Jeb Kothari RN  Outcome: Ongoing  Goal: Absence of physical injury  Description: Absence of physical injury  Outcome: Ongoing     Problem: Infection:  Goal: Will remain free from infection  Description: Will remain free from infection  Outcome: Ongoing     Problem: Safety:  Goal: Free from accidental physical injury  Description: Free from accidental physical injury  Outcome: Ongoing  Goal: Free from intentional harm  Description: Free from intentional harm  4/11/2022 2234 by Janelle Lai RN  Outcome: Ongoing  4/11/2022 1014 by Jeb Kothari RN  Outcome: Ongoing     Problem: Daily Care:  Goal: Daily care needs are met  Description: Daily care needs are met  Outcome: Ongoing     Problem: Pain:  Goal: Patient's pain/discomfort is manageable  Description: Patient's pain/discomfort is manageable  Outcome: Ongoing  Goal: Pain level will decrease  Description: Pain level will decrease  Outcome: Ongoing  Goal: Control of acute pain  Description: Control of acute pain  Outcome: Ongoing  Goal: Control of chronic pain  Description: Control of chronic pain  Outcome: Ongoing     Problem: Skin Integrity:  Goal: Skin integrity will stabilize  Description: Skin integrity will stabilize  Outcome: Ongoing     Problem: Discharge Planning:  Goal: Patients continuum of care needs are met  Description: Patients continuum of care needs are met  Outcome: Ongoing     Problem: Airway Clearance - Ineffective:  Goal: Ability to maintain a clear airway will improve  Description: Ability to maintain a clear airway will improve  Outcome: Ongoing     Problem: Skin Integrity:  Goal: Will show no infection signs and symptoms  Description: Will show no infection signs and symptoms  Outcome: Ongoing  Goal: Absence of new skin breakdown  Description: Absence of new skin breakdown  Outcome: Ongoing     Problem: Nutrition  Goal: Optimal nutrition therapy  4/11/2022 2234 by Jeffrey Oglesby RN  Outcome: Ongoing  4/11/2022 1014 by Jules Decker RN  Outcome: Ongoing

## 2022-04-12 NOTE — CARE COORDINATION
INTERDISCIPLINARY PLAN OF CARE CONFERENCE    Date/Time: 4/12/2022 4:14 PM  Completed by: Norris Lea RN, Case Management      Patient Name:  Gaston Knox  YOB: 1955  Admitting Diagnosis: Tracheostomy dependent ([facility] [Z93.0]  Physical debility [R53.81]  Recurrent falls [R29.6]  Acute renal failure, unspecified acute renal failure type ([facility] [N17.9]  ARF (acute renal failure) ([facility] [N17.9]     Admit Date/Time:  4/1/2022  5:27 AM    Chart reviewed. Interdisciplinary team contacted or reviewed plan related to patient progress and discharge plans. Disciplines included Case Management, Nursing, and Dietitian. Current Status: Stable  PT/OT recommendation for discharge plan of care: SNF    Expected D/C Disposition:  Rehab  Confirmed plan with patient  Yes   Met with: patient  Discharge Plan Comments:  Reviewed chart and spoke with Bette Fleming at [facility] who states clinically can accept but pt owes them 12,000.00$. She will check with [employer] to see if can set up paymt plan. Spoke with Delonte Burt at [facility] re: referral made earlier as did not get answer from [facility] re: acceptance/denial. Per Delonte Burt after review and discussing case with [employer] will be able to accept as self pay if pt will pay up front 8,400$. Per [facility] if pt does not stay 30 days then monies will be returned immediately to pt. Discussed with pt that he will need to pay 30 days up front and Delonte Burt will call for financial arrangements. Pt aware of cost and that therapy will bill under medicare part B     Pt is agreeable to above. Spoke with Delonte Burt who states once financial secured then will accept pt.        Home O2 in place on admit: No  Pt informed of need to bring portable home O2 tank on day of discharge for nursing to connect prior to leaving:  Not Indicated  Verbalized agreement/Understanding:  Not Indicated

## 2022-04-12 NOTE — PLAN OF CARE
Problem: Daily Care:  Goal: Daily care needs are met  Outcome: Ongoing   All care needs met per patient request.  No complaints voiced. Call light in reach.

## 2022-04-12 NOTE — PROGRESS NOTES
Shift handoff report given to [facility]. Patient needs are met. In bed, lowest position, call light within reach.

## 2022-04-13 VITALS
SYSTOLIC BLOOD PRESSURE: 131 MMHG | DIASTOLIC BLOOD PRESSURE: 66 MMHG | HEART RATE: 56 BPM | TEMPERATURE: 97.5 F | BODY MASS INDEX: 33.14 KG/M2 | WEIGHT: 231.5 LBS | OXYGEN SATURATION: 97 % | HEIGHT: 70 IN | RESPIRATION RATE: 14 BRPM

## 2022-04-13 LAB
ANION GAP SERPL CALCULATED.3IONS-SCNC: 8 MMOL/L (ref 3–16)
BUN BLDV-MCNC: 33 MG/DL (ref 7–20)
CALCIUM SERPL-MCNC: 9.3 MG/DL (ref 8.3–10.6)
CHLORIDE BLD-SCNC: 104 MMOL/L (ref 99–110)
CO2: 29 MMOL/L (ref 21–32)
CREAT SERPL-MCNC: 1.8 MG/DL (ref 0.8–1.3)
GFR AFRICAN AMERICAN: 46
GFR NON-AFRICAN AMERICAN: 38
GLUCOSE BLD-MCNC: 100 MG/DL (ref 70–99)
GLUCOSE BLD-MCNC: 129 MG/DL (ref 70–99)
GLUCOSE BLD-MCNC: 162 MG/DL (ref 70–99)
GLUCOSE BLD-MCNC: 66 MG/DL (ref 70–99)
GLUCOSE BLD-MCNC: 73 MG/DL (ref 70–99)
GLUCOSE BLD-MCNC: 79 MG/DL (ref 70–99)
PERFORMED ON: ABNORMAL
PERFORMED ON: NORMAL
POTASSIUM SERPL-SCNC: 4.1 MMOL/L (ref 3.5–5.1)
SODIUM BLD-SCNC: 141 MMOL/L (ref 136–145)

## 2022-04-13 PROCEDURE — 36415 COLL VENOUS BLD VENIPUNCTURE: CPT

## 2022-04-13 PROCEDURE — 97530 THERAPEUTIC ACTIVITIES: CPT

## 2022-04-13 PROCEDURE — 97110 THERAPEUTIC EXERCISES: CPT

## 2022-04-13 PROCEDURE — 99221 1ST HOSP IP/OBS SF/LOW 40: CPT

## 2022-04-13 PROCEDURE — 80048 BASIC METABOLIC PNL TOTAL CA: CPT

## 2022-04-13 PROCEDURE — 2580000003 HC RX 258: Performed by: INTERNAL MEDICINE

## 2022-04-13 PROCEDURE — 92507 TX SP LANG VOICE COMM INDIV: CPT

## 2022-04-13 PROCEDURE — 99239 HOSP IP/OBS DSCHRG MGMT >30: CPT | Performed by: INTERNAL MEDICINE

## 2022-04-13 PROCEDURE — 94761 N-INVAS EAR/PLS OXIMETRY MLT: CPT

## 2022-04-13 PROCEDURE — 6370000000 HC RX 637 (ALT 250 FOR IP): Performed by: NURSE PRACTITIONER

## 2022-04-13 PROCEDURE — 6370000000 HC RX 637 (ALT 250 FOR IP): Performed by: INTERNAL MEDICINE

## 2022-04-13 PROCEDURE — 6360000002 HC RX W HCPCS: Performed by: INTERNAL MEDICINE

## 2022-04-13 RX ORDER — LISINOPRIL 2.5 MG/1
2.5 TABLET ORAL DAILY
Qty: 30 TABLET | Refills: 3
Start: 2022-04-14

## 2022-04-13 RX ORDER — METHYLPHENIDATE HYDROCHLORIDE 10 MG/1
20 TABLET ORAL SEE ADMIN INSTRUCTIONS
Qty: 6 TABLET | Refills: 0 | Status: SHIPPED | OUTPATIENT
Start: 2022-04-13 | End: 2022-04-13 | Stop reason: SDUPTHER

## 2022-04-13 RX ORDER — METHYLPHENIDATE HYDROCHLORIDE 10 MG/1
20 TABLET ORAL SEE ADMIN INSTRUCTIONS
Qty: 12 TABLET | Refills: 0 | Status: SHIPPED | OUTPATIENT
Start: 2022-04-13 | End: 2022-04-16

## 2022-04-13 RX ORDER — PREGABALIN 100 MG/1
100 CAPSULE ORAL 2 TIMES DAILY
Qty: 6 CAPSULE | Refills: 0 | Status: SHIPPED | OUTPATIENT
Start: 2022-04-13 | End: 2022-04-16

## 2022-04-13 RX ADMIN — TORSEMIDE 20 MG: 20 TABLET ORAL at 08:25

## 2022-04-13 RX ADMIN — BUPROPION HYDROCHLORIDE 400 MG: 100 TABLET, FILM COATED, EXTENDED RELEASE ORAL at 08:34

## 2022-04-13 RX ADMIN — ARIPIPRAZOLE 30 MG: 10 TABLET ORAL at 08:24

## 2022-04-13 RX ADMIN — SODIUM CHLORIDE, PRESERVATIVE FREE 10 ML: 5 INJECTION INTRAVENOUS at 08:36

## 2022-04-13 RX ADMIN — GLIPIZIDE 2.5 MG: 5 TABLET ORAL at 08:59

## 2022-04-13 RX ADMIN — LISINOPRIL 2.5 MG: 5 TABLET ORAL at 08:25

## 2022-04-13 RX ADMIN — PREGABALIN 100 MG: 100 CAPSULE ORAL at 08:25

## 2022-04-13 RX ADMIN — ENOXAPARIN SODIUM 40 MG: 40 INJECTION SUBCUTANEOUS at 08:26

## 2022-04-13 RX ADMIN — TAMSULOSIN HYDROCHLORIDE 0.4 MG: 0.4 CAPSULE ORAL at 08:27

## 2022-04-13 RX ADMIN — DOCUSATE SODIUM 100 MG: 100 CAPSULE, LIQUID FILLED ORAL at 08:24

## 2022-04-13 RX ADMIN — POLYETHYLENE GLYCOL (3350) 17 G: 17 POWDER, FOR SOLUTION ORAL at 08:26

## 2022-04-13 RX ADMIN — FERROUS SULFATE TAB 325 MG (65 MG ELEMENTAL FE) 325 MG: 325 (65 FE) TAB at 08:24

## 2022-04-13 RX ADMIN — CETIRIZINE HYDROCHLORIDE 5 MG: 10 TABLET ORAL at 08:26

## 2022-04-13 RX ADMIN — METHYLPHENIDATE HYDROCHLORIDE 20 MG: 10 TABLET ORAL at 08:24

## 2022-04-13 RX ADMIN — PRAVASTATIN SODIUM 40 MG: 40 TABLET ORAL at 08:25

## 2022-04-13 RX ADMIN — ATENOLOL 25 MG: 25 TABLET ORAL at 08:24

## 2022-04-13 RX ADMIN — METHYLPHENIDATE HYDROCHLORIDE 20 MG: 10 TABLET ORAL at 12:45

## 2022-04-13 RX ADMIN — Medication 1 TABLET: at 08:26

## 2022-04-13 RX ADMIN — POTASSIUM CHLORIDE 20 MEQ: 1500 TABLET, EXTENDED RELEASE ORAL at 08:24

## 2022-04-13 ASSESSMENT — PAIN SCALES - GENERAL: PAINLEVEL_OUTOF10: 7

## 2022-04-13 ASSESSMENT — PAIN DESCRIPTION - LOCATION: LOCATION: BUTTOCKS

## 2022-04-13 ASSESSMENT — PAIN DESCRIPTION - PAIN TYPE: TYPE: ACUTE PAIN

## 2022-04-13 NOTE — PROGRESS NOTES
Progress Note    Admit Date:  4/1/2022  Admitted for placement    ENT did not visit  and now back to tracheostomy tube with PMV   Off izzy tube as it did not stay    Subjective:  Mr. Kerby Baumgarten is awaiting placement. Objective:   /66   Pulse 56   Temp 97.5 °F (36.4 °C) (Oral)   Resp 14   Ht 5' 10\" (1.778 m)   Wt 231 lb 8 oz (105 kg)   SpO2 97%   BMI 33.22 kg/m²           Intake/Output Summary (Last 24 hours) at 4/13/2022 0840  Last data filed at 4/13/2022 0206  Gross per 24 hour   Intake --   Output 850 ml   Net -850 ml       Physical Exam:        General: middle aged obese male  Awake, alert and oriented. Appears to be not in any distress  Mucous Membranes:  Pink , anicteric  Tracheostomy with izzy tube noted   Neck: No JVD, no carotid bruit, no thyromegaly  Chest:  Clear to auscultation bilaterally, diminished in bases  Cardiovascular:  RRR S1S2 heard, no murmurs or gallops  Abdomen:  Soft, obese undistended, non tender, no organomegaly, BS present  Pain pump in left side of abd  Extremities: left heel almost healed linear ulcer with no infection noted   No edema or cyanosis.  Distal pulses well felt  Neurological : grossly normal        Medications:  buPROPion, 400 mg, Daily  lisinopril, 2.5 mg, Daily  polyethylene glycol, 17 g, Daily  insulin lispro, 0-12 Units, TID WC  insulin lispro, 0-6 Units, Nightly  docusate sodium, 100 mg, BID  pravastatin, 40 mg, Daily  budesonide-formoterol, 2 puff, BID  glipiZIDE, 2.5 mg, QAM AC  therapeutic multivitamin-minerals, 1 tablet, Daily  ferrous sulfate, 325 mg, Daily with breakfast  ARIPiprazole, 30 mg, Daily  tamsulosin, 0.4 mg, Daily  potassium chloride, 20 mEq, Daily  insulin glargine, 30 Units, Nightly  torsemide, 20 mg, Daily  pregabalin, 100 mg, BID  atenolol, 25 mg, Daily  sodium chloride flush, 5-40 mL, 2 times per day  enoxaparin, 40 mg, Daily  montelukast, 10 mg, Nightly  cetirizine, 5 mg, Daily  methylphenidate, 20 mg, BID WC      PRN Medications:  glucose, 4 each, PRN  glucagon (rDNA), 1 mg, PRN  dextrose, 100 mL/hr, PRN  sodium chloride flush, 5-40 mL, PRN  sodium chloride, 250 mL, PRN  acetaminophen, 650 mg, Q6H PRN   Or  acetaminophen, 650 mg, Q6H PRN  dextrose bolus (hypoglycemia), 125 mL, PRN   Or  dextrose bolus (hypoglycemia), 250 mL, PRN        Data:  CBC:   No results for input(s): WBC, HGB, HCT, MCV, PLT in the last 72 hours. BMP:   Recent Labs     04/11/22  0513 04/12/22  0528 04/13/22  0551    140 141   K 4.4 4.0 4.1    103 104   CO2 27 29 29   BUN 26* 28* 33*   CREATININE 1.4* 1.5* 1.8*       CULTURES  COVID 19 Not detected        RADIOLOGY  CT HEAD WO CONTRAST   Final Result   Stable CT scan of the head without acute intracranial abnormality. Sputum - pseudomonas     Assessment/Plan: TRICIA on CKD stage 3a. Creatinine is 1.8. Hold Demadex today. RN notified. - Baseline ~ 1.5  - creatinine 2.2 on admission  - Given IVF's. -demadex and ACEI was held at time of admission.  - creat improved   - resumed meds as tolerated     Multiple falls, Generalized  weakness  - PT/OT eval  - SNF placement recommended but has no more rehab days left  - PT and OT to see today.        H/o CVA  - on ASA and  Pravastatin     H/o tracheal stenosis  - s/p tracheostomy. And scheduled for Bc tube  at [facility] this week  - consulted ENT and bc tube placed  - sputum with pseudomonas likely colonization - hold abx     Hypertension  -BP borderline  -lisinopril held and resumed now      H/o PE/DVT  - not on AC     Depression  - continue home medication regimen.     DM type 2  - monitor glucose. - Continue Lantus 30 units nightly, Glipizide  - Humalog with meals, SSI coverage. COPD  - stable. No AE  - continue Symbicort, Singulair     Chronic diastolic CHF  - stable.  No s/s decompensation  - continue Demadex     BPH  - on Flomax.     Iron deficiency anemia  - continue ferrous sulfate    DVT Prophylaxis: lovenox   Diet: ADULT DIET; Regular; 4 carb choices (60 gm/meal)  Code Status: Full Code      Discharge planning. Ready for discharge.      Kendall Brambila MD, 4/13/2022 8:40 AM

## 2022-04-13 NOTE — PROGRESS NOTES
Occupational Therapy Daily Treatment Note    Unit: 2 [place]  Date:  4/13/2022  Patient Name:    Willis Soliz  Admitting diagnosis:  Tracheostomy dependent ([facility] [Z93.0]  Physical debility [R53.81]  Recurrent falls [R29.6]  Acute renal failure, unspecified acute renal failure type ([facility] [N17.9]  ARF (acute renal failure) ([facility] [N17.9]  Admit Date:  4/1/2022  Precautions/Restrictions:  fall risk and bed/chair alarm, tracheostomy, wounds on coccyx /L foot/R foot      Discharge Recommendations: SNF  DME needs for discharge: defer to facility       Therapy recommendations for staff:   Assist of 2 with use of gait belt for all transfers to/from chair with drop arm (sit/scoot with or without slide board)    AM-PAC Score: AM-PAC Inpatient Daily Activity Raw Score: [address] S4 Level: Level 3- Safety if not going to SNF       Treatment Time: 11:45-12:00, 13:05-13:20  Treatment number:  7  Total Treatment Time: 30 minutes    History of Present Illness: Per Mercedez Alcala APRN 4/01: \"The patient is a 75 y. o. male with h/o CVA, hypertension, hyperlipidemia, h/o DVT/PE, DM, Gout, COPD, CHF, h/o tracheal stenosis s/p tracheostomy who presents to [facility] with c/o multiple falls. [facility] was discharged from rehab and unable to walk. [facility] is living in a hotel. [facility] as fallen twice. [facility] has not been taking his medications or been able to care for himself. \"  Discharged from [facility] 3/27    Subjective:  Patient in chair and agreeable to therapy.       Pain   Yes  Rating: moderate  Location: buttocks   Pain Medicine Status: No request made      Bed Mobility:   Supine to Sit:  SBA  Sit to Supine:  Not Tested  Rolling:           Not Tested  Scooting:        SBA     Transfer Training:   Sit to stand:   Not Tested  Stand to sit:  Not Tested  Bed to Chair:  CGA -min A for lateral scoot into chair with arm rest removed  Chair to [facility]:   Not Tested   Standard toilet:   Not Tested     Activity Tolerance   Pt completed therapy session with No

## 2022-04-13 NOTE — CONSULTS
Psychiatric Consult   Admit Date:  4/1/2022    Consult Date:  4/13/2022   Reason for Consult: Hx of Depression   Summary Present Illness: Jorge Montenegro is a 73 y.o. 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 He was discharged from a nursing facility this past Sunday and has been living in a [address] [place] [address]. He states he does not have anywhere else to live. He fell twice today. When EMS found him he needed assistance getting up and was covered in his own urine. He has not been taking his medications like he should and is having difficulty caring for himself. I asked him why he is living in a hotel and he states he has nowhere else to live and has no family. Pt sitting up in bed this morning. I introduced myself to the patient and he was polite, answered questions appropriately. Pt denies a history of depression. Pt states that he is taking mental health medications for narcolepsy, not depression. Pt states he sees a psychiatrist who prescribes Abilify and Wellbutrin, but denies ever being treated for mental health. Pt denies depression, states that he is going to be moving to a nursing home, and feels ok with this decision. Pt states he was not able to care for himself appropriately and would rather be there than a hospital.  Pt denies any thoughts of self harm, states that he has family in [place], and plans to return there when able. Pt states that he does have friends in this area that he can call for support. Pt does appear to be be saying the right things to avoid giving me an indication that he needs help. Pt denies feeling sad, depressed. He is future oriented talking about his move to the nursing facility to work on his ability to care for himself.   Pt thoughts were linear and logical.  Pt does not want or appear to need any adjustments in mental health medications at this time. Psychiatric Hx: Denies  Abuse History: Former smoker, denies alcohol or drug use  Social Hx: , states he has family in [place] he plans to move close to when able. Pt states he does have friend support in this area. 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 MSE: Mental Status Examination:  Level of consciousness:  within normal limits  Appearance:  ill-appearing, hospital attire and lying in bed overweight  Behavior/Motor:  no abnormalities noted did not assess  Attitude toward examiner:  cooperative  Speech:  normal rate, normal volume and trach present  Mood:  decreased range  Affect:  flat  Hallucinations: Absent  Thought processes:  linear Attention:  attention span and concentration were age appropriate  Thought content:  Suicidal Ideation:  denies suicidal ideationno evidence of delusions  Insight: normal insight and judgment  Judgement: normal insight and judgment  Fund of Knowledge: average  IQ:average Memory: intact  Cognition:  oriented to person, place, and time  Suicide:  no specific plan to harm self  Sleep: no sleep issues  Appetite: ok  Inventory of strengths and weaknesses:Cooperative and Pleasant  PE: VITALS:  /66   Pulse 56   Temp 97.5 °F (36.4 °C) (Oral)   Resp 14   Ht 5' 10\" (1.778 m)   Wt 231 lb 8 oz (105 kg)   SpO2 97%   BMI 33.22 kg/m²       ROS:  Reviewed ED and Med notes and agree with findings  Labs:    No results displayed because visit has over 200 results.            Impression: Hx of depression  Dx: axis I: Physical debility   Axis 2: No diagnosis  Zainab 3: See Medical History  Axis 4: Problems with primary support group, Problems related to the social environment and Problems with access to health care services  Axis 5: 61-70 mild symptoms         Active Hospital Problems    Diagnosis Date Noted    ARF (acute renal failure) ([facility] [N17.9] 04/04/2022    Physical debility [R53.81] 04/01/2022    Recurrent falls [R29.6] 09/18/2019    Acute kidney injury superimposed on CKD ([facility] [N17.9, N18.9] 03/15/2019     Recommendation: Pt denies any current feelings of depression, also denies having a history of mental health issues. Pt was alert and oriented in our conversation, denies any thoughts of self harm. Pt does not require psychiatric follow or medication adjustment at this time. Pt verbalized understanding that he can reach out anytime with questions or concerns. Please contact us if any change in mental condition requires another assessment.     Spent > 45 minutes evaluating and treating patient     TheCARON Johnson-BC

## 2022-04-13 NOTE — PLAN OF CARE
Problem: Falls - Risk of:  Goal: Will remain free from falls  Description: Will remain free from falls  Outcome: Ongoing  Goal: Absence of physical injury  Description: Absence of physical injury  Outcome: Ongoing     Problem: Infection:  Goal: Will remain free from infection  Description: Will remain free from infection  Outcome: Ongoing     Problem: Safety:  Goal: Free from accidental physical injury  Description: Free from accidental physical injury  Outcome: Ongoing  Goal: Free from intentional harm  Description: Free from intentional harm  Outcome: Ongoing     Problem: Daily Care:  Goal: Daily care needs are met  Description: Daily care needs are met  4/13/2022 0242 by Cecilia Barnes RN  Outcome: Ongoing  4/12/2022 1435 by Sigrid Crocker RN  Outcome: Ongoing     Problem: Pain:  Description: Pain management should include both nonpharmacologic and pharmacologic interventions.   Goal: Patient's pain/discomfort is manageable  Description: Patient's pain/discomfort is manageable  Outcome: Ongoing  Goal: Pain level will decrease  Description: Pain level will decrease  Outcome: Ongoing  Goal: Control of acute pain  Description: Control of acute pain  Outcome: Ongoing  Goal: Control of chronic pain  Description: Control of chronic pain  Outcome: Ongoing     Problem: Skin Integrity:  Goal: Skin integrity will stabilize  Description: Skin integrity will stabilize  Outcome: Ongoing     Problem: Discharge Planning:  Goal: Patients continuum of care needs are met  Description: Patients continuum of care needs are met  Outcome: Ongoing     Problem: Airway Clearance - Ineffective:  Goal: Ability to maintain a clear airway will improve  Description: Ability to maintain a clear airway will improve  Outcome: Ongoing     Problem: Skin Integrity:  Goal: Will show no infection signs and symptoms  Description: Will show no infection signs and symptoms  Outcome: Ongoing  Goal: Absence of new skin breakdown  Description: Absence of new skin breakdown  Outcome: Ongoing     Problem: Nutrition  Goal: Optimal nutrition therapy  Outcome: Ongoing

## 2022-04-13 NOTE — FLOWSHEET NOTE
04/13/22 0815   Vital Signs   Temp 97.5 °F (36.4 °C)   Temp Source Oral   Pulse 56   Heart Rate Source Monitor   Resp 14   /66   BP Location Left upper arm   Patient Position Semi fowlers   Level of Consciousness Alert (0)   MEWS Score 0   Patient Currently in Pain Yes   Pain Assessment   Pain Assessment 0-10   Pain Level 7   Pain Type Acute pain   Pain Location Buttocks   Oxygen Therapy   SpO2 97 %   O2 Device None (Room air)   Shift assessment complete - See flow sheet. Medications given - See [facility]     Patient with no complaints of pain at this time. Patient denies any further needs. Bed alarm is set for patient safetyt   Call light in reach  Bedside Mobility Assessment Tool (BMAT):     Assessment Level 1- Sit and Shake    1. From a semi-reclined position, ask patient to sit up and rotate to a seated position at the side of the bed. Can use the bedrail. 2. Ask patient to reach out and grab your hand and shake making sure patient reaches across his/her midline. Pass- Patient is able to come to a seated position, maintain core strength. Maintains seated balance while reaching across midline. Move on to Assessment Level 2. Assessment Level 2- Stretch and Point   1. With patient in seated position at the side of the bed, have patient place both feet on the floor (or stool) with knees no higher than hips. 2. Ask patient to stretch one leg and straighten the knee, then bend the ankle/flex and point the toes. If appropriate, repeat with the other leg. Pass- Patient is able to demonstrate appropriate quad strength on intended weight bearing limb(s). Move onto Assessment Level 3. Assessment Level 3- Stand   1. Ask patient to elevate off the bed or chair (seated to standing) using an assistive device (cane, bedrail). 2. Patient should be able to raise buttocks off be and hold for a count of five. May repeat once. Fail- Patient unable to demonstrate standing stability.  Patient is MOBILITY LEVEL 3. Assessment Level 4- Walk   1. Ask patient to march in place at bedside. 2. Then ask patient to advance step and return each foot. Some medical conditions may render a patient from stepping backwards, use your best clinical judgement. Fail- Patient not able to complete tasks OR requires use of assistive device. Patient is MOBILITY LEVEL 3.        Mobility Level- 3

## 2022-04-13 NOTE — DISCHARGE INSTR - COC
Continuity of Care Form    Patient Name: Colby Hurd   :  1955  MRN:  [id number]    [facility] date:  2022  Discharge date:  2022    Code Status Order: Full Code   Advance Directives:      Admitting Physician:  Celso Perry MD  PCP: Irasema Her    Discharging Nurse: [facility] Unit/Room#: 0227/0227-02  Discharging Unit Phone Number: [phone]    Emergency Contact:   Extended Emergency Contact Information  Primary Emergency Contact: [address] [place] [address] Road Phone: [phone]  Relation: Brother/Sister  Secondary Emergency Contact: Luke Moore  Mobile Phone: [phone]  Relation: Other   needed?  No    Past Surgical History:  Past Surgical History:   Procedure Laterality Date    BACK SURGERY      ENDOSCOPY, COLON, DIAGNOSTIC      KNEE SURGERY      X 4    LUMBAR FUSION      LUMBAR LAMINECTOMY      OTHER SURGICAL HISTORY Right 2017    Pain Pump insertion    [place] [address] W/BRNCL ALVEOLAR LAVAGE N/A 2018    BRONCHOSCOPY ALVEOLAR LAVAGE performed by Shayy Mejia MD at [address]      x2    TONSILLECTOMY AND ADENOIDECTOMY      TOTAL KNEE ARTHROPLASTY      TRACHEOSTOMY      over 30 trach operations due to MRSA infections in the trach    UPPER GASTROINTESTINAL ENDOSCOPY  16    removal of foreign body    UVULOPALATOPHARYGOPLASTY      VENA CAVA FILTER PLACEMENT         Immunization History:   Immunization History   Administered Date(s) Administered    COVID-19, Pfizer Purple top, DILUTE for use, 12+ yrs, 30mcg/0.3mL dose 2021, 2021    Influenza A ([id number]) Vaccine IM 2009    Influenza Virus Vaccine 10/01/2007, 10/01/2008, 10/06/2009, 10/20/2009, 2011, 2012    Pneumococcal Polysaccharide ([id number]) 2004, 2012, 03/10/2016, 2016       Active Problems:  Patient Active Problem List   Diagnosis Code    Chronic thrombocytopenia D69.6    Chronic respiratory failure with hypoxia on 6 L home O2 tent over trach J96.11    Type II diabetes mellitus, well controlled ([facility] [address] E11.9    HTN (hypertension) I10    Non morbid obesity due to excess calories E66.09    Anxiety and depression F41.9, F32. 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) L89.154    Decubitus ulcer of left buttock, stage 2 ([facility]) L89.322    Debility R53.81    Arthritis of right knee M17.11    Submandibular sialolithiasis K11.5    Lung nodule R91.1    Pressure ulcer of sacral region, stage 3 ([facility]) L89.153    Degenerative arthritis of lumbar spine M47.816    Ambulatory dysfunction R26.2    Pressure ulcer of buttock, stage 2 L89.302    Cellulitis of buttock L03.317    Acute kidney injury superimposed on CKD ([facility]) N17.9, N18.9    Hyperlipidemia E78.5    Normocytic anemia D64.9    Recurrent falls R29.6    Physical debility R53.81    H/O: CVA (cerebrovascular accident) Z80.78    ARF (acute renal failure) ([facility]) N17.9       Isolation/Infection:   Isolation            No Isolation          Patient Infection Status       Infection Onset Added Last Indicated Last Indicated By Review Planned Expiration Resolved Resolved By    None active    Resolved    COVID-19 (Rule Out) 04/01/22 04/01/22 04/01/22 COVID-19 & Influenza Combo (Ordered)   04/01/22 Rule-Out Test Resulted    MRSA  10/03/18 02/28/20 Culture, Anaerobic and Aerobic   02/15/21 Marlin Koyanagi, RN            Nurse Assessment:  Last Vital Signs: /66   Pulse 56   Temp 97.5 °F (36.4 °C) (Oral)   Resp 14   Ht 5' 10\" (1.778 m)   Wt 231 lb 8 oz (105 kg)   SpO2 97%   BMI 33.22 kg/m²     Last documented pain score (0-10 scale): Pain Level: 7  Last Weight:   Wt Readings from Last 1 Encounters:   04/03/22 231 lb 8 oz (105 kg)     Mental Status:  oriented    IV Access:  - None    Nursing Mobility/ADLs:  Walking   Assisted  Transfer  Assisted  Bathing  Assisted  Dressing  Assisted  Toileting  Assisted  Feeding  Independent  Med Admin  Independent  Med Delivery   whole    Wound Care Documentation and Therapy:  Wound 01/30/19 Coccyx Mid #23, Coccyx, Pressure, Stage 4, Onset 5/2017, pressure (Active)   Number of days: 1169       Wound 03/06/19 Sacrum # 25, Sacrum, Pressure, Stage 3, onset 2/28/19, pressure (Active)   Number of days: 1134       Wound 03/06/19 Buttocks Left # 26, Left Buttocks, Pressure, Stage 2, Onset 2/28/19, pressure (Active)   Number of days: 1134       Wound 03/06/19 Buttocks Right #27, Right Buttocks, pressure, stage 2, onset 2/27/19, pressure (Active)   Number of days: 1134       Wound 04/01/22 Sacrum Right unstageable (Active)   Wound Image   04/02/22 2119   Wound Etiology Pressure Unstageable 04/11/22 2133   Dressing Status Clean;Dry; Intact 04/13/22 0815   Wound Cleansed Cleansed with saline 04/07/22 2149   Dressing/Treatment Alginate with Ag; Foam 04/11/22 2133   Wound Assessment Slough; Exposed structure muscle 04/07/22 2149   Drainage Amount Small 04/07/22 2149   Drainage Description Purulent 04/07/22 2149   Odor Moderate 04/07/22 2149   Dominique-wound Assessment Blanchable erythema 04/07/22 2149   Margins Defined edges 04/07/22 2149   Number of days: 12        Elimination:  Continence:    Bowel: Yes  Bladder: Yes  Urinary Catheter: None   Colostomy/Ileostomy/Ileal Conduit: NA       Date of Last BM: ***    Intake/Output Summary (Last 24 hours) at 4/13/2022 1444  Last data filed at 4/13/2022 1206  Gross per 24 hour   Intake 240 ml   Output 1550 ml   Net -1310 ml     I/O last 3 completed shifts:  In: -   Out: 1750 [[id number]]    Safety Concerns:     History of Falls (last 30 days) and At Risk for Falls    Impairments/Disabilities:      Speech and Trach    Nutrition Therapy:  Current Nutrition Therapy:   - Oral Diet:  General    Routes of Feeding: Oral  Liquids: No Restrictions  Daily Fluid Restriction: no  Last Modified Barium Swallow with Video (Video Swallowing Test): not done    Treatments at the Time of Hospital Discharge:   Respiratory Treatments: ***  Oxygen Therapy:  is not on home oxygen therapy. Ventilator:    - No ventilator support    Rehab Therapies: Physical Therapy and Occupational Therapy  Weight Bearing Status/Restrictions: No weight bearing restrictions  Other Medical Equipment (for information only, NOT a DME order):  wheelchair  Other Treatments: ***    Patient's personal belongings (please select all that are sent with patient):  {[facility] DME Belongings:[id number]}    RN SIGNATURE:  Electronically signed by Malena Krishnamurthy RN on 4/13/22 at 3:21 PM EDT    CASE MANAGEMENT/SOCIAL WORK SECTION    Inpatient Status Date: 04/04/2022    Readmission Risk Assessment Score:  Readmission Risk              Risk of Unplanned Readmission:  21           Discharging to Facility/ Agency   Name: [facility]  Address: [address] [place] [address]  Phone: [phone]  Fax: [phone]      / signature: Electronically signed by DAMI Barboza, SINDIW-S on 4/13/22 at 3:02 PM EDT    PHYSICIAN SECTION    Prognosis: Good    Condition at Discharge: Stable    Rehab Potential (if transferring to Rehab): Good    Recommended Labs or Other Treatments After Discharge: N/A    Physician Certification: I certify the above information and transfer of Brianda Harkins  is necessary for the continuing treatment of the diagnosis listed and that he requires [facility] for less than 30 days.      Update Admission H&P: No change in H&P    PHYSICIAN SIGNATURE:  Electronically signed by MARIA G Victor on 4/13/22 at 2:44 PM EDT

## 2022-04-13 NOTE — PROGRESS NOTES
Handoff report and transfer of care given at bedside to [facility]. Patient in stable condition, denies needs/concerns at this time. Call light within reach.

## 2022-04-13 NOTE — CARE COORDINATION
DISCHARGE ORDER  Date/Time 2022 4:02 PM  Completed by: Abigail Tiwari, RN, Case Management    Patient Name: Lesli Sullivan    : 1955      Admit order Date and Status: 22 inpt  Noted discharge order. (verify MD's last order for status of admission/Traditional Medicare 3 [place] Inpatient qualifying stay required for SNF)    Confirmed discharge plan with:              Patient:  Yes              When pt confirms DC plan does any support person need to be contacted by CM No              Discharge to Facility:  [facility] phone number for staff giving report: [phone]   Pre-certification completed:  [address] Exemption Notification (HENS) completed: Yes   Discharge orders and Continuity of Care faxed to facility: Yes     Transportation:               Medical Transport explained with choice list offered to pt/family. Choice:(no preference)  Agency used:  [facility]  up time:   15:30      Pt/family/Nursing/Facility aware of  time: Yes Names:  Homero Zarate charge, Curtis Kanner, [facility], pt, Susannah Roa at [facility]  Ambulance form completed:  Yes:      Comments: Spoke with Susannah Roa at [facility] who states has received paymt from pt and can accept today for rehab. Spoke with pt who remains agreeable. Chart reviewed and no other dc needs identified. Pt is being d/c'd to  SNF today. Pt's O2 sats are 93% on  RA    Discharge timeout done with  nsg CM and pt. All discharge needs and concerns addressed. Discharging nurse to complete MAICOL, reconcile AVS, and place final copy with patient's discharge packet. Discharging RN to ensure that written prescriptions for  Level II medications are sent with patient to the facility as per protocol.

## 2022-04-13 NOTE — PROGRESS NOTES
Speech Language Pathology  Speech/Language Treatment/Follow-Up Note  Facility/Department: [address] MEDICAL-SURGICAL    [place]  : 1955 (73 y.o.)   MRN: [id number]  ROOM: 0227/0227-02  ADMISSION DATE: 2022  PATIENT DIAGNOSIS(ES): Tracheostomy dependent ([address] [Z93.0]  Physical debility [R53.81]  Recurrent falls [R29.6]  Acute renal failure, unspecified acute renal failure type ([address] [N17.9]  ARF (acute renal failure) ([facility]) [N17.9]  Allergies   Allergen Reactions    Aspartame And Phenylalanine Anaphylaxis    Cefuroxime Axetil Anaphylaxis    Clindamycin Hcl Anaphylaxis    Erythromycin Anaphylaxis and Rash    Feldene [Piroxicam] Anaphylaxis    Guanfacine Hcl Anaphylaxis    Iodine Anaphylaxis    Pcn [Penicillins] Anaphylaxis    Pletal [Cilostazol] Anaphylaxis    Robaxin [Methocarbamol] Anaphylaxis and Shortness Of Breath    Arthrotec [Diclofenac-Misoprostol]     Betadine [Povidone Iodine]     Claritin [Loratadine]     Clindamycin/Lincomycin     Indocin [Indometacin Sodium]     Tenex [Guanfacine Hcl]     Vasotec     Vioxx     Zyvox [Linezolid]        DATE ONSET: 2022    Pain: The patient does not complain of pain       Current Diet: ADULT DIET; Regular; 4 carb choices (60 gm/meal)      Treatment and Impressions:   Subjective: Pt seen in room at bedside with RN permission.  RN Report/Chart Review: I was consulted on this patient  for placement of Saratha Ores in stoma with removal of trach. Pt is chronic trach pt s/p tracheal stenosis. Trach in place since . Without and ENT consult or ENT on site yesterday, I did not feel comfortable with decannulation and placement of Bc button. Consult placed and Dr. Ivette Su saw the patient . I was able to see the patient directly with Dr. Ivette Su.  In brief, the patient has large outer stoma and size 4 capped shiley uncuffed trach. The capped trach is only present to allow for speech.  Per his treating SLP at [facility] makes for poor fitting of valve into button and it easily pops off with coughing or exertion  · 04/06: When I walked into pt's room today, I noted that his izzy button was not in place. It was almost completely removed from stoma. Per RN, it has not been staying in place. I removed the button today, cleaned outer stoma. Cleaned izzy button. I attempted to replace with hemostats. I was able to get a better fit with this method. However, I continue to meet resistance at right wall of stoma. I was able to temporarily have button stay in place. During this time, I was able to place the speaking valve in the button. In both settings, valve was immediately thrown from button with phonation. Button did not remain in place long enough for more than 2 HME trials. I suspect the stoma has stenosed and pt requires smaller size izzy button. Will discuss with ENT tomorrow. · 04/08: Upon SLP entry today, pt noted with izzy button completely out of stoma. Again suspect that bilateral ties are causing excess tension and pulling button from stoma. I have attempted to loosen the ties on multiple occasions but they are re-tightened by staff. The izzy button was caked in thick secretions. I removed it and cleaned it. Cleaned outer/anterior inner stoma with removal of thick secretions. I was able to place izzy button with patent fit to bilateral stoma walls today. Button remained in place for over 40 mins without ties. Unable to place FlexiVoice valve due to altered shape of outer izzy button rim. · 04/08 Session 2: The following messafe was sent to Dr. Frank Patricia or Facility: [facility] From: Lee Pryor RE: Mary Corey 1955 RM: [id number]. Reaching out via [employer] vs text in hopes to not bother you if you are off today. There has been no ENT follow-up with Mr. Alayna Jacobsen that I can see. I got a better fit with his Joselin Maybobbi today but I cannot get that flexivoice to stay no matter what I do.  I'm thinking he might need a downsize in [employer]? Wondering if you knew if there would be any follow-up? I know the original plan was for Thursday. Thank you! -Chema Need Callback: NO CALLBACK REQ SPEECH THERAPY        Read 1:18 PM           MD Response: If he can't tolerate the button, you can put the size 4 trache back in          I revisited the patient this afternoon to re-assess placement of Bc button. Again, bilateral ties were           tightly adjusted and bc button was not in place with notable bilateral tension. I removed the button            and cleaned it. Discussed with patient who wishes to replace size 4 shiley. New trach and trach collar        obtained and placed. Pt with audible voicing with considerable air leak. I am in contact with                           otolaryngology department at [facility] as they provided original recommendation. · 04/11: Pt's current D 14mm 18L Larrybutton is ill-fitting, as above. Enquired about ordering smaller size, however, we are trying to find placement for the patient at this and he will likely be discharged once equipment would arive. Pt is 90% intelligible with Shiley #4 today (capped). Better with chin down posture for some added occlusion. Pt has concerns regarding need to call bank and being understood on the phone. Discussed breath support, chin down posture, and volume techniques to enhance intelligibility. Good carryover noted. I recommend the patient f/o OP with Lilli Cabrera and [facility] Timothy Turner [facility] is [facility], however, pt continues to report he is moving to [place] once home is renovated, thus follow-up will be a concern. Pt states he has looked into ENT office in [place] and been in contact. Unable to verify. This. Will continue to follow as able to offer support/techniques with speech intelligibility. Will consider ordering small diameter bc button if d/c planning is halted. Pt agreeable to POC at this time. · 04/12: Pt seen at bedside. No new changes. Remains with Shiley #4 capped trach in place. Pt continues to voice concerns talking on phone. Discussed breath support, chin down posture, and volume techniques to enhance intelligibility. Good carryover noted. I continue to recommend the patient f/o OP with Jl Gregorio and Dr. Alfred Ch at [facility]. Stomaplasty is likely best course of action, however, pt continues to report he is moving to [place] once home is renovated, thus follow-up will be a concern. Pt states he has looked into ENT office in [place] and been in contact. Unable to verify. This. Will continue to follow as able to offer support/techniques with speech intelligibility. Will consider ordering small diameter izzy button if d/c planning is halted. Pt agreeable to POC at this time. · 04/13: Placement currently pending. Pt hoping to leave today. No new case management notes at time of this note. Pt independent with chin-down posture today with much improved speech intelligibility with capped shiley size 4. Trach care completed, trach collar replaced, and stoma cleaned by SLP upon pt request. No new concerns at this time.  There is additional concern with pt's lack of ability to remove the valve himself, due to BLE neuropath which greatly reduced his sensation to almost none. The patient MUST have valve removed for sleep due to upper airway collapse and sleep apnea.  There is concern for continuity of care for multiple reasons. This is off-market use of  Carrie Aimee button, therefore there is limited staff training. If our staff here were trained, that would only allow for temporary use as pt will ultimately d/c. Pt does not want LTC, however, he was unable to care for himself at home.  [facility] is not willing to accept pt due to him leaving AMA last admission and [facility] will not accept due to unsafe d/c.    Did enquire with PT/OT about possible DME to enhance pt's ability to remove valve independently on 04/05.  Regardless, due to ill-fit of [place]. Dr. Junito Lan wants pt to leave button in place until Thursday- today, with no valve to attempt to alter stoma shape for better fit.  Unsure of pt's carryover of current management and POC.  Pt' speech intelligibility is ~90% with trach cap and HME. ~70% intelligible with trach removed.  Respiratory Status: Pt with SPO2% of 94 on  RA with RR of 20     Education: SLP edu pt re: Role of SLP, POC, Evidenced based practice for current recommendations and treatment and Risks of not following recommendations. Pt verbalized understanding, would benefit from ongoing education and RN aware of recommendations  · Assessment: See above.  Recommendations: Blanca Borne button in place until Thursday, will re-attempt HME then. ENT to re-assess Thursday. Leave decannulated. Speech/Language/Cog Goals:  Short Term Goals  Timeframe for Short Term Goals: 5 days (04/10/2022)- extend to 04/15/2022  Goal 1: The patient will achieve functional communication via various modalities (finger occlusion, HME, change in head posture to reduce leak, etc.) with 100% intelligibility in various contexts as judged by SLP  4/13/2022 : Goal addressed, see above. Ongoing, progressing. LongTerm Goals:  Timeframe for Long Term Goals: 7 days (04/12/2022)- extend to 04/17/2022  Goal 1: The pt will develop speech communication that is functional and intelligible in this environment as assessed by the patient or communication partner 90%-100%  4/13/2022 : Ongoing, progressing.'    Plan:    Continued treatment with goals per plan of care. Discharge Recommendations: Recommend SLP tx at discharge. Recommend ENT follow-up. Recommend OP follow-up with Dr. Michelle Troncoso (ENT) and Nissa Lindquist (SLP) at [facility] otol[facility]ology (treatment team familiar with patient)    If pt discharges from hospital prior to Speech/Swallowing discharge, this note serves as tx and discharge summary.      Total Treatment Time / Charges     Time in Time out Total Time / units   Cognitive Tx         Speech Tx 0900 0925 25 mins / 1 unit     Dysphagia Tx        Signature:  Rogelio Abdi M.A., Reshma Clark #[id number]  Speech-Language Pathologist  Phone: [phone]

## 2022-04-13 NOTE — DISCHARGE SUMMARY
Name:  Luis Antonio Vanegas  Room:  4297/0901-56  MRN:    [id number]    Discharge Summary      This discharge summary is in conjunction with a complete physical exam done on the day of discharge. Discharging Physician: Dr. Sylvia Chase: 4/1/2022  Discharge:  4/13/2022    HPI taken from admission H&P:    The patient is a 73 y.o. male with h/o CVA, hypertension, hyperlipidemia, h/o DVT/PE, DM, Gout, COPD, CHF, h/o tracheal stenosis s/p tracheostomy who presents to [facility] with c/o multiple falls. He was discharged from rehab and unable to walk. He is living in a hotel. He as fallen twice. He has not been taking his medications or been able to care for himself.       Vitals stable. On RA. Labs with TRICIA. Baseline 1.5. Creatinine 2.2 on admission. CT head negative. Admitted to med-surg. Diagnoses this Admission and Hospital Course   TRICIA on CKD stage 3a  - Baseline ~ 1.5  - creatinine 2.2 on admission  - Given IVF's. -demadex and ACEI was held   - creat improved   - resumed meds as tolerated      Multiple falls, Generalized  weakness  - PT/OT eval  - SNF placement recommended but has no more rehab days left  - PT and OT to see today  -d/c to SNF         H/o CVA  - on ASA and  Pravastatin     H/o tracheal stenosis  - s/p tracheostomy. And scheduled for Bc tube  at [facility] this week  - consulted ENT and bc tube placed  - sputum with pseudomonas likely colonization - no abx      Hypertension  -lisinopril held and resumed now      H/o PE/DVT  - not on AC     Depression  - continue home medication regimen.     DM type 2  -continue home regimen     COPD  - stable. No AE  - continue Symbicort, Singulair     Chronic diastolic CHF  - stable.  No s/s decompensation  - continue Demadex     BPH  - on Flomax.     Iron deficiency anemia  - continue ferrous sulfate    Procedures (Please Review Full Report for Details)  N/A    Consults    Wound care  Psychiatry   ENT       Physical Exam at Discharge:    BP 131/66   Pulse 56   Temp 97.5 °F (36.4 °C) (Oral)   Resp 14   Ht 5' 10\" (1.778 m)   Wt 231 lb 8 oz (105 kg)   SpO2 97%   BMI 33.22 kg/m²   General: middle aged obese male  Awake, alert and oriented. Appears to be not in any distress  Mucous Membranes:  Pink , anicteric  Tracheostomy with izzy tube noted   Neck: No JVD, no carotid bruit, no thyromegaly  Chest:  Clear to auscultation bilaterally, diminished in bases  Cardiovascular:  RRR S1S2 heard, no murmurs or gallops  Abdomen:  Soft, obese undistended, non tender, no organomegaly, BS present  Pain pump in left side of abd  Extremities: left heel almost healed linear ulcer with no infection noted   No edema or cyanosis. Distal pulses well felt  Neurological : grossly normal    Lab Results   Component Value Date    WBC 4.7 04/03/2022    HGB 12.3 (L) 04/03/2022    HCT 36.8 (L) 04/03/2022    MCV 81.9 04/03/2022     (L) 04/03/2022     Lab Results   Component Value Date     04/13/2022    K 4.1 04/13/2022     04/13/2022    CO2 29 04/13/2022    BUN 33 (H) 04/13/2022    CREATININE 1.8 (H) 04/13/2022    GLUCOSE 79 04/13/2022    CALCIUM 9.3 04/13/2022    PROT 6.6 04/01/2022    LABALBU 4.5 04/01/2022    BILITOT 0.6 04/01/2022    ALKPHOS 141 (H) 04/01/2022    AST 31 04/01/2022    ALT 37 04/01/2022    LABGLOM 38 (A) 04/13/2022    GFRAA 46 (A) 04/13/2022    AGRATIO 2.1 04/01/2022    GLOB 2.4 05/08/2020           CULTURES  Sputum culture + pseudomonas   COVID and Flu negative      RADIOLOGY  CT HEAD WO CONTRAST   Final Result   Stable CT scan of the head without acute intracranial abnormality.                Discharge Medications     Medication List      ASK your doctor about these medications    Advair Diskus 250-50 MCG/DOSE Aepb  Generic drug: fluticasone-salmeterol     ARIPiprazole 30 MG tablet  Commonly known as: ABILIFY     atenolol 25 MG tablet  Commonly known as: TENORMIN  Take 1 tablet by mouth daily     buPROPion 200 MG extended release tablet  Commonly known as: WELLBUTRIN SR     COLACE PO     eplerenone 25 MG tablet  Commonly known as: INSPRA     ferrous sulfate 325 (65 Fe) MG tablet  Commonly known as: IRON 325     fexofenadine 180 MG tablet  Commonly known as: ALLEGRA     FLOMAX PO     glipiZIDE 10 MG extended release tablet  Commonly known as: GLUCOTROL XL     insulin glargine 100 UNIT/ML injection vial  Commonly known as: LANTUS  Inject 30 Units into the skin nightly     * insulin lispro 100 UNIT/ML injection vial  Commonly known as: HUMALOG  Inject 0-18 Units into the skin 3 times daily (with meals)     * insulin lispro 100 UNIT/ML injection vial  Commonly known as: HUMALOG  Inject 0-9 Units into the skin nightly     * insulin lispro 100 UNIT/ML injection vial  Commonly known as: HUMALOG  Inject 15 Units into the skin 3 times daily (with meals)     lidocaine 2 % jelly  Commonly known as: XYLOCAINE  Apply topically as needed for Trach change     lisinopril 10 MG tablet  Commonly known as: PRINIVIL;ZESTRIL     methylphenidate 10 MG tablet  Commonly known as: RITALIN     potassium chloride 10 MEQ extended release tablet  Commonly known as: KLOR-CON M     pravastatin 40 MG tablet  Commonly known as: PRAVACHOL     pregabalin 100 MG capsule  Commonly known as: LYRICA     sodium chloride 0.9 % SOLN 40 mL with SUFentanil 50 MCG/ML SOLN 5 mcg/mL     therapeutic multivitamin-minerals tablet     torsemide 20 MG tablet  Commonly known as: DEMADEX  Take 1 tablet by mouth daily     zafirlukast 20 MG tablet  Commonly known as: ACCOLATE     zinc gluconate 50 MG tablet         * This list has 3 medication(s) that are the same as other medications prescribed for you. Read the directions carefully, and ask your doctor or other care provider to review them with you. Discharged in stable condition to [facility]    Follow Up:   Follow up with physician at [facility]        More than 30 mts spent      Tiffany Campoverde MD 4/13/2022 3:28 PM

## 2022-04-13 NOTE — PROGRESS NOTES
Tried to call report in at [facility]. No answer at 1337. Called again at 1350, was on hold for nurse but was hung up on. Pt verbalized understanding denies any questions/ needs at this time. Transport came to transport pt to facility.

## 2022-04-13 NOTE — PLAN OF CARE
Problem: Falls - Risk of:  Goal: Will remain free from falls  Description: Will remain free from falls  4/13/2022 1439 by Gil Barba RN  Outcome: Ongoing  4/13/2022 0242 by Pedro Berumen RN  Outcome: Ongoing  Goal: Absence of physical injury  Description: Absence of physical injury  4/13/2022 1439 by Gil Barba RN  Outcome: Ongoing  4/13/2022 0242 by Pedro Berumen RN  Outcome: Ongoing     Problem: Infection:  Goal: Will remain free from infection  Description: Will remain free from infection  4/13/2022 1439 by Gil Barba RN  Outcome: Ongoing  4/13/2022 0242 by Pedro Berumen RN  Outcome: Ongoing     Problem: Safety:  Goal: Free from accidental physical injury  Description: Free from accidental physical injury  4/13/2022 1439 by Gil Barba RN  Outcome: Ongoing  4/13/2022 0242 by Pedro Berumen RN  Outcome: Ongoing  Goal: Free from intentional harm  Description: Free from intentional harm  4/13/2022 1439 by Gil Barba RN  Outcome: Ongoing  4/13/2022 0242 by Pedro eBrumen RN  Outcome: Ongoing     Problem: Daily Care:  Goal: Daily care needs are met  Description: Daily care needs are met  4/13/2022 1439 by Gil Barba RN  Outcome: Ongoing  4/13/2022 0242 by Pedro Berumen RN  Outcome: Ongoing     Problem: Pain:  Goal: Patient's pain/discomfort is manageable  Description: Patient's pain/discomfort is manageable  4/13/2022 1439 by Gil Barba RN  Outcome: Ongoing  4/13/2022 0242 by Pedro Berumen RN  Outcome: Ongoing  Goal: Pain level will decrease  Description: Pain level will decrease  4/13/2022 1439 by Gil Barba RN  Outcome: Ongoing  4/13/2022 0242 by Pedro Berumen RN  Outcome: Ongoing  Goal: Control of acute pain  Description: Control of acute pain  4/13/2022 1439 by Gil Barba RN  Outcome: Ongoing  4/13/2022 0242 by Pedro Berumen RN  Outcome: Ongoing  Goal: Control of chronic pain  Description: Control of chronic pain  4/13/2022 1439 by Gil Barba RN  Outcome: Ongoing  4/13/2022 0242 by Gage Kelly RN  Outcome: Ongoing     Problem: Skin Integrity:  Goal: Skin integrity will stabilize  Description: Skin integrity will stabilize  4/13/2022 1439 by Angelito Morin RN  Outcome: Ongoing  4/13/2022 0242 by Gage Kelly RN  Outcome: Ongoing     Problem: Discharge Planning:  Goal: Patients continuum of care needs are met  Description: Patients continuum of care needs are met  4/13/2022 1439 by Angelito Morin RN  Outcome: Ongoing  4/13/2022 0242 by Gage Kelly RN  Outcome: Ongoing     Problem: Airway Clearance - Ineffective:  Goal: Ability to maintain a clear airway will improve  Description: Ability to maintain a clear airway will improve  4/13/2022 1439 by Angelito Morin RN  Outcome: Ongoing  4/13/2022 0242 by Gage Kelly RN  Outcome: Ongoing     Problem: Skin Integrity:  Goal: Will show no infection signs and symptoms  Description: Will show no infection signs and symptoms  4/13/2022 1439 by Angelito Morin RN  Outcome: Ongoing  4/13/2022 0242 by Gage Kelly RN  Outcome: Ongoing  Goal: Absence of new skin breakdown  Description: Absence of new skin breakdown  4/13/2022 1439 by Angelito Morin RN  Outcome: Ongoing  4/13/2022 0242 by Gage Kelly RN  Outcome: Ongoing     Problem: Nutrition  Goal: Optimal nutrition therapy  4/13/2022 1439 by Angelito Morin RN  Outcome: Ongoing  4/13/2022 0242 by Gage Kelly RN  Outcome: Ongoing

## (undated) DEVICE — ENDO CARRY-ON PROCEDURE KIT INCLUDES SUCTION TUBING, LUBRICANT, GAUZE, BIOHAZARD STICKER, TRANSPORT PAD AND INTERCEPT BEDSIDE KIT.: Brand: ENDO CARRY-ON PROCEDURE KIT

## (undated) DEVICE — CONMED SCOPE SAVER BITE BLOCK, 20X27 MM: Brand: SCOPE SAVER

## (undated) DEVICE — NEBULIZER AEROSOL TBNG 7 FT FOR ACUTE CARE NEBUTECH

## (undated) DEVICE — SHEET,DRAPE,40X58,STERILE: Brand: MEDLINE

## (undated) DEVICE — LENS EYEGLASS LTWT REPL DISP SAFEVIEW

## (undated) DEVICE — AIRLIFE™ ADULT AEROSOL MASK VINYL, UNDER-THE-CHIN STYLE: Brand: AIRLIFE™

## (undated) DEVICE — SYRINGE MED 50ML LUERSLIP TIP

## (undated) DEVICE — SYRINGE MED 10ML SLIP TIP BLNT FILL AND LUERLOCK DISP

## (undated) DEVICE — SINGLE USE SUCTION VALVE MAJ-209: Brand: SINGLE USE SUCTION VALVE (STERILE)

## (undated) DEVICE — SINGLE USE BIOPSY VALVE MAJ-210: Brand: SINGLE USE BIOPSY VALVE (STERILE)